# Patient Record
Sex: MALE | Race: WHITE | NOT HISPANIC OR LATINO | Employment: OTHER | URBAN - METROPOLITAN AREA
[De-identification: names, ages, dates, MRNs, and addresses within clinical notes are randomized per-mention and may not be internally consistent; named-entity substitution may affect disease eponyms.]

---

## 2017-02-01 ENCOUNTER — OFFICE VISIT (OUTPATIENT)
Dept: FAMILY MEDICINE | Facility: CLINIC | Age: 77
End: 2017-02-01
Payer: MEDICARE

## 2017-02-01 VITALS
SYSTOLIC BLOOD PRESSURE: 131 MMHG | DIASTOLIC BLOOD PRESSURE: 70 MMHG | TEMPERATURE: 98 F | BODY MASS INDEX: 26.32 KG/M2 | WEIGHT: 173.63 LBS | HEART RATE: 55 BPM | OXYGEN SATURATION: 98 % | HEIGHT: 68 IN

## 2017-02-01 DIAGNOSIS — R10.11 RUQ ABDOMINAL PAIN: Primary | ICD-10-CM

## 2017-02-01 PROCEDURE — 99999 PR PBB SHADOW E&M-EST. PATIENT-LVL III: CPT | Mod: PBBFAC,,, | Performed by: NURSE PRACTITIONER

## 2017-02-01 PROCEDURE — 1125F AMNT PAIN NOTED PAIN PRSNT: CPT | Mod: S$GLB,,, | Performed by: NURSE PRACTITIONER

## 2017-02-01 PROCEDURE — 1159F MED LIST DOCD IN RCRD: CPT | Mod: S$GLB,,, | Performed by: NURSE PRACTITIONER

## 2017-02-01 PROCEDURE — 99213 OFFICE O/P EST LOW 20 MIN: CPT | Mod: S$GLB,,, | Performed by: NURSE PRACTITIONER

## 2017-02-01 PROCEDURE — 1157F ADVNC CARE PLAN IN RCRD: CPT | Mod: S$GLB,,, | Performed by: NURSE PRACTITIONER

## 2017-02-01 PROCEDURE — 1160F RVW MEDS BY RX/DR IN RCRD: CPT | Mod: S$GLB,,, | Performed by: NURSE PRACTITIONER

## 2017-02-01 NOTE — PROGRESS NOTES
"History of Present Illness   Joo York is a 77 y.o. man with no significant past medical history who presents today with RUQ abdominal pain. He states pain has been intermittent over the past three days. The pain is a sharp pain which lasts for seconds-minutes and resolves spontaneously. He denies associated nausea, vomiting, or diarrhea. He has been having normal bowel movements. No black tarry stools or blood in stool. He has no had fevers. Since onset of pain he has switched to a bland diet which has helped somewhat. Pain is not reproducible, and he denies known injury or trauma. Pain is not present on exam today. He has no additional complaints and is otherwise healthy on today's visit.       History reviewed. No pertinent past medical history.    Past Surgical History   Procedure Laterality Date    Vasectomy      Tonsillectomy         Social History     Social History    Marital status:      Spouse name: N/A    Number of children: N/A    Years of education: N/A     Social History Main Topics    Smoking status: Never Smoker    Smokeless tobacco: None    Alcohol use None    Drug use: None    Sexual activity: Not Asked     Other Topics Concern    None     Social History Narrative       Family History   Problem Relation Age of Onset    Cancer Brother        Review of Systems  Review of Systems   Constitutional: Negative for chills and fever.   Respiratory: Negative for shortness of breath.    Cardiovascular: Negative for chest pain and palpitations.   Gastrointestinal: Positive for abdominal pain (RUQ). Negative for blood in stool, constipation, diarrhea, heartburn, nausea and vomiting.   Genitourinary: Negative for dysuria, flank pain, frequency and urgency.     A complete review of systems was otherwise negative.    Physical Exam  Visit Vitals    /70 (BP Location: Left arm, Patient Position: Sitting, BP Method: Manual)    Pulse (!) 55    Temp 98.2 °F (36.8 °C) (Oral)    Ht 5' 8" " (1.727 m)    Wt 78.8 kg (173 lb 9.8 oz)    SpO2 98%    BMI 26.4 kg/m2     General appearance: alert, appears stated age, cooperative and no distress  Back: symmetric, no curvature. ROM normal. No CVA tenderness.  Lungs: clear to auscultation bilaterally  Chest wall: no tenderness  Heart: regular rate and rhythm, S1, S2 normal, no murmur, click, rub or gallop  Abdomen: soft, non-tender; bowel sounds normal; no masses,  no organomegaly and no rebound tenderness, guarding, or rigidity.   Neurologic: Grossly normal    Assessment/Plan  RUQ abdominal pain  Patient is nontoxic and well appearing on exam today. Outpatient ultrasound ordered to evaluate for possible gallbladder pathology. He will schedule appointment with a PCP to establish care. Instructed patient to go to ER if he develops persistent pain, fever, nausea, vomiting, or other worrisome complaints. Continue bland diet and advance as tolerated. Contact me for any questions or concerns. He has verbalized understanding and is in agreement with plan of care.   -     US Abdomen Complete; Future; Expected date: 2/1/17      Return if symptoms worsen or fail to improve.

## 2017-02-01 NOTE — PATIENT INSTRUCTIONS
Uncertain Causes of Abdominal Pain (Male)  Based on your visit today, the exact cause of your abdominal pain is not clear. Your exam and tests do not suggest a dangerous cause at this time. However, the signs of a serious problem may take more time to appear. Although your evaluation was reassuring today, sometimes early in the course of many conditions, exam and lab tests can appear normal. Therefore, it is important for you to watch for any new symptoms or worsening of your condition.  It may not be obvious what caused your symptoms. Pay attention to things that do seem to make your symptoms worse or better and discuss this with your doctor when you follow up.  The evaluation of abdominal pain in the emergency department may only require an exam by the doctor or it may include blood, urine or imaging studies, depending on many factors. Sometimes exams and tests can identify a cause but in many cases, a clear cause is not found. Further testing at follow up visits may help to suggest a clear diagnosis.  Home care  · Rest as much as you can until your next exam.  · Try to avoid any medicines (unless otherwise directed by your doctor), foods, activities, or other factors that may have contributed to your symptoms.  · Try to eat foods that you know that you have tolerated well in the past. Certain diets may be recommended for some conditions that cause abdominal pain. However, since the cause of your symptoms may not be clear, discuss your diet more with your healthcare provider or specialist for further recommendations.   · If you have diarrhea, it may help to avoid dairy (lactose) for the time being. A low fat, low fiber diet can also help.  · Eating several small meals per day as opposed to 2 or 3 larger meals may help.  · Avoid dehydration. Make sure to drink plenty of water. Other options include broth, soup, gelatin, sports drinks, or other clear liquids.  · Watch closely for anything that may make your  symptoms worse or better. Pay close attention to symptoms below that may mean your condition is getting worse.  Follow-up care  Follow up with your healthcare provider if your symptoms are not improving, or as advised. In some cases, you may need more testing.  When to seek medical advice  Call your healthcare provider right away if any of these occur:  · Pain is becoming worse  · You are unable to take your medicines or can't keep water down due to excessive vomiting  · Swelling of the abdomen  · Fever of 100.4ºF (38ºC) or higher, or as directed by your healthcare provider  · Blood in vomit or bowel movements (dark red or black color)  · Jaundice (yellow color of eyes and skin)  · New onset of weakness, dizziness or fainting  · New onset of chest, arm, back, neck or jaw pain  © 9563-2698 [employer]. [address] [place] [address], [place] [address]. All rights reserved. This information is not intended as a substitute for professional medical care. Always follow your healthcare professional's instructions.

## 2017-02-01 NOTE — MR AVS SNAPSHOT
"    [facility]  [address] [place] [address]  Phone: [phone]  Fax: [phone]                  Joo York   2017 11:00 AM   Office Visit    Description:  Male : 1940   Provider:  Tenisha Perez NP   Department:  [facility]           Reason for Visit     PAIN ON RIGHT SIDE           Diagnoses this Visit        Comments    RUQ abdominal pain    -  Primary            To Do List           Future Appointments        Provider Department Dept Phone    2017 10:30 AM [facility] US1 - ROOM 3 [facility] [phone]      Goals (5 Years of Data)     None      Follow-Up and Disposition     Return if symptoms worsen or fail to improve.      [facility]     [facility] Care [facility] -  Assistance  Registered nurses in the [facility] provide clinical advisement, health education, appointment booking, and other advisory services.  Call for this free service at [phone].             Medications           Message regarding Medications     Verify the changes and/or additions to your medication regime listed below are the same as discussed with your clinician today.  If any of these changes or additions are incorrect, please notify your healthcare provider.             Verify that the below list of medications is an accurate representation of the medications you are currently taking.  If none reported, the list may be blank. If incorrect, please contact your healthcare provider. Carry this list with you in case of emergency.                Clinical Reference Information           Vital Signs - Last Recorded  Most recent update: 2017 10:57 AM by Lynda Carreon    BP Pulse Temp Ht Wt SpO2    131/70 (BP Location: Left arm, Patient Position: Sitting, BP Method: Manual) (!) 55 98.2 °F (36.8 °C) (Oral) 5' 8" (1.727 m) 78.8 kg (173 lb 9.8 oz) 98%    BMI                26.4 kg/m2          Blood Pressure          Most Recent " Value    BP  131/70      Allergies as of 2/1/2017     No Known Allergies      Immunizations Administered on Date of Encounter - 2/1/2017     None      Orders Placed During Today's Visit     Future Labs/Procedures Expected by Expires    US Abdomen Complete  2/1/2017 2/1/2018      Instructions      Uncertain Causes of Abdominal Pain (Male)  Based on your visit today, the exact cause of your abdominal pain is not clear. Your exam and tests do not suggest a dangerous cause at this time. However, the signs of a serious problem may take more time to appear. Although your evaluation was reassuring today, sometimes early in the course of many conditions, exam and lab tests can appear normal. Therefore, it is important for you to watch for any new symptoms or worsening of your condition.  It may not be obvious what caused your symptoms. Pay attention to things that do seem to make your symptoms worse or better and discuss this with your doctor when you follow up.  The evaluation of abdominal pain in the emergency department may only require an exam by the doctor or it may include blood, urine or imaging studies, depending on many factors. Sometimes exams and tests can identify a cause but in many cases, a clear cause is not found. Further testing at follow up visits may help to suggest a clear diagnosis.  Home care  · Rest as much as you can until your next exam.  · Try to avoid any medicines (unless otherwise directed by your doctor), foods, activities, or other factors that may have contributed to your symptoms.  · Try to eat foods that you know that you have tolerated well in the past. Certain diets may be recommended for some conditions that cause abdominal pain. However, since the cause of your symptoms may not be clear, discuss your diet more with your healthcare provider or specialist for further recommendations.   · If you have diarrhea, it may help to avoid dairy (lactose) for the time being. A low fat, low fiber  diet can also help.  · Eating several small meals per day as opposed to 2 or 3 larger meals may help.  · Avoid dehydration. Make sure to drink plenty of water. Other options include broth, soup, gelatin, sports drinks, or other clear liquids.  · Watch closely for anything that may make your symptoms worse or better. Pay close attention to symptoms below that may mean your condition is getting worse.  Follow-up care  Follow up with your healthcare provider if your symptoms are not improving, or as advised. In some cases, you may need more testing.  When to seek medical advice  Call your healthcare provider right away if any of these occur:  · Pain is becoming worse  · You are unable to take your medicines or can't keep water down due to excessive vomiting  · Swelling of the abdomen  · Fever of 100.4ºF (38ºC) or higher, or as directed by your healthcare provider  · Blood in vomit or bowel movements (dark red or black color)  · Jaundice (yellow color of eyes and skin)  · New onset of weakness, dizziness or fainting  · New onset of chest, arm, back, neck or jaw pain  © 5566-8046 [employer]. [address] [place] [address]. All rights reserved. This information is not intended as a substitute for professional medical care. Always follow your healthcare professional's instructions.

## 2017-02-03 ENCOUNTER — HOSPITAL ENCOUNTER (OUTPATIENT)
Dept: RADIOLOGY | Facility: HOSPITAL | Age: 77
Discharge: HOME | End: 2017-02-03
Attending: NURSE PRACTITIONER
Payer: MEDICARE

## 2017-02-03 ENCOUNTER — OFFICE VISIT (OUTPATIENT)
Dept: FAMILY MEDICINE | Facility: CLINIC | Age: 77
End: 2017-02-03
Payer: MEDICARE

## 2017-02-03 ENCOUNTER — PATIENT MESSAGE (OUTPATIENT)
Dept: FAMILY MEDICINE | Facility: CLINIC | Age: 77
End: 2017-02-03

## 2017-02-03 VITALS
HEIGHT: 68 IN | RESPIRATION RATE: 17 BRPM | OXYGEN SATURATION: 98 % | DIASTOLIC BLOOD PRESSURE: 90 MMHG | TEMPERATURE: 97 F | SYSTOLIC BLOOD PRESSURE: 144 MMHG | BODY MASS INDEX: 25.9 KG/M2 | WEIGHT: 170.88 LBS | HEART RATE: 52 BPM

## 2017-02-03 DIAGNOSIS — R10.11 RUQ ABDOMINAL PAIN: ICD-10-CM

## 2017-02-03 DIAGNOSIS — K75.81 NASH (NONALCOHOLIC STEATOHEPATITIS): ICD-10-CM

## 2017-02-03 DIAGNOSIS — R10.11 RIGHT UPPER QUADRANT ABDOMINAL PAIN: Primary | ICD-10-CM

## 2017-02-03 PROCEDURE — 1159F MED LIST DOCD IN RCRD: CPT | Mod: S$GLB,,, | Performed by: INTERNAL MEDICINE

## 2017-02-03 PROCEDURE — 99999 PR PBB SHADOW E&M-EST. PATIENT-LVL III: CPT | Mod: PBBFAC,,, | Performed by: INTERNAL MEDICINE

## 2017-02-03 PROCEDURE — 99214 OFFICE O/P EST MOD 30 MIN: CPT | Mod: S$GLB,,, | Performed by: INTERNAL MEDICINE

## 2017-02-03 PROCEDURE — 1157F ADVNC CARE PLAN IN RCRD: CPT | Mod: S$GLB,,, | Performed by: INTERNAL MEDICINE

## 2017-02-03 PROCEDURE — 76700 US EXAM ABDOM COMPLETE: CPT | Mod: 26,,, | Performed by: RADIOLOGY

## 2017-02-03 PROCEDURE — 1125F AMNT PAIN NOTED PAIN PRSNT: CPT | Mod: S$GLB,,, | Performed by: INTERNAL MEDICINE

## 2017-02-03 PROCEDURE — 1160F RVW MEDS BY RX/DR IN RCRD: CPT | Mod: S$GLB,,, | Performed by: INTERNAL MEDICINE

## 2017-02-03 RX ORDER — TIZANIDINE 4 MG/1
4 TABLET ORAL NIGHTLY PRN
Qty: 15 TABLET | Refills: 0 | Status: SHIPPED | OUTPATIENT
Start: 2017-02-03 | End: 2017-02-13

## 2017-02-03 NOTE — PROGRESS NOTES
"Subjective:       Patient ID: Joo York is a 77 y.o. male.    Chief Complaint: Establish Care and Abdominal Pain    HPI Comments: Abdominal pain x five days    HPI: 78 y/o reports five day history of right upper quadrent pain. Pain worse when lying on right side at night no overlying skin changes no change with PO intake no nausea/vomitting. No GERD type symptoms no change in pain with movement of right shoulder/arm. Did try to hang a television two days prior to onset of pain. No pain currently riding bike for exercise three to four tiimes per week. Has tried tyelonol and topical heat which help to relieve the pain    Review of Systems   Constitutional: Negative for activity change, fever and unexpected weight change.   HENT: Negative for congestion, rhinorrhea, sore throat and trouble swallowing.    Eyes: Negative for photophobia and redness.   Respiratory: Negative for cough, chest tightness, shortness of breath and wheezing.    Cardiovascular: Negative for chest pain, palpitations and leg swelling.   Gastrointestinal: Negative for abdominal pain, blood in stool, constipation, diarrhea, nausea and vomiting.   Endocrine: Negative for cold intolerance, heat intolerance and polyuria.   Genitourinary: Negative for decreased urine volume, difficulty urinating, dysuria and urgency.   Musculoskeletal: Negative for arthralgias and back pain.   Skin: Negative for rash.   Neurological: Negative for dizziness, syncope, weakness and headaches.   Psychiatric/Behavioral: Negative for dysphoric mood, sleep disturbance and suicidal ideas.       Objective:     Vitals:    02/03/17 1042   BP: (!) 144/90   BP Location: Right arm   Patient Position: Sitting   BP Method: Manual   Pulse: (!) 52   Resp: 17   Temp: 97.4 °F (36.3 °C)   TempSrc: Oral   SpO2: 98%   Weight: 77.5 kg (170 lb 13.7 oz)   Height: 5' 8" (1.727 m)          Physical Exam   Constitutional: He is oriented to person, place, and time. He appears well-developed and " well-nourished.   HENT:   Head: Normocephalic and atraumatic.   Eyes: Conjunctivae are normal. Pupils are equal, round, and reactive to light.   Neck: Normal range of motion.   Cardiovascular: Normal rate and regular rhythm.  Exam reveals no gallop and no friction rub.    No murmur heard.  Pulmonary/Chest: Effort normal and breath sounds normal. He has no wheezes. He has no rales.   Abdominal: Soft. Bowel sounds are normal. There is no tenderness. There is no rebound and no guarding.   Negative willoughby's sign   Musculoskeletal: Normal range of motion. He exhibits no edema or tenderness.   Neurological: He is alert and oriented to person, place, and time. No cranial nerve deficit.   Skin: Skin is warm and dry.   No rashes or skin changes over axilla   Psychiatric: He has a normal mood and affect.     RUQ ultrasound done today shows hypoechoic liver withou distinct masses no evidence of cholelithiasis small punctate stone in right renal pelvis  Assessment:       1. Right upper quadrant abdominal pain    2. BARAHONA (nonalcoholic steatohepatitis)        Plan:         1. Suspect MSK as cause given relief with topical heat and APAP, given evidence of fatting liver hold APAP topical heating cream daily and muscle relaxer nightly. Check urine for blood but doubt imaged renal stone causing symptoms    2. lft's and lipid panel today

## 2017-02-03 NOTE — MR AVS SNAPSHOT
[facility]  [address]  [place] [address]  Phone: [phone]                  Joo York   2/3/2017 11:00 AM   Office Visit    Description:  Male : 1940   Provider:  Keegan Watson MD   Department:  [facility]           Reason for Visit     Establish Care     Abdominal Pain           Diagnoses this Visit        Comments    Right upper quadrant abdominal pain    -  Primary     BARAHONA (nonalcoholic steatohepatitis)                To Do List           Goals (5 Years of Data)     None       These Medications        Disp Refills Start End    tizanidine (ZANAFLEX) 4 MG tablet 15 tablet 0 2/3/2017 2017    Take 1 tablet (4 mg total) by mouth nightly as needed. - Oral    Pharmacy: [facility] - [facility] [address] #: [phone]         [facility]     [facility] -  Assistance  Registered nurses in the [facility] provide clinical advisement, health education, appointment booking, and other advisory services.  Call for this free service at [phone].             Medications           Message regarding Medications     Verify the changes and/or additions to your medication regime listed below are the same as discussed with your clinician today.  If any of these changes or additions are incorrect, please notify your healthcare provider.        START taking these NEW medications        Refills    tizanidine (ZANAFLEX) 4 MG tablet 0    Sig: Take 1 tablet (4 mg total) by mouth nightly as needed.    Class: Normal    Route: Oral           Verify that the below list of medications is an accurate representation of the medications you are currently taking.  If none reported, the list may be blank. If incorrect, please contact your healthcare provider. Carry this list with you in case of emergency.           Current Medications     KRILL/OM-3/DHA/EPA/PHOSPHO/AST (MEGARED OMEGA-3 KRILL OIL ORAL) Take  "by mouth.    multivitamin-minerals-lutein Tab Take 1 tablet by mouth once daily.    SAW PALMETTO ORAL Take by mouth.    SAW/VIT E/SOD FRANSISCO/LYC/BETA/PYG (PROSTATE HEALTH ORAL) Take by mouth.    tizanidine (ZANAFLEX) 4 MG tablet Take 1 tablet (4 mg total) by mouth nightly as needed.           Clinical Reference Information           Your Vitals Were     BP Pulse Temp Resp Height Weight    144/90 (BP Location: Right arm, Patient Position: Sitting, BP Method: Manual) 52 97.4 °F (36.3 °C) (Oral) 17 5' 8" (1.727 m) 77.5 kg (170 lb 13.7 oz)    SpO2 BMI             98% 25.98 kg/m2         Blood Pressure          Most Recent Value    BP  (!)  144/90      Allergies as of 2/3/2017     No Known Allergies      Immunizations Administered on Date of Encounter - 2/3/2017     None      Orders Placed During Today's Visit     Future Labs/Procedures Expected by Expires    CBC auto differential  2/3/2017 2/3/2018    Comprehensive metabolic panel  2/3/2017 2/3/2018    Lipid panel  2/3/2017 2/3/2018    Urinalysis  2/3/2017 4/4/2018      Instructions    capscasin (over the counter heating cream) apply to skin once to twice per day       Language Assistance Services     ATTENTION: Language assistance services are available, free of charge. Please call [phone].      ATENCIÓN: Si maria elenala belkis, tiene a hensley disposición servicios gratuitos de asistencia lingüística. Llame al [phone].     CHÚ Ý: N?u b?n nói Ti?ng Vi?t, có các d?ch v? h? tr? ngôn ng? mi?n phí dành cho b?n. G?i s? [phone].         [facility] complies with applicable Federal civil rights laws and does not discriminate on the basis of race, color, national origin, age, disability, or sex.        "

## 2017-02-06 ENCOUNTER — OFFICE VISIT (OUTPATIENT)
Dept: FAMILY MEDICINE | Facility: CLINIC | Age: 77
End: 2017-02-06
Payer: MEDICARE

## 2017-02-06 VITALS
HEART RATE: 59 BPM | HEIGHT: 68 IN | SYSTOLIC BLOOD PRESSURE: 162 MMHG | RESPIRATION RATE: 17 BRPM | BODY MASS INDEX: 26.4 KG/M2 | TEMPERATURE: 98 F | OXYGEN SATURATION: 97 % | DIASTOLIC BLOOD PRESSURE: 88 MMHG | WEIGHT: 174.19 LBS

## 2017-02-06 DIAGNOSIS — B02.9 HERPES ZOSTER WITHOUT COMPLICATION: Primary | ICD-10-CM

## 2017-02-06 DIAGNOSIS — R03.0 ELEVATED BLOOD PRESSURE READING: ICD-10-CM

## 2017-02-06 PROCEDURE — 99213 OFFICE O/P EST LOW 20 MIN: CPT | Mod: S$GLB,,, | Performed by: INTERNAL MEDICINE

## 2017-02-06 PROCEDURE — 1157F ADVNC CARE PLAN IN RCRD: CPT | Mod: S$GLB,,, | Performed by: INTERNAL MEDICINE

## 2017-02-06 PROCEDURE — 1125F AMNT PAIN NOTED PAIN PRSNT: CPT | Mod: S$GLB,,, | Performed by: INTERNAL MEDICINE

## 2017-02-06 PROCEDURE — 1159F MED LIST DOCD IN RCRD: CPT | Mod: S$GLB,,, | Performed by: INTERNAL MEDICINE

## 2017-02-06 PROCEDURE — 99999 PR PBB SHADOW E&M-EST. PATIENT-LVL III: CPT | Mod: PBBFAC,,, | Performed by: INTERNAL MEDICINE

## 2017-02-06 PROCEDURE — 1160F RVW MEDS BY RX/DR IN RCRD: CPT | Mod: S$GLB,,, | Performed by: INTERNAL MEDICINE

## 2017-02-06 RX ORDER — METHYLPREDNISOLONE 4 MG/1
TABLET ORAL
Qty: 1 PACKAGE | Refills: 0 | Status: SHIPPED | OUTPATIENT
Start: 2017-02-06 | End: 2017-02-20

## 2017-02-06 RX ORDER — VALACYCLOVIR HYDROCHLORIDE 1 G/1
1000 TABLET, FILM COATED ORAL 3 TIMES DAILY
Qty: 21 TABLET | Refills: 0 | Status: SHIPPED | OUTPATIENT
Start: 2017-02-06 | End: 2017-10-06 | Stop reason: ALTCHOICE

## 2017-02-06 NOTE — PROGRESS NOTES
"Subjective:       Patient ID: Joo York is a 77 y.o. male.    Chief Complaint: Abdominal Pain    HPI Comments: F/u flank pain    HPI: 78 y/o seen three days ago for five day history of right abdominal pain. Since last visit trialed muscle relaxer which helped with night time pain but over last two days has developed "burning sensation of right abdomen raditaiting to back. This morning noted red "bumps" on abdomen no prior similar skin rash no involvement of face or eyes    Review of Systems   Constitutional: Negative for activity change, fever and unexpected weight change.   HENT: Negative for congestion, rhinorrhea, sore throat and trouble swallowing.    Eyes: Negative for photophobia and redness.   Respiratory: Negative for cough, chest tightness, shortness of breath and wheezing.    Cardiovascular: Negative for chest pain, palpitations and leg swelling.   Gastrointestinal: Negative for abdominal pain, blood in stool, constipation, diarrhea, nausea and vomiting.   Endocrine: Negative for cold intolerance, heat intolerance and polyuria.   Genitourinary: Negative for decreased urine volume, difficulty urinating, dysuria and urgency.   Musculoskeletal: Negative for arthralgias and back pain.   Skin: Positive for rash.   Neurological: Negative for dizziness, syncope, weakness and headaches.   Psychiatric/Behavioral: Negative for dysphoric mood, sleep disturbance and suicidal ideas.       Objective:     Vitals:    02/06/17 1027   BP: (!) 162/88   BP Location: Left arm   Patient Position: Sitting   BP Method: Manual   Pulse: (!) 59   Resp: 17   Temp: 97.6 °F (36.4 °C)   TempSrc: Oral   SpO2: 97%   Weight: 79 kg (174 lb 2.6 oz)   Height: 5' 8" (1.727 m)          Physical Exam   Constitutional: He is oriented to person, place, and time. He appears well-developed and well-nourished.   HENT:   Head: Normocephalic and atraumatic.   Eyes: Conjunctivae are normal. Pupils are equal, round, and reactive to light.   Neck: " Normal range of motion.   Cardiovascular: Normal rate and regular rhythm.  Exam reveals no gallop and no friction rub.    No murmur heard.  Pulmonary/Chest: Effort normal and breath sounds normal. He has no wheezes. He has no rales.   Abdominal: Soft. Bowel sounds are normal. There is no tenderness. There is no rebound and no guarding.   Musculoskeletal: Normal range of motion. He exhibits no edema or tenderness.   Neurological: He is alert and oriented to person, place, and time. No cranial nerve deficit.   Skin: Skin is warm and dry. Rash noted.   Right upper abdomen shows four patches of erythematous vesicles in dermatomal distribution    Psychiatric: He has a normal mood and affect.       Assessment:       1. Herpes zoster without complication    2. Elevated blood pressure reading        Plan:    1. Antiviral tid x seven days short steroid course for anti inflammatory effect can continue muscle relaxer    2. Follow up in two weeks to monitor suspect some relation to pain from #1

## 2017-02-20 ENCOUNTER — OFFICE VISIT (OUTPATIENT)
Dept: FAMILY MEDICINE | Facility: CLINIC | Age: 77
End: 2017-02-20
Payer: MEDICARE

## 2017-02-20 VITALS
HEART RATE: 52 BPM | WEIGHT: 173.63 LBS | SYSTOLIC BLOOD PRESSURE: 128 MMHG | HEIGHT: 68 IN | BODY MASS INDEX: 26.32 KG/M2 | TEMPERATURE: 98 F | RESPIRATION RATE: 17 BRPM | DIASTOLIC BLOOD PRESSURE: 70 MMHG | OXYGEN SATURATION: 97 %

## 2017-02-20 DIAGNOSIS — B02.9 HERPES ZOSTER WITHOUT COMPLICATION: Primary | ICD-10-CM

## 2017-02-20 PROCEDURE — 1157F ADVNC CARE PLAN IN RCRD: CPT | Mod: S$GLB,,, | Performed by: INTERNAL MEDICINE

## 2017-02-20 PROCEDURE — 1126F AMNT PAIN NOTED NONE PRSNT: CPT | Mod: S$GLB,,, | Performed by: INTERNAL MEDICINE

## 2017-02-20 PROCEDURE — 1159F MED LIST DOCD IN RCRD: CPT | Mod: S$GLB,,, | Performed by: INTERNAL MEDICINE

## 2017-02-20 PROCEDURE — 99213 OFFICE O/P EST LOW 20 MIN: CPT | Mod: S$GLB,,, | Performed by: INTERNAL MEDICINE

## 2017-02-20 PROCEDURE — 99999 PR PBB SHADOW E&M-EST. PATIENT-LVL III: CPT | Mod: PBBFAC,,, | Performed by: INTERNAL MEDICINE

## 2017-02-20 RX ORDER — IBUPROFEN 200 MG
200 TABLET ORAL
COMMUNITY
End: 2017-10-04

## 2017-02-20 NOTE — PROGRESS NOTES
"Subjective:       Patient ID: Joo York is a 77 y.o. male.    Chief Complaint: Herpes Zoster and Follow-up    HPI Comments: F/u shingles    HPI: 78 y/o see two weeks ago with flare of right abdominal pain with subsequent skin changes consistent with zoster. Completed valacylovir pain significantly improved still some itching about site but no logner as sensitive. No new skin lesions     Review of Systems   Constitutional: Negative for activity change, appetite change, fatigue, fever and unexpected weight change.   HENT: Negative for ear pain, rhinorrhea and sore throat.    Eyes: Negative for discharge and visual disturbance.   Respiratory: Negative for chest tightness, shortness of breath and wheezing.    Cardiovascular: Negative for chest pain, palpitations and leg swelling.   Gastrointestinal: Negative for abdominal pain, constipation and diarrhea.   Endocrine: Negative for cold intolerance and heat intolerance.   Genitourinary: Negative for dysuria and hematuria.   Musculoskeletal: Negative for joint swelling and neck stiffness.   Skin: Negative for rash.   Neurological: Negative for dizziness, syncope, weakness and headaches.   Psychiatric/Behavioral: Negative for suicidal ideas.       Objective:     Vitals:    02/20/17 1107   BP: 128/70   BP Location: Left arm   Patient Position: Sitting   BP Method: Manual   Pulse: (!) 52   Resp: 17   Temp: 98.3 °F (36.8 °C)   TempSrc: Oral   SpO2: 97%   Weight: 78.8 kg (173 lb 9.8 oz)   Height: 5' 8" (1.727 m)          Physical Exam   Constitutional: He is oriented to person, place, and time. He appears well-developed and well-nourished.   HENT:   Head: Normocephalic and atraumatic.   Eyes: Conjunctivae are normal. Pupils are equal, round, and reactive to light.   Neck: Normal range of motion.   Cardiovascular: Normal rate and regular rhythm.  Exam reveals no gallop and no friction rub.    No murmur heard.  Pulmonary/Chest: Effort normal and breath sounds normal. He has no " wheezes. He has no rales.   Abdominal: Soft. Bowel sounds are normal. There is no tenderness. There is no rebound and no guarding.   Musculoskeletal: Normal range of motion. He exhibits no edema or tenderness.   Neurological: He is alert and oriented to person, place, and time. No cranial nerve deficit.   Skin: Skin is warm and dry.   Right axila in dermatomal patterns shows three dried patches no crusting or ulceration   Psychiatric: He has a normal mood and affect.       Assessment:       1. Herpes zoster without complication        Plan:    resolving daily moisturizer to skin, recommend shingles vaccine in foru weeks

## 2017-03-27 ENCOUNTER — TELEPHONE (OUTPATIENT)
Dept: FAMILY MEDICINE | Facility: CLINIC | Age: 77
End: 2017-03-27

## 2017-03-27 DIAGNOSIS — Z23 NEED FOR SHINGLES VACCINE: Primary | ICD-10-CM

## 2017-03-27 NOTE — TELEPHONE ENCOUNTER
----- Message from Breann Ruiz sent at 3/27/2017  1:29 PM CDT -----  Contact: self  Pt is requesting Shingle vaccine. Please call @ [phone]        Thanks

## 2017-04-05 ENCOUNTER — PATIENT MESSAGE (OUTPATIENT)
Dept: FAMILY MEDICINE | Facility: CLINIC | Age: 77
End: 2017-04-05

## 2017-04-05 DIAGNOSIS — Z23 NEED FOR ZOSTER VACCINATION: Primary | ICD-10-CM

## 2017-04-07 ENCOUNTER — TELEPHONE (OUTPATIENT)
Dept: FAMILY MEDICINE | Facility: CLINIC | Age: 77
End: 2017-04-07

## 2017-04-07 NOTE — TELEPHONE ENCOUNTER
Spoke with patient and he will go to his pharmacy, the [facility] to receive his injection. His insurance will not pay for him to receive this at the clinic. He was explained this and gladly accepted this and will remove from the injection schedule.

## 2017-10-04 ENCOUNTER — OFFICE VISIT (OUTPATIENT)
Dept: FAMILY MEDICINE | Facility: CLINIC | Age: 77
End: 2017-10-04
Payer: MEDICARE

## 2017-10-04 ENCOUNTER — APPOINTMENT (OUTPATIENT)
Dept: RADIOLOGY | Facility: HOSPITAL | Age: 77
End: 2017-10-04
Attending: INTERNAL MEDICINE
Payer: MEDICARE

## 2017-10-04 VITALS
DIASTOLIC BLOOD PRESSURE: 84 MMHG | BODY MASS INDEX: 26.73 KG/M2 | SYSTOLIC BLOOD PRESSURE: 136 MMHG | WEIGHT: 176.38 LBS | HEART RATE: 60 BPM | HEIGHT: 68 IN | RESPIRATION RATE: 20 BRPM | TEMPERATURE: 98 F | OXYGEN SATURATION: 98 %

## 2017-10-04 DIAGNOSIS — R07.89 ATYPICAL CHEST PAIN: ICD-10-CM

## 2017-10-04 DIAGNOSIS — K21.9 GASTROESOPHAGEAL REFLUX DISEASE, ESOPHAGITIS PRESENCE NOT SPECIFIED: ICD-10-CM

## 2017-10-04 DIAGNOSIS — Z23 NEED FOR INFLUENZA VACCINATION: ICD-10-CM

## 2017-10-04 DIAGNOSIS — R07.89 ATYPICAL CHEST PAIN: Primary | ICD-10-CM

## 2017-10-04 PROCEDURE — 71020 XR CHEST PA AND LATERAL: CPT | Mod: 26,,, | Performed by: RADIOLOGY

## 2017-10-04 PROCEDURE — 90662 IIV NO PRSV INCREASED AG IM: CPT | Mod: S$GLB,,, | Performed by: INTERNAL MEDICINE

## 2017-10-04 PROCEDURE — 93005 ELECTROCARDIOGRAM TRACING: CPT | Mod: S$GLB,,, | Performed by: INTERNAL MEDICINE

## 2017-10-04 PROCEDURE — 99214 OFFICE O/P EST MOD 30 MIN: CPT | Mod: S$GLB,,, | Performed by: INTERNAL MEDICINE

## 2017-10-04 PROCEDURE — 93010 ELECTROCARDIOGRAM REPORT: CPT | Mod: S$GLB,,, | Performed by: INTERNAL MEDICINE

## 2017-10-04 PROCEDURE — 71020 XR CHEST PA AND LATERAL: CPT | Mod: TC,PN

## 2017-10-04 PROCEDURE — 99999 PR PBB SHADOW E&M-EST. PATIENT-LVL III: CPT | Mod: PBBFAC,,, | Performed by: INTERNAL MEDICINE

## 2017-10-04 PROCEDURE — 99499 UNLISTED E&M SERVICE: CPT | Mod: S$GLB,,, | Performed by: INTERNAL MEDICINE

## 2017-10-04 PROCEDURE — G0008 ADMIN INFLUENZA VIRUS VAC: HCPCS | Mod: S$GLB,,, | Performed by: INTERNAL MEDICINE

## 2017-10-04 NOTE — PROGRESS NOTES
High dose influenza vaccine Given to the pt as ordered by the MD. The patient tolerated well, I advised the patient to wait 15 minuets to observe for any vaccine reactions. The pt. Expressed an understanding.

## 2017-10-04 NOTE — PROGRESS NOTES
"Subjective:       Patient ID: Joo York is a 77 y.o. male.    Chief Complaint: Annual Exam and Chest Pain (off and on)    Chest Pain    This is a new problem. The current episode started 1 to 4 weeks ago (x 2 weeks). The onset quality is gradual. The problem occurs daily. The problem has been gradually improving. The pain is present in the lateral region (left upper pectoral). The pain is at a severity of 2/10. The pain is mild. The quality of the pain is described as burning. The pain does not radiate. Pertinent negatives include no back pain, cough, headaches, irregular heartbeat, lower extremity edema, nausea, palpitations, vomiting or weakness. Associated symptoms comments: Feels "fullness" in throat when leaning forward similar to refulx symptoms in past. Able to ride bike and walk > 1 mile without this pain. Associated with: leaning forward or eating. He has tried nothing for the symptoms. Risk factors include male gender.   Pertinent negatives for past medical history include no CAD and no diabetes.     Review of Systems   Constitutional: Negative for activity change and unexpected weight change.   HENT: Negative for hearing loss, rhinorrhea and trouble swallowing.    Eyes: Negative for discharge and visual disturbance.   Respiratory: Negative for cough, chest tightness and wheezing.    Cardiovascular: Positive for chest pain. Negative for palpitations.   Gastrointestinal: Negative for blood in stool, constipation, diarrhea, nausea and vomiting.   Endocrine: Negative for polydipsia and polyuria.   Genitourinary: Negative for hematuria and urgency.   Musculoskeletal: Negative for arthralgias, back pain, joint swelling and neck pain.   Neurological: Negative for weakness and headaches.   Psychiatric/Behavioral: Negative for confusion and dysphoric mood.       Objective:     Vitals:    10/04/17 1345 10/04/17 1410   BP: (!) 146/86 136/84   BP Location: Right arm Right arm   Patient Position: Sitting    BP " "Method: Medium (Manual)    Pulse: (!) 58 60   Resp: 20    Temp: 98.1 °F (36.7 °C)    TempSrc: Oral    SpO2: 98%    Weight: 80 kg (176 lb 5.9 oz)    Height: 5' 8" (1.727 m)           Physical Exam   Constitutional: He is oriented to person, place, and time. He appears well-developed and well-nourished.   HENT:   Head: Normocephalic and atraumatic.   Eyes: Conjunctivae are normal. Pupils are equal, round, and reactive to light.   Neck: Normal range of motion. No JVD present.   Cardiovascular: Normal rate and regular rhythm.  Exam reveals no gallop and no friction rub.    No murmur heard.  No LE edema   Pulmonary/Chest: Effort normal and breath sounds normal. He has no wheezes. He has no rales.   Abdominal: Soft. Bowel sounds are normal. There is no tenderness. There is no rebound and no guarding.   Musculoskeletal: Normal range of motion. He exhibits no edema or tenderness.   Neurological: He is alert and oriented to person, place, and time. No cranial nerve deficit.   Skin: Skin is warm and dry.   Psychiatric: He has a normal mood and affect.     EKG sinus camila without heart block no acute ischemic changes  Assessment:       1. Atypical chest pain    2. Gastroesophageal reflux disease, esophagitis presence not specified    3. Need for influenza vaccination        Plan:    1/2. Positional nature lack of exertional component. And similarity to past reflux symtpoms suspect GI component check cxr for mediastinal widening start H2 blocker nightly if no improvement would consider cards evaluation    3. Flu vaccine today       "

## 2017-10-05 ENCOUNTER — PATIENT MESSAGE (OUTPATIENT)
Dept: FAMILY MEDICINE | Facility: CLINIC | Age: 77
End: 2017-10-05

## 2017-10-06 ENCOUNTER — PATIENT MESSAGE (OUTPATIENT)
Dept: FAMILY MEDICINE | Facility: CLINIC | Age: 77
End: 2017-10-06

## 2017-10-17 ENCOUNTER — PATIENT MESSAGE (OUTPATIENT)
Dept: FAMILY MEDICINE | Facility: CLINIC | Age: 77
End: 2017-10-17

## 2018-03-22 ENCOUNTER — PES CALL (OUTPATIENT)
Dept: ADMINISTRATIVE | Facility: CLINIC | Age: 78
End: 2018-03-22

## 2018-07-11 ENCOUNTER — PES CALL (OUTPATIENT)
Dept: ADMINISTRATIVE | Facility: CLINIC | Age: 78
End: 2018-07-11

## 2018-08-02 ENCOUNTER — PATIENT MESSAGE (OUTPATIENT)
Dept: FAMILY MEDICINE | Facility: CLINIC | Age: 78
End: 2018-08-02

## 2018-08-02 ENCOUNTER — TELEPHONE (OUTPATIENT)
Dept: FAMILY MEDICINE | Facility: CLINIC | Age: 78
End: 2018-08-02

## 2018-08-02 NOTE — TELEPHONE ENCOUNTER
Informed pt that per  recommendations were for him to go tot he ED. Pt states he feels like he shouldn't have to go to the ED because this was a one time thing and he is 78 years old and he would rather come to the office to see a provider.

## 2018-08-02 NOTE — TELEPHONE ENCOUNTER
"Patient states that he passed a blood clot and urinated blood this week. Patient states the urine is now pink. Patient states he "feels" like he has to urinate but when he goes, nothing comes up. Patient denies c/o fever, pain or abdominal distention. Patient was instructed to go to the ER to have his bladder scanned and to be evaluated. Patient refused stating he would just rather come to the clinic.   "

## 2018-08-02 NOTE — TELEPHONE ENCOUNTER
Please let patient know that this is a medial emergency, not urinating in that time frame can be dangerous. He needs urgent evaluation and this is not something that can wait. Futhermore, he may need imaging a Pollack placed, which we cannot do here anyway

## 2018-08-02 NOTE — TELEPHONE ENCOUNTER
Spoke to patient myself by phone. He reports that he had an episode of bleeding two days ago. Since then he has been having frequency. No retention. Will keep appt.

## 2018-08-02 NOTE — TELEPHONE ENCOUNTER
Pt states he started having some frequent urination sometime ago but didn't bother about it until Tuesday he noticed a discharge of blood with some clots. Yesterday he noticed it was just pinkish in color but no clots. Today he states it's clear but he is going maybe every 10-15 minutes but hardly nothing is coming out. He states he is not having any pain  Just some discomfort a slight pressure when he is trying to urinate. He states no fever,abd pain,chills,nausea or body aches. He asked if he would need to give a urine sample upon appt he was told yes and also blood work. Pt was informed if symptoms change or get worse to go to the ED. Pt is scheduled to see  tomorrow morning

## 2018-08-03 ENCOUNTER — OFFICE VISIT (OUTPATIENT)
Dept: FAMILY MEDICINE | Facility: CLINIC | Age: 78
End: 2018-08-03
Payer: MEDICARE

## 2018-08-03 ENCOUNTER — OFFICE VISIT (OUTPATIENT)
Dept: UROLOGY | Facility: CLINIC | Age: 78
End: 2018-08-03
Payer: MEDICARE

## 2018-08-03 ENCOUNTER — LAB VISIT (OUTPATIENT)
Dept: LAB | Facility: HOSPITAL | Age: 78
End: 2018-08-03
Attending: FAMILY MEDICINE
Payer: MEDICARE

## 2018-08-03 VITALS
OXYGEN SATURATION: 96 % | SYSTOLIC BLOOD PRESSURE: 128 MMHG | DIASTOLIC BLOOD PRESSURE: 74 MMHG | RESPIRATION RATE: 17 BRPM | WEIGHT: 175.25 LBS | TEMPERATURE: 98 F | BODY MASS INDEX: 26.56 KG/M2 | HEIGHT: 68 IN | HEART RATE: 61 BPM

## 2018-08-03 VITALS
DIASTOLIC BLOOD PRESSURE: 78 MMHG | RESPIRATION RATE: 16 BRPM | SYSTOLIC BLOOD PRESSURE: 128 MMHG | BODY MASS INDEX: 26.07 KG/M2 | HEIGHT: 68 IN | WEIGHT: 172 LBS

## 2018-08-03 DIAGNOSIS — N39.0 URINARY TRACT INFECTION WITH HEMATURIA, SITE UNSPECIFIED: ICD-10-CM

## 2018-08-03 DIAGNOSIS — R31.0 GROSS HEMATURIA: ICD-10-CM

## 2018-08-03 DIAGNOSIS — R31.0 GROSS HEMATURIA: Primary | ICD-10-CM

## 2018-08-03 DIAGNOSIS — R35.0 URINARY FREQUENCY: ICD-10-CM

## 2018-08-03 DIAGNOSIS — R31.9 URINARY TRACT INFECTION WITH HEMATURIA, SITE UNSPECIFIED: ICD-10-CM

## 2018-08-03 DIAGNOSIS — R30.0 DYSURIA: ICD-10-CM

## 2018-08-03 DIAGNOSIS — N40.1 BENIGN PROSTATIC HYPERPLASIA WITH LOWER URINARY TRACT SYMPTOMS, SYMPTOM DETAILS UNSPECIFIED: ICD-10-CM

## 2018-08-03 LAB
BACTERIA #/AREA URNS AUTO: ABNORMAL /HPF
BILIRUB SERPL-MCNC: NORMAL MG/DL
BILIRUB UR QL STRIP: NEGATIVE
BLOOD URINE, POC: 250
CLARITY UR REFRACT.AUTO: ABNORMAL
COLOR UR AUTO: ABNORMAL
COLOR, POC UA: NORMAL
GLUCOSE UR QL STRIP: NEGATIVE
GLUCOSE UR QL STRIP: NORMAL
HGB UR QL STRIP: ABNORMAL
HYALINE CASTS UR QL AUTO: 0 /LPF
KETONES UR QL STRIP: NEGATIVE
KETONES UR QL STRIP: NORMAL
LEUKOCYTE ESTERASE UR QL STRIP: ABNORMAL
LEUKOCYTE ESTERASE URINE, POC: NORMAL
MICROSCOPIC COMMENT: ABNORMAL
NITRITE UR QL STRIP: NEGATIVE
NITRITE, POC UA: NORMAL
PH UR STRIP: 7 [PH] (ref 5–8)
PH, POC UA: 6
POC RESIDUAL URINE VOLUME: 91 ML (ref 0–100)
PROT UR QL STRIP: ABNORMAL
PROTEIN, POC: 500
RBC #/AREA URNS AUTO: >100 /HPF (ref 0–4)
SP GR UR STRIP: 1.01 (ref 1–1.03)
SPECIFIC GRAVITY, POC UA: 1005
URN SPEC COLLECT METH UR: ABNORMAL
UROBILINOGEN UR STRIP-ACNC: NEGATIVE EU/DL
UROBILINOGEN, POC UA: NORMAL
WBC #/AREA URNS AUTO: 23 /HPF (ref 0–5)

## 2018-08-03 PROCEDURE — 99999 PR PBB SHADOW E&M-EST. PATIENT-LVL IV: CPT | Mod: PBBFAC,,, | Performed by: FAMILY MEDICINE

## 2018-08-03 PROCEDURE — 99999 PR PBB SHADOW E&M-EST. PATIENT-LVL IV: CPT | Mod: PBBFAC,,, | Performed by: NURSE PRACTITIONER

## 2018-08-03 PROCEDURE — 51798 US URINE CAPACITY MEASURE: CPT | Mod: S$GLB,,, | Performed by: NURSE PRACTITIONER

## 2018-08-03 PROCEDURE — 87086 URINE CULTURE/COLONY COUNT: CPT

## 2018-08-03 PROCEDURE — 88112 CYTOPATH CELL ENHANCE TECH: CPT | Mod: 26,,, | Performed by: PATHOLOGY

## 2018-08-03 PROCEDURE — 99204 OFFICE O/P NEW MOD 45 MIN: CPT | Mod: 25,S$GLB,, | Performed by: NURSE PRACTITIONER

## 2018-08-03 PROCEDURE — 88112 CYTOPATH CELL ENHANCE TECH: CPT | Performed by: PATHOLOGY

## 2018-08-03 PROCEDURE — 87086 URINE CULTURE/COLONY COUNT: CPT | Mod: 59

## 2018-08-03 PROCEDURE — 81001 URINALYSIS AUTO W/SCOPE: CPT

## 2018-08-03 PROCEDURE — 99214 OFFICE O/P EST MOD 30 MIN: CPT | Mod: 25,S$GLB,, | Performed by: FAMILY MEDICINE

## 2018-08-03 PROCEDURE — 96372 THER/PROPH/DIAG INJ SC/IM: CPT | Mod: S$GLB,,, | Performed by: FAMILY MEDICINE

## 2018-08-03 PROCEDURE — 81001 URINALYSIS AUTO W/SCOPE: CPT | Mod: S$GLB,,, | Performed by: NURSE PRACTITIONER

## 2018-08-03 RX ORDER — TAMSULOSIN HYDROCHLORIDE 0.4 MG/1
0.4 CAPSULE ORAL DAILY
Qty: 30 CAPSULE | Refills: 0 | Status: SHIPPED | OUTPATIENT
Start: 2018-08-03 | End: 2018-08-31 | Stop reason: SDUPTHER

## 2018-08-03 RX ORDER — CEFTRIAXONE 250 MG/1
250 INJECTION, POWDER, FOR SOLUTION INTRAMUSCULAR; INTRAVENOUS
Status: COMPLETED | OUTPATIENT
Start: 2018-08-03 | End: 2018-08-03

## 2018-08-03 RX ORDER — PHENAZOPYRIDINE HYDROCHLORIDE 200 MG/1
200 TABLET, FILM COATED ORAL 3 TIMES DAILY PRN
Qty: 20 TABLET | Refills: 0 | Status: SHIPPED | OUTPATIENT
Start: 2018-08-03 | End: 2018-08-13

## 2018-08-03 RX ORDER — CIPROFLOXACIN 500 MG/1
500 TABLET ORAL 2 TIMES DAILY
Qty: 28 TABLET | Refills: 0 | Status: SHIPPED | OUTPATIENT
Start: 2018-08-03 | End: 2018-08-17

## 2018-08-03 RX ADMIN — CEFTRIAXONE 250 MG: 250 INJECTION, POWDER, FOR SOLUTION INTRAMUSCULAR; INTRAVENOUS at 10:08

## 2018-08-03 NOTE — LETTER
August 3, 2018      Randolph Morales Jr., MD  [address]  [place] [address]           [facility]  [address] [facility] [address]  [facility] [facility] [place] [address]  Phone: [phone]  Fax: [phone]          Patient: Joo York   MR Number: [id number]   YOB: 1940   Date of Visit: 8/3/2018       Dear Dr. Randolph Morales Jr.:    Thank you for referring Joo York to me for evaluation. Attached you will find relevant portions of my assessment and plan of care.    If you have questions, please do not hesitate to call me. I look forward to following oJo York along with you.    Sincerely,    Susana Adams, NP    Enclosure  CC:  No Recipients    If you would like to receive this communication electronically, please contact [email or web address] or [phone] to request more information on [facility] Link access.    For providers and/or their staff who would like to refer a patient to [facility], please contact us through our one-stop-shop provider referral line, [facility], at [phone].    If you feel you have received this communication in error or would no longer like to receive these types of communications, please e-mail [email or web address]

## 2018-08-03 NOTE — PROGRESS NOTES
"Subjective:       Patient ID: Joo York is a 78 y.o. male who is a new patient was referred by Randolph Morales Jr., MD  for evaluation of gross hematuria    Chief Complaint:   Chief Complaint   Patient presents with    Hematuria       Hematuria  Patient complains of gross hematuria. He reports blood clots the size of "pinpoint" during onset of symptoms. He has not had any more blood clots since this time.  Onset of hematuria was 3 days ago and was sudden in onset. He tells me that hematuria resolved x 2 days but returned this am. There is not a history of nephrolithiasis. There is not a history of urologic trauma. Other urologic symptoms include urgency, dysuria, frequency. Also reports nocturia x 4-5 (normally 1-2).  Patient admits to history of tobacco use. Former smoker x 15-20 years. He reports 1-2 packs/day. Patient reports he quit smoking 38 years ago.  Patient denies history of Agent Orange exposure, chronic Pollack catheter,  surgeries, occupational exposure, sexually transmitted diseases, trauma and urolithiasis. Prior workup has been none.  He denies known family history of  malignancy or prostate cancer.     He was seen by Dr. Morales this am prior to this office visit. He was prescribed empiric Cipro. He is not straining to void. Denies flank pain, suprapubic pressure/pain, or fever.    PVR (bladder scan) today - 91 ml    ACTIVE MEDICAL ISSUES:  Patient Active Problem List   Diagnosis    Dyslipidemia    Gross hematuria    Urinary frequency       PAST MEDICAL HISTORY  History reviewed. No pertinent past medical history.    PAST SURGICAL HISTORY:  Past Surgical History:   Procedure Laterality Date    TONSILLECTOMY      VASECTOMY         SOCIAL HISTORY:  Social History   Substance Use Topics    Smoking status: Never Smoker    Smokeless tobacco: Not on file    Alcohol use Not on file       FAMILY HISTORY:  Family History   Problem Relation Age of Onset    Cancer Brother        ALLERGIES AND " "MEDICATIONS: updated and reviewed.  Review of patient's allergies indicates:  No Known Allergies  Current Outpatient Prescriptions   Medication Sig    ciprofloxacin HCl (CIPRO) 500 MG tablet Take 1 tablet (500 mg total) by mouth 2 (two) times daily. for 14 days    DOCOSAHEXANOIC ACID/EPA (FISH OIL ORAL) Take 1,200 mg by mouth Daily.    MULTIVIT-MIN/FOLIC/VIT K/LYCOP (ONE-A-DAY MEN'S 50 PLUS ORAL) Take by mouth.    ranitidine (ZANTAC) 300 MG capsule Take 1 capsule (300 mg total) by mouth every evening. (Patient taking differently: Take 300 mg by mouth as needed. )    tamsulosin (FLOMAX) 0.4 mg Cap Take 1 capsule (0.4 mg total) by mouth once daily.    ZINC MTH/COPPER/SAW PALM/GNSG (PROSTATE HEALTH FORMULA ORAL) Take by mouth.    phenazopyridine (PYRIDIUM) 200 MG tablet Take 1 tablet (200 mg total) by mouth 3 (three) times daily as needed.     No current facility-administered medications for this visit.        Review of Systems   Constitutional: Negative for activity change, chills, fatigue, fever and unexpected weight change.   Eyes: Negative for discharge, redness and visual disturbance.   Respiratory: Negative for cough, shortness of breath and wheezing.    Cardiovascular: Negative for chest pain and leg swelling.   Gastrointestinal: Negative for abdominal distention, abdominal pain, constipation, diarrhea, nausea and vomiting.   Genitourinary: Positive for dysuria, frequency and hematuria. Negative for decreased urine volume, difficulty urinating, discharge, flank pain, penile pain, testicular pain and urgency.   Musculoskeletal: Negative for arthralgias, joint swelling and myalgias.   Skin: Negative for color change and rash.   Neurological: Negative for dizziness and light-headedness.   Psychiatric/Behavioral: Negative for behavioral problems and confusion. The patient is not nervous/anxious.        Objective:      Vitals:    08/03/18 1128   BP: 128/78   Resp: 16   Weight: 78 kg (172 lb)   Height: 5' 8" " (1.727 m)     Physical Exam   Constitutional: He is oriented to person, place, and time. He appears well-developed.   HENT:   Head: Normocephalic and atraumatic.   Nose: Nose normal.   Eyes: Conjunctivae are normal. Right eye exhibits no discharge. Left eye exhibits no discharge.   Neck: Normal range of motion. Neck supple. No tracheal deviation present. No thyromegaly present.   Cardiovascular: Normal rate and regular rhythm.    Pulmonary/Chest: Effort normal. No respiratory distress. He has no wheezes.   Abdominal: Soft. He exhibits no distension. There is no hepatosplenomegaly. There is no tenderness. There is no CVA tenderness. No hernia.   Genitourinary:   Genitourinary Comments: Patient declined exam   Musculoskeletal: Normal range of motion. He exhibits no edema.   Neurological: He is alert and oriented to person, place, and time.   Skin: Skin is warm and dry. No rash noted. No erythema.     Psychiatric: He has a normal mood and affect. His behavior is normal. Judgment normal.       Urine dipstick shows ++LE, +++protein, 250 RBCs.     Assessment:       1. Gross hematuria    2. Urinary frequency    3. Dysuria          Plan:       1. Gross hematuria   - Discussed etiology and workup of hematuria  - POCT urinalysis, dipstick or tablet reag  - Urine culture today  - Cytology, urine  - CT Urogram Abd Pelvis W WO; Future  - Cystoscopy; Future  - Creatinine, serum; Future    2. Urinary frequency  - POCT Bladder Scan    3. Dysuria  - Urine culture  - phenazopyridine (PYRIDIUM) 200 MG tablet; Take 1 tablet (200 mg total) by mouth 3 (three) times daily as needed.  Dispense: 20 tablet; Refill: 0            Follow-up for 2-3 weeks for cystoscopy.

## 2018-08-03 NOTE — PROGRESS NOTES
Routine Office Visit    Patient Name: Joo York    : 1940  MRN: [id number]    Subjective:  Joo is a 78 y.o. male who presents today for:   Chief Complaint   Patient presents with    Hematuria     pelvic area pressure no pain     78-year-old male comes in with complaint of blood in the urine 2 days ago.  He states that since then he has been urinating a lot more than normal.  He also reports that he has increased urge to urinate.  He also states that starting last night it burns somewhat to urinate.  He reports no history of urinary tract infections.  He reports no history of kidney stones.  He reports no trauma to the pelvic area.  He reports no catheterization.  He states that this morning he had another episode where he noticed a bit of blood.  He also reports that he used to be a smoker but quit over 30 years ago.  The patient reports that prior to this he used to have a weak urinary stream and would have dribbling.  He would also get up 2 times a night to urinate.  He reports no fevers or chills      Past Medical History  History reviewed. No pertinent past medical history.    Past Surgical History  Past Surgical History:   Procedure Laterality Date    TONSILLECTOMY      VASECTOMY          Family History  Family History   Problem Relation Age of Onset    Cancer Brother        Social History  Social History     Social History    Marital status:      Spouse name: N/A    Number of children: N/A    Years of education: N/A     Occupational History    Not on file.     Social History Main Topics    Smoking status: Never Smoker    Smokeless tobacco: Not on file    Alcohol use Not on file    Drug use: Unknown    Sexual activity: Not on file     Other Topics Concern    Not on file     Social History Narrative    No narrative on file       Current Medications  Current Outpatient Prescriptions on File Prior to Visit   Medication Sig Dispense Refill    DOCOSAHEXANOIC ACID/EPA (FISH OIL ORAL)  "Take 1,200 mg by mouth Daily.      MULTIVIT-MIN/FOLIC/VIT K/LYCOP (ONE-A-DAY MEN'S 50 PLUS ORAL) Take by mouth.      ranitidine (ZANTAC) 300 MG capsule Take 1 capsule (300 mg total) by mouth every evening. (Patient taking differently: Take 300 mg by mouth as needed. ) 30 capsule 1    ZINC MTH/COPPER/SAW PALM/GNSG (PROSTATE HEALTH FORMULA ORAL) Take by mouth.      [DISCONTINUED] multivitamin-minerals-lutein Tab Take 1 tablet by mouth once daily.      [DISCONTINUED] SAW PALMETTO ORAL Take 450 mg by mouth once daily.        No current facility-administered medications on file prior to visit.        Allergies   Review of patient's allergies indicates:  No Known Allergies    Review of Systems   Constitutional: Negative for chills and fever.   Genitourinary: Positive for dysuria, frequency, hematuria and urgency. Negative for discharge, flank pain, penile pain, penile swelling, scrotal swelling and testicular pain.         /74 (BP Location: Right arm, Patient Position: Sitting, BP Method: Medium (Manual))   Pulse 61   Temp 98.1 °F (36.7 °C) (Oral)   Resp 17   Ht 5' 8" (1.727 m)   Wt 79.5 kg (175 lb 4.3 oz)   SpO2 96%   BMI 26.65 kg/m²     Physical Exam   Constitutional: He appears well-developed.   Cardiovascular: Normal rate, regular rhythm, normal heart sounds and intact distal pulses.    Pulmonary/Chest: Effort normal and breath sounds normal. No respiratory distress. He has no wheezes.   Abdominal: Soft. Bowel sounds are normal. He exhibits no distension. There is no tenderness.   Genitourinary: Testes normal and penis normal. Right testis shows no mass. Left testis shows no mass. Circumcised.       Assessment/Plan:  Joo was seen today for hematuria.  He is an established patient, new to me with an acute problem    Diagnoses and all orders for this visit:    Gross hematuria  -     POCT URINE DIPSTICK WITH MICROSCOPE, AUTOMATED  -     PSA, total and free; Future  -     Comprehensive metabolic panel; " Future  -     Ambulatory referral to Urology  -     cefTRIAXone injection 250 mg; Inject 250 mg into the muscle one time.  -     ciprofloxacin HCl (CIPRO) 500 MG tablet; Take 1 tablet (500 mg total) by mouth 2 (two) times daily. for 14 days  -     Urine culture; Future  -     Urinalysis; Future  Discussed with patient workup.  He will need a Urology referral.  Urine culture is collected.  One time dose of ceftriaxone given in the office.  Discussed with patient that although we typically try to avoid fluoroquinolones, urinary tract infection in Men are consider complicated and are an indication for fluoroquinolones use.  Discussed with him side effect of fluoroquinolone use including tendon rupture, myopathies, and neuropathies.  Patient was instructed to call office if he has any of the side effects.  Will adjust medication if needed based on culture results.  Discussed with patient that he will likely need a CT scan and cystoscopy.    Urinary tract infection with hematuria, site unspecified  As above.    Benign prostatic hyperplasia with lower urinary tract symptoms, symptom details unspecified  -     tamsulosin (FLOMAX) 0.4 mg Cap; Take 1 capsule (0.4 mg total) by mouth once daily.  Discussed with patient that many of his symptoms are consistent with BPH, and this was explained to him.  He agrees to start on Flomax, will monitor.        This office note has been dictated.  This dictation has been generated using M-Modal Fluency Direct dictation; some phonetic errors may occur.

## 2018-08-03 NOTE — PROGRESS NOTES
(10:55 AM) - Rocephin was given IM in the right gluteus joe. Tolerated well. Pt went to urology appt after injection

## 2018-08-05 LAB
BACTERIA UR CULT: NORMAL
BACTERIA UR CULT: NORMAL

## 2018-08-06 ENCOUNTER — TELEPHONE (OUTPATIENT)
Dept: UROLOGY | Facility: CLINIC | Age: 78
End: 2018-08-06

## 2018-08-08 ENCOUNTER — HOSPITAL ENCOUNTER (OUTPATIENT)
Dept: RADIOLOGY | Facility: HOSPITAL | Age: 78
Discharge: HOME | End: 2018-08-08
Attending: NURSE PRACTITIONER
Payer: MEDICARE

## 2018-08-08 DIAGNOSIS — R31.0 GROSS HEMATURIA: ICD-10-CM

## 2018-08-08 PROCEDURE — 74178 CT ABD&PLV WO CNTR FLWD CNTR: CPT | Mod: 26,,, | Performed by: RADIOLOGY

## 2018-08-08 PROCEDURE — 25500020 PHARM REV CODE 255: Performed by: NURSE PRACTITIONER

## 2018-08-08 PROCEDURE — 74178 CT ABD&PLV WO CNTR FLWD CNTR: CPT | Mod: TC

## 2018-08-08 RX ADMIN — IOHEXOL 125 ML: 350 INJECTION, SOLUTION INTRAVENOUS at 12:08

## 2018-08-27 ENCOUNTER — PROCEDURE VISIT (OUTPATIENT)
Dept: UROLOGY | Facility: CLINIC | Age: 78
End: 2018-08-27
Payer: MEDICARE

## 2018-08-27 VITALS
TEMPERATURE: 99 F | HEIGHT: 68 IN | DIASTOLIC BLOOD PRESSURE: 78 MMHG | HEART RATE: 68 BPM | RESPIRATION RATE: 16 BRPM | WEIGHT: 177.94 LBS | BODY MASS INDEX: 26.97 KG/M2 | SYSTOLIC BLOOD PRESSURE: 138 MMHG

## 2018-08-27 DIAGNOSIS — R31.0 GROSS HEMATURIA: Primary | ICD-10-CM

## 2018-08-27 PROCEDURE — 88112 CYTOPATH CELL ENHANCE TECH: CPT | Mod: 26,,, | Performed by: PATHOLOGY

## 2018-08-27 PROCEDURE — 52000 CYSTOURETHROSCOPY: CPT | Mod: S$GLB,,, | Performed by: UROLOGY

## 2018-08-27 PROCEDURE — 88112 CYTOPATH CELL ENHANCE TECH: CPT | Performed by: PATHOLOGY

## 2018-08-27 RX ORDER — CIPROFLOXACIN 500 MG/1
500 TABLET ORAL ONCE
Qty: 1 TABLET | Refills: 0 | Status: SHIPPED | OUTPATIENT
Start: 2018-08-27 | End: 2018-08-27

## 2018-08-27 NOTE — H&P (VIEW-ONLY)
"Chief Complaint:       Chief Complaint   Patient presents with    Hematuria         Hematuria  Patient complains of gross hematuria. He reports blood clots the size of "pinpoint" during onset of symptoms. He has not had any more blood clots since this time.  Onset of hematuria was 3 days ago and was sudden in onset. He tells me that hematuria resolved x 2 days but returned this am. There is not a history of nephrolithiasis. There is not a history of urologic trauma. Other urologic symptoms include urgency, dysuria, frequency. Also reports nocturia x 4-5 (normally 1-2).  Patient admits to history of tobacco use. Former smoker x 15-20 years. He reports 1-2 packs/day. Patient reports he quit smoking 38 years ago.  Patient denies history of Agent Orange exposure, chronic Pollack catheter,  surgeries, occupational exposure, sexually transmitted diseases, trauma and urolithiasis. Prior workup has been none.  He denies known family history of  malignancy or prostate cancer.        PVR (bladder scan) first visit - 91 ml     He had an office cystoscopy on 8/27/2018.  This showed a large prostate but also several very erythematous patches in the posterior bladder wall.    ACTIVE MEDICAL ISSUES:      Patient Active Problem List   Diagnosis    Dyslipidemia    Gross hematuria    Urinary frequency         PAST MEDICAL HISTORY  History reviewed. No pertinent past medical history.     PAST SURGICAL HISTORY:        Past Surgical History:   Procedure Laterality Date    TONSILLECTOMY        VASECTOMY             SOCIAL HISTORY:       Social History   Substance Use Topics    Smoking status: Never Smoker    Smokeless tobacco: Not on file    Alcohol use Not on file         FAMILY HISTORY:        Family History   Problem Relation Age of Onset    Cancer Brother           ALLERGIES AND MEDICATIONS: updated and reviewed.  Review of patient's allergies indicates:  No Known Allergies       Current Outpatient Prescriptions " "  Medication Sig    ciprofloxacin HCl (CIPRO) 500 MG tablet Take 1 tablet (500 mg total) by mouth 2 (two) times daily. for 14 days    DOCOSAHEXANOIC ACID/EPA (FISH OIL ORAL) Take 1,200 mg by mouth Daily.    MULTIVIT-MIN/FOLIC/VIT K/LYCOP (ONE-A-DAY MEN'S 50 PLUS ORAL) Take by mouth.    ranitidine (ZANTAC) 300 MG capsule Take 1 capsule (300 mg total) by mouth every evening. (Patient taking differently: Take 300 mg by mouth as needed. )    tamsulosin (FLOMAX) 0.4 mg Cap Take 1 capsule (0.4 mg total) by mouth once daily.    ZINC MTH/COPPER/SAW PALM/GNSG (PROSTATE HEALTH FORMULA ORAL) Take by mouth.    phenazopyridine (PYRIDIUM) 200 MG tablet Take 1 tablet (200 mg total) by mouth 3 (three) times daily as needed.      No current facility-administered medications for this visit.          Review of Systems   Constitutional: Negative for activity change, chills, fatigue, fever and unexpected weight change.   Eyes: Negative for discharge, redness and visual disturbance.   Respiratory: Negative for cough, shortness of breath and wheezing.    Cardiovascular: Negative for chest pain and leg swelling.   Gastrointestinal: Negative for abdominal distention, abdominal pain, constipation, diarrhea, nausea and vomiting.   Genitourinary: Positive for dysuria, frequency and hematuria. Negative for decreased urine volume, difficulty urinating, discharge, flank pain, penile pain, testicular pain and urgency.   Musculoskeletal: Negative for arthralgias, joint swelling and myalgias.   Skin: Negative for color change and rash.   Neurological: Negative for dizziness and light-headedness.   Psychiatric/Behavioral: Negative for behavioral problems and confusion. The patient is not nervous/anxious.        Objective:   Vitals       Vitals:     08/03/18 1128   BP: 128/78   Resp: 16   Weight: 78 kg (172 lb)   Height: 5' 8" (1.727 m)         Physical Exam   Constitutional: He is oriented to person, place, and time. He appears well-developed. "   HENT:   Head: Normocephalic and atraumatic.   Nose: Nose normal.   Eyes: Conjunctivae are normal. Right eye exhibits no discharge. Left eye exhibits no discharge.   Neck: Normal range of motion. Neck supple. No tracheal deviation present. No thyromegaly present.   Cardiovascular: Normal rate and regular rhythm.    Pulmonary/Chest: Effort normal. No respiratory distress. He has no wheezes.   Abdominal: Soft. He exhibits no distension. There is no hepatosplenomegaly. There is no tenderness. There is no CVA tenderness. No hernia.   Genitourinary:   Genitourinary Comments: Patient declined exam   Musculoskeletal: Normal range of motion. He exhibits no edema.   Neurological: He is alert and oriented to person, place, and time.   Skin: Skin is warm and dry. No rash noted. No erythema.     Psychiatric: He has a normal mood and affect. His behavior is normal. Judgment normal.       Urine dipstick shows ++LE, +++protein, 250 RBCs.      Assessment:       1. Gross hematuria    2. Urinary frequency    3. Dysuria           Plan:       1. Gross hematuria   Plan for Cystoscopy with biopsy on Wednesday 9/5/2018    Urine cytology       2. Urinary frequency  - POCT Bladder Scan     3. Dysuria

## 2018-08-27 NOTE — PROCEDURES
"Cystoscopy  Date/Time: 8/27/2018 3:59 PM  Performed by: KADIE Johnson MD  Authorized by: Susana Adams NP     Consent Done?:  Yes (Written)  Time out: Immediately prior to procedure a "time out" was called to verify the correct patient, procedure, equipment, support staff and site/side marked as required.    Indications: hematuria    Position:  Supine  Anesthesia:  Lidocaine jelly  Patient sedated?: No    Preparation: Patient was prepped and draped in usual sterile fashion      Scope type:  Flexible cystoscope  Stent inserted: No    Stent removed: No    External exam normal: Yes    Digital exam performed: No    Urethra normal: Yes    Prostate normal: No          Hyperplasia (Trilobar)(Length (cm):  6)Bladder neck normal: Bladder neck normal   Bladder normal: No (trabecuated, several intensely erythematous patches posterior wall)      Patient tolerance:  Patient tolerated the procedure well with no immediate complications     He needs a biopsy.  Plan for Wednesday 9/5/2018      "

## 2018-08-29 ENCOUNTER — TELEPHONE (OUTPATIENT)
Dept: UROLOGY | Facility: CLINIC | Age: 78
End: 2018-08-29

## 2018-08-30 ENCOUNTER — HOSPITAL ENCOUNTER (OUTPATIENT)
Dept: PREADMISSION TESTING | Facility: HOSPITAL | Age: 78
Discharge: HOME | End: 2018-08-30
Attending: UROLOGY
Payer: MEDICARE

## 2018-08-30 VITALS
BODY MASS INDEX: 26.76 KG/M2 | WEIGHT: 176.56 LBS | RESPIRATION RATE: 16 BRPM | DIASTOLIC BLOOD PRESSURE: 4 MMHG | HEART RATE: 56 BPM | SYSTOLIC BLOOD PRESSURE: 161 MMHG | OXYGEN SATURATION: 98 % | TEMPERATURE: 98 F | HEIGHT: 68 IN

## 2018-08-30 DIAGNOSIS — Z01.818 PRE-OP TESTING: Primary | ICD-10-CM

## 2018-08-30 DIAGNOSIS — R31.0 GROSS HEMATURIA: ICD-10-CM

## 2018-08-30 LAB
ANION GAP SERPL CALC-SCNC: 9 MMOL/L
BASOPHILS # BLD AUTO: 0.04 K/UL
BASOPHILS NFR BLD: 0.7 %
BUN SERPL-MCNC: 11 MG/DL
CALCIUM SERPL-MCNC: 10.6 MG/DL
CHLORIDE SERPL-SCNC: 106 MMOL/L
CO2 SERPL-SCNC: 26 MMOL/L
CREAT SERPL-MCNC: 1 MG/DL
DIFFERENTIAL METHOD: ABNORMAL
EOSINOPHIL # BLD AUTO: 0.2 K/UL
EOSINOPHIL NFR BLD: 3.8 %
ERYTHROCYTE [DISTWIDTH] IN BLOOD BY AUTOMATED COUNT: 12.8 %
EST. GFR  (AFRICAN AMERICAN): >60 ML/MIN/1.73 M^2
EST. GFR  (NON AFRICAN AMERICAN): >60 ML/MIN/1.73 M^2
GLUCOSE SERPL-MCNC: 106 MG/DL
HCT VFR BLD AUTO: 43 %
HGB BLD-MCNC: 15.3 G/DL
LYMPHOCYTES # BLD AUTO: 1.7 K/UL
LYMPHOCYTES NFR BLD: 28.5 %
MCH RBC QN AUTO: 32.3 PG
MCHC RBC AUTO-ENTMCNC: 35.6 G/DL
MCV RBC AUTO: 91 FL
MONOCYTES # BLD AUTO: 0.7 K/UL
MONOCYTES NFR BLD: 12.3 %
NEUTROPHILS # BLD AUTO: 3.3 K/UL
NEUTROPHILS NFR BLD: 54.7 %
PLATELET # BLD AUTO: 183 K/UL
PMV BLD AUTO: 10.1 FL
POTASSIUM SERPL-SCNC: 5.1 MMOL/L
RBC # BLD AUTO: 4.73 M/UL
SODIUM SERPL-SCNC: 141 MMOL/L
WBC # BLD AUTO: 6.04 K/UL

## 2018-08-30 PROCEDURE — 85025 COMPLETE CBC W/AUTO DIFF WBC: CPT

## 2018-08-30 PROCEDURE — 36415 COLL VENOUS BLD VENIPUNCTURE: CPT

## 2018-08-30 PROCEDURE — 80048 BASIC METABOLIC PNL TOTAL CA: CPT

## 2018-08-30 PROCEDURE — 93005 ELECTROCARDIOGRAM TRACING: CPT

## 2018-08-30 PROCEDURE — 93010 ELECTROCARDIOGRAM REPORT: CPT | Mod: ,,, | Performed by: INTERNAL MEDICINE

## 2018-08-30 NOTE — DISCHARGE INSTRUCTIONS
"  Your surgery is scheduled for __Wednesday Sept 5, 2018__________________.    Call [phone] between 2 p.m. and 5 p.m. on   _Tuesday______________ to find out your arrival time for the day of your surgery.      Please report to SAME DAY SURGERY UNIT on the 2nd FLOOR at _______ a.m.  Use front door entrance. The doors open at 0530 am.      If you need WHEELCHAIR assistance please call  [phone] from your cell phone or "[phone]"  from the  hospital courtesy phone located to the right after you enter the hospital lobby.      INSTRUCTIONS IMPORTANT!!!  ¨ Do not eat or drink after 12 midnight-including water. OK to brush teeth, no   gum, candy or mints!      _x___  Prep instructions:   SHOWER     _x___  Please shower using Hibiclens soap the night before AND  the morning of  your surgery/procedure. Do not use Hibiclens on your face or genitals               If your surgery is around your belly button (Navel) be sure to wash inside your  belly button also. Rinse hibiclens off completely.  _x___  No shaving of procedural area at least 4-5 days before surgery due to increased risk of skin irritation and/or possible infection.  _x___  Do not wear makeup, including mascara. WEARING EYE MAKEUP MAY  LEAD TO SERIOUS EYE INJURY during surgery.  _x___  No powder, lotions or creams to your body.  _x___  You may wear only deodorant on the day of surgery.  ____  Please remove all jewelry, including piercings and leave at home.  _x___  No money or valuables needed. Please leave at home.  You may bring your cell phone.  ____  Please bring any documents given by your doctor.  _x___  If going home the same day, arrange for a ride home. You will not be able to drive if Anesthesia was used.  ____  Children under 18 years require a parent / guardian present the entire time   they are in surgery / recovery.  _x___  Wear loose fitting clothing. Allow for dressings, bandages.  _x___  Stop Aspirin, Ibuprofen, Motrin and Aleve at least 3-5 days before "   surgery, unless otherwise instructed by your doctor, or the nurse.              You MAY use Tylenol/acetaminophen until day of surgery.  ____  If you take diabetic medication, do not take am of surgery unless instructed by   Doctor.  _x___  Call MD for temperature above 101 degrees.        _x___ Stop taking any Fish Oil supplement or any Vitamins that contain Vitamin  E at least 5 days prior to surgery.          I have read or had read and explained to me, and understand the above information.  Additional comments or instructions:Please call   [phone] if you have any questions regarding the instructions above.

## 2018-08-31 ENCOUNTER — PATIENT MESSAGE (OUTPATIENT)
Dept: FAMILY MEDICINE | Facility: CLINIC | Age: 78
End: 2018-08-31

## 2018-08-31 DIAGNOSIS — N40.1 BENIGN PROSTATIC HYPERPLASIA WITH LOWER URINARY TRACT SYMPTOMS, SYMPTOM DETAILS UNSPECIFIED: ICD-10-CM

## 2018-08-31 RX ORDER — TAMSULOSIN HYDROCHLORIDE 0.4 MG/1
0.4 CAPSULE ORAL DAILY
Qty: 90 CAPSULE | Refills: 1 | Status: SHIPPED | OUTPATIENT
Start: 2018-08-31 | End: 2018-09-18 | Stop reason: SDUPTHER

## 2018-09-04 ENCOUNTER — PATIENT MESSAGE (OUTPATIENT)
Dept: FAMILY MEDICINE | Facility: CLINIC | Age: 78
End: 2018-09-04

## 2018-09-05 ENCOUNTER — ANESTHESIA (OUTPATIENT)
Dept: SURGERY | Facility: HOSPITAL | Age: 78
End: 2018-09-05
Payer: MEDICARE

## 2018-09-05 ENCOUNTER — ANESTHESIA EVENT (OUTPATIENT)
Dept: SURGERY | Facility: HOSPITAL | Age: 78
End: 2018-09-05
Payer: MEDICARE

## 2018-09-05 ENCOUNTER — HOSPITAL ENCOUNTER (OUTPATIENT)
Facility: HOSPITAL | Age: 78
Discharge: HOME | End: 2018-09-05
Attending: UROLOGY | Admitting: UROLOGY
Payer: MEDICARE

## 2018-09-05 VITALS
HEIGHT: 68 IN | HEART RATE: 66 BPM | BODY MASS INDEX: 26.76 KG/M2 | WEIGHT: 176.56 LBS | SYSTOLIC BLOOD PRESSURE: 174 MMHG | OXYGEN SATURATION: 100 % | RESPIRATION RATE: 20 BRPM | TEMPERATURE: 98 F | DIASTOLIC BLOOD PRESSURE: 74 MMHG

## 2018-09-05 DIAGNOSIS — R31.0 GROSS HEMATURIA: Primary | ICD-10-CM

## 2018-09-05 PROCEDURE — 63600175 PHARM REV CODE 636 W HCPCS: Performed by: UROLOGY

## 2018-09-05 PROCEDURE — 36000707: Performed by: UROLOGY

## 2018-09-05 PROCEDURE — 71000015 HC POSTOP RECOV 1ST HR: Performed by: UROLOGY

## 2018-09-05 PROCEDURE — 25000003 PHARM REV CODE 250: Performed by: NURSE ANESTHETIST, CERTIFIED REGISTERED

## 2018-09-05 PROCEDURE — 71000016 HC POSTOP RECOV ADDL HR: Performed by: UROLOGY

## 2018-09-05 PROCEDURE — 71000033 HC RECOVERY, INTIAL HOUR: Performed by: UROLOGY

## 2018-09-05 PROCEDURE — 63600175 PHARM REV CODE 636 W HCPCS: Performed by: NURSE ANESTHETIST, CERTIFIED REGISTERED

## 2018-09-05 PROCEDURE — 88305 TISSUE EXAM BY PATHOLOGIST: CPT | Performed by: PATHOLOGY

## 2018-09-05 PROCEDURE — 88305 TISSUE EXAM BY PATHOLOGIST: CPT | Mod: 26,,, | Performed by: PATHOLOGY

## 2018-09-05 PROCEDURE — 37000008 HC ANESTHESIA 1ST 15 MINUTES: Performed by: UROLOGY

## 2018-09-05 PROCEDURE — 36000706: Performed by: UROLOGY

## 2018-09-05 PROCEDURE — D9220A PRA ANESTHESIA: Mod: CRNA,,, | Performed by: NURSE ANESTHETIST, CERTIFIED REGISTERED

## 2018-09-05 PROCEDURE — 25000003 PHARM REV CODE 250: Performed by: UROLOGY

## 2018-09-05 PROCEDURE — 71000039 HC RECOVERY, EACH ADD'L HOUR: Performed by: UROLOGY

## 2018-09-05 PROCEDURE — D9220A PRA ANESTHESIA: Mod: ANES,,, | Performed by: ANESTHESIOLOGY

## 2018-09-05 PROCEDURE — 27200651 HC AIRWAY, LMA: Performed by: NURSE ANESTHETIST, CERTIFIED REGISTERED

## 2018-09-05 PROCEDURE — 52204 CYSTOSCOPY W/BIOPSY(S): CPT | Mod: ,,, | Performed by: UROLOGY

## 2018-09-05 PROCEDURE — 63600175 PHARM REV CODE 636 W HCPCS: Performed by: ANESTHESIOLOGY

## 2018-09-05 PROCEDURE — 37000009 HC ANESTHESIA EA ADD 15 MINS: Performed by: UROLOGY

## 2018-09-05 RX ORDER — HYDRALAZINE HYDROCHLORIDE 20 MG/ML
5 INJECTION INTRAMUSCULAR; INTRAVENOUS ONCE
Status: COMPLETED | OUTPATIENT
Start: 2018-09-05 | End: 2018-09-05

## 2018-09-05 RX ORDER — CIPROFLOXACIN 2 MG/ML
400 INJECTION, SOLUTION INTRAVENOUS
Status: COMPLETED | OUTPATIENT
Start: 2018-09-05 | End: 2018-09-05

## 2018-09-05 RX ORDER — PHENYLEPHRINE HYDROCHLORIDE 10 MG/ML
INJECTION INTRAVENOUS
Status: DISCONTINUED | OUTPATIENT
Start: 2018-09-05 | End: 2018-09-05

## 2018-09-05 RX ORDER — LIDOCAINE HCL/PF 100 MG/5ML
SYRINGE (ML) INTRAVENOUS
Status: DISCONTINUED | OUTPATIENT
Start: 2018-09-05 | End: 2018-09-05

## 2018-09-05 RX ORDER — SODIUM CHLORIDE, SODIUM LACTATE, POTASSIUM CHLORIDE, CALCIUM CHLORIDE 600; 310; 30; 20 MG/100ML; MG/100ML; MG/100ML; MG/100ML
INJECTION, SOLUTION INTRAVENOUS CONTINUOUS PRN
Status: DISCONTINUED | OUTPATIENT
Start: 2018-09-05 | End: 2018-09-05

## 2018-09-05 RX ORDER — ONDANSETRON 2 MG/ML
INJECTION INTRAMUSCULAR; INTRAVENOUS
Status: DISCONTINUED | OUTPATIENT
Start: 2018-09-05 | End: 2018-09-05

## 2018-09-05 RX ORDER — PROPOFOL 10 MG/ML
VIAL (ML) INTRAVENOUS
Status: DISCONTINUED | OUTPATIENT
Start: 2018-09-05 | End: 2018-09-05

## 2018-09-05 RX ORDER — ONDANSETRON 2 MG/ML
4 INJECTION INTRAMUSCULAR; INTRAVENOUS ONCE AS NEEDED
Status: DISCONTINUED | OUTPATIENT
Start: 2018-09-05 | End: 2018-09-05 | Stop reason: HOSPADM

## 2018-09-05 RX ORDER — SODIUM CHLORIDE 0.9 % (FLUSH) 0.9 %
3 SYRINGE (ML) INJECTION
Status: DISCONTINUED | OUTPATIENT
Start: 2018-09-05 | End: 2018-09-05 | Stop reason: HOSPADM

## 2018-09-05 RX ORDER — HYDROCODONE BITARTRATE AND ACETAMINOPHEN 5; 325 MG/1; MG/1
1 TABLET ORAL EVERY 4 HOURS PRN
Status: DISCONTINUED | OUTPATIENT
Start: 2018-09-05 | End: 2018-09-05 | Stop reason: HOSPADM

## 2018-09-05 RX ORDER — PHENAZOPYRIDINE HYDROCHLORIDE 100 MG/1
200 TABLET, FILM COATED ORAL ONCE
Status: COMPLETED | OUTPATIENT
Start: 2018-09-05 | End: 2018-09-05

## 2018-09-05 RX ORDER — OXYBUTYNIN CHLORIDE 5 MG/1
5 TABLET ORAL 3 TIMES DAILY
Status: DISCONTINUED | OUTPATIENT
Start: 2018-09-05 | End: 2018-09-05 | Stop reason: HOSPADM

## 2018-09-05 RX ORDER — OXYBUTYNIN CHLORIDE 5 MG/1
5 TABLET ORAL 3 TIMES DAILY PRN
Qty: 30 TABLET | Refills: 0 | Status: SHIPPED | OUTPATIENT
Start: 2018-09-05 | End: 2018-09-18

## 2018-09-05 RX ORDER — PHENAZOPYRIDINE HYDROCHLORIDE 200 MG/1
200 TABLET, FILM COATED ORAL 3 TIMES DAILY PRN
Qty: 21 TABLET | Refills: 0 | Status: SHIPPED | OUTPATIENT
Start: 2018-09-05 | End: 2018-09-12 | Stop reason: ALTCHOICE

## 2018-09-05 RX ORDER — SODIUM CHLORIDE, SODIUM LACTATE, POTASSIUM CHLORIDE, CALCIUM CHLORIDE 600; 310; 30; 20 MG/100ML; MG/100ML; MG/100ML; MG/100ML
INJECTION, SOLUTION INTRAVENOUS CONTINUOUS
Status: DISCONTINUED | OUTPATIENT
Start: 2018-09-05 | End: 2018-09-05 | Stop reason: HOSPADM

## 2018-09-05 RX ORDER — GLYCOPYRROLATE 0.2 MG/ML
INJECTION INTRAMUSCULAR; INTRAVENOUS
Status: DISCONTINUED | OUTPATIENT
Start: 2018-09-05 | End: 2018-09-05

## 2018-09-05 RX ORDER — HYDROCODONE BITARTRATE AND ACETAMINOPHEN 5; 325 MG/1; MG/1
1 TABLET ORAL EVERY 6 HOURS PRN
Qty: 30 TABLET | Refills: 0 | Status: SHIPPED | OUTPATIENT
Start: 2018-09-05 | End: 2018-09-18

## 2018-09-05 RX ORDER — FENTANYL CITRATE 50 UG/ML
INJECTION, SOLUTION INTRAMUSCULAR; INTRAVENOUS
Status: DISCONTINUED | OUTPATIENT
Start: 2018-09-05 | End: 2018-09-05

## 2018-09-05 RX ORDER — FENTANYL CITRATE 50 UG/ML
25 INJECTION, SOLUTION INTRAMUSCULAR; INTRAVENOUS EVERY 5 MIN PRN
Status: DISCONTINUED | OUTPATIENT
Start: 2018-09-05 | End: 2018-09-05 | Stop reason: HOSPADM

## 2018-09-05 RX ADMIN — FENTANYL CITRATE 25 MCG: 50 INJECTION INTRAMUSCULAR; INTRAVENOUS at 07:09

## 2018-09-05 RX ADMIN — PHENAZOPYRIDINE HYDROCHLORIDE 200 MG: 100 TABLET ORAL at 08:09

## 2018-09-05 RX ADMIN — SODIUM CHLORIDE, SODIUM LACTATE, POTASSIUM CHLORIDE, AND CALCIUM CHLORIDE: .6; .31; .03; .02 INJECTION, SOLUTION INTRAVENOUS at 07:09

## 2018-09-05 RX ADMIN — HYDRALAZINE HYDROCHLORIDE 5 MG: 20 INJECTION INTRAMUSCULAR; INTRAVENOUS at 09:09

## 2018-09-05 RX ADMIN — GLYCOPYRROLATE 0.2 MG: 0.2 INJECTION, SOLUTION INTRAMUSCULAR; INTRAVENOUS at 07:09

## 2018-09-05 RX ADMIN — LIDOCAINE HYDROCHLORIDE 100 MG: 20 INJECTION, SOLUTION INTRAVENOUS at 07:09

## 2018-09-05 RX ADMIN — CIPROFLOXACIN 400 MG: 2 INJECTION, SOLUTION INTRAVENOUS at 07:09

## 2018-09-05 RX ADMIN — FENTANYL CITRATE 50 MCG: 50 INJECTION INTRAMUSCULAR; INTRAVENOUS at 07:09

## 2018-09-05 RX ADMIN — PROPOFOL 40 MG: 10 INJECTION, EMULSION INTRAVENOUS at 07:09

## 2018-09-05 RX ADMIN — ONDANSETRON 4 MG: 2 INJECTION, SOLUTION INTRAMUSCULAR; INTRAVENOUS at 07:09

## 2018-09-05 RX ADMIN — PHENYLEPHRINE HYDROCHLORIDE 100 MCG: 10 INJECTION INTRAVENOUS at 07:09

## 2018-09-05 RX ADMIN — PROPOFOL 160 MG: 10 INJECTION, EMULSION INTRAVENOUS at 07:09

## 2018-09-05 NOTE — ANESTHESIA PREPROCEDURE EVALUATION
09/05/2018  Joo York is a 78 y.o., male   Pre-operative evaluation for Procedure(s) (LRB):  CYSTOSCOPY, WITH BLADDER BIOPSY (N/A)    Joo York is a 78 y.o. male     Patient Active Problem List   Diagnosis    Dyslipidemia    Gross hematuria    Urinary frequency       Review of patient's allergies indicates:  No Known Allergies    No current facility-administered medications on file prior to encounter.      Current Outpatient Medications on File Prior to Encounter   Medication Sig Dispense Refill    MULTIVIT-MIN/FOLIC/VIT K/LYCOP (ONE-A-DAY MEN'S 50 PLUS ORAL) Take by mouth.      ranitidine (ZANTAC) 300 MG capsule Take 1 capsule (300 mg total) by mouth every evening. (Patient taking differently: Take 300 mg by mouth as needed. ) 30 capsule 1    ZINC MTH/COPPER/SAW PALM/GNSG (PROSTATE HEALTH FORMULA ORAL) Take by mouth.         Past Surgical History:   Procedure Laterality Date    office cysto 2018      TONSILLECTOMY      VASECTOMY         Social History     Socioeconomic History    Marital status:      Spouse name: Not on file    Number of children: Not on file    Years of education: Not on file    Highest education level: Not on file   Social Needs    Financial resource strain: Not on file    Food insecurity - worry: Not on file    Food insecurity - inability: Not on file    Transportation needs - medical: Not on file    Transportation needs - non-medical: Not on file   Occupational History    Not on file   Tobacco Use    Smoking status: Never Smoker    Smokeless tobacco: Never Used   Substance and Sexual Activity    Alcohol use: No     Frequency: Never    Drug use: No    Sexual activity: Yes     Partners: Female   Other Topics Concern    Not on file   Social History Narrative    Not on file     Lab Results   Component Value Date    WBC 6.04 08/30/2018    HGB 15.3  08/30/2018    HCT 43.0 08/30/2018    MCV 91 08/30/2018     08/30/2018     CMP  Sodium   Date Value Ref Range Status   08/30/2018 141 136 - 145 mmol/L Final     Potassium   Date Value Ref Range Status   08/30/2018 5.1 3.5 - 5.1 mmol/L Final     Chloride   Date Value Ref Range Status   08/30/2018 106 95 - 110 mmol/L Final     CO2   Date Value Ref Range Status   08/30/2018 26 23 - 29 mmol/L Final     Glucose   Date Value Ref Range Status   08/30/2018 106 70 - 110 mg/dL Final     BUN, Bld   Date Value Ref Range Status   08/30/2018 11 8 - 23 mg/dL Final     Creatinine   Date Value Ref Range Status   08/30/2018 1.0 0.5 - 1.4 mg/dL Final     Calcium   Date Value Ref Range Status   08/30/2018 10.6 (H) 8.7 - 10.5 mg/dL Final     Total Protein   Date Value Ref Range Status   08/08/2018 7.7 6.0 - 8.4 g/dL Final     Albumin   Date Value Ref Range Status   08/08/2018 4.3 3.5 - 5.2 g/dL Final     Total Bilirubin   Date Value Ref Range Status   08/08/2018 0.6 0.1 - 1.0 mg/dL Final     Comment:     For infants and newborns, interpretation of results should be based  on gestational age, weight and in agreement with clinical  observations.  Premature Infant recommended reference ranges:  Up to 24 hours.............<8.0 mg/dL  Up to 48 hours............<12.0 mg/dL  3-5 days..................<15.0 mg/dL  6-29 days.................<15.0 mg/dL       Alkaline Phosphatase   Date Value Ref Range Status   08/08/2018 59 55 - 135 U/L Final     AST   Date Value Ref Range Status   08/08/2018 27 10 - 40 U/L Final     ALT   Date Value Ref Range Status   08/08/2018 31 10 - 44 U/L Final     Anion Gap   Date Value Ref Range Status   08/30/2018 9 8 - 16 mmol/L Final     eGFR if    Date Value Ref Range Status   08/30/2018 >60 >60 mL/min/1.73 m^2 Final     eGFR if non    Date Value Ref Range Status   08/30/2018 >60 >60 mL/min/1.73 m^2 Final     Comment:     Calculation used to obtain the estimated glomerular  filtration  rate (eGFR) is the CKD-EPI equation.            Anesthesia Evaluation    I have reviewed the Patient Summary Reports.     I have reviewed the Medications.     Review of Systems  Anesthesia Hx:  No problems with previous Anesthesia Denies Hx of Anesthetic complications  History of prior surgery of interest to airway management or planning: Denies Family Hx of Anesthesia complications.   Denies Personal Hx of Anesthesia complications.   Social:  No Alcohol Use, Non-Smoker    Hematology/Oncology:  Hematology Normal   Oncology Normal     EENT/Dental:EENT/Dental Normal   Cardiovascular:   Exercise tolerance: good hyperlipidemia    Pulmonary:  Pulmonary Normal    Renal/:  Renal/ Normal  Gross hematuria  Urinary frequency   Hepatic/GI:  Hepatic/GI Normal    Neurological:  Neurology Normal    Endocrine:  Endocrine Normal    Psych:  Psychiatric Normal       Body mass index is 26.85 kg/m².      Physical Exam  General:  Well nourished    Airway/Jaw/Neck:  Airway Findings: Mouth Opening: Normal Tongue: Normal  General Airway Assessment: Adult, Average  Mallampati: II  TM Distance: Normal, at least 6 cm  Jaw/Neck Findings:  Neck ROM: Normal ROM      Dental:  Dental Findings: In tact   Chest/Lungs:  Chest/Lungs Findings: Normal Respiratory Rate, Clear to auscultation     Heart/Vascular:  Heart Findings: Rate: Normal  Rhythm: Regular Rhythm        Mental Status:  Mental Status Findings:  Alert and Oriented, Cooperative         Anesthesia Plan  Type of Anesthesia, risks & benefits discussed:  Anesthesia Type:  general  Patient's Preference:   Intra-op Monitoring Plan: standard ASA monitors  Intra-op Monitoring Plan Comments:   Post Op Pain Control Plan: multimodal analgesia and IV/PO Opioids PRN  Post Op Pain Control Plan Comments:   Induction:   IV  Beta Blocker:  Patient is not currently on a Beta-Blocker (No further documentation required).       Informed Consent: Patient understands risks and agrees with  Anesthesia plan.  Questions answered. Anesthesia consent signed with patient.  ASA Score: 2     Day of Surgery Review of History & Physical:    H&P update referred to the surgeon.         Ready For Surgery From Anesthesia Perspective.

## 2018-09-05 NOTE — OR NURSING
Patient AAOx4 and denies pain. Incision CDI, no signs or symptoms of bleeding. BP elevated 181/84, notified physician, gave 5mg hydralazine IV x2 doses per order. Report given to LOUISA Riddle RN. Patient transported to [facility].

## 2018-09-05 NOTE — OP NOTE
DATE OF PROCEDURE:  09/05/2018.    PREOPERATIVE DIAGNOSIS:  Gross hematuria.    POSTOPERATIVE DIAGNOSES:  Gross hematuria, bladder lesion.    PROCEDURES PERFORMED:  Cystoscopy, bladder biopsy, fulguration.    PRIMARY SURGEON:  Danyel Johnson M.D.    ANESTHESIA:  General.    ESTIMATED BLOOD LOSS:  Minimal.    DRAINS:  None.    COMPLICATIONS:  None.    INDICATIONS:  Joo York is an 78-year-old male with a history of gross   hematuria.  He underwent an office cystoscopy few days ago shows several   erythematous lesions, which were concerning and it was recommended that he   undergo cystoscopy with bladder biopsy in the Operating Room.    Joo York was taken to the Operating Room where he was positively identified   by armband.  He was placed supine on the operating room table.  Following   induction of adequate general anesthesia, he was placed in the dorsal lithotomy   position and his external genitalia were prepped and draped in the usual sterile   fashion.    A preoperative timeout was performed as well as confirmation of preoperative   antibiotics.  A 22-Latvian rigid cystoscope was then passed per urethra into the   bladder under direct vision.  The bladder was inspected with 30 and 70 degree   lenses.  He had trilobar hypertrophy of the prostate noted.  In the posterior   wall, he had several markedly erythematous patches, which were concerning for   CIS.  He had moderate trabeculation of the bladder wall noted.  Both ureteral   orifices were seen to be in their orthotopic position.    I then switched to the biopsy bridge and used the biopsy bridge to sample   several of the erythematous patches.  These were sent off for pathologic   analysis listed as bladder lesion.  I then switched to sterile water.    I then switched back to the regular bridge and passed a Bugbee electrode through   the scope and used a Bugbee electrode to cauterize the biopsy sites.    Hemostasis was deemed adequate.  The scope  was used to drain the bladder.  The   scope was then withdrawn.    His anesthesia was reversed.  He was taken to the Recovery Room in stable   condition.          /cally [id number] blank(s)        MEHRAN/AUDIE  dd: 09/05/2018 08:01:45 (CDT)  td: 09/05/2018 08:17:43 (CDT)  Doc ID   #[id number]  Job ID #[id number]    CC:

## 2018-09-05 NOTE — ANESTHESIA POSTPROCEDURE EVALUATION
"Anesthesia Post Evaluation    Patient: Joo York    Procedure(s) Performed: Procedure(s) (LRB):  CYSTOSCOPY, WITH BLADDER BIOPSY (N/A)    Final Anesthesia Type: general  Patient location during evaluation: PACU  Patient participation: Yes- Able to Participate  Level of consciousness: awake and alert and oriented  Post-procedure vital signs: reviewed and stable  Pain management: adequate  Airway patency: patent  PONV status at discharge: No PONV  Anesthetic complications: no      Cardiovascular status: blood pressure returned to baseline, hemodynamically stable and hypertensive  Respiratory status: unassisted, spontaneous ventilation and room air  Hydration status: euvolemic  Follow-up not needed.        Visit Vitals  BP (!) 175/76 (BP Location: Left arm, Patient Position: Lying)   Pulse 65   Temp 36.5 °C (97.7 °F) (Oral)   Resp 18   Ht 5' 8" (1.727 m)   Wt 80.1 kg (176 lb 9 oz)   SpO2 100%   BMI 26.85 kg/m²   /90 in recovery prior to release. I counseled the patient on reporting the hypertension we observed to his primary care physician. We treated him twice to get him down to his pre-procedure baseline. Patient voiced understanding and reported he has had high readings in the past, but hadn't spoken to his PCP about it.     Pain/Jagdish Score: Pain Assessment Performed: Yes (9/5/2018 10:10 AM)  Presence of Pain: denies (9/5/2018 10:10 AM)  Pain Rating Prior to Med Admin: 0 (9/5/2018  7:54 AM)  Jagdish Score: 10 (9/5/2018 10:10 AM)  Modified Jagdish Score: 20 (9/5/2018 10:10 AM)        "

## 2018-09-05 NOTE — BRIEF OP NOTE
[facility]  Brief Operative Note     SUMMARY     Surgery Date: 9/5/2018     Surgeon(s) and Role:     * KADIE Johnson MD - Primary    Assisting Surgeon: None    Pre-op Diagnosis:  Gross hematuria [R31.0]    Post-op Diagnosis:  Post-Op Diagnosis Codes:     * Gross hematuria [R31.0]    Procedure(s) (LRB):  CYSTOSCOPY, WITH BLADDER BIOPSY (N/A)    Anesthesia: General/MAC    Description of the findings of the procedure: multiple erythematous lesions posterior wall    Findings/Key Components: as above    Estimated Blood Loss: * No values recorded between 9/5/2018  7:36 AM and 9/5/2018  7:53 AM *         Specimens:   Specimen (12h ago, onward)    Start     Ordered    09/05/18 0743  Specimen to Pathology - Surgery  Once     Comments:  Bladder Lesion     Start Status   09/05/18 0743 Collected (09/05/18 0743)       09/05/18 0742          Discharge Note    SUMMARY     Admit Date: 9/5/2018    Discharge Date and Time:  09/05/2018 7:57 AM    Hospital Course (synopsis of major diagnoses, care, treatment, and services provided during the course of the hospital stay): Patient was admitted for an outpatient procedure and tolerated the procedure well with no complications.      Final Diagnosis: Post-Op Diagnosis Codes:     * Gross hematuria [R31.0]    Disposition: Home or Self Care    Follow Up/Patient Instructions:     Medications:  Reconciled Home Medications:      Medication List      START taking these medications    HYDROcodone-acetaminophen 5-325 mg per tablet  Commonly known as:  NORCO  Take 1 tablet by mouth every 6 (six) hours as needed for Pain.     oxybutynin 5 MG Tab  Commonly known as:  DITROPAN  Take 1 tablet (5 mg total) by mouth 3 (three) times daily as needed.     phenazopyridine 200 MG tablet  Commonly known as:  PYRIDIUM  Take 1 tablet (200 mg total) by mouth 3 (three) times daily as needed for Pain (Burning).        CHANGE how you take these medications    ranitidine 300 MG capsule  Commonly  known as:  ZANTAC  Take 1 capsule (300 mg total) by mouth every evening.  What changed:    · when to take this  · reasons to take this        CONTINUE taking these medications    ONE-A-DAY MEN'S 50 PLUS ORAL  Take by mouth.     PROSTATE HEALTH FORMULA ORAL  Take by mouth.     tamsulosin 0.4 mg Cap  Commonly known as:  FLOMAX  Take 1 capsule (0.4 mg total) by mouth once daily.          Discharge Procedure Orders   Diet general     Call MD for:   Order Comments: Significant Hematuria

## 2018-09-05 NOTE — DISCHARGE SUMMARY
[facility]  Discharge Note  Short Stay    Admit Date: 9/5/2018    Discharge Date and Time: 09/05/2018 7:58 AM     Attending Physician: KADIE Johnson MD     Discharge Provider: JASON Johnson    Diagnoses:  Active Hospital Problems    Diagnosis  POA    *Gross hematuria [R31.0]  Yes      Resolved Hospital Problems   No resolved problems to display.       Discharged Condition: stable    Hospital Course: Patient was admitted for an outpatient procedure and tolerated the procedure well with no complications.    Final Diagnoses: Same as principal problem.    Disposition: Home or Self Care    Follow up/Patient Instructions:    Medications:  Reconciled Home Medications:      Medication List      START taking these medications    HYDROcodone-acetaminophen 5-325 mg per tablet  Commonly known as:  NORCO  Take 1 tablet by mouth every 6 (six) hours as needed for Pain.     oxybutynin 5 MG Tab  Commonly known as:  DITROPAN  Take 1 tablet (5 mg total) by mouth 3 (three) times daily as needed.     phenazopyridine 200 MG tablet  Commonly known as:  PYRIDIUM  Take 1 tablet (200 mg total) by mouth 3 (three) times daily as needed for Pain (Burning).        CHANGE how you take these medications    ranitidine 300 MG capsule  Commonly known as:  ZANTAC  Take 1 capsule (300 mg total) by mouth every evening.  What changed:    · when to take this  · reasons to take this        CONTINUE taking these medications    ONE-A-DAY MEN'S 50 PLUS ORAL  Take by mouth.     PROSTATE HEALTH FORMULA ORAL  Take by mouth.     tamsulosin 0.4 mg Cap  Commonly known as:  FLOMAX  Take 1 capsule (0.4 mg total) by mouth once daily.          Discharge Procedure Orders   Diet general     Call MD for:   Order Comments: Significant Hematuria         Discharge Procedure Orders (must include Diet, Follow-up, Activity):   Discharge Procedure Orders (must include Diet, Follow-up, Activity)   Diet general     Call MD for:   Order Comments: Significant  Hematuria

## 2018-09-05 NOTE — TRANSFER OF CARE
"Anesthesia Transfer of Care Note    Patient: Joo York    Procedure(s) Performed: Procedure(s) (LRB):  CYSTOSCOPY, WITH BLADDER BIOPSY (N/A)    Patient location: PACU    Anesthesia Type: general    Transport from OR: Transported from OR on room air with adequate spontaneous ventilation    Post pain: adequate analgesia    Post assessment: no apparent anesthetic complications    Post vital signs: stable    Level of consciousness: responds to stimulation    Nausea/Vomiting: no nausea/vomiting    Complications: none    Transfer of care protocol was followed      Last vitals:   Visit Vitals  BP (!) 163/80 (BP Location: Left arm, Patient Position: Lying)   Pulse 67   Temp 36.4 °C (97.5 °F) (Axillary)   Resp 10   Ht 5' 8" (1.727 m)   Wt 80.1 kg (176 lb 9 oz)   SpO2 100%   BMI 26.85 kg/m²     "

## 2018-09-05 NOTE — INTERVAL H&P NOTE
The patient has been examined and the H&P has been reviewed:    I concur with the findings and no changes have occurred since H&P was written.    Anesthesia/Surgery risks, benefits and alternative options discussed and understood by patient/family.          Active Hospital Problems    Diagnosis  POA    Gross hematuria [R31.0]  Yes      Resolved Hospital Problems   No resolved problems to display.

## 2018-09-10 ENCOUNTER — NURSE TRIAGE (OUTPATIENT)
Dept: ADMINISTRATIVE | Facility: CLINIC | Age: 78
End: 2018-09-10

## 2018-09-10 ENCOUNTER — TELEPHONE (OUTPATIENT)
Dept: FAMILY MEDICINE | Facility: CLINIC | Age: 78
End: 2018-09-10

## 2018-09-10 NOTE — TELEPHONE ENCOUNTER
----- Message from Cindy Ky sent at 9/10/2018  2:59 PM CDT -----  Contact: spouse- Maria Luz - [phone]  Pt's spouse states pt has not been able to eat for 2 days because he cannot swallow due to the surgery he had. Not sure if reaction to medication.     Call was also transferred to [facility].

## 2018-09-10 NOTE — TELEPHONE ENCOUNTER
"    Reason for Disposition   Patient sounds very sick or weak to the triager    Answer Assessment - Initial Assessment Questions  1. SYMPTOM: "Are you having difficulty swallowing liquids, solids, or both?"      both  2. ONSET: "When did the swallowing problems begin?"       Yesterday   3. CAUSE: "What do you think is causing the problem?"       Not sure -pt had cystoscopy 9/5/18 and seemed to do ok for several days. Yesterday began having trouble swallowing - stated it is like indigestion but he can't swallow when this occurs and has trouble breathing. Occurs intermittently  4. CHRONIC/RECURRENT: "Is this a new problem for you?"  If no, ask: "How long have you had this problem?" (e.g., days, weeks, months)       New problem   5. OTHER SYMPTOMS: "Do you have any other symptoms?" (e.g., difficulty breathing, sore throat, swollen tongue, chest pain)      None reported  6. PREGNANCY: "Is there any chance you are pregnant?" "When was your last menstrual period?"      n/a    Protocols used: ST SWALLOWING DIFFICULTY-A-      "

## 2018-09-10 NOTE — TELEPHONE ENCOUNTER
Spoke with patient and he informed me that he had a Cytoscope and bladder biopsy done on 9/5/18 and was placed on Norco, Ditropan, and Pyridium. On yesterday he had problems swallowing solid foods and liquids. He said later on that night the episode had resolved but when he ate his noon meal today the same thing happened. Patient is not sure that the medications are causing this. Please advise.

## 2018-09-11 NOTE — TELEPHONE ENCOUNTER
Spoke with patient and advise per provider to be see by any provider today due to unavailability on providers schedule. Patient said that he is still out of town and want be back until tomorrow. Patient said that he has seen Dr Morales in [place] and would like to be scheduled with him.

## 2018-09-12 ENCOUNTER — TELEPHONE (OUTPATIENT)
Dept: FAMILY MEDICINE | Facility: CLINIC | Age: 78
End: 2018-09-12

## 2018-09-12 ENCOUNTER — HOSPITAL ENCOUNTER (OUTPATIENT)
Dept: RADIOLOGY | Facility: HOSPITAL | Age: 78
Discharge: HOME | End: 2018-09-12
Attending: FAMILY MEDICINE
Payer: MEDICARE

## 2018-09-12 ENCOUNTER — OFFICE VISIT (OUTPATIENT)
Dept: FAMILY MEDICINE | Facility: CLINIC | Age: 78
End: 2018-09-12
Payer: MEDICARE

## 2018-09-12 VITALS
SYSTOLIC BLOOD PRESSURE: 154 MMHG | BODY MASS INDEX: 27.13 KG/M2 | WEIGHT: 179 LBS | TEMPERATURE: 98 F | RESPIRATION RATE: 16 BRPM | HEART RATE: 61 BPM | OXYGEN SATURATION: 94 % | DIASTOLIC BLOOD PRESSURE: 70 MMHG | HEIGHT: 68 IN

## 2018-09-12 DIAGNOSIS — R13.10 DYSPHAGIA, UNSPECIFIED TYPE: Primary | ICD-10-CM

## 2018-09-12 DIAGNOSIS — R13.10 DYSPHAGIA: ICD-10-CM

## 2018-09-12 DIAGNOSIS — R13.10 DYSPHAGIA, UNSPECIFIED TYPE: ICD-10-CM

## 2018-09-12 PROCEDURE — 1101F PT FALLS ASSESS-DOCD LE1/YR: CPT | Mod: CPTII,,, | Performed by: FAMILY MEDICINE

## 2018-09-12 PROCEDURE — 99214 OFFICE O/P EST MOD 30 MIN: CPT | Mod: S$PBB,,, | Performed by: FAMILY MEDICINE

## 2018-09-12 PROCEDURE — 74220 X-RAY XM ESOPHAGUS 1CNTRST: CPT | Mod: TC

## 2018-09-12 PROCEDURE — 74230 X-RAY XM SWLNG FUNCJ C+: CPT | Mod: 26,,, | Performed by: RADIOLOGY

## 2018-09-12 PROCEDURE — 99213 OFFICE O/P EST LOW 20 MIN: CPT | Mod: PBBFAC,25,PN | Performed by: FAMILY MEDICINE

## 2018-09-12 PROCEDURE — 99999 PR PBB SHADOW E&M-EST. PATIENT-LVL III: CPT | Mod: PBBFAC,,, | Performed by: FAMILY MEDICINE

## 2018-09-12 PROCEDURE — 70371 SPEECH EVALUATION COMPLEX: CPT | Mod: 26,,, | Performed by: FAMILY MEDICINE

## 2018-09-12 PROCEDURE — 74230 X-RAY XM SWLNG FUNCJ C+: CPT | Mod: TC

## 2018-09-12 NOTE — TELEPHONE ENCOUNTER
Paola rubalcava/ [facility] called to see if Dr Morales would approve an  Esophagram on pt ?.......  Dr. Morales said yes.

## 2018-09-12 NOTE — TELEPHONE ENCOUNTER
Patient informed of Barium Swallow test today at 1:30pm. Patient was instructed to go through ER to registration for test. Patient verbalized understanding.

## 2018-09-13 ENCOUNTER — PES CALL (OUTPATIENT)
Dept: ADMINISTRATIVE | Facility: CLINIC | Age: 78
End: 2018-09-13

## 2018-09-16 ENCOUNTER — TELEPHONE (OUTPATIENT)
Dept: FAMILY MEDICINE | Facility: CLINIC | Age: 78
End: 2018-09-16

## 2018-09-16 NOTE — PROGRESS NOTES
Routine Office Visit    Patient Name: Joo York    : 1940  MRN: [id number]    Subjective:  Joo is a 78 y.o. male who presents today for:   Chief Complaint   Patient presents with    Sore Throat     problem swolling       78-year-old male comes in a to discuss sore throat and problem with swallowing.  He states that this has happened within the last week.  He states that it all began after he was discharged from the hospital for prostate biopsy.  He reports that he has had 2 episodes where he was eating and felt like his throat was closing off and he could not swallow anything liquid or solid.  The he states that he had a feeling that something was stuck in the back of his throat and is mid chest.  He states that they resolved on their own after a few minutes.  The during these times.  He had some shortness of breath.  The he has a history of GERD but has not been taking any medication recently.     Past Medical History  Past Medical History:   Diagnosis Date    Hematuria        Past Surgical History  Past Surgical History:   Procedure Laterality Date    CYSTOSCOPY WITH BIOPSY OF BLADDER N/A 2018    Procedure: CYSTOSCOPY, WITH BLADDER BIOPSY;  Surgeon: KADIE Johnson MD;  Location: [facility] OR;  Service: Urology;  Laterality: N/A;  RN PRE OP 18------NEED CONSENT    CYSTOSCOPY, WITH BLADDER BIOPSY N/A 2018    Performed by KADIE Johnson MD at [facility] OR    office cysto 2018      TONSILLECTOMY      VASECTOMY          Family History  Family History   Problem Relation Age of Onset    Cancer Brother        Social History  Social History     Socioeconomic History    Marital status:      Spouse name: Not on file    Number of children: Not on file    Years of education: Not on file    Highest education level: Not on file   Social Needs    Financial resource strain: Not on file    Food insecurity - worry: Not on file    Food insecurity - inability: Not on file    Transportation needs  - medical: Not on file    Transportation needs - non-medical: Not on file   Occupational History    Not on file   Tobacco Use    Smoking status: Never Smoker    Smokeless tobacco: Never Used   Substance and Sexual Activity    Alcohol use: No     Frequency: Never    Drug use: No    Sexual activity: Yes     Partners: Female   Other Topics Concern    Not on file   Social History Narrative    Not on file       Current Medications  Current Outpatient Medications on File Prior to Visit   Medication Sig Dispense Refill    HYDROcodone-acetaminophen (NORCO) 5-325 mg per tablet Take 1 tablet by mouth every 6 (six) hours as needed for Pain. 30 tablet 0    MULTIVIT-MIN/FOLIC/VIT K/LYCOP (ONE-A-DAY MEN'S 50 PLUS ORAL) Take by mouth.      oxybutynin (DITROPAN) 5 MG Tab Take 1 tablet (5 mg total) by mouth 3 (three) times daily as needed. 30 tablet 0    tamsulosin (FLOMAX) 0.4 mg Cap Take 1 capsule (0.4 mg total) by mouth once daily. 90 capsule 1    ZINC MTH/COPPER/SAW PALM/GNSG (PROSTATE HEALTH FORMULA ORAL) Take by mouth.       No current facility-administered medications on file prior to visit.        Allergies   Review of patient's allergies indicates:  No Known Allergies    Review of Systems   Constitutional: Negative for activity change and unexpected weight change.   HENT: Negative for hearing loss, rhinorrhea and trouble swallowing.    Eyes: Negative for discharge.   Respiratory: Positive for wheezing. Negative for chest tightness.    Cardiovascular: Negative for chest pain and palpitations.   Gastrointestinal: Positive for constipation. Negative for blood in stool, diarrhea and vomiting.   Endocrine: Negative for polydipsia and polyuria.   Genitourinary: Negative for difficulty urinating, hematuria and urgency.   Musculoskeletal: Negative for arthralgias, joint swelling and neck pain.   Neurological: Negative for weakness and headaches.   Psychiatric/Behavioral: Positive for confusion. Negative for dysphoric  "mood.       BP (!) 154/70 (BP Location: Right arm, Patient Position: Sitting, BP Method: Medium (Manual))   Pulse 61   Temp 98.3 °F (36.8 °C) (Oral)   Resp 16   Ht 5' 8" (1.727 m)   Wt 81.2 kg (179 lb 0.2 oz)   SpO2 (!) 94%   BMI 27.22 kg/m²     Physical Exam   Constitutional: He appears well-developed and well-nourished.   HENT:   Head: Normocephalic.   Right Ear: External ear normal.   Left Ear: External ear normal.   Nose: Nose normal.   Mouth/Throat: No oropharyngeal exudate.   Neck: Normal range of motion. Neck supple. No tracheal deviation present.   Cardiovascular: Normal rate, regular rhythm, normal heart sounds and intact distal pulses.   No murmur heard.  Pulmonary/Chest: Effort normal and breath sounds normal. He has no wheezes. He has no rales.   Abdominal: Soft. Bowel sounds are normal. He exhibits no mass. There is no tenderness.   Musculoskeletal: He exhibits no edema.   Lymphadenopathy:     He has no cervical adenopathy.   Skin: He is not diaphoretic.   Vitals reviewed.        Assessment/Plan:  Joo was seen today for sore throat.    Diagnoses and all orders for this visit:    Dysphagia, unspecified type  -     Fl Modified Barium Swallow Speech; Future  -     SLP video swallow; Future  -     ranitidine (ZANTAC) 150 MG tablet; Take 1 tablet (150 mg total) by mouth 2 (two) times daily.     Discussed with the patient that I would like to get a modified barium swallow study for further evaluation.  I discussed with him that I would like to place him on a proton pump inhibitor but he states that he would prefer Zantac.  I discussed placing him on 150 mg of Zantac twice a day however he states that he has a 300 mg pills at home and he would rather take these for now.  I discussed with him that if his symptoms do not improve with this, will need to adjust his dosage. I will call him after his modified barium.  We also discussed that in the future if he has similar symptoms, the best option will go to " the emergency room as we need to be sure that his throat is not closing off.     This office note has been dictated.  This dictation has been generated using M-Modal Fluency Direct dictation; some phonetic errors may occur.

## 2018-09-16 NOTE — TELEPHONE ENCOUNTER
Please let patient know that I would like to see him to discuss his the imaging results from his visit as the swallowing study did show some muscle problems that we should discuss along with recommendations for this.

## 2018-09-18 ENCOUNTER — TELEPHONE (OUTPATIENT)
Dept: FAMILY MEDICINE | Facility: CLINIC | Age: 78
End: 2018-09-18

## 2018-09-18 ENCOUNTER — OFFICE VISIT (OUTPATIENT)
Dept: UROLOGY | Facility: CLINIC | Age: 78
End: 2018-09-18
Payer: MEDICARE

## 2018-09-18 VITALS
BODY MASS INDEX: 26.1 KG/M2 | HEART RATE: 62 BPM | DIASTOLIC BLOOD PRESSURE: 72 MMHG | HEIGHT: 68 IN | WEIGHT: 172.19 LBS | SYSTOLIC BLOOD PRESSURE: 122 MMHG

## 2018-09-18 DIAGNOSIS — R35.0 URINARY FREQUENCY: ICD-10-CM

## 2018-09-18 DIAGNOSIS — N40.1 BENIGN PROSTATIC HYPERPLASIA WITH LOWER URINARY TRACT SYMPTOMS, SYMPTOM DETAILS UNSPECIFIED: Primary | ICD-10-CM

## 2018-09-18 DIAGNOSIS — R31.0 GROSS HEMATURIA: ICD-10-CM

## 2018-09-18 LAB
BILIRUB SERPL-MCNC: NORMAL MG/DL
BLOOD URINE, POC: NORMAL
COLOR, POC UA: YELLOW
GLUCOSE UR QL STRIP: POSITIVE
KETONES UR QL STRIP: NORMAL
LEUKOCYTE ESTERASE URINE, POC: NORMAL
NITRITE, POC UA: NORMAL
PH, POC UA: 5
PROTEIN, POC: NORMAL
SPECIFIC GRAVITY, POC UA: 1030
UROBILINOGEN, POC UA: NORMAL

## 2018-09-18 PROCEDURE — 99999 PR PBB SHADOW E&M-EST. PATIENT-LVL III: CPT | Mod: PBBFAC,,, | Performed by: UROLOGY

## 2018-09-18 PROCEDURE — 99213 OFFICE O/P EST LOW 20 MIN: CPT | Mod: PBBFAC | Performed by: UROLOGY

## 2018-09-18 PROCEDURE — 99214 OFFICE O/P EST MOD 30 MIN: CPT | Mod: S$PBB,,, | Performed by: UROLOGY

## 2018-09-18 PROCEDURE — 81001 URINALYSIS AUTO W/SCOPE: CPT | Mod: PBBFAC | Performed by: UROLOGY

## 2018-09-18 PROCEDURE — 1101F PT FALLS ASSESS-DOCD LE1/YR: CPT | Mod: CPTII,,, | Performed by: UROLOGY

## 2018-09-18 RX ORDER — TAMSULOSIN HYDROCHLORIDE 0.4 MG/1
0.8 CAPSULE ORAL DAILY
Qty: 180 CAPSULE | Refills: 1 | Status: SHIPPED | OUTPATIENT
Start: 2018-09-18 | End: 2018-12-21 | Stop reason: SDUPTHER

## 2018-09-18 NOTE — PROGRESS NOTES
Subjective:       Patient ID: Joo York is a 78 y.o. male who was last seen in this office 8/27/2018    Chief Complaint:   Chief Complaint   Patient presents with    Hematuria     f/u from cysto/bladder bx       Benign Prostatic Hyperplasia  He patient reports frequency and nocturia two times a night. He denies weak stream. The patient states symptoms are of mild severity. Onset of symptoms was several years ago and was gradual in onset.  He has no personal history and no family history of prostate cancer. He reports a history of gross hematuria. He denies flank pain, kidney stones and recurrent UTI.  He is currently taking Flomax.  He recently had a hematuria work up.  Office cystoscopy showed several erythematous patches worrisome for CIS.  He had a biopsy on 9/5/2018 and is here for follow up.      He would like to increase his Flomax.        ACTIVE MEDICAL ISSUES:  Patient Active Problem List   Diagnosis    Dyslipidemia    Gross hematuria    Urinary frequency    Dysphagia       ALLERGIES AND MEDICATIONS: updated and reviewed.  Review of patient's allergies indicates:  No Known Allergies  Current Outpatient Medications   Medication Sig    MULTIVIT-MIN/FOLIC/VIT K/LYCOP (ONE-A-DAY MEN'S 50 PLUS ORAL) Take by mouth.    ranitidine (ZANTAC) 150 MG tablet Take 1 tablet (150 mg total) by mouth 2 (two) times daily.    tamsulosin (FLOMAX) 0.4 mg Cap Take 2 capsules (0.8 mg total) by mouth once daily.    ZINC MTH/COPPER/SAW PALM/GNSG (PROSTATE HEALTH FORMULA ORAL) Take by mouth.     No current facility-administered medications for this visit.        Review of Systems   Constitutional: Negative for activity change, fatigue, fever and unexpected weight change.   HENT: Negative for congestion.    Eyes: Negative for redness.   Respiratory: Negative for chest tightness and shortness of breath.    Cardiovascular: Negative for chest pain and leg swelling.   Gastrointestinal: Negative for abdominal pain, constipation,  "diarrhea, nausea and vomiting.   Genitourinary: Negative for dysuria, flank pain, frequency, hematuria, penile pain, penile swelling, scrotal swelling, testicular pain and urgency.   Musculoskeletal: Negative for arthralgias and back pain.   Neurological: Negative for dizziness and light-headedness.   Psychiatric/Behavioral: Negative for behavioral problems and confusion. The patient is not nervous/anxious.    All other systems reviewed and are negative.      Objective:      Vitals:    09/18/18 0928   BP: 122/72   Pulse: 62   Weight: 78.1 kg (172 lb 2.9 oz)   Height: 5' 8" (1.727 m)     Physical Exam   Nursing note and vitals reviewed.  Constitutional: He is oriented to person, place, and time. He appears well-developed and well-nourished.   HENT:   Head: Normocephalic.   Eyes: Conjunctivae are normal.   Neck: Normal range of motion. Neck supple. No tracheal deviation present. No thyromegaly present.   Cardiovascular: Normal rate and normal heart sounds.    Pulmonary/Chest: Effort normal and breath sounds normal. No respiratory distress. He has no wheezes.   Abdominal: Soft. Bowel sounds are normal. There is no hepatosplenomegaly. There is no tenderness. There is no rebound and no CVA tenderness. No hernia.   Musculoskeletal: Normal range of motion. He exhibits no edema or tenderness.   Lymphadenopathy:     He has no cervical adenopathy.   Neurological: He is alert and oriented to person, place, and time.   Skin: Skin is warm and dry. No rash noted. No erythema.     Psychiatric: He has a normal mood and affect. His behavior is normal. Judgment and thought content normal.       Urine dipstick shows negative for all components.  Micro exam: negative for WBC's or RBC's.    CT: no tumors or stones    Biopsy: no malignancy  As        Assessment:       1. Benign prostatic hyperplasia with lower urinary tract symptoms, symptom details unspecified    2. Gross hematuria    3. Urinary frequency          Plan:       1. Benign " prostatic hyperplasia with lower urinary tract symptoms, symptom details unspecified  Increase Flomax to 0.8 mg    - tamsulosin (FLOMAX) 0.4 mg Cap; Take 2 capsules (0.8 mg total) by mouth once daily.  Dispense: 180 capsule; Refill: 1  - POCT urinalysis, dipstick or tablet reag  - US Retroperitoneal Complete (Kidney and; Future    2. Gross hematuria  Resolved  He may need Proscar    3. Urinary frequency  improved            Follow-up in about 6 months (around 3/18/2019) for Follow up, Review X-ray.

## 2018-09-20 ENCOUNTER — OFFICE VISIT (OUTPATIENT)
Dept: FAMILY MEDICINE | Facility: CLINIC | Age: 78
End: 2018-09-20
Payer: MEDICARE

## 2018-09-20 VITALS
RESPIRATION RATE: 20 BRPM | DIASTOLIC BLOOD PRESSURE: 70 MMHG | WEIGHT: 171.31 LBS | HEART RATE: 63 BPM | OXYGEN SATURATION: 99 % | TEMPERATURE: 98 F | BODY MASS INDEX: 25.96 KG/M2 | HEIGHT: 68 IN | SYSTOLIC BLOOD PRESSURE: 136 MMHG

## 2018-09-20 DIAGNOSIS — J39.2 CRICOPHARYNGEAL HYPERTROPHY: ICD-10-CM

## 2018-09-20 DIAGNOSIS — K21.9 GASTROESOPHAGEAL REFLUX DISEASE, ESOPHAGITIS PRESENCE NOT SPECIFIED: Primary | ICD-10-CM

## 2018-09-20 DIAGNOSIS — K22.4 ESOPHAGEAL DYSMOTILITY: ICD-10-CM

## 2018-09-20 DIAGNOSIS — R49.0 HOARSENESS: ICD-10-CM

## 2018-09-20 PROCEDURE — 99214 OFFICE O/P EST MOD 30 MIN: CPT | Mod: PBBFAC,PN | Performed by: FAMILY MEDICINE

## 2018-09-20 PROCEDURE — 99999 PR PBB SHADOW E&M-EST. PATIENT-LVL IV: CPT | Mod: PBBFAC,,, | Performed by: FAMILY MEDICINE

## 2018-09-20 PROCEDURE — 1101F PT FALLS ASSESS-DOCD LE1/YR: CPT | Mod: CPTII,,, | Performed by: FAMILY MEDICINE

## 2018-09-20 PROCEDURE — 99214 OFFICE O/P EST MOD 30 MIN: CPT | Mod: S$PBB,,, | Performed by: FAMILY MEDICINE

## 2018-10-01 NOTE — PROGRESS NOTES
Routine Office Visit    Patient Name: Joo York    : 1940  MRN: [id number]    Subjective:  Joo is a 78 y.o. male who presents today for:   Chief Complaint   Patient presents with    review swallow study       78-year-old male comes in after being recalled to review abnormal barium swallow study and esophagram.  The studies were done because of choking sensation after eating.  He was empirically placed on Zantac prior to the results coming back.  He reports that since then he has not had any difficulty.  He also reports that since then he has made dietary changes he had been recommended to decrease GERD symptoms.    Past Medical History  Past Medical History:   Diagnosis Date    Hematuria     Hernia of abdominal cavity        Past Surgical History  Past Surgical History:   Procedure Laterality Date    CYSTOSCOPY WITH BIOPSY OF BLADDER N/A 2018    Procedure: CYSTOSCOPY, WITH BLADDER BIOPSY;  Surgeon: KADIE Johnson MD;  Location: [facility] OR;  Service: Urology;  Laterality: N/A;  RN PRE OP 18------NEED CONSENT    CYSTOSCOPY, WITH BLADDER BIOPSY N/A 2018    Performed by KADIE Johnson MD at [facility] OR    office cysto 2018      TONSILLECTOMY      VASECTOMY          Family History  Family History   Problem Relation Age of Onset    Cancer Brother        Social History  Social History     Socioeconomic History    Marital status:      Spouse name: Not on file    Number of children: Not on file    Years of education: Not on file    Highest education level: Not on file   Social Needs    Financial resource strain: Not on file    Food insecurity - worry: Not on file    Food insecurity - inability: Not on file    Transportation needs - medical: Not on file    Transportation needs - non-medical: Not on file   Occupational History    Not on file   Tobacco Use    Smoking status: Never Smoker    Smokeless tobacco: Never Used   Substance and Sexual Activity    Alcohol use: No      "Frequency: Never    Drug use: No    Sexual activity: Yes     Partners: Female   Other Topics Concern    Not on file   Social History Narrative    Not on file       Current Medications  Current Outpatient Medications on File Prior to Visit   Medication Sig Dispense Refill    MULTIVIT-MIN/FOLIC/VIT K/LYCOP (ONE-A-DAY MEN'S 50 PLUS ORAL) Take by mouth.      ranitidine (ZANTAC) 150 MG tablet Take 1 tablet (150 mg total) by mouth 2 (two) times daily. 60 tablet 0    tamsulosin (FLOMAX) 0.4 mg Cap Take 2 capsules (0.8 mg total) by mouth once daily. 180 capsule 1    ZINC MTH/COPPER/SAW PALM/GNSG (PROSTATE HEALTH FORMULA ORAL) Take by mouth.       No current facility-administered medications on file prior to visit.        Allergies   Review of patient's allergies indicates:  No Known Allergies    Review of Systems   Constitutional: Negative for activity change and unexpected weight change.   HENT: Positive for voice change (wife reports hoarseness over the last year). Negative for hearing loss, rhinorrhea and trouble swallowing.    Eyes: Negative for discharge and visual disturbance.   Respiratory: Negative for chest tightness and wheezing.    Cardiovascular: Negative for chest pain and palpitations.   Gastrointestinal: Negative for blood in stool, constipation, diarrhea and vomiting.   Endocrine: Negative for polydipsia and polyuria.   Genitourinary: Positive for urgency. Negative for difficulty urinating and hematuria.   Musculoskeletal: Negative for arthralgias, joint swelling and neck pain.   Neurological: Negative for weakness and headaches.   Psychiatric/Behavioral: Negative for confusion and dysphoric mood.       /70 (BP Location: Right arm, Patient Position: Sitting, BP Method: Medium (Manual))   Pulse 63   Temp 97.9 °F (36.6 °C) (Oral)   Resp 20   Ht 5' 8" (1.727 m)   Wt 77.7 kg (171 lb 4.8 oz)   SpO2 99%   BMI 26.05 kg/m²     Physical Exam   Constitutional: He appears well-developed and " well-nourished.   HENT:   Head: Normocephalic.   Right Ear: External ear normal.   Left Ear: External ear normal.   Nose: Nose normal.   Mouth/Throat: No oropharyngeal exudate.   Neck: Normal range of motion. Neck supple. No tracheal deviation present.   Cardiovascular: Normal rate, regular rhythm, normal heart sounds and intact distal pulses.   No murmur heard.  Pulmonary/Chest: Effort normal and breath sounds normal. He has no wheezes. He has no rales.   Abdominal: Soft. Bowel sounds are normal. He exhibits no mass. There is no tenderness.   Lymphadenopathy:     He has no cervical adenopathy.   Skin: He is not diaphoretic.   Vitals reviewed.        Assessment/Plan:  Joo was seen today for review swallow study.    Diagnoses and all orders for this visit:    Gastroesophageal reflux disease, esophagitis presence not specified  -     Ambulatory referral to Gastroenterology    Esophageal dysmotility  -     Ambulatory referral to Gastroenterology    Cricopharyngeal hypertrophy  -     Ambulatory referral to Gastroenterology    Hoarseness  -     Ambulatory consult to ENT    - Modified barium swallow study and esophagram results were reviewed with the patient.  These had shown cricopharyngeal hypertrophy and esophageal dysmotility.  - I will refer the patient to GI for further evaluation.  He is to continue Zantac for now.  The we had discussed switching him over to a proton pump inhibitor, however he prefers to stay on the Zantac given the side effects which may be associated with proton pump inhibitors.  Since he has been symptom-free last visit, I see no problems with this, until he sees GI.   - this may be related to acid reflux, however I will refer the patient to ENT for evaluation.      This office note has been dictated.  This dictation has been generated using M-Modal Fluency Direct dictation; some phonetic errors may occur.

## 2018-10-17 DIAGNOSIS — R13.10 DYSPHAGIA, UNSPECIFIED TYPE: ICD-10-CM

## 2018-11-26 ENCOUNTER — OFFICE VISIT (OUTPATIENT)
Dept: OTOLARYNGOLOGY | Facility: CLINIC | Age: 78
End: 2018-11-26
Payer: MEDICARE

## 2018-11-26 VITALS
SYSTOLIC BLOOD PRESSURE: 140 MMHG | BODY MASS INDEX: 27 KG/M2 | DIASTOLIC BLOOD PRESSURE: 80 MMHG | HEIGHT: 67 IN | WEIGHT: 172 LBS

## 2018-11-26 DIAGNOSIS — K21.9 GASTROESOPHAGEAL REFLUX DISEASE, ESOPHAGITIS PRESENCE NOT SPECIFIED: ICD-10-CM

## 2018-11-26 DIAGNOSIS — R49.0 HOARSENESS: Primary | ICD-10-CM

## 2018-11-26 PROCEDURE — 99203 OFFICE O/P NEW LOW 30 MIN: CPT | Mod: 25,S$GLB,, | Performed by: OTOLARYNGOLOGY

## 2018-11-26 PROCEDURE — 1101F PT FALLS ASSESS-DOCD LE1/YR: CPT | Mod: CPTII,S$GLB,, | Performed by: OTOLARYNGOLOGY

## 2018-11-26 PROCEDURE — 31575 DIAGNOSTIC LARYNGOSCOPY: CPT | Mod: S$GLB,,, | Performed by: OTOLARYNGOLOGY

## 2018-11-26 NOTE — LETTER
November 26, 2018      Randolph Morales Jr., MD  [address] [facility]  [place] [address]           [facility]  [address] [facility] [address]  [place] [address]  Phone: [phone]          Patient: Joo York   MR Number: [id number]   YOB: 1940   Date of Visit: 11/26/2018       Dear Dr. Randolph Morales Jr.:    Thank you for referring Joo York to me for evaluation. Attached you will find relevant portions of my assessment and plan of care.    If you have questions, please do not hesitate to call me. I look forward to following Joo York along with you.    Sincerely,    Matt Valdez MD    Enclosure  CC:  No Recipients    If you would like to receive this communication electronically, please contact [email or web address] or [phone] to request more information on [facility] access.    For providers and/or their staff who would like to refer a patient to [facility], please contact us through our one-stop-shop provider referral line, [facility], at [phone].    If you feel you have received this communication in error or would no longer like to receive these types of communications, please e-mail [email or web address]

## 2018-11-26 NOTE — PROGRESS NOTES
History of Present Illness:  Joo York presents to the clinic today for Other (hoarseness,swallowing problems)  .  He is a retired  and nonsmoker who quit smoking 35-40 years ago.  He is here for evaluation of his voice which has a raspy quality and he says that it improves and worsens periodically.  He feels that it is due to his gastroesophageal reflux disease. He has had no symptoms of sinusitis or pharyngiti or chronic cough.    Past Medical History:   Diagnosis Date    Hematuria     Hernia of abdominal cavity      Past Surgical History:   Procedure Laterality Date    CYSTOSCOPY WITH BIOPSY OF BLADDER N/A 9/5/2018    Procedure: CYSTOSCOPY, WITH BLADDER BIOPSY;  Surgeon: KADIE Johnson MD;  Location: [facility] OR;  Service: Urology;  Laterality: N/A;  RN PRE OP 8-30-18------NEED CONSENT    CYSTOSCOPY, WITH BLADDER BIOPSY N/A 9/5/2018    Performed by KADIE Johnson MD at [facility] OR    office cysto 2018      TONSILLECTOMY      VASECTOMY       Family History   Problem Relation Age of Onset    Cancer Brother        Social History     Tobacco Use    Smoking status: Never Smoker    Smokeless tobacco: Never Used   Substance Use Topics    Alcohol use: No     Frequency: Never    Drug use: No         Review of Systems   Ears: Negative for hearing loss.    Nose:  Negative for nasal obstruction and postnasal drip.    Mouth/Throat: Positive for hoarse voice.   Gastrointestinal:  Positive for acid reflux and indigestion.       Physical Exam:    Constitutional:   Vital signs are normal. He appears well-developed and well-nourished. He is active. Hoarse voice.      Head:  Normocephalic. Salivary glands normal.  Facial strength is normal.      Ears:  Hearing normal to normal and whispered voice; external ear normal without scars, lesions, or masses; ear canal, tympanic membrane, and middle ear normal..     Nose:  Nose normal including turbinates, nasal mucosa, sinuses and nasal septum.     Mouth/Throat  Lips,  teeth, and gums normal and oropharynx normal.     Neck:  Neck normal without thyromegaly masses, asymmetry, normal tracheal structure, crepitus, and tenderness, thyroid normal, trachea normal and phonation normal.       FIBEROPTIC LARYNGOSCOPY -  After appropriate consent, I introduced a fiberoptic laryngoscope through the left nostril.  The nasopharynx was clear.  The base of the tongue and vallecula were clear.  The piriform sinuses were clear.  The posterior pharyngeal wall was clear.  The laryngeal and lingual surfaces of the epiglottis were normal. The true vocal cords were clear and free of polyps or nodules. The upper trachea could be seen in was clear.  The ventricle was clear.  There was some erythema of the arytenoid area and interarytenoid area and posterior portion of the false vocal cords without any definite lesion.  Impression:  This is compatible with gastroesophageal reflux disease.  There were no laryngeal or true vocal cord lesions noted.     Assessment:   Joo was seen today for other.    Diagnoses and all orders for this visit:    Hoarseness    Gastroesophageal reflux disease, esophagitis presence not specified          Plan:   continue Zantac 150 mg b.i.d..  Consider using Tums at night if you have had an acid prone meal.  Head elevation while sleeping.  Contact the office and revisit if symptoms do not improve.    Follow-up if symptoms worsen or fail to improve.

## 2018-12-21 DIAGNOSIS — N40.1 BENIGN PROSTATIC HYPERPLASIA WITH LOWER URINARY TRACT SYMPTOMS, SYMPTOM DETAILS UNSPECIFIED: ICD-10-CM

## 2018-12-21 RX ORDER — TAMSULOSIN HYDROCHLORIDE 0.4 MG/1
0.8 CAPSULE ORAL DAILY
Qty: 180 CAPSULE | Refills: 1 | Status: SHIPPED | OUTPATIENT
Start: 2018-12-21 | End: 2019-03-25 | Stop reason: SDUPTHER

## 2019-01-01 ENCOUNTER — OFFICE VISIT (OUTPATIENT)
Dept: FAMILY MEDICINE | Facility: CLINIC | Age: 79
End: 2019-01-01
Payer: MEDICARE

## 2019-01-01 ENCOUNTER — PATIENT MESSAGE (OUTPATIENT)
Dept: FAMILY MEDICINE | Facility: CLINIC | Age: 79
End: 2019-01-01

## 2019-01-01 ENCOUNTER — HOSPITAL ENCOUNTER (OUTPATIENT)
Dept: CARDIOLOGY | Facility: HOSPITAL | Age: 79
Discharge: HOME | End: 2019-12-10
Attending: INTERNAL MEDICINE
Payer: MEDICARE

## 2019-01-01 ENCOUNTER — HOSPITAL ENCOUNTER (OUTPATIENT)
Dept: CARDIOLOGY | Facility: HOSPITAL | Age: 79
Discharge: HOME | End: 2019-11-11
Attending: INTERNAL MEDICINE
Payer: MEDICARE

## 2019-01-01 ENCOUNTER — OFFICE VISIT (OUTPATIENT)
Dept: CARDIOLOGY | Facility: CLINIC | Age: 79
End: 2019-01-01
Payer: MEDICARE

## 2019-01-01 ENCOUNTER — TELEPHONE (OUTPATIENT)
Dept: FAMILY MEDICINE | Facility: CLINIC | Age: 79
End: 2019-01-01

## 2019-01-01 VITALS
DIASTOLIC BLOOD PRESSURE: 72 MMHG | HEART RATE: 58 BPM | SYSTOLIC BLOOD PRESSURE: 130 MMHG | WEIGHT: 173.31 LBS | BODY MASS INDEX: 27.2 KG/M2 | TEMPERATURE: 99 F | OXYGEN SATURATION: 96 % | HEIGHT: 67 IN

## 2019-01-01 VITALS
OXYGEN SATURATION: 98 % | RESPIRATION RATE: 15 BRPM | SYSTOLIC BLOOD PRESSURE: 130 MMHG | WEIGHT: 175.06 LBS | DIASTOLIC BLOOD PRESSURE: 80 MMHG | BODY MASS INDEX: 27.48 KG/M2 | HEIGHT: 67 IN | HEART RATE: 62 BPM

## 2019-01-01 VITALS
DIASTOLIC BLOOD PRESSURE: 80 MMHG | OXYGEN SATURATION: 98 % | WEIGHT: 175.5 LBS | SYSTOLIC BLOOD PRESSURE: 160 MMHG | BODY MASS INDEX: 27.55 KG/M2 | HEART RATE: 61 BPM | HEIGHT: 67 IN

## 2019-01-01 DIAGNOSIS — I73.9 CLAUDICATION: Primary | ICD-10-CM

## 2019-01-01 DIAGNOSIS — I73.9 VASCULAR CLAUDICATION: ICD-10-CM

## 2019-01-01 DIAGNOSIS — K76.0 HEPATIC STEATOSIS: ICD-10-CM

## 2019-01-01 DIAGNOSIS — I73.9 CLAUDICATION: ICD-10-CM

## 2019-01-01 DIAGNOSIS — K21.9 GASTROESOPHAGEAL REFLUX DISEASE, ESOPHAGITIS PRESENCE NOT SPECIFIED: Primary | ICD-10-CM

## 2019-01-01 DIAGNOSIS — Z23 NEED FOR PNEUMOCOCCAL VACCINE: ICD-10-CM

## 2019-01-01 DIAGNOSIS — R03.0 ELEVATED BLOOD PRESSURE READING IN OFFICE WITHOUT DIAGNOSIS OF HYPERTENSION: ICD-10-CM

## 2019-01-01 DIAGNOSIS — Z23 NEED FOR PNEUMOCOCCAL VACCINE: Primary | ICD-10-CM

## 2019-01-01 LAB
IMMEDIATE ARM BP: 167 MMHG
IMMEDIATE LEFT ABI: 0.55
IMMEDIATE LEFT TIBIAL: 92 MMHG
IMMEDIATE RIGHT ABI: 0.3
IMMEDIATE RIGHT TIBIAL: 50 MMHG
LEFT ABI: 0.79
LEFT ABI: 0.95
LEFT ARM BP: 163 MMHG
LEFT ARM BP: 169 MMHG
LEFT CALF BP: 155 MMHG
LEFT DORSALIS PEDIS: 134 MMHG
LEFT DORSALIS PEDIS: 167 MMHG
LEFT LOWER LEG BP: 191 MMHG
LEFT POSTERIOR TIBIAL: 124 MMHG
LEFT POSTERIOR TIBIAL: 156 MMHG
LEFT TBI: 0.4
LEFT TBI: 0.45
LEFT TOE PRESSURE: 70 MMHG
LEFT TOE PRESSURE: 77 MMHG
LEFT UPPER LEG BP: 255 MMHG
RIGHT ABI: 0.79
RIGHT ABI: 0.92
RIGHT ARM BP: 170 MMHG
RIGHT ARM BP: 175 MMHG
RIGHT CALF BP: 178 MMHG
RIGHT DORSALIS PEDIS: 134 MMHG
RIGHT DORSALIS PEDIS: 161 MMHG
RIGHT LOWER LEG BP: 179 MMHG
RIGHT POSTERIOR TIBIAL: 124 MMHG
RIGHT POSTERIOR TIBIAL: 153 MMHG
RIGHT TBI: 0.29
RIGHT TBI: 0.35
RIGHT TOE PRESSURE: 50 MMHG
RIGHT TOE PRESSURE: 61 MMHG
RIGHT UPPER LEG BP: 255 MMHG
TOE RAISES: 100

## 2019-01-01 PROCEDURE — 93924 LWR XTR VASC STDY BILAT: CPT | Mod: 50,HCNC

## 2019-01-01 PROCEDURE — 99499 UNLISTED E&M SERVICE: CPT | Mod: HCNC,S$GLB,, | Performed by: INTERNAL MEDICINE

## 2019-01-01 PROCEDURE — 99999 PR PBB SHADOW E&M-EST. PATIENT-LVL III: CPT | Mod: PBBFAC,HCNC,, | Performed by: INTERNAL MEDICINE

## 2019-01-01 PROCEDURE — 99999 PR PBB SHADOW E&M-EST. PATIENT-LVL III: ICD-10-PCS | Mod: PBBFAC,HCNC,, | Performed by: INTERNAL MEDICINE

## 2019-01-01 PROCEDURE — 93922 UPR/L XTREMITY ART 2 LEVELS: CPT | Mod: 26,HCNC,, | Performed by: SURGERY

## 2019-01-01 PROCEDURE — 1101F PT FALLS ASSESS-DOCD LE1/YR: CPT | Mod: HCNC,CPTII,S$GLB, | Performed by: INTERNAL MEDICINE

## 2019-01-01 PROCEDURE — 99213 OFFICE O/P EST LOW 20 MIN: CPT | Mod: HCNC,S$GLB,, | Performed by: INTERNAL MEDICINE

## 2019-01-01 PROCEDURE — 93924 LWR XTR VASC STDY BILAT: CPT | Mod: 26,HCNC,, | Performed by: INTERNAL MEDICINE

## 2019-01-01 PROCEDURE — 93000 ELECTROCARDIOGRAM COMPLETE: CPT | Mod: HCNC,S$GLB,, | Performed by: INTERNAL MEDICINE

## 2019-01-01 PROCEDURE — 93922 UPR/L XTREMITY ART 2 LEVELS: CPT | Mod: 50,HCNC

## 2019-01-01 PROCEDURE — 1159F PR MEDICATION LIST DOCUMENTED IN MEDICAL RECORD: ICD-10-PCS | Mod: HCNC,S$GLB,, | Performed by: INTERNAL MEDICINE

## 2019-01-01 PROCEDURE — 93922 CV US ANKLE BRACHIAL INDICES WO STRESS W TOE TRACINGS (CUPID ONLY): ICD-10-PCS | Mod: 26,HCNC,, | Performed by: SURGERY

## 2019-01-01 PROCEDURE — 99214 OFFICE O/P EST MOD 30 MIN: CPT | Mod: HCNC,S$GLB,, | Performed by: INTERNAL MEDICINE

## 2019-01-01 PROCEDURE — 1126F PR PAIN SEVERITY QUANTIFIED, NO PAIN PRESENT: ICD-10-PCS | Mod: HCNC,S$GLB,, | Performed by: INTERNAL MEDICINE

## 2019-01-01 PROCEDURE — 1101F PR PT FALLS ASSESS DOC 0-1 FALLS W/OUT INJ PAST YR: ICD-10-PCS | Mod: HCNC,CPTII,S$GLB, | Performed by: INTERNAL MEDICINE

## 2019-01-01 PROCEDURE — 99203 OFFICE O/P NEW LOW 30 MIN: CPT | Mod: HCNC,S$GLB,, | Performed by: INTERNAL MEDICINE

## 2019-01-01 PROCEDURE — 99203 PR OFFICE/OUTPT VISIT, NEW, LEVL III, 30-44 MIN: ICD-10-PCS | Mod: HCNC,S$GLB,, | Performed by: INTERNAL MEDICINE

## 2019-01-01 PROCEDURE — 99213 PR OFFICE/OUTPT VISIT, EST, LEVL III, 20-29 MIN: ICD-10-PCS | Mod: HCNC,S$GLB,, | Performed by: INTERNAL MEDICINE

## 2019-01-01 PROCEDURE — 93924 CV US ANKLE / BRACHIAL INDICES EXERCIS W/ RESTING SEGMENTAL PRESSURES (CUPID ONLY): ICD-10-PCS | Mod: 26,HCNC,, | Performed by: INTERNAL MEDICINE

## 2019-01-01 PROCEDURE — 1159F MED LIST DOCD IN RCRD: CPT | Mod: HCNC,S$GLB,, | Performed by: INTERNAL MEDICINE

## 2019-01-01 PROCEDURE — 99214 PR OFFICE/OUTPT VISIT, EST, LEVL IV, 30-39 MIN: ICD-10-PCS | Mod: HCNC,S$GLB,, | Performed by: INTERNAL MEDICINE

## 2019-01-01 PROCEDURE — 1126F AMNT PAIN NOTED NONE PRSNT: CPT | Mod: HCNC,S$GLB,, | Performed by: INTERNAL MEDICINE

## 2019-01-01 PROCEDURE — 99499 RISK ADDL DX/OHS AUDIT: ICD-10-PCS | Mod: HCNC,S$GLB,, | Performed by: INTERNAL MEDICINE

## 2019-01-01 PROCEDURE — 93000 EKG 12-LEAD: ICD-10-PCS | Mod: HCNC,S$GLB,, | Performed by: INTERNAL MEDICINE

## 2019-01-01 RX ORDER — FAMOTIDINE 20 MG/1
20 TABLET, FILM COATED ORAL 2 TIMES DAILY
Qty: 60 TABLET | Refills: 11 | Status: ON HOLD | OUTPATIENT
Start: 2019-01-01 | End: 2020-01-01 | Stop reason: HOSPADM

## 2019-02-07 ENCOUNTER — TELEPHONE (OUTPATIENT)
Dept: UROLOGY | Facility: CLINIC | Age: 79
End: 2019-02-07

## 2019-02-07 NOTE — TELEPHONE ENCOUNTER
Lt message appt that is schedule for 03/12/19 will need to be emile  will be out of the office. Pt will need to keep ultrasound appt an follow appt should be made after ultrasound appt.-jannette

## 2019-03-06 ENCOUNTER — HOSPITAL ENCOUNTER (OUTPATIENT)
Dept: RADIOLOGY | Facility: HOSPITAL | Age: 79
Discharge: HOME | End: 2019-03-06
Attending: UROLOGY
Payer: MEDICARE

## 2019-03-06 DIAGNOSIS — N40.1 BENIGN PROSTATIC HYPERPLASIA WITH LOWER URINARY TRACT SYMPTOMS, SYMPTOM DETAILS UNSPECIFIED: ICD-10-CM

## 2019-03-06 PROCEDURE — 76770 US RETROPERITONEAL COMPLETE: ICD-10-PCS | Mod: 26,HCNC,, | Performed by: RADIOLOGY

## 2019-03-06 PROCEDURE — 76770 US EXAM ABDO BACK WALL COMP: CPT | Mod: 26,HCNC,, | Performed by: RADIOLOGY

## 2019-03-06 PROCEDURE — 76770 US EXAM ABDO BACK WALL COMP: CPT | Mod: TC,HCNC

## 2019-03-25 ENCOUNTER — OFFICE VISIT (OUTPATIENT)
Dept: UROLOGY | Facility: CLINIC | Age: 79
End: 2019-03-25
Payer: MEDICARE

## 2019-03-25 VITALS
SYSTOLIC BLOOD PRESSURE: 110 MMHG | HEIGHT: 67 IN | HEART RATE: 82 BPM | BODY MASS INDEX: 27.64 KG/M2 | DIASTOLIC BLOOD PRESSURE: 72 MMHG | WEIGHT: 176.13 LBS

## 2019-03-25 DIAGNOSIS — N40.1 BENIGN PROSTATIC HYPERPLASIA WITH LOWER URINARY TRACT SYMPTOMS, SYMPTOM DETAILS UNSPECIFIED: ICD-10-CM

## 2019-03-25 DIAGNOSIS — N40.1 BPH WITH OBSTRUCTION/LOWER URINARY TRACT SYMPTOMS: Primary | ICD-10-CM

## 2019-03-25 DIAGNOSIS — N13.8 BPH WITH OBSTRUCTION/LOWER URINARY TRACT SYMPTOMS: Primary | ICD-10-CM

## 2019-03-25 DIAGNOSIS — R35.1 NOCTURIA MORE THAN TWICE PER NIGHT: ICD-10-CM

## 2019-03-25 DIAGNOSIS — N20.0 KIDNEY STONES: ICD-10-CM

## 2019-03-25 DIAGNOSIS — R33.9 INCOMPLETE EMPTYING OF BLADDER: ICD-10-CM

## 2019-03-25 PROBLEM — R31.0 GROSS HEMATURIA: Status: RESOLVED | Noted: 2018-08-03 | Resolved: 2019-03-25

## 2019-03-25 LAB
BILIRUB SERPL-MCNC: NORMAL MG/DL
BLOOD URINE, POC: NORMAL
COLOR, POC UA: YELLOW
GLUCOSE UR QL STRIP: NORMAL
KETONES UR QL STRIP: NORMAL
LEUKOCYTE ESTERASE URINE, POC: NORMAL
NITRITE, POC UA: NORMAL
PH, POC UA: 5
PROTEIN, POC: NORMAL
SPECIFIC GRAVITY, POC UA: 1020
UROBILINOGEN, POC UA: NORMAL

## 2019-03-25 PROCEDURE — 99999 PR PBB SHADOW E&M-EST. PATIENT-LVL III: CPT | Mod: PBBFAC,HCNC,, | Performed by: UROLOGY

## 2019-03-25 PROCEDURE — 99214 OFFICE O/P EST MOD 30 MIN: CPT | Mod: 25,S$GLB,, | Performed by: UROLOGY

## 2019-03-25 PROCEDURE — 81001 URINALYSIS AUTO W/SCOPE: CPT | Mod: S$GLB,,, | Performed by: UROLOGY

## 2019-03-25 PROCEDURE — 1101F PR PT FALLS ASSESS DOC 0-1 FALLS W/OUT INJ PAST YR: ICD-10-PCS | Mod: CPTII,S$GLB,, | Performed by: UROLOGY

## 2019-03-25 PROCEDURE — 99214 PR OFFICE/OUTPT VISIT, EST, LEVL IV, 30-39 MIN: ICD-10-PCS | Mod: 25,S$GLB,, | Performed by: UROLOGY

## 2019-03-25 PROCEDURE — 99999 PR PBB SHADOW E&M-EST. PATIENT-LVL III: ICD-10-PCS | Mod: PBBFAC,HCNC,, | Performed by: UROLOGY

## 2019-03-25 PROCEDURE — 1101F PT FALLS ASSESS-DOCD LE1/YR: CPT | Mod: CPTII,S$GLB,, | Performed by: UROLOGY

## 2019-03-25 PROCEDURE — 81001 POCT URINALYSIS, DIPSTICK OR TABLET REAGENT, AUTOMATED, WITH MICROSCOP: ICD-10-PCS | Mod: S$GLB,,, | Performed by: UROLOGY

## 2019-03-25 RX ORDER — TAMSULOSIN HYDROCHLORIDE 0.4 MG/1
0.8 CAPSULE ORAL DAILY
Qty: 180 CAPSULE | Refills: 3 | Status: SHIPPED | OUTPATIENT
Start: 2019-03-25 | End: 2019-06-21 | Stop reason: SDUPTHER

## 2019-03-25 RX ORDER — GUAIFENESIN/PHENYLPROPANOLAMIN
450 EXPECTORANT ORAL DAILY
Status: ON HOLD | COMMUNITY
End: 2020-01-01 | Stop reason: HOSPADM

## 2019-03-25 NOTE — PROGRESS NOTES
Subjective:       Patient ID: Joo York is a 79 y.o. male who was last seen in this office Visit date not found    Chief Complaint:   Chief Complaint   Patient presents with    Benign Prostatic Hypertrophy     6 month f/u with ultrasound results        Benign Prostatic Hyperplasia  He patient reports frequency and nocturia two times a night. He denies weak stream. The patient states symptoms are of mild severity. Onset of symptoms was several years ago and was gradual in onset.  He has no personal history and no family history of prostate cancer. He reports a history of gross hematuria. He denies flank pain, kidney stones and recurrent UTI.  He is currently taking Flomax 0.8 mg.  He recently had a hematuria work up.  Office cystoscopy showed several erythematous patches worrisome for CIS.  He had a biopsy on 9/5/2018 that showed no malginancy.  He is pleased with his stream at present.       ACTIVE MEDICAL ISSUES:  Patient Active Problem List   Diagnosis    Dyslipidemia    Urinary frequency    Dysphagia       ALLERGIES AND MEDICATIONS: updated and reviewed.  Review of patient's allergies indicates:  No Known Allergies  Current Outpatient Medications   Medication Sig    MULTIVIT-MIN/FOLIC/VIT K/LYCOP (ONE-A-DAY MEN'S 50 PLUS ORAL) Take by mouth.    ranitidine (ZANTAC) 150 MG tablet Take 1 tablet (150 mg total) by mouth 2 (two) times daily.    saw palmetto 500 MG capsule Take 450 mg by mouth once daily.    tamsulosin (FLOMAX) 0.4 mg Cap Take 2 capsules (0.8 mg total) by mouth once daily.    ZINC MTH/COPPER/SAW PALM/GNSG (PROSTATE HEALTH FORMULA ORAL) Take by mouth.     No current facility-administered medications for this visit.        Review of Systems   Constitutional: Negative for activity change, fatigue, fever and unexpected weight change.   HENT: Negative for congestion.    Eyes: Negative for redness.   Respiratory: Negative for chest tightness and shortness of breath.    Cardiovascular: Negative for  "chest pain and leg swelling.   Gastrointestinal: Negative for abdominal pain, constipation, diarrhea, nausea and vomiting.   Genitourinary: Negative for dysuria, flank pain, frequency, hematuria, penile pain, penile swelling, scrotal swelling, testicular pain and urgency.   Musculoskeletal: Negative for arthralgias and back pain.   Neurological: Negative for dizziness and light-headedness.   Psychiatric/Behavioral: Negative for behavioral problems and confusion. The patient is not nervous/anxious.    All other systems reviewed and are negative.      Objective:      Vitals:    03/25/19 1501   BP: 110/72   Pulse: 82   Weight: 79.9 kg (176 lb 2.4 oz)   Height: 5' 7" (1.702 m)     Physical Exam   Nursing note and vitals reviewed.  Constitutional: He is oriented to person, place, and time. He appears well-developed and well-nourished.   HENT:   Head: Normocephalic.   Eyes: Conjunctivae are normal.   Neck: Normal range of motion. No tracheal deviation present. No thyromegaly present.   Cardiovascular: Normal rate and normal heart sounds.    Pulmonary/Chest: Effort normal and breath sounds normal. No respiratory distress. He has no wheezes.   Abdominal: Soft. He exhibits no distension and no mass. There is no hepatosplenomegaly. There is no tenderness. There is no rebound, no guarding and no CVA tenderness. No hernia. Hernia confirmed negative in the right inguinal area and confirmed negative in the left inguinal area.   Genitourinary: Rectum normal, testes normal and penis normal. Rectal exam shows no external hemorrhoid, no mass and no tenderness. Prostate is enlarged. Prostate is not tender. Right testis shows no mass and no tenderness. Left testis shows no mass and no tenderness.       Musculoskeletal: He exhibits no edema or tenderness.   Lymphadenopathy: No inguinal adenopathy noted on the right or left side.   Neurological: He is alert and oriented to person, place, and time.   Skin: Skin is warm and dry. No rash " noted. No erythema.     Psychiatric: He has a normal mood and affect. His behavior is normal. Judgment and thought content normal.       Urine dipstick shows negative for all components.  Micro exam: negative for WBC's or RBC's.    US Retroperitoneal Complete (Kidney and   Order: [id number]   Status:  Final result   Visible to patient:  Yes (Patient Portal)   Next appt:  04/10/2019 at 09:00 AM in Family Medicine (ANNUAL WELLNESS VISIT-NURSE PRACTITIONER, [address])   Dx:  Benign prostatic hyperplasia with low...   Details     Reading Physician Reading Date Result Priority   Joshua Pardo MD 3/6/2019       Narrative     EXAMINATION:  US RETROPERITONEAL COMPLETE    CLINICAL HISTORY:  Benign prostatic hyperplasia with lower urinary tract symptoms    TECHNIQUE:  Ultrasound of the kidneys and urinary bladder was performed including color flow and Doppler evaluation of the kidneys.    COMPARISON:  CT urogram dated 08/08/2018 and complete abdominal ultrasound dated 02/03/2017.    FINDINGS:  Right kidney: The right kidney measures 11.0 x 5.1 x 5.2 cm. No cortical thinning. No loss of corticomedullary distinction.  No mass. There is a 5 mm nonshadowing echogenic focus within the cortex of the mid right kidney which may represent a nonobstructing calculus.  No hydronephrosis.    Left kidney: The left kidney measures 11.8 x 6.2 x 5.3 cm. No cortical thinning. No loss of corticomedullary distinction. There is a subcentimeter cyst within the upper pole of the left kidney measuring 7 mm in size.  No renal stone. No hydronephrosis.    The urinary bladder is smooth walled with a prominent prostatic impression upon the base of the urinary bladder.  Prevoid measurements suggest a prevoid bladder volume of 39 mL.  Following voiding, repeat bladder ultrasound suggest a postvoid residual of 31 mL.    The prostate gland is enlarged measuring 6.8 x 5.6 x 5.3 cm in size corresponding to an estimated prostate volume of 106 cm3.       Impression       Enlarged prostate gland with prominent impression upon the floor of the urinary bladder.    Mild to moderate postvoid residual within the urinary bladder estimated to be 31 mL.    Subcentimeter left renal cyst.    Possible nonobstructing calculus within the mid right kidney.      Electronically signed by: Joshua Pardo MD  Date: 03/06/2019  Time: 11:54            Last Resulted: 03/06/19 11:54           Lab Results   Component Value Date    PSA 2.5 09/07/2010     PSA Total 0.00 - 4.00 ng/mL 4.5High      Comment: PSA Expected levels:   Hormonal Therapy: <0.05 ng/ml   Prostatectomy: <0.01 ng/ml   Radiation Therapy: <1.00 ng/ml     PSA, Free 0.01 - 1.50 ng/mL 1.74High      PSA, Free Pct Not established % 38.67          Assessment:       1. BPH with obstruction/lower urinary tract symptoms    2. Benign prostatic hyperplasia with lower urinary tract symptoms, symptom details unspecified    3. Nocturia more than twice per night    4. Incomplete emptying of bladder    5. Kidney stones          Plan:       1. Benign prostatic hyperplasia with lower urinary tract symptoms, symptom details unspecified    - tamsulosin (FLOMAX) 0.4 mg Cap; Take 2 capsules (0.8 mg total) by mouth once daily.  Dispense: 180 capsule; Refill: 3    2. BPH with obstruction/lower urinary tract symptoms  We discussed his PSA.  For 79, this is acceptable but we will monitor.    - POCT urinalysis, dipstick or tablet reag  - Prostate Specific Antigen, Diagnostic; Future    3. Nocturia more than twice per night  Limit evening fluids    4. Incomplete emptying of bladder  stable    5. Kidney stones  monitor  - X-Ray KUB; Future            No follow-ups on file.

## 2019-04-10 ENCOUNTER — OFFICE VISIT (OUTPATIENT)
Dept: FAMILY MEDICINE | Facility: CLINIC | Age: 79
End: 2019-04-10
Payer: MEDICARE

## 2019-04-10 VITALS — BODY MASS INDEX: 27.16 KG/M2 | WEIGHT: 173.38 LBS

## 2019-04-10 DIAGNOSIS — R35.0 URINARY FREQUENCY: ICD-10-CM

## 2019-04-10 DIAGNOSIS — I70.0 ABDOMINAL AORTIC ATHEROSCLEROSIS: ICD-10-CM

## 2019-04-10 DIAGNOSIS — Z23 NEED FOR VACCINATION: ICD-10-CM

## 2019-04-10 DIAGNOSIS — Z00.00 ENCOUNTER FOR PREVENTIVE HEALTH EXAMINATION: Primary | ICD-10-CM

## 2019-04-10 PROCEDURE — G0439 PPPS, SUBSEQ VISIT: HCPCS | Mod: HCNC,S$GLB,, | Performed by: NURSE PRACTITIONER

## 2019-04-10 PROCEDURE — 99999 PR PBB SHADOW E&M-EST. PATIENT-LVL III: ICD-10-PCS | Mod: PBBFAC,HCNC,, | Performed by: NURSE PRACTITIONER

## 2019-04-10 PROCEDURE — 99499 RISK ADDL DX/OHS AUDIT: ICD-10-PCS | Mod: S$GLB,,, | Performed by: NURSE PRACTITIONER

## 2019-04-10 PROCEDURE — 99499 UNLISTED E&M SERVICE: CPT | Mod: S$GLB,,, | Performed by: NURSE PRACTITIONER

## 2019-04-10 PROCEDURE — 99999 PR PBB SHADOW E&M-EST. PATIENT-LVL III: CPT | Mod: PBBFAC,HCNC,, | Performed by: NURSE PRACTITIONER

## 2019-04-10 PROCEDURE — G0439 PR MEDICARE ANNUAL WELLNESS SUBSEQUENT VISIT: ICD-10-PCS | Mod: HCNC,S$GLB,, | Performed by: NURSE PRACTITIONER

## 2019-04-10 NOTE — PATIENT INSTRUCTIONS
Counseling and Referral of Other Preventative  (Italic type indicates deductible and co-insurance are waived)    Patient Name: Joo York  Today's Date: 4/10/2019    Health Maintenance       Date Due Completion Date    TETANUS VACCINE 01/30/1958 ---    Pneumococcal Vaccine (65+ Low/Medium Risk) (2 of 2 - PCV13) 09/11/2015 9/11/2014    Lipid Panel 02/03/2022 2/3/2017        No orders of the defined types were placed in this encounter.    The following information is provided to all patients.  This information is to help you find resources for any of the problems found today that may be affecting your health:                Living healthy guide: www.[facility].[place].gov      Understanding Diabetes: www.diabetes.org      Eating healthy: www.cdc.gov/healthyweight      CDC home safety checklist: www.cdc.gov/steadi/patient.html      Agency on Aging: www.goea.[place].[facility]      Alcoholics anonymous (AA): www.aa.org      Physical Activity: www.[place].nih.gov/ue1mihp      Tobacco use: www.quitwithusla.org

## 2019-04-11 NOTE — PROGRESS NOTES
Joo York presented for a  Medicare AWV and comprehensive Health Risk Assessment today. The following components were reviewed and updated:    · Medical history  · Family History  · Social history  · Allergies and Current Medications  · Health Risk Assessment  · Health Maintenance  · Care Team     ** See Completed Assessments for Annual Wellness Visit within the encounter summary.**       The following assessments were completed:  · Living Situation  · CAGE  · Depression Screening  · Timed Get Up and Go  · Whisper Test  · Cognitive Function Screening  ·   ·   · Nutrition Screening  · ADL Screening  · PAQ Screening    Vitals:    04/10/19 0927   Weight: 78.7 kg (173 lb 6.3 oz)     Body mass index is 27.16 kg/m².  Physical Exam   Constitutional: He is oriented to person, place, and time.   Cardiovascular: Normal rate.   Pulmonary/Chest: Effort normal.   Musculoskeletal: Normal range of motion.   Neurological: He is alert and oriented to person, place, and time.   Skin: Skin is warm. Capillary refill takes less than 2 seconds.   Psychiatric: He has a normal mood and affect. His behavior is normal. Thought content normal.   Vitals reviewed.        Diagnoses and health risks identified today and associated recommendations/orders:    1. Encounter for preventive health examination  Education provided about preventive health examinations and procedures; addressed and discussed patient's health concerns. Additionally, reviewed medical record for risk factors and documented the results during this encounter.    2. Abdominal aortic atherosclerosis  Stable, asymptomatic; monitor.     3. Urinary frequency  Stable, patient evaluated/monitored by [facility] urology dept; continue as advised.     4. Need for vaccination  Patient aware of recommendation for pneumococcal and tetanus vaccines.        Provided Joo with a 5-10 year written screening schedule and personal prevention plan. Recommendations were developed using the USPSTF  age appropriate recommendations. Education, counseling, and referrals were provided as needed. After Visit Summary printed and given to patient which includes a list of additional screenings\tests needed.    Follow up in about 1 year (around 4/10/2020) for assessment .    Rylan Newell Jr, NP

## 2019-04-16 DIAGNOSIS — R13.10 DYSPHAGIA, UNSPECIFIED TYPE: ICD-10-CM

## 2019-06-13 NOTE — TELEPHONE ENCOUNTER
Spoke with patient. Injection appointment for Zostavax scheduled 4-6-17 at [facility]   O-Z Plasty Text: The defect edges were debeveled with a #15 scalpel blade.  Given the location of the defect, shape of the defect and the proximity to free margins an O-Z plasty (double transposition flap) was deemed most appropriate.  Using a sterile surgical marker, the appropriate transposition flaps were drawn incorporating the defect and placing the expected incisions within the relaxed skin tension lines where possible.    The area thus outlined was incised deep to adipose tissue with a #15 scalpel blade.  The skin margins were undermined to an appropriate distance in all directions utilizing iris scissors.  Hemostasis was achieved with electrocautery.  The flaps were then transposed into place, one clockwise and the other counterclockwise, and anchored with interrupted buried subcutaneous sutures.

## 2019-06-21 DIAGNOSIS — N40.1 BENIGN PROSTATIC HYPERPLASIA WITH LOWER URINARY TRACT SYMPTOMS, SYMPTOM DETAILS UNSPECIFIED: ICD-10-CM

## 2019-06-21 RX ORDER — TAMSULOSIN HYDROCHLORIDE 0.4 MG/1
0.8 CAPSULE ORAL DAILY
Qty: 180 CAPSULE | Refills: 3 | Status: SHIPPED | OUTPATIENT
Start: 2019-06-21 | End: 2020-01-01 | Stop reason: SDUPTHER

## 2019-07-11 DIAGNOSIS — R13.10 DYSPHAGIA, UNSPECIFIED TYPE: ICD-10-CM

## 2019-11-14 NOTE — TELEPHONE ENCOUNTER
Unable to leave voicemail. VIRGINIA show mild disease bilaterally referral to cardiologist for consideration of angiogram versus cliostazol therapy

## 2019-12-02 NOTE — PROGRESS NOTES
CARDIOLOGY CONSULTATION    REASON FOR CONSULT:   Joo York is a 79 y.o. male who presents for cardiovascular evaluation.      HISTORY OF PRESENT ILLNESS:     Joo York presents for cardiovascular evaluation. He was seen by Dr. Watson with complaints of bilateral calf pain when he attempts to ambulate. Some numbness/tingling plantar surface.VIRGINIA showed mild bilateral arterial disease. VIR[place]IA of 0.79 bilaterally. He is not a diabetic. No htn or hlp.    CARDIOVASCULAR HISTORY:     None    PAST MEDICAL HISTORY:     Past Medical History:   Diagnosis Date    Hematuria     Hernia of abdominal cavity        PAST SURGICAL HISTORY:     Past Surgical History:   Procedure Laterality Date    CYSTOSCOPY WITH BIOPSY OF BLADDER N/A 9/5/2018    Procedure: CYSTOSCOPY, WITH BLADDER BIOPSY;  Surgeon: KADIE Johnson MD;  Location: [facility];  Service: Urology;  Laterality: N/A;  RN PRE OP 8-30-18------NEED CONSENT    office cysto 2018      TONSILLECTOMY      VASECTOMY         ALLERGIES AND MEDICATION:   Review of patient's allergies indicates:  No Known Allergies     Medication List           Accurate as of December 2, 2019 10:57 AM. If you have any questions, ask your nurse or doctor.               CONTINUE taking these medications    famotidine 20 MG tablet  Commonly known as:  Pepcid  Take 1 tablet (20 mg total) by mouth 2 (two) times daily.     ONE-A-DAY MEN'S 50 PLUS ORAL     PROSTATE HEALTH FORMULA ORAL     saw palmetto 500 MG capsule     tamsulosin 0.4 mg Cap  Commonly known as:  FLOMAX  Take 2 capsules (0.8 mg total) by mouth once daily.            SOCIAL HISTORY:     Social History     Socioeconomic History    Marital status:      Spouse name: Not on file    Number of children: Not on file    Years of education: Not on file    Highest education level: Not on file   Occupational History    Not on file   Social Needs    Financial resource strain: Not on file    Food insecurity:     Worry: Not on file      "Inability: Not on file    Transportation needs:     Medical: Not on file     Non-medical: Not on file   Tobacco Use    Smoking status: Never Smoker    Smokeless tobacco: Never Used   Substance and Sexual Activity    Alcohol use: Yes     Frequency: Never    Drug use: No    Sexual activity: Yes     Partners: Female   Lifestyle    Physical activity:     Days per week: Not on file     Minutes per session: Not on file    Stress: Not on file   Relationships    Social connections:     Talks on phone: Not on file     Gets together: Not on file     Attends Zoroastrianism service: Not on file     Active member of club or organization: Not on file     Attends meetings of clubs or organizations: Not on file     Relationship status: Not on file   Other Topics Concern    Not on file   Social History Narrative    Not on file       FAMILY HISTORY:     Family History   Problem Relation Age of Onset    Osteoarthritis Sister     Cancer Brother        REVIEW OF SYSTEMS:   Review of Systems   Respiratory: Negative for shortness of breath and wheezing.    Cardiovascular: Positive for claudication. Negative for chest pain, palpitations, orthopnea, leg swelling and PND.   Musculoskeletal: Negative for falls, joint pain and myalgias.   Neurological: Positive for tingling. Negative for dizziness, tremors, focal weakness, loss of consciousness and weakness.   All other systems reviewed and are negative.      PHYSICAL EXAM:     Vitals:    12/02/19 1038   BP: (!) 160/80   Pulse: 61    Body mass index is 27.49 kg/m².  Weight: 79.6 kg (175 lb 7.8 oz)   Height: 5' 7" (170.2 cm)     Physical Exam   Constitutional: He is oriented to person, place, and time. No distress.   Neck: No JVD present. Carotid bruit is not present.   Cardiovascular:   Murmur heard.   Systolic murmur is present with a grade of 2/6.  Pulses:       Femoral pulses are 2+ on the right side, and 2+ on the left side.       Popliteal pulses are 2+ on the right side, and 2+ " on the left side.        Dorsalis pedis pulses are 2+ on the right side, and 2+ on the left side.        Posterior tibial pulses are 1+ on the right side, and 1+ on the left side.   Pulmonary/Chest: Effort normal and breath sounds normal.   Abdominal: Soft. Bowel sounds are normal. There is no tenderness.   Musculoskeletal:        Right lower leg: He exhibits no deformity.        Left lower leg: He exhibits no edema.   Neurological: He is alert and oriented to person, place, and time.   Skin: He is not diaphoretic.   Psychiatric: He has a normal mood and affect. His speech is normal and behavior is normal. Thought content normal.   Vitals reviewed.      DATA:   EKG: (personally reviewed tracing)  Normal sinus rhythm    Laboratory:  CBC:  Recent Labs   Lab 17  1130 18  1645   WBC 6.24 6.04   Hemoglobin 15.6 15.3   Hematocrit 45.5 43.0   Platelets 218 183       CHEMISTRIES:  Recent Labs   Lab 17  1130 18  1202 18  1645   Glucose 96 128 H 106   Sodium 142 139 141   Potassium 4.9 4.5 5.1   BUN, Bld 12 12 11   Creatinine 1.0 1.1 1.0   eGFR if African American >60 >60 >60   eGFR if non African American >60 >60 >60   Calcium 10.3 11.0 H 10.6 H       CARDIAC BIOMARKERS:        COAGS:        LIPIDS/LFTS:  Recent Labs   Lab 17  1130 18  1202   Cholesterol 226 H  --    Triglycerides 176 H  --    HDL 49  --    LDL Cholesterol 141.8  --    Non-HDL Cholesterol 177  --    AST 27 27   ALT 28 31       Cardiovascular Testin. Right lower extremity pressures and waveforms indicate mild arterial occlusive disease.  2. Left lower extremity pressures and waveforms indicate mild arterial occlusive disease.  Toe pressure is normal with dampened waveform.    ASSESSMENT:     1. Claudication  2. Elevated blood pressure without diagnosis of HTN        PLAN:     1. CMP, FLP  2. LE pressures c stress.  3. BP log  4. RTC one month.        Triston Hightower MD, MPH, FACC, [facility]

## 2019-12-02 NOTE — LETTER
December 2, 2019      Keegan Watson MD  [address]  [place] [address]           [facility]  [address]  [facility] [place] [address]  Phone: [phone]          Patient: Joo York   MR Number: [id number]   YOB: 1940   Date of Visit: 12/2/2019       Dear Dr. Keegan Watson:    Thank you for referring Joo York to me for evaluation. Attached you will find relevant portions of my assessment and plan of care.    If you have questions, please do not hesitate to call me. I look forward to following Joo York along with you.    Sincerely,    Triston Hightower MD    Enclosure  CC:  No Recipients    If you would like to receive this communication electronically, please contact [email or web address] or [phone] to request more information on [employer] Link access.    For providers and/or their staff who would like to refer a patient to [facility], please contact us through our one-stop-shop provider referral line, [facility], at [phone].    If you feel you have received this communication in error or would no longer like to receive these types of communications, please e-mail [email or web address]

## 2019-12-17 NOTE — PROGRESS NOTES
CARDIOLOGY CLINIC VISIT        HISTORY OF PRESENT ILLNESS:     oJo York presents for continued care. Initially seen secondary to complaints of bilateral calf pain with ambulation. VIRGINIA showed mild disease. We had him do segmental pressures lower extremity with exercise.     · With toe raises the right VIRGINIA went from 0.92 to 0.30  · With toe raises the left VIRGINIA went from 0.95 to 0.55.    We discussed the test results. Discussed next step of CTA vs. Invasive angiography. We also discussed potential medications such as cilostazol. He would like to proceed with the CTA of the lower extremities.     CARDIOVASCULAR HISTORY:     None    PAST MEDICAL HISTORY:     Past Medical History:   Diagnosis Date    Hematuria     Hernia of abdominal cavity        PAST SURGICAL HISTORY:     Past Surgical History:   Procedure Laterality Date    CYSTOSCOPY WITH BIOPSY OF BLADDER N/A 9/5/2018    Procedure: CYSTOSCOPY, WITH BLADDER BIOPSY;  Surgeon: KADIE Johnson MD;  Location: [facility];  Service: Urology;  Laterality: N/A;  RN PRE OP [id number]------NEED CONSENT    office cysto 2018      TONSILLECTOMY      VASECTOMY         ALLERGIES AND MEDICATION:   Review of patient's allergies indicates:  No Known Allergies     Medication List           Accurate as of December 17, 2019  1:12 PM. If you have any questions, ask your nurse or doctor.               CONTINUE taking these medications    famotidine 20 MG tablet  Commonly known as:  Pepcid  Take 1 tablet (20 mg total) by mouth 2 (two) times daily.     ONE-A-DAY MEN'S 50 PLUS ORAL     PROSTATE HEALTH FORMULA ORAL     saw palmetto 500 MG capsule     tamsulosin 0.4 mg Cap  Commonly known as:  FLOMAX  Take 2 capsules (0.8 mg total) by mouth once daily.            SOCIAL HISTORY:     Social History     Socioeconomic History    Marital status:      Spouse name: Not on file    Number of children: Not on file    Years of education: Not on file    Highest education level: Not on file  "  Occupational History    Not on file   Social Needs    Financial resource strain: Not on file    Food insecurity:     Worry: Not on file     Inability: Not on file    Transportation needs:     Medical: Not on file     Non-medical: Not on file   Tobacco Use    Smoking status: Never Smoker    Smokeless tobacco: Never Used   Substance and Sexual Activity    Alcohol use: Yes     Frequency: Never    Drug use: No    Sexual activity: Yes     Partners: Female   Lifestyle    Physical activity:     Days per week: Not on file     Minutes per session: Not on file    Stress: Not on file   Relationships    Social connections:     Talks on phone: Not on file     Gets together: Not on file     Attends Orthodoxy service: Not on file     Active member of club or organization: Not on file     Attends meetings of clubs or organizations: Not on file     Relationship status: Not on file   Other Topics Concern    Not on file   Social History Narrative    Not on file       FAMILY HISTORY:     Family History   Problem Relation Age of Onset    Osteoarthritis Sister     Cancer Brother        REVIEW OF SYSTEMS:   Review of Systems   Respiratory: Negative for shortness of breath and wheezing.    Cardiovascular: Positive for claudication. Negative for chest pain, palpitations, orthopnea and leg swelling.   Neurological: Negative for dizziness, speech change, focal weakness, seizures and headaches.   All other systems reviewed and are negative.      PHYSICAL EXAM:     Vitals:    12/17/19 1310   BP: 130/80   Pulse: 62   Resp: 15    Body mass index is 27.42 kg/m².  Weight: 79.4 kg (175 lb 0.7 oz)   Height: 5' 7" (170.2 cm)     Physical Exam   Constitutional: He is oriented to person, place, and time. No distress.   Cardiovascular: Normal rate, regular rhythm and normal pulses.   Pulmonary/Chest: Effort normal and breath sounds normal.   Abdominal: Soft. Bowel sounds are normal.   Musculoskeletal:        Right lower leg: He exhibits " no edema.        Left lower leg: He exhibits no edema.   Neurological: He is alert and oriented to person, place, and time.   Skin: He is not diaphoretic.   Psychiatric: He has a normal mood and affect. His speech is normal and behavior is normal.   Vitals reviewed.      DATA:     Laboratory:  CBC:  Recent Labs   Lab 02/03/17  1130 08/30/18  1645   WBC 6.24 6.04   Hemoglobin 15.6 15.3   Hematocrit 45.5 43.0   Platelets 218 183       CHEMISTRIES:  Recent Labs   Lab 08/08/18  1202 08/30/18  1645 12/10/19  0730   Glucose 128 H 106 142 H   Sodium 139 141 141   Potassium 4.5 5.1 4.9   BUN, Bld 12 11 14   Creatinine 1.1 1.0 1.2   eGFR if  >60 >60 >60   eGFR if non  >60 >60 56 A   Calcium 11.0 H 10.6 H 10.4       CARDIAC BIOMARKERS:        COAGS:        LIPIDS/LFTS:  Recent Labs   Lab 02/03/17  1130 08/08/18  1202 12/10/19  0730   Cholesterol 226 H  --  212 H   Triglycerides 176 H  --  170 H   HDL 49  --  45   LDL Cholesterol 141.8  --  133.0   Non-HDL Cholesterol 177  --  167   AST 27 27 22   ALT 28 31 25       Cardiovascular Testing:    · With toe raises the right VIRGINIA went from 0.92 to 0.30  · With toe raises the left VIRGINIA went from 0.95 to 0.55.    ASSESSMENT:     1. Claudication  2. Abnormal VIRGINIA/peripheral study    PLAN:     1. CTA lower extremities.  2. RTC one month          Triston Hightower MD, MPH, FACC, [facility]

## 2020-01-01 ENCOUNTER — TELEPHONE (OUTPATIENT)
Dept: FAMILY MEDICINE | Facility: CLINIC | Age: 80
End: 2020-01-01

## 2020-01-01 ENCOUNTER — PATIENT MESSAGE (OUTPATIENT)
Dept: ADMINISTRATIVE | Facility: HOSPITAL | Age: 80
End: 2020-01-01

## 2020-01-01 ENCOUNTER — HOSPITAL ENCOUNTER (OUTPATIENT)
Dept: CARDIOLOGY | Facility: HOSPITAL | Age: 80
Discharge: HOME | End: 2020-06-11
Attending: INTERNAL MEDICINE
Payer: MEDICARE

## 2020-01-01 ENCOUNTER — PATIENT MESSAGE (OUTPATIENT)
Dept: CARDIOLOGY | Facility: CLINIC | Age: 80
End: 2020-01-01

## 2020-01-01 ENCOUNTER — PATIENT OUTREACH (OUTPATIENT)
Dept: OTHER | Facility: OTHER | Age: 80
End: 2020-01-01

## 2020-01-01 ENCOUNTER — PATIENT MESSAGE (OUTPATIENT)
Dept: OTHER | Facility: OTHER | Age: 80
End: 2020-01-01

## 2020-01-01 ENCOUNTER — NURSE TRIAGE (OUTPATIENT)
Dept: ADMINISTRATIVE | Facility: CLINIC | Age: 80
End: 2020-01-01

## 2020-01-01 ENCOUNTER — HOSPITAL ENCOUNTER (OUTPATIENT)
Facility: HOSPITAL | Age: 80
Discharge: HOME | End: 2020-01-24
Attending: INTERNAL MEDICINE | Admitting: INTERNAL MEDICINE
Payer: MEDICARE

## 2020-01-01 ENCOUNTER — OFFICE VISIT (OUTPATIENT)
Dept: CARDIOLOGY | Facility: CLINIC | Age: 80
End: 2020-01-01
Payer: MEDICARE

## 2020-01-01 ENCOUNTER — HOSPITAL ENCOUNTER (OUTPATIENT)
Dept: RADIOLOGY | Facility: HOSPITAL | Age: 80
Discharge: HOME | End: 2020-01-07
Attending: INTERNAL MEDICINE
Payer: MEDICARE

## 2020-01-01 ENCOUNTER — HOSPITAL ENCOUNTER (INPATIENT)
Facility: HOSPITAL | Age: 80
LOS: 20 days | DRG: 177 | End: 2020-08-07
Attending: EMERGENCY MEDICINE | Admitting: HOSPITALIST
Payer: MEDICARE

## 2020-01-01 ENCOUNTER — HOSPITAL ENCOUNTER (OUTPATIENT)
Dept: RADIOLOGY | Facility: HOSPITAL | Age: 80
Discharge: HOME | End: 2020-06-11
Attending: INTERNAL MEDICINE
Payer: MEDICARE

## 2020-01-01 ENCOUNTER — PATIENT MESSAGE (OUTPATIENT)
Dept: CARDIOLOGY | Facility: HOSPITAL | Age: 80
End: 2020-01-01

## 2020-01-01 ENCOUNTER — PATIENT MESSAGE (OUTPATIENT)
Dept: ADMINISTRATIVE | Facility: OTHER | Age: 80
End: 2020-01-01

## 2020-01-01 ENCOUNTER — PATIENT OUTREACH (OUTPATIENT)
Dept: ADMINISTRATIVE | Facility: OTHER | Age: 80
End: 2020-01-01

## 2020-01-01 ENCOUNTER — PES CALL (OUTPATIENT)
Dept: ADMINISTRATIVE | Facility: CLINIC | Age: 80
End: 2020-01-01

## 2020-01-01 ENCOUNTER — LAB VISIT (OUTPATIENT)
Dept: INTERNAL MEDICINE | Facility: CLINIC | Age: 80
DRG: 177 | End: 2020-01-01
Payer: MEDICARE

## 2020-01-01 ENCOUNTER — HOSPITAL ENCOUNTER (OUTPATIENT)
Dept: PREADMISSION TESTING | Facility: HOSPITAL | Age: 80
Discharge: HOME | End: 2020-01-20
Attending: INTERNAL MEDICINE
Payer: MEDICARE

## 2020-01-01 VITALS
OXYGEN SATURATION: 99 % | BODY MASS INDEX: 27.32 KG/M2 | WEIGHT: 174.25 LBS | HEIGHT: 67 IN | RESPIRATION RATE: 15 BRPM | SYSTOLIC BLOOD PRESSURE: 140 MMHG | HEART RATE: 66 BPM | DIASTOLIC BLOOD PRESSURE: 78 MMHG | OXYGEN SATURATION: 97 % | DIASTOLIC BLOOD PRESSURE: 80 MMHG | HEART RATE: 54 BPM | BODY MASS INDEX: 27.35 KG/M2 | WEIGHT: 174.06 LBS | SYSTOLIC BLOOD PRESSURE: 150 MMHG | RESPIRATION RATE: 15 BRPM | HEIGHT: 67 IN

## 2020-01-01 VITALS
DIASTOLIC BLOOD PRESSURE: 72 MMHG | RESPIRATION RATE: 24 BRPM | WEIGHT: 173.75 LBS | HEIGHT: 67 IN | SYSTOLIC BLOOD PRESSURE: 158 MMHG | TEMPERATURE: 99 F | HEART RATE: 58 BPM | BODY MASS INDEX: 27.27 KG/M2 | OXYGEN SATURATION: 98 %

## 2020-01-01 VITALS
TEMPERATURE: 97 F | HEIGHT: 67 IN | WEIGHT: 173.75 LBS | RESPIRATION RATE: 16 BRPM | OXYGEN SATURATION: 100 % | SYSTOLIC BLOOD PRESSURE: 183 MMHG | HEART RATE: 54 BPM | BODY MASS INDEX: 27.27 KG/M2 | DIASTOLIC BLOOD PRESSURE: 69 MMHG

## 2020-01-01 VITALS
DIASTOLIC BLOOD PRESSURE: 82 MMHG | WEIGHT: 172 LBS | OXYGEN SATURATION: 98 % | HEIGHT: 67 IN | HEART RATE: 58 BPM | SYSTOLIC BLOOD PRESSURE: 148 MMHG | BODY MASS INDEX: 27 KG/M2

## 2020-01-01 VITALS
SYSTOLIC BLOOD PRESSURE: 121 MMHG | HEIGHT: 67 IN | BODY MASS INDEX: 26.33 KG/M2 | OXYGEN SATURATION: 91 % | TEMPERATURE: 99 F | RESPIRATION RATE: 24 BRPM | WEIGHT: 167.75 LBS | DIASTOLIC BLOOD PRESSURE: 59 MMHG | HEART RATE: 94 BPM

## 2020-01-01 VITALS — HEIGHT: 67 IN | BODY MASS INDEX: 27 KG/M2 | WEIGHT: 172 LBS

## 2020-01-01 DIAGNOSIS — R06.02 SOBOE (SHORTNESS OF BREATH ON EXERTION): ICD-10-CM

## 2020-01-01 DIAGNOSIS — I73.9 PAD (PERIPHERAL ARTERY DISEASE): ICD-10-CM

## 2020-01-01 DIAGNOSIS — I70.0 ABDOMINAL AORTIC ATHEROSCLEROSIS: ICD-10-CM

## 2020-01-01 DIAGNOSIS — R03.0 ELEVATED BLOOD PRESSURE READING IN OFFICE WITHOUT DIAGNOSIS OF HYPERTENSION: Primary | ICD-10-CM

## 2020-01-01 DIAGNOSIS — U07.1 COVID-19 VIRUS DETECTED: Primary | ICD-10-CM

## 2020-01-01 DIAGNOSIS — R06.02 SOBOE (SHORTNESS OF BREATH ON EXERTION): Primary | ICD-10-CM

## 2020-01-01 DIAGNOSIS — I10 ESSENTIAL HYPERTENSION: ICD-10-CM

## 2020-01-01 DIAGNOSIS — R79.89 ELEVATED TROPONIN: ICD-10-CM

## 2020-01-01 DIAGNOSIS — I73.9 CLAUDICATION: ICD-10-CM

## 2020-01-01 DIAGNOSIS — I10 ESSENTIAL HYPERTENSION: Primary | ICD-10-CM

## 2020-01-01 DIAGNOSIS — R50.9 FEVER, UNSPECIFIED FEVER CAUSE: ICD-10-CM

## 2020-01-01 DIAGNOSIS — R06.02 SOB (SHORTNESS OF BREATH): ICD-10-CM

## 2020-01-01 DIAGNOSIS — R09.02 HYPOXIA: ICD-10-CM

## 2020-01-01 DIAGNOSIS — I73.9 CLAUDICATION: Primary | ICD-10-CM

## 2020-01-01 DIAGNOSIS — Z51.5 PALLIATIVE CARE ENCOUNTER: ICD-10-CM

## 2020-01-01 DIAGNOSIS — J96.01 ACUTE HYPOXEMIC RESPIRATORY FAILURE: ICD-10-CM

## 2020-01-01 DIAGNOSIS — J18.9 MULTIFOCAL PNEUMONIA: ICD-10-CM

## 2020-01-01 DIAGNOSIS — I73.9 VASCULAR CLAUDICATION: ICD-10-CM

## 2020-01-01 DIAGNOSIS — U07.1 COVID-19: Primary | ICD-10-CM

## 2020-01-01 DIAGNOSIS — R50.9 FEVER, UNSPECIFIED FEVER CAUSE: Primary | ICD-10-CM

## 2020-01-01 DIAGNOSIS — Z20.822 SUSPECTED COVID-19 VIRUS INFECTION: ICD-10-CM

## 2020-01-01 DIAGNOSIS — R79.89 ELEVATED BRAIN NATRIURETIC PEPTIDE (BNP) LEVEL: ICD-10-CM

## 2020-01-01 LAB
25(OH)D3+25(OH)D2 SERPL-MCNC: 30 NG/ML (ref 30–96)
ABO + RH BLD: NORMAL
ALBUMIN SERPL BCP-MCNC: 2.2 G/DL (ref 3.5–5.2)
ALBUMIN SERPL BCP-MCNC: 2.3 G/DL (ref 3.5–5.2)
ALBUMIN SERPL BCP-MCNC: 2.5 G/DL (ref 3.5–5.2)
ALBUMIN SERPL BCP-MCNC: 2.6 G/DL (ref 3.5–5.2)
ALBUMIN SERPL BCP-MCNC: 2.6 G/DL (ref 3.5–5.2)
ALBUMIN SERPL BCP-MCNC: 2.8 G/DL (ref 3.5–5.2)
ALBUMIN SERPL BCP-MCNC: 3 G/DL (ref 3.5–5.2)
ALBUMIN SERPL BCP-MCNC: 3.3 G/DL (ref 3.5–5.2)
ALLENS TEST: ABNORMAL
ALP SERPL-CCNC: 126 U/L (ref 55–135)
ALP SERPL-CCNC: 133 U/L (ref 55–135)
ALP SERPL-CCNC: 150 U/L (ref 55–135)
ALP SERPL-CCNC: 157 U/L (ref 55–135)
ALP SERPL-CCNC: 180 U/L (ref 55–135)
ALP SERPL-CCNC: 65 U/L (ref 55–135)
ALP SERPL-CCNC: 67 U/L (ref 55–135)
ALP SERPL-CCNC: 78 U/L (ref 55–135)
ALT SERPL W/O P-5'-P-CCNC: 22 U/L (ref 10–44)
ALT SERPL W/O P-5'-P-CCNC: 24 U/L (ref 10–44)
ALT SERPL W/O P-5'-P-CCNC: 46 U/L (ref 10–44)
ALT SERPL W/O P-5'-P-CCNC: 53 U/L (ref 10–44)
ALT SERPL W/O P-5'-P-CCNC: 59 U/L (ref 10–44)
ALT SERPL W/O P-5'-P-CCNC: 63 U/L (ref 10–44)
ALT SERPL W/O P-5'-P-CCNC: 74 U/L (ref 10–44)
ALT SERPL W/O P-5'-P-CCNC: 74 U/L (ref 10–44)
ANION GAP SERPL CALC-SCNC: 10 MMOL/L (ref 8–16)
ANION GAP SERPL CALC-SCNC: 13 MMOL/L (ref 8–16)
ANION GAP SERPL CALC-SCNC: 15 MMOL/L (ref 8–16)
ANION GAP SERPL CALC-SCNC: 3 MMOL/L (ref 8–16)
ANION GAP SERPL CALC-SCNC: 4 MMOL/L (ref 8–16)
ANION GAP SERPL CALC-SCNC: 5 MMOL/L (ref 8–16)
ANION GAP SERPL CALC-SCNC: 6 MMOL/L (ref 8–16)
ANION GAP SERPL CALC-SCNC: 8 MMOL/L (ref 8–16)
ANION GAP SERPL CALC-SCNC: 9 MMOL/L (ref 8–16)
ANISOCYTOSIS BLD QL SMEAR: ABNORMAL
ANISOCYTOSIS BLD QL SMEAR: SLIGHT
AORTIC ROOT ANNULUS: 3.09 CM
AORTIC VALVE CUSP SEPERATION: 2.24 CM
APTT BLDCRRT: 26.5 SEC (ref 21–32)
ASCENDING AORTA: 3.31 CM
AST SERPL-CCNC: 29 U/L (ref 10–40)
AST SERPL-CCNC: 38 U/L (ref 10–40)
AST SERPL-CCNC: 47 U/L (ref 10–40)
AST SERPL-CCNC: 48 U/L (ref 10–40)
AST SERPL-CCNC: 61 U/L (ref 10–40)
AST SERPL-CCNC: 65 U/L (ref 10–40)
AST SERPL-CCNC: 67 U/L (ref 10–40)
AST SERPL-CCNC: 72 U/L (ref 10–40)
AV INDEX (PROSTH): 0.88
AV MEAN GRADIENT: 4 MMHG
AV PEAK GRADIENT: 7 MMHG
AV VALVE AREA: 3.17 CM2
AV VELOCITY RATIO: 1.03
BACTERIA #/AREA URNS HPF: NORMAL /HPF
BACTERIA BLD CULT: NORMAL
BACTERIA SPEC AEROBE CULT: ABNORMAL
BASO STIPL BLD QL SMEAR: ABNORMAL
BASO STIPL BLD QL SMEAR: ABNORMAL
BASOPHILS # BLD AUTO: 0.01 K/UL (ref 0–0.2)
BASOPHILS # BLD AUTO: 0.02 K/UL (ref 0–0.2)
BASOPHILS # BLD AUTO: 0.03 K/UL (ref 0–0.2)
BASOPHILS # BLD AUTO: 0.04 K/UL (ref 0–0.2)
BASOPHILS # BLD AUTO: 0.04 K/UL (ref 0–0.2)
BASOPHILS # BLD AUTO: 0.06 K/UL (ref 0–0.2)
BASOPHILS # BLD AUTO: ABNORMAL K/UL (ref 0–0.2)
BASOPHILS NFR BLD: 0 % (ref 0–1.9)
BASOPHILS NFR BLD: 0.1 % (ref 0–1.9)
BASOPHILS NFR BLD: 0.2 % (ref 0–1.9)
BASOPHILS NFR BLD: 0.3 % (ref 0–1.9)
BASOPHILS NFR BLD: 0.3 % (ref 0–1.9)
BASOPHILS NFR BLD: 0.5 % (ref 0–1.9)
BILIRUB SERPL-MCNC: 0.4 MG/DL (ref 0.1–1)
BILIRUB SERPL-MCNC: 0.4 MG/DL (ref 0.1–1)
BILIRUB SERPL-MCNC: 0.5 MG/DL (ref 0.1–1)
BILIRUB SERPL-MCNC: 0.6 MG/DL (ref 0.1–1)
BILIRUB SERPL-MCNC: 0.7 MG/DL (ref 0.1–1)
BILIRUB SERPL-MCNC: 0.7 MG/DL (ref 0.1–1)
BILIRUB SERPL-MCNC: 0.8 MG/DL (ref 0.1–1)
BILIRUB SERPL-MCNC: 0.8 MG/DL (ref 0.1–1)
BILIRUB UR QL STRIP: NEGATIVE
BLD GP AB SCN CELLS X3 SERPL QL: NORMAL
BLD PROD TYP BPU: NORMAL
BLD PROD TYP BPU: NORMAL
BLOOD UNIT EXPIRATION DATE: NORMAL
BLOOD UNIT EXPIRATION DATE: NORMAL
BLOOD UNIT TYPE CODE: 600
BLOOD UNIT TYPE CODE: 600
BLOOD UNIT TYPE: NORMAL
BLOOD UNIT TYPE: NORMAL
BNP SERPL-MCNC: 136 PG/ML (ref 0–99)
BNP SERPL-MCNC: 217 PG/ML (ref 0–99)
BSA FOR ECHO PROCEDURE: 1.92 M2
BUN SERPL-MCNC: 16 MG/DL (ref 8–23)
BUN SERPL-MCNC: 20 MG/DL (ref 8–23)
BUN SERPL-MCNC: 21 MG/DL (ref 8–23)
BUN SERPL-MCNC: 21 MG/DL (ref 8–23)
BUN SERPL-MCNC: 24 MG/DL (ref 8–23)
BUN SERPL-MCNC: 24 MG/DL (ref 8–23)
BUN SERPL-MCNC: 26 MG/DL (ref 8–23)
BUN SERPL-MCNC: 26 MG/DL (ref 8–23)
BUN SERPL-MCNC: 27 MG/DL (ref 8–23)
BUN SERPL-MCNC: 28 MG/DL (ref 8–23)
BUN SERPL-MCNC: 29 MG/DL (ref 8–23)
BUN SERPL-MCNC: 30 MG/DL (ref 8–23)
BUN SERPL-MCNC: 30 MG/DL (ref 8–23)
BUN SERPL-MCNC: 31 MG/DL (ref 8–23)
BUN SERPL-MCNC: 36 MG/DL (ref 8–23)
BUN SERPL-MCNC: 39 MG/DL (ref 8–23)
BUN SERPL-MCNC: 42 MG/DL (ref 8–23)
BUN SERPL-MCNC: 45 MG/DL (ref 8–23)
BUN SERPL-MCNC: 50 MG/DL (ref 8–23)
CALCIUM SERPL-MCNC: 7.5 MG/DL (ref 8.7–10.5)
CALCIUM SERPL-MCNC: 7.6 MG/DL (ref 8.7–10.5)
CALCIUM SERPL-MCNC: 7.8 MG/DL (ref 8.7–10.5)
CALCIUM SERPL-MCNC: 7.9 MG/DL (ref 8.7–10.5)
CALCIUM SERPL-MCNC: 8.1 MG/DL (ref 8.7–10.5)
CALCIUM SERPL-MCNC: 8.2 MG/DL (ref 8.7–10.5)
CALCIUM SERPL-MCNC: 8.3 MG/DL (ref 8.7–10.5)
CALCIUM SERPL-MCNC: 8.4 MG/DL (ref 8.7–10.5)
CALCIUM SERPL-MCNC: 8.4 MG/DL (ref 8.7–10.5)
CALCIUM SERPL-MCNC: 8.5 MG/DL (ref 8.7–10.5)
CALCIUM SERPL-MCNC: 8.5 MG/DL (ref 8.7–10.5)
CALCIUM SERPL-MCNC: 8.6 MG/DL (ref 8.7–10.5)
CALCIUM SERPL-MCNC: 8.7 MG/DL (ref 8.7–10.5)
CALCIUM SERPL-MCNC: 8.7 MG/DL (ref 8.7–10.5)
CALCIUM SERPL-MCNC: 8.8 MG/DL (ref 8.7–10.5)
CALCIUM SERPL-MCNC: 9.1 MG/DL (ref 8.7–10.5)
CALCIUM SERPL-MCNC: 9.4 MG/DL (ref 8.7–10.5)
CHLORIDE SERPL-SCNC: 102 MMOL/L (ref 95–110)
CHLORIDE SERPL-SCNC: 103 MMOL/L (ref 95–110)
CHLORIDE SERPL-SCNC: 104 MMOL/L (ref 95–110)
CHLORIDE SERPL-SCNC: 105 MMOL/L (ref 95–110)
CHLORIDE SERPL-SCNC: 106 MMOL/L (ref 95–110)
CHLORIDE SERPL-SCNC: 106 MMOL/L (ref 95–110)
CHLORIDE SERPL-SCNC: 107 MMOL/L (ref 95–110)
CHLORIDE SERPL-SCNC: 108 MMOL/L (ref 95–110)
CHLORIDE SERPL-SCNC: 109 MMOL/L (ref 95–110)
CK SERPL-CCNC: 113 U/L (ref 20–200)
CLARITY UR: CLEAR
CO2 SERPL-SCNC: 18 MMOL/L (ref 23–29)
CO2 SERPL-SCNC: 21 MMOL/L (ref 23–29)
CO2 SERPL-SCNC: 22 MMOL/L (ref 23–29)
CO2 SERPL-SCNC: 23 MMOL/L (ref 23–29)
CO2 SERPL-SCNC: 23 MMOL/L (ref 23–29)
CO2 SERPL-SCNC: 25 MMOL/L (ref 23–29)
CO2 SERPL-SCNC: 25 MMOL/L (ref 23–29)
CO2 SERPL-SCNC: 26 MMOL/L (ref 23–29)
CO2 SERPL-SCNC: 26 MMOL/L (ref 23–29)
CO2 SERPL-SCNC: 27 MMOL/L (ref 23–29)
CO2 SERPL-SCNC: 27 MMOL/L (ref 23–29)
CO2 SERPL-SCNC: 28 MMOL/L (ref 23–29)
CO2 SERPL-SCNC: 34 MMOL/L (ref 23–29)
CO2 SERPL-SCNC: 38 MMOL/L (ref 23–29)
CO2 SERPL-SCNC: 39 MMOL/L (ref 23–29)
CODING SYSTEM: NORMAL
CODING SYSTEM: NORMAL
COLOR UR: YELLOW
CREAT SERPL-MCNC: 0.7 MG/DL (ref 0.5–1.4)
CREAT SERPL-MCNC: 0.7 MG/DL (ref 0.5–1.4)
CREAT SERPL-MCNC: 0.8 MG/DL (ref 0.5–1.4)
CREAT SERPL-MCNC: 0.9 MG/DL (ref 0.5–1.4)
CREAT SERPL-MCNC: 1 MG/DL (ref 0.5–1.4)
CREAT SERPL-MCNC: 1 MG/DL (ref 0.5–1.4)
CREAT SERPL-MCNC: 1.2 MG/DL (ref 0.5–1.4)
CRP SERPL-MCNC: 147.1 MG/L (ref 0–8.2)
CRP SERPL-MCNC: 158.2 MG/L (ref 0–8.2)
CRP SERPL-MCNC: 202.4 MG/L (ref 0–8.2)
CRP SERPL-MCNC: 223.2 MG/L (ref 0–8.2)
CRP SERPL-MCNC: 226.4 MG/L (ref 0–8.2)
CRP SERPL-MCNC: 233.4 MG/L (ref 0–8.2)
CRP SERPL-MCNC: 29.5 MG/L (ref 0–8.2)
CRP SERPL-MCNC: 315.7 MG/L (ref 0–8.2)
CRP SERPL-MCNC: 329.6 MG/L (ref 0–8.2)
CRP SERPL-MCNC: 54.8 MG/L (ref 0–8.2)
CV ECHO LV RWT: 0.33 CM
CV STRESS BASE HR: 60 BPM
D DIMER PPP IA.FEU-MCNC: 1.61 MG/L FEU
D DIMER PPP IA.FEU-MCNC: 10.71 MG/L FEU
D DIMER PPP IA.FEU-MCNC: 11.2 MG/L FEU
D DIMER PPP IA.FEU-MCNC: 12.78 MG/L FEU
D DIMER PPP IA.FEU-MCNC: 13.28 MG/L FEU
D DIMER PPP IA.FEU-MCNC: 24.71 MG/L FEU
D DIMER PPP IA.FEU-MCNC: 32.41 MG/L FEU
D DIMER PPP IA.FEU-MCNC: 6.64 MG/L FEU
D DIMER PPP IA.FEU-MCNC: >33 MG/L FEU
DELSYS: ABNORMAL
DELSYS: ABNORMAL
DIASTOLIC BLOOD PRESSURE: 71 MMHG
DIFFERENTIAL METHOD: ABNORMAL
DIFFERENTIAL METHOD: NORMAL
DISPENSE STATUS: NORMAL
DISPENSE STATUS: NORMAL
DOP CALC AO PEAK VEL: 1.31 M/S
DOP CALC AO VTI: 35.09 CM
DOP CALC LVOT AREA: 3.6 CM2
DOP CALC LVOT DIAMETER: 2.14 CM
DOP CALC LVOT PEAK VEL: 1.35 M/S
DOP CALC LVOT STROKE VOLUME: 111.12 CM3
DOP CALCLVOT PEAK VEL VTI: 30.91 CM
E WAVE DECELERATION TIME: 328.47 MSEC
E/A RATIO: 0.9
E/E' RATIO: 10.3 M/S
ECHO LV POSTERIOR WALL: 0.81 CM (ref 0.6–1.1)
EOSINOPHIL # BLD AUTO: 0 K/UL (ref 0–0.5)
EOSINOPHIL # BLD AUTO: 0.1 K/UL (ref 0–0.5)
EOSINOPHIL # BLD AUTO: 0.2 K/UL (ref 0–0.5)
EOSINOPHIL # BLD AUTO: 0.3 K/UL (ref 0–0.5)
EOSINOPHIL # BLD AUTO: 0.3 K/UL (ref 0–0.5)
EOSINOPHIL # BLD AUTO: ABNORMAL K/UL (ref 0–0.5)
EOSINOPHIL NFR BLD: 0 % (ref 0–8)
EOSINOPHIL NFR BLD: 0.1 % (ref 0–8)
EOSINOPHIL NFR BLD: 0.2 % (ref 0–8)
EOSINOPHIL NFR BLD: 0.2 % (ref 0–8)
EOSINOPHIL NFR BLD: 0.3 % (ref 0–8)
EOSINOPHIL NFR BLD: 0.3 % (ref 0–8)
EOSINOPHIL NFR BLD: 0.4 % (ref 0–8)
EOSINOPHIL NFR BLD: 0.4 % (ref 0–8)
EOSINOPHIL NFR BLD: 0.8 % (ref 0–8)
EOSINOPHIL NFR BLD: 0.8 % (ref 0–8)
EOSINOPHIL NFR BLD: 1 % (ref 0–8)
EOSINOPHIL NFR BLD: 1 % (ref 0–8)
EOSINOPHIL NFR BLD: 1.2 % (ref 0–8)
EOSINOPHIL NFR BLD: 1.9 % (ref 0–8)
EP: 8
ERYTHROCYTE [DISTWIDTH] IN BLOOD BY AUTOMATED COUNT: 12.1 % (ref 11.5–14.5)
ERYTHROCYTE [DISTWIDTH] IN BLOOD BY AUTOMATED COUNT: 12.4 % (ref 11.5–14.5)
ERYTHROCYTE [DISTWIDTH] IN BLOOD BY AUTOMATED COUNT: 12.9 % (ref 11.5–14.5)
ERYTHROCYTE [DISTWIDTH] IN BLOOD BY AUTOMATED COUNT: 12.9 % (ref 11.5–14.5)
ERYTHROCYTE [DISTWIDTH] IN BLOOD BY AUTOMATED COUNT: 13 % (ref 11.5–14.5)
ERYTHROCYTE [DISTWIDTH] IN BLOOD BY AUTOMATED COUNT: 13.1 % (ref 11.5–14.5)
ERYTHROCYTE [DISTWIDTH] IN BLOOD BY AUTOMATED COUNT: 13.2 % (ref 11.5–14.5)
ERYTHROCYTE [DISTWIDTH] IN BLOOD BY AUTOMATED COUNT: 13.5 % (ref 11.5–14.5)
ERYTHROCYTE [DISTWIDTH] IN BLOOD BY AUTOMATED COUNT: 13.5 % (ref 11.5–14.5)
ERYTHROCYTE [DISTWIDTH] IN BLOOD BY AUTOMATED COUNT: 13.7 % (ref 11.5–14.5)
ERYTHROCYTE [DISTWIDTH] IN BLOOD BY AUTOMATED COUNT: 13.9 % (ref 11.5–14.5)
ERYTHROCYTE [DISTWIDTH] IN BLOOD BY AUTOMATED COUNT: 14 % (ref 11.5–14.5)
ERYTHROCYTE [DISTWIDTH] IN BLOOD BY AUTOMATED COUNT: 14.1 % (ref 11.5–14.5)
ERYTHROCYTE [DISTWIDTH] IN BLOOD BY AUTOMATED COUNT: 14.4 % (ref 11.5–14.5)
ERYTHROCYTE [DISTWIDTH] IN BLOOD BY AUTOMATED COUNT: 14.6 % (ref 11.5–14.5)
ERYTHROCYTE [DISTWIDTH] IN BLOOD BY AUTOMATED COUNT: 14.9 % (ref 11.5–14.5)
ERYTHROCYTE [DISTWIDTH] IN BLOOD BY AUTOMATED COUNT: 15 % (ref 11.5–14.5)
ERYTHROCYTE [DISTWIDTH] IN BLOOD BY AUTOMATED COUNT: 15 % (ref 11.5–14.5)
ERYTHROCYTE [DISTWIDTH] IN BLOOD BY AUTOMATED COUNT: 15.1 % (ref 11.5–14.5)
ERYTHROCYTE [SEDIMENTATION RATE] IN BLOOD BY WESTERGREN METHOD: 14 MM/H
ERYTHROCYTE [SEDIMENTATION RATE] IN BLOOD BY WESTERGREN METHOD: 45 MM/H
EST. GFR  (AFRICAN AMERICAN): >60 ML/MIN/1.73 M^2
EST. GFR  (NON AFRICAN AMERICAN): 56 ML/MIN/1.73 M^2
EST. GFR  (NON AFRICAN AMERICAN): >60 ML/MIN/1.73 M^2
FERRITIN SERPL-MCNC: 2080 NG/ML (ref 20–300)
FIO2: 100
FIO2: 60
FLOW: 40
FRACTIONAL SHORTENING: 38 % (ref 28–44)
GLUCOSE SERPL-MCNC: 100 MG/DL (ref 70–110)
GLUCOSE SERPL-MCNC: 126 MG/DL (ref 70–110)
GLUCOSE SERPL-MCNC: 126 MG/DL (ref 70–110)
GLUCOSE SERPL-MCNC: 136 MG/DL (ref 70–110)
GLUCOSE SERPL-MCNC: 159 MG/DL (ref 70–110)
GLUCOSE SERPL-MCNC: 170 MG/DL (ref 70–110)
GLUCOSE SERPL-MCNC: 201 MG/DL (ref 70–110)
GLUCOSE SERPL-MCNC: 207 MG/DL (ref 70–110)
GLUCOSE SERPL-MCNC: 221 MG/DL (ref 70–110)
GLUCOSE SERPL-MCNC: 221 MG/DL (ref 70–110)
GLUCOSE SERPL-MCNC: 232 MG/DL (ref 70–110)
GLUCOSE SERPL-MCNC: 239 MG/DL (ref 70–110)
GLUCOSE SERPL-MCNC: 252 MG/DL (ref 70–110)
GLUCOSE SERPL-MCNC: 270 MG/DL (ref 70–110)
GLUCOSE SERPL-MCNC: 291 MG/DL (ref 70–110)
GLUCOSE SERPL-MCNC: 307 MG/DL (ref 70–110)
GLUCOSE SERPL-MCNC: 311 MG/DL (ref 70–110)
GLUCOSE SERPL-MCNC: 315 MG/DL (ref 70–110)
GLUCOSE SERPL-MCNC: 453 MG/DL (ref 70–110)
GLUCOSE UR QL STRIP: ABNORMAL
GRAM STN SPEC: ABNORMAL
HCO3 UR-SCNC: 18.5 MMOL/L (ref 24–28)
HCO3 UR-SCNC: 20.9 MMOL/L (ref 24–28)
HCT VFR BLD AUTO: 22.1 % (ref 40–54)
HCT VFR BLD AUTO: 22.2 % (ref 40–54)
HCT VFR BLD AUTO: 24.5 % (ref 40–54)
HCT VFR BLD AUTO: 24.8 % (ref 40–54)
HCT VFR BLD AUTO: 25.4 % (ref 40–54)
HCT VFR BLD AUTO: 25.6 % (ref 40–54)
HCT VFR BLD AUTO: 25.9 % (ref 40–54)
HCT VFR BLD AUTO: 26.1 % (ref 40–54)
HCT VFR BLD AUTO: 27 % (ref 40–54)
HCT VFR BLD AUTO: 27.2 % (ref 40–54)
HCT VFR BLD AUTO: 27.2 % (ref 40–54)
HCT VFR BLD AUTO: 27.3 % (ref 40–54)
HCT VFR BLD AUTO: 27.3 % (ref 40–54)
HCT VFR BLD AUTO: 27.6 % (ref 40–54)
HCT VFR BLD AUTO: 28 % (ref 40–54)
HCT VFR BLD AUTO: 28.5 % (ref 40–54)
HCT VFR BLD AUTO: 29.4 % (ref 40–54)
HCT VFR BLD AUTO: 29.8 % (ref 40–54)
HCT VFR BLD AUTO: 30.5 % (ref 40–54)
HCT VFR BLD AUTO: 30.6 % (ref 40–54)
HCT VFR BLD AUTO: 31.2 % (ref 40–54)
HCT VFR BLD AUTO: 35.3 % (ref 40–54)
HCT VFR BLD AUTO: 36 % (ref 40–54)
HCT VFR BLD AUTO: 38.1 % (ref 40–54)
HCT VFR BLD AUTO: 38.3 % (ref 40–54)
HCT VFR BLD AUTO: 39.3 % (ref 40–54)
HCT VFR BLD AUTO: 41.7 % (ref 40–54)
HCT VFR BLD AUTO: 42.2 % (ref 40–54)
HCT VFR BLD AUTO: 44.6 % (ref 40–54)
HCT VFR BLD AUTO: 44.7 % (ref 40–54)
HCT VFR BLD AUTO: 46.2 % (ref 40–54)
HCT VFR BLD AUTO: 47.6 % (ref 40–54)
HGB BLD-MCNC: 10.1 G/DL (ref 14–18)
HGB BLD-MCNC: 10.2 G/DL (ref 14–18)
HGB BLD-MCNC: 10.2 G/DL (ref 14–18)
HGB BLD-MCNC: 10.4 G/DL (ref 14–18)
HGB BLD-MCNC: 10.6 G/DL (ref 14–18)
HGB BLD-MCNC: 11.7 G/DL (ref 14–18)
HGB BLD-MCNC: 12.2 G/DL (ref 14–18)
HGB BLD-MCNC: 12.5 G/DL (ref 14–18)
HGB BLD-MCNC: 12.7 G/DL (ref 14–18)
HGB BLD-MCNC: 13.1 G/DL (ref 14–18)
HGB BLD-MCNC: 13.9 G/DL (ref 14–18)
HGB BLD-MCNC: 14.1 G/DL (ref 14–18)
HGB BLD-MCNC: 15.1 G/DL (ref 14–18)
HGB BLD-MCNC: 15.2 G/DL (ref 14–18)
HGB BLD-MCNC: 15.5 G/DL (ref 14–18)
HGB BLD-MCNC: 16.2 G/DL (ref 14–18)
HGB BLD-MCNC: 7.5 G/DL (ref 14–18)
HGB BLD-MCNC: 7.6 G/DL (ref 14–18)
HGB BLD-MCNC: 8 G/DL (ref 14–18)
HGB BLD-MCNC: 8.2 G/DL (ref 14–18)
HGB BLD-MCNC: 8.3 G/DL (ref 14–18)
HGB BLD-MCNC: 8.4 G/DL (ref 14–18)
HGB BLD-MCNC: 8.5 G/DL (ref 14–18)
HGB BLD-MCNC: 8.5 G/DL (ref 14–18)
HGB BLD-MCNC: 8.7 G/DL (ref 14–18)
HGB BLD-MCNC: 9 G/DL (ref 14–18)
HGB BLD-MCNC: 9.2 G/DL (ref 14–18)
HGB BLD-MCNC: 9.2 G/DL (ref 14–18)
HGB BLD-MCNC: 9.3 G/DL (ref 14–18)
HGB BLD-MCNC: 9.4 G/DL (ref 14–18)
HGB UR QL STRIP: NEGATIVE
HYPOCHROMIA BLD QL SMEAR: ABNORMAL
HYPOCHROMIA BLD QL SMEAR: ABNORMAL
IMM GRANULOCYTES # BLD AUTO: 0.02 K/UL (ref 0–0.04)
IMM GRANULOCYTES # BLD AUTO: 0.03 K/UL (ref 0–0.04)
IMM GRANULOCYTES # BLD AUTO: 0.04 K/UL (ref 0–0.04)
IMM GRANULOCYTES # BLD AUTO: 0.06 K/UL (ref 0–0.04)
IMM GRANULOCYTES # BLD AUTO: 0.2 K/UL (ref 0–0.04)
IMM GRANULOCYTES # BLD AUTO: 0.24 K/UL (ref 0–0.04)
IMM GRANULOCYTES # BLD AUTO: 0.24 K/UL (ref 0–0.04)
IMM GRANULOCYTES # BLD AUTO: 0.32 K/UL (ref 0–0.04)
IMM GRANULOCYTES # BLD AUTO: 0.35 K/UL (ref 0–0.04)
IMM GRANULOCYTES # BLD AUTO: 0.36 K/UL (ref 0–0.04)
IMM GRANULOCYTES # BLD AUTO: 0.37 K/UL (ref 0–0.04)
IMM GRANULOCYTES # BLD AUTO: 0.38 K/UL (ref 0–0.04)
IMM GRANULOCYTES # BLD AUTO: 0.48 K/UL (ref 0–0.04)
IMM GRANULOCYTES # BLD AUTO: 0.5 K/UL (ref 0–0.04)
IMM GRANULOCYTES # BLD AUTO: 0.7 K/UL (ref 0–0.04)
IMM GRANULOCYTES # BLD AUTO: 0.71 K/UL (ref 0–0.04)
IMM GRANULOCYTES # BLD AUTO: 0.87 K/UL (ref 0–0.04)
IMM GRANULOCYTES # BLD AUTO: ABNORMAL K/UL (ref 0–0.04)
IMM GRANULOCYTES NFR BLD AUTO: 0.2 % (ref 0–0.5)
IMM GRANULOCYTES NFR BLD AUTO: 0.4 % (ref 0–0.5)
IMM GRANULOCYTES NFR BLD AUTO: 0.5 % (ref 0–0.5)
IMM GRANULOCYTES NFR BLD AUTO: 0.6 % (ref 0–0.5)
IMM GRANULOCYTES NFR BLD AUTO: 1.4 % (ref 0–0.5)
IMM GRANULOCYTES NFR BLD AUTO: 1.6 % (ref 0–0.5)
IMM GRANULOCYTES NFR BLD AUTO: 1.7 % (ref 0–0.5)
IMM GRANULOCYTES NFR BLD AUTO: 1.7 % (ref 0–0.5)
IMM GRANULOCYTES NFR BLD AUTO: 1.8 % (ref 0–0.5)
IMM GRANULOCYTES NFR BLD AUTO: 1.9 % (ref 0–0.5)
IMM GRANULOCYTES NFR BLD AUTO: 1.9 % (ref 0–0.5)
IMM GRANULOCYTES NFR BLD AUTO: 2.3 % (ref 0–0.5)
IMM GRANULOCYTES NFR BLD AUTO: 2.9 % (ref 0–0.5)
IMM GRANULOCYTES NFR BLD AUTO: 3.2 % (ref 0–0.5)
IMM GRANULOCYTES NFR BLD AUTO: 4.2 % (ref 0–0.5)
IMM GRANULOCYTES NFR BLD AUTO: ABNORMAL % (ref 0–0.5)
INR PPP: 1 (ref 0.8–1.2)
INR PPP: 1.1 (ref 0.8–1.2)
INR PPP: 1.1 (ref 0.8–1.2)
INTERVENTRICULAR SEPTUM: 0.79 CM (ref 0.6–1.1)
IP: 15
IVRT: 133.21 MSEC
KETONES UR QL STRIP: NEGATIVE
LA MAJOR: 5.31 CM
LA MINOR: 5.16 CM
LA WIDTH: 4.55 CM
LACTATE SERPL-SCNC: 2.2 MMOL/L (ref 0.5–2.2)
LDH SERPL L TO P-CCNC: 560 U/L (ref 110–260)
LEFT ANT TIBIAL SYS PSV: 46 CM/S
LEFT ATRIUM SIZE: 3.45 CM
LEFT ATRIUM VOLUME INDEX: 36.8 ML/M2
LEFT ATRIUM VOLUME: 69.84 CM3
LEFT CFA PSV: 121 CM/S
LEFT EXTERNAL ILIAC PSV: 96 CM/S
LEFT INTERNAL DIMENSION IN SYSTOLE: 3.05 CM (ref 2.1–4)
LEFT PERONEAL SYS PSV: 67 CM/S
LEFT POPLITEAL PSV: 49 CM/S
LEFT POST TIBIAL SYS PSV: 57 CM/S
LEFT PROFUNDA SYS PSV: 107 CM/S
LEFT SUPER FEMORAL DIST SYS PSV: 161 CM/S
LEFT SUPER FEMORAL MID SYS PSV: 366 CM/S
LEFT SUPER FEMORAL OSTIAL SYS PSV: 137 CM/S
LEFT SUPER FEMORAL PROX SYS PSV: 60 CM/S
LEFT TIB/PER TRUNK SYS PSV: 59 CM/S
LEFT VENTRICLE DIASTOLIC VOLUME INDEX: 60.35 ML/M2
LEFT VENTRICLE DIASTOLIC VOLUME: 114.45 ML
LEFT VENTRICLE MASS INDEX: 70 G/M2
LEFT VENTRICLE SYSTOLIC VOLUME INDEX: 19.2 ML/M2
LEFT VENTRICLE SYSTOLIC VOLUME: 36.5 ML
LEFT VENTRICULAR INTERNAL DIMENSION IN DIASTOLE: 4.93 CM (ref 3.5–6)
LEFT VENTRICULAR MASS: 132.57 G
LEUKOCYTE ESTERASE UR QL STRIP: NEGATIVE
LV LATERAL E/E' RATIO: 9.36 M/S
LV SEPTAL E/E' RATIO: 11.44 M/S
LYMPHOCYTES # BLD AUTO: 0.5 K/UL (ref 1–4.8)
LYMPHOCYTES # BLD AUTO: 0.6 K/UL (ref 1–4.8)
LYMPHOCYTES # BLD AUTO: 0.6 K/UL (ref 1–4.8)
LYMPHOCYTES # BLD AUTO: 0.7 K/UL (ref 1–4.8)
LYMPHOCYTES # BLD AUTO: 0.8 K/UL (ref 1–4.8)
LYMPHOCYTES # BLD AUTO: 0.9 K/UL (ref 1–4.8)
LYMPHOCYTES # BLD AUTO: 1 K/UL (ref 1–4.8)
LYMPHOCYTES # BLD AUTO: 1 K/UL (ref 1–4.8)
LYMPHOCYTES # BLD AUTO: 1.3 K/UL (ref 1–4.8)
LYMPHOCYTES # BLD AUTO: 1.3 K/UL (ref 1–4.8)
LYMPHOCYTES # BLD AUTO: 1.5 K/UL (ref 1–4.8)
LYMPHOCYTES # BLD AUTO: 1.5 K/UL (ref 1–4.8)
LYMPHOCYTES # BLD AUTO: ABNORMAL K/UL (ref 1–4.8)
LYMPHOCYTES NFR BLD: 1 % (ref 18–48)
LYMPHOCYTES NFR BLD: 1 % (ref 18–48)
LYMPHOCYTES NFR BLD: 10 % (ref 18–48)
LYMPHOCYTES NFR BLD: 12 % (ref 18–48)
LYMPHOCYTES NFR BLD: 15.6 % (ref 18–48)
LYMPHOCYTES NFR BLD: 2.6 % (ref 18–48)
LYMPHOCYTES NFR BLD: 23.6 % (ref 18–48)
LYMPHOCYTES NFR BLD: 3 % (ref 18–48)
LYMPHOCYTES NFR BLD: 3 % (ref 18–48)
LYMPHOCYTES NFR BLD: 3.4 % (ref 18–48)
LYMPHOCYTES NFR BLD: 3.9 % (ref 18–48)
LYMPHOCYTES NFR BLD: 4 % (ref 18–48)
LYMPHOCYTES NFR BLD: 4 % (ref 18–48)
LYMPHOCYTES NFR BLD: 4.1 % (ref 18–48)
LYMPHOCYTES NFR BLD: 5.3 % (ref 18–48)
LYMPHOCYTES NFR BLD: 5.3 % (ref 18–48)
LYMPHOCYTES NFR BLD: 5.4 % (ref 18–48)
LYMPHOCYTES NFR BLD: 5.7 % (ref 18–48)
LYMPHOCYTES NFR BLD: 5.8 % (ref 18–48)
LYMPHOCYTES NFR BLD: 6 % (ref 18–48)
LYMPHOCYTES NFR BLD: 6.5 % (ref 18–48)
LYMPHOCYTES NFR BLD: 7 % (ref 18–48)
LYMPHOCYTES NFR BLD: 7 % (ref 18–48)
LYMPHOCYTES NFR BLD: 7.1 % (ref 18–48)
LYMPHOCYTES NFR BLD: 7.9 % (ref 18–48)
MAGNESIUM SERPL-MCNC: 2.1 MG/DL (ref 1.6–2.6)
MAGNESIUM SERPL-MCNC: 2.3 MG/DL (ref 1.6–2.6)
MAGNESIUM SERPL-MCNC: 2.3 MG/DL (ref 1.6–2.6)
MAGNESIUM SERPL-MCNC: 2.5 MG/DL (ref 1.6–2.6)
MAGNESIUM SERPL-MCNC: 2.7 MG/DL (ref 1.6–2.6)
MAGNESIUM SERPL-MCNC: 2.7 MG/DL (ref 1.6–2.6)
MAGNESIUM SERPL-MCNC: 2.8 MG/DL (ref 1.6–2.6)
MAGNESIUM SERPL-MCNC: 2.8 MG/DL (ref 1.6–2.6)
MAGNESIUM SERPL-MCNC: 2.9 MG/DL (ref 1.6–2.6)
MAGNESIUM SERPL-MCNC: 3.1 MG/DL (ref 1.6–2.6)
MCH RBC QN AUTO: 30 PG (ref 27–31)
MCH RBC QN AUTO: 30.1 PG (ref 27–31)
MCH RBC QN AUTO: 30.3 PG (ref 27–31)
MCH RBC QN AUTO: 30.4 PG (ref 27–31)
MCH RBC QN AUTO: 30.5 PG (ref 27–31)
MCH RBC QN AUTO: 30.6 PG (ref 27–31)
MCH RBC QN AUTO: 30.6 PG (ref 27–31)
MCH RBC QN AUTO: 30.7 PG (ref 27–31)
MCH RBC QN AUTO: 30.7 PG (ref 27–31)
MCH RBC QN AUTO: 30.9 PG (ref 27–31)
MCH RBC QN AUTO: 31 PG (ref 27–31)
MCH RBC QN AUTO: 31.1 PG (ref 27–31)
MCH RBC QN AUTO: 31.4 PG (ref 27–31)
MCH RBC QN AUTO: 31.4 PG (ref 27–31)
MCH RBC QN AUTO: 31.5 PG (ref 27–31)
MCH RBC QN AUTO: 31.5 PG (ref 27–31)
MCHC RBC AUTO-ENTMCNC: 28.8 G/DL (ref 32–36)
MCHC RBC AUTO-ENTMCNC: 30.7 G/DL (ref 32–36)
MCHC RBC AUTO-ENTMCNC: 31.8 G/DL (ref 32–36)
MCHC RBC AUTO-ENTMCNC: 32.3 G/DL (ref 32–36)
MCHC RBC AUTO-ENTMCNC: 32.7 G/DL (ref 32–36)
MCHC RBC AUTO-ENTMCNC: 32.8 G/DL (ref 32–36)
MCHC RBC AUTO-ENTMCNC: 32.8 G/DL (ref 32–36)
MCHC RBC AUTO-ENTMCNC: 33.1 G/DL (ref 32–36)
MCHC RBC AUTO-ENTMCNC: 33.1 G/DL (ref 32–36)
MCHC RBC AUTO-ENTMCNC: 33.2 G/DL (ref 32–36)
MCHC RBC AUTO-ENTMCNC: 33.2 G/DL (ref 32–36)
MCHC RBC AUTO-ENTMCNC: 33.3 G/DL (ref 32–36)
MCHC RBC AUTO-ENTMCNC: 33.4 G/DL (ref 32–36)
MCHC RBC AUTO-ENTMCNC: 33.4 G/DL (ref 32–36)
MCHC RBC AUTO-ENTMCNC: 33.5 G/DL (ref 32–36)
MCHC RBC AUTO-ENTMCNC: 33.7 G/DL (ref 32–36)
MCHC RBC AUTO-ENTMCNC: 33.7 G/DL (ref 32–36)
MCHC RBC AUTO-ENTMCNC: 33.8 G/DL (ref 32–36)
MCHC RBC AUTO-ENTMCNC: 33.9 G/DL (ref 32–36)
MCHC RBC AUTO-ENTMCNC: 33.9 G/DL (ref 32–36)
MCHC RBC AUTO-ENTMCNC: 34 G/DL (ref 32–36)
MCHC RBC AUTO-ENTMCNC: 34 G/DL (ref 32–36)
MCV RBC AUTO: 100 FL (ref 82–98)
MCV RBC AUTO: 105 FL (ref 82–98)
MCV RBC AUTO: 90 FL (ref 82–98)
MCV RBC AUTO: 91 FL (ref 82–98)
MCV RBC AUTO: 92 FL (ref 82–98)
MCV RBC AUTO: 93 FL (ref 82–98)
MCV RBC AUTO: 94 FL (ref 82–98)
MCV RBC AUTO: 94 FL (ref 82–98)
MCV RBC AUTO: 96 FL (ref 82–98)
MCV RBC AUTO: 98 FL (ref 82–98)
MCV RBC AUTO: 99 FL (ref 82–98)
METAMYELOCYTES NFR BLD MANUAL: 1 %
METAMYELOCYTES NFR BLD MANUAL: 2 %
METAMYELOCYTES NFR BLD MANUAL: 2 %
MICROSCOPIC COMMENT: NORMAL
MODE: ABNORMAL
MODE: ABNORMAL
MONOCYTES # BLD AUTO: 0.4 K/UL (ref 0.3–1)
MONOCYTES # BLD AUTO: 0.5 K/UL (ref 0.3–1)
MONOCYTES # BLD AUTO: 0.5 K/UL (ref 0.3–1)
MONOCYTES # BLD AUTO: 0.6 K/UL (ref 0.3–1)
MONOCYTES # BLD AUTO: 0.6 K/UL (ref 0.3–1)
MONOCYTES # BLD AUTO: 0.7 K/UL (ref 0.3–1)
MONOCYTES # BLD AUTO: 0.8 K/UL (ref 0.3–1)
MONOCYTES # BLD AUTO: 0.9 K/UL (ref 0.3–1)
MONOCYTES # BLD AUTO: 1 K/UL (ref 0.3–1)
MONOCYTES # BLD AUTO: 1 K/UL (ref 0.3–1)
MONOCYTES # BLD AUTO: 1.1 K/UL (ref 0.3–1)
MONOCYTES # BLD AUTO: 1.1 K/UL (ref 0.3–1)
MONOCYTES # BLD AUTO: ABNORMAL K/UL (ref 0.3–1)
MONOCYTES NFR BLD: 1 % (ref 4–15)
MONOCYTES NFR BLD: 1 % (ref 4–15)
MONOCYTES NFR BLD: 10.9 % (ref 4–15)
MONOCYTES NFR BLD: 13.9 % (ref 4–15)
MONOCYTES NFR BLD: 2.3 % (ref 4–15)
MONOCYTES NFR BLD: 2.7 % (ref 4–15)
MONOCYTES NFR BLD: 2.9 % (ref 4–15)
MONOCYTES NFR BLD: 3 % (ref 4–15)
MONOCYTES NFR BLD: 3.2 % (ref 4–15)
MONOCYTES NFR BLD: 3.3 % (ref 4–15)
MONOCYTES NFR BLD: 3.5 % (ref 4–15)
MONOCYTES NFR BLD: 3.7 % (ref 4–15)
MONOCYTES NFR BLD: 3.9 % (ref 4–15)
MONOCYTES NFR BLD: 4 % (ref 4–15)
MONOCYTES NFR BLD: 4.1 % (ref 4–15)
MONOCYTES NFR BLD: 4.2 % (ref 4–15)
MONOCYTES NFR BLD: 4.5 % (ref 4–15)
MONOCYTES NFR BLD: 4.6 % (ref 4–15)
MONOCYTES NFR BLD: 4.6 % (ref 4–15)
MONOCYTES NFR BLD: 5.3 % (ref 4–15)
MV PEAK A VEL: 1.14 M/S
MV PEAK E VEL: 1.03 M/S
MYELOCYTES NFR BLD MANUAL: 1 %
MYELOCYTES NFR BLD MANUAL: 2 %
NEUTROPHILS # BLD AUTO: 11.2 K/UL (ref 1.8–7.7)
NEUTROPHILS # BLD AUTO: 11.3 K/UL (ref 1.8–7.7)
NEUTROPHILS # BLD AUTO: 12.8 K/UL (ref 1.8–7.7)
NEUTROPHILS # BLD AUTO: 15.2 K/UL (ref 1.8–7.7)
NEUTROPHILS # BLD AUTO: 17.9 K/UL (ref 1.8–7.7)
NEUTROPHILS # BLD AUTO: 18.7 K/UL (ref 1.8–7.7)
NEUTROPHILS # BLD AUTO: 18.9 K/UL (ref 1.8–7.7)
NEUTROPHILS # BLD AUTO: 19 K/UL (ref 1.8–7.7)
NEUTROPHILS # BLD AUTO: 19.4 K/UL (ref 1.8–7.7)
NEUTROPHILS # BLD AUTO: 19.7 K/UL (ref 1.8–7.7)
NEUTROPHILS # BLD AUTO: 21.8 K/UL (ref 1.8–7.7)
NEUTROPHILS # BLD AUTO: 22.1 K/UL (ref 1.8–7.7)
NEUTROPHILS # BLD AUTO: 22.2 K/UL (ref 1.8–7.7)
NEUTROPHILS # BLD AUTO: 3.8 K/UL (ref 1.8–7.7)
NEUTROPHILS # BLD AUTO: 7.1 K/UL (ref 1.8–7.7)
NEUTROPHILS # BLD AUTO: 8.6 K/UL (ref 1.8–7.7)
NEUTROPHILS # BLD AUTO: 8.7 K/UL (ref 1.8–7.7)
NEUTROPHILS # BLD AUTO: ABNORMAL K/UL (ref 1.8–7.7)
NEUTROPHILS NFR BLD: 59.6 % (ref 38–73)
NEUTROPHILS NFR BLD: 72.2 % (ref 38–73)
NEUTROPHILS NFR BLD: 77 % (ref 38–73)
NEUTROPHILS NFR BLD: 83 % (ref 38–73)
NEUTROPHILS NFR BLD: 86.8 % (ref 38–73)
NEUTROPHILS NFR BLD: 87 % (ref 38–73)
NEUTROPHILS NFR BLD: 87 % (ref 38–73)
NEUTROPHILS NFR BLD: 87.2 % (ref 38–73)
NEUTROPHILS NFR BLD: 87.3 % (ref 38–73)
NEUTROPHILS NFR BLD: 87.5 % (ref 38–73)
NEUTROPHILS NFR BLD: 87.7 % (ref 38–73)
NEUTROPHILS NFR BLD: 87.7 % (ref 38–73)
NEUTROPHILS NFR BLD: 87.9 % (ref 38–73)
NEUTROPHILS NFR BLD: 89 % (ref 38–73)
NEUTROPHILS NFR BLD: 89 % (ref 38–73)
NEUTROPHILS NFR BLD: 89.2 % (ref 38–73)
NEUTROPHILS NFR BLD: 89.4 % (ref 38–73)
NEUTROPHILS NFR BLD: 89.6 % (ref 38–73)
NEUTROPHILS NFR BLD: 89.6 % (ref 38–73)
NEUTROPHILS NFR BLD: 90.3 % (ref 38–73)
NEUTROPHILS NFR BLD: 90.9 % (ref 38–73)
NEUTROPHILS NFR BLD: 91.4 % (ref 38–73)
NEUTROPHILS NFR BLD: 91.4 % (ref 38–73)
NEUTROPHILS NFR BLD: 93 % (ref 38–73)
NEUTROPHILS NFR BLD: 95 % (ref 38–73)
NEUTS BAND NFR BLD MANUAL: 1 %
NEUTS BAND NFR BLD MANUAL: 3 %
NEUTS BAND NFR BLD MANUAL: 5 %
NEUTS BAND NFR BLD MANUAL: 5 %
NEUTS BAND NFR BLD MANUAL: 6 %
NITRITE UR QL STRIP: NEGATIVE
NRBC BLD-RTO: 0 /100 WBC
NRBC BLD-RTO: 1 /100 WBC
NRBC BLD-RTO: 2 /100 WBC
NUC REST DIASTOLIC VOLUME INDEX: 50
NUC REST EJECTION FRACTION: 77
NUC REST SYSTOLIC VOLUME INDEX: 12
OB PNL STL: POSITIVE
OHS CV CPX 1 MINUTE RECOVERY HEART RATE: 127 BPM
OHS CV CPX 85 PERCENT MAX PREDICTED HEART RATE MALE: 117
OHS CV CPX ESTIMATED METS: 10.1
OHS CV CPX MAX PREDICTED HEART RATE: 138
OHS CV CPX PATIENT IS FEMALE: 0
OHS CV CPX PATIENT IS MALE: 1
OHS CV CPX PEAK DIASTOLIC BLOOD PRESSURE: 78 MMHG
OHS CV CPX PEAK HEAR RATE: 144 BPM
OHS CV CPX PEAK RATE PRESSURE PRODUCT: NORMAL
OHS CV CPX PEAK SYSTOLIC BLOOD PRESSURE: 229 MMHG
OHS CV CPX PERCENT MAX PREDICTED HEART RATE ACHIEVED: 104
OHS CV CPX RATE PRESSURE PRODUCT PRESENTING: 9840
OHS CV LEFT LOWER EXTREMITY ABI (NO CALC): 0.93
OHS CV RIGHT ABI LOWER EXTREMITY (NO CALC): 1
OVALOCYTES BLD QL SMEAR: ABNORMAL
PCO2 BLDA: 26.7 MMHG (ref 35–45)
PCO2 BLDA: 32.2 MMHG (ref 35–45)
PH SMN: 7.42 [PH] (ref 7.35–7.45)
PH SMN: 7.45 [PH] (ref 7.35–7.45)
PH UR STRIP: 6 [PH] (ref 5–8)
PHOSPHATE SERPL-MCNC: 1.5 MG/DL (ref 2.7–4.5)
PHOSPHATE SERPL-MCNC: 1.6 MG/DL (ref 2.7–4.5)
PHOSPHATE SERPL-MCNC: 2 MG/DL (ref 2.7–4.5)
PHOSPHATE SERPL-MCNC: 2.3 MG/DL (ref 2.7–4.5)
PHOSPHATE SERPL-MCNC: 2.4 MG/DL (ref 2.7–4.5)
PHOSPHATE SERPL-MCNC: 2.5 MG/DL (ref 2.7–4.5)
PHOSPHATE SERPL-MCNC: 2.6 MG/DL (ref 2.7–4.5)
PHOSPHATE SERPL-MCNC: 2.9 MG/DL (ref 2.7–4.5)
PHOSPHATE SERPL-MCNC: 3 MG/DL (ref 2.7–4.5)
PHOSPHATE SERPL-MCNC: 3.3 MG/DL (ref 2.7–4.5)
PHOSPHATE SERPL-MCNC: 3.5 MG/DL (ref 2.7–4.5)
PHOSPHATE SERPL-MCNC: 3.8 MG/DL (ref 2.7–4.5)
PISA TR MAX VEL: 2.63 M/S
PLATELET # BLD AUTO: 115 K/UL (ref 150–350)
PLATELET # BLD AUTO: 125 K/UL (ref 150–350)
PLATELET # BLD AUTO: 132 K/UL (ref 150–350)
PLATELET # BLD AUTO: 133 K/UL (ref 150–350)
PLATELET # BLD AUTO: 137 K/UL (ref 150–350)
PLATELET # BLD AUTO: 144 K/UL (ref 150–350)
PLATELET # BLD AUTO: 144 K/UL (ref 150–350)
PLATELET # BLD AUTO: 150 K/UL (ref 150–350)
PLATELET # BLD AUTO: 154 K/UL (ref 150–350)
PLATELET # BLD AUTO: 154 K/UL (ref 150–350)
PLATELET # BLD AUTO: 180 K/UL (ref 150–350)
PLATELET # BLD AUTO: 187 K/UL (ref 150–350)
PLATELET # BLD AUTO: 194 K/UL (ref 150–350)
PLATELET # BLD AUTO: 220 K/UL (ref 150–350)
PLATELET # BLD AUTO: 235 K/UL (ref 150–350)
PLATELET # BLD AUTO: 240 K/UL (ref 150–350)
PLATELET # BLD AUTO: 260 K/UL (ref 150–350)
PLATELET # BLD AUTO: 270 K/UL (ref 150–350)
PLATELET # BLD AUTO: 283 K/UL (ref 150–350)
PLATELET # BLD AUTO: 286 K/UL (ref 150–350)
PLATELET # BLD AUTO: 291 K/UL (ref 150–350)
PLATELET # BLD AUTO: 320 K/UL (ref 150–350)
PLATELET # BLD AUTO: 334 K/UL (ref 150–350)
PLATELET # BLD AUTO: 359 K/UL (ref 150–350)
PLATELET # BLD AUTO: 382 K/UL (ref 150–350)
PLATELET BLD QL SMEAR: ABNORMAL
PMV BLD AUTO: 10 FL (ref 9.2–12.9)
PMV BLD AUTO: 10 FL (ref 9.2–12.9)
PMV BLD AUTO: 10.2 FL (ref 9.2–12.9)
PMV BLD AUTO: 10.3 FL (ref 9.2–12.9)
PMV BLD AUTO: 10.5 FL (ref 9.2–12.9)
PMV BLD AUTO: 10.6 FL (ref 9.2–12.9)
PMV BLD AUTO: 10.7 FL (ref 9.2–12.9)
PMV BLD AUTO: 10.8 FL (ref 9.2–12.9)
PMV BLD AUTO: 11.2 FL (ref 9.2–12.9)
PMV BLD AUTO: 9.7 FL (ref 9.2–12.9)
PMV BLD AUTO: 9.9 FL (ref 9.2–12.9)
PO2 BLDA: 52 MMHG (ref 80–100)
PO2 BLDA: 60 MMHG (ref 80–100)
POC ACTIVATED CLOTTING TIME K: 147 SEC (ref 74–137)
POC ACTIVATED CLOTTING TIME K: 164 SEC (ref 74–137)
POC ACTIVATED CLOTTING TIME K: 202 SEC (ref 74–137)
POC ACTIVATED CLOTTING TIME K: 208 SEC (ref 74–137)
POC BE: -3 MMOL/L
POC BE: -4 MMOL/L
POC SATURATED O2: 89 % (ref 95–100)
POC SATURATED O2: 91 % (ref 95–100)
POC TCO2: 19 MMOL/L (ref 23–27)
POC TCO2: 22 MMOL/L (ref 23–27)
POCT GLUCOSE: 118 MG/DL (ref 70–110)
POCT GLUCOSE: 129 MG/DL (ref 70–110)
POCT GLUCOSE: 132 MG/DL (ref 70–110)
POCT GLUCOSE: 142 MG/DL (ref 70–110)
POCT GLUCOSE: 146 MG/DL (ref 70–110)
POCT GLUCOSE: 154 MG/DL (ref 70–110)
POCT GLUCOSE: 161 MG/DL (ref 70–110)
POCT GLUCOSE: 169 MG/DL (ref 70–110)
POCT GLUCOSE: 177 MG/DL (ref 70–110)
POCT GLUCOSE: 177 MG/DL (ref 70–110)
POCT GLUCOSE: 178 MG/DL (ref 70–110)
POCT GLUCOSE: 196 MG/DL (ref 70–110)
POCT GLUCOSE: 205 MG/DL (ref 70–110)
POCT GLUCOSE: 217 MG/DL (ref 70–110)
POCT GLUCOSE: 218 MG/DL (ref 70–110)
POCT GLUCOSE: 221 MG/DL (ref 70–110)
POCT GLUCOSE: 223 MG/DL (ref 70–110)
POCT GLUCOSE: 237 MG/DL (ref 70–110)
POCT GLUCOSE: 247 MG/DL (ref 70–110)
POCT GLUCOSE: 251 MG/DL (ref 70–110)
POCT GLUCOSE: 252 MG/DL (ref 70–110)
POCT GLUCOSE: 266 MG/DL (ref 70–110)
POCT GLUCOSE: 277 MG/DL (ref 70–110)
POCT GLUCOSE: 281 MG/DL (ref 70–110)
POCT GLUCOSE: 284 MG/DL (ref 70–110)
POCT GLUCOSE: 292 MG/DL (ref 70–110)
POCT GLUCOSE: 294 MG/DL (ref 70–110)
POCT GLUCOSE: 294 MG/DL (ref 70–110)
POCT GLUCOSE: 296 MG/DL (ref 70–110)
POCT GLUCOSE: 327 MG/DL (ref 70–110)
POCT GLUCOSE: 332 MG/DL (ref 70–110)
POCT GLUCOSE: 333 MG/DL (ref 70–110)
POCT GLUCOSE: 390 MG/DL (ref 70–110)
POCT GLUCOSE: 391 MG/DL (ref 70–110)
POCT GLUCOSE: 420 MG/DL (ref 70–110)
POCT GLUCOSE: 79 MG/DL (ref 70–110)
POCT GLUCOSE: 97 MG/DL (ref 70–110)
POIKILOCYTOSIS BLD QL SMEAR: SLIGHT
POIKILOCYTOSIS BLD QL SMEAR: SLIGHT
POLYCHROMASIA BLD QL SMEAR: ABNORMAL
POTASSIUM SERPL-SCNC: 3.5 MMOL/L (ref 3.5–5.1)
POTASSIUM SERPL-SCNC: 3.7 MMOL/L (ref 3.5–5.1)
POTASSIUM SERPL-SCNC: 3.7 MMOL/L (ref 3.5–5.1)
POTASSIUM SERPL-SCNC: 3.9 MMOL/L (ref 3.5–5.1)
POTASSIUM SERPL-SCNC: 4 MMOL/L (ref 3.5–5.1)
POTASSIUM SERPL-SCNC: 4.1 MMOL/L (ref 3.5–5.1)
POTASSIUM SERPL-SCNC: 4.1 MMOL/L (ref 3.5–5.1)
POTASSIUM SERPL-SCNC: 4.2 MMOL/L (ref 3.5–5.1)
POTASSIUM SERPL-SCNC: 4.2 MMOL/L (ref 3.5–5.1)
POTASSIUM SERPL-SCNC: 4.3 MMOL/L (ref 3.5–5.1)
POTASSIUM SERPL-SCNC: 4.5 MMOL/L (ref 3.5–5.1)
POTASSIUM SERPL-SCNC: 4.6 MMOL/L (ref 3.5–5.1)
POTASSIUM SERPL-SCNC: 4.7 MMOL/L (ref 3.5–5.1)
POTASSIUM SERPL-SCNC: 4.8 MMOL/L (ref 3.5–5.1)
POTASSIUM SERPL-SCNC: 4.9 MMOL/L (ref 3.5–5.1)
POTASSIUM SERPL-SCNC: 5 MMOL/L (ref 3.5–5.1)
POTASSIUM SERPL-SCNC: 5.7 MMOL/L (ref 3.5–5.1)
PREALB SERPL-MCNC: 7 MG/DL (ref 20–43)
PROCALCITONIN SERPL IA-MCNC: 10 NG/ML
PROT SERPL-MCNC: 5.6 G/DL (ref 6–8.4)
PROT SERPL-MCNC: 6.2 G/DL (ref 6–8.4)
PROT SERPL-MCNC: 6.2 G/DL (ref 6–8.4)
PROT SERPL-MCNC: 6.5 G/DL (ref 6–8.4)
PROT SERPL-MCNC: 6.6 G/DL (ref 6–8.4)
PROT SERPL-MCNC: 6.7 G/DL (ref 6–8.4)
PROT SERPL-MCNC: 7.1 G/DL (ref 6–8.4)
PROT SERPL-MCNC: 7.2 G/DL (ref 6–8.4)
PROT UR QL STRIP: NEGATIVE
PROTHROMBIN TIME: 10.1 SEC (ref 9–12.5)
PROTHROMBIN TIME: 11.6 SEC (ref 9–12.5)
PROTHROMBIN TIME: 11.6 SEC (ref 9–12.5)
PULM VEIN S/D RATIO: 1.24
PV PEAK D VEL: 0.5 M/S
PV PEAK S VEL: 0.62 M/S
PV PEAK VELOCITY: 1.1 CM/S
RA MAJOR: 4.86 CM
RA PRESSURE: 3 MMHG
RA WIDTH: 2.75 CM
RBC # BLD AUTO: 2.45 M/UL (ref 4.6–6.2)
RBC # BLD AUTO: 2.54 M/UL (ref 4.6–6.2)
RBC # BLD AUTO: 2.62 M/UL (ref 4.6–6.2)
RBC # BLD AUTO: 2.64 M/UL (ref 4.6–6.2)
RBC # BLD AUTO: 2.67 M/UL (ref 4.6–6.2)
RBC # BLD AUTO: 2.71 M/UL (ref 4.6–6.2)
RBC # BLD AUTO: 2.72 M/UL (ref 4.6–6.2)
RBC # BLD AUTO: 2.74 M/UL (ref 4.6–6.2)
RBC # BLD AUTO: 2.82 M/UL (ref 4.6–6.2)
RBC # BLD AUTO: 2.91 M/UL (ref 4.6–6.2)
RBC # BLD AUTO: 2.98 M/UL (ref 4.6–6.2)
RBC # BLD AUTO: 2.99 M/UL (ref 4.6–6.2)
RBC # BLD AUTO: 3.03 M/UL (ref 4.6–6.2)
RBC # BLD AUTO: 3.32 M/UL (ref 4.6–6.2)
RBC # BLD AUTO: 3.86 M/UL (ref 4.6–6.2)
RBC # BLD AUTO: 3.95 M/UL (ref 4.6–6.2)
RBC # BLD AUTO: 4.04 M/UL (ref 4.6–6.2)
RBC # BLD AUTO: 4.15 M/UL (ref 4.6–6.2)
RBC # BLD AUTO: 4.35 M/UL (ref 4.6–6.2)
RBC # BLD AUTO: 4.58 M/UL (ref 4.6–6.2)
RBC # BLD AUTO: 4.64 M/UL (ref 4.6–6.2)
RBC # BLD AUTO: 4.83 M/UL (ref 4.6–6.2)
RBC # BLD AUTO: 4.89 M/UL (ref 4.6–6.2)
RBC # BLD AUTO: 5.11 M/UL (ref 4.6–6.2)
RBC # BLD AUTO: 5.21 M/UL (ref 4.6–6.2)
RIGHT ANT TIBIAL SYS PSV: 49 CM/S
RIGHT CFA PSV: 106 CM/S
RIGHT EXTERNAL ILLIAC PSV: 91 CM/S
RIGHT PERONEAL SYS PSV: 121 CM/S
RIGHT POPLITEAL PSV: 56 CM/S
RIGHT POST TIBIAL SYS PSV: 0 CM/S
RIGHT PROFUNDA SYS PSV: 103 CM/S
RIGHT SUPER FEMORAL DIST SYS PSV: 163 CM/S
RIGHT SUPER FEMORAL MID SYS PSV: 144 CM/S
RIGHT SUPER FEMORAL OSTIAL SYS PSV: 115 CM/S
RIGHT SUPER FEMORAL PROX SYS PSV: 91 CM/S
RIGHT TIB/PER TRUNK SYS PSV: 52 CM/S
RIGHT VENTRICULAR END-DIASTOLIC DIMENSION: 3.33 CM
SAMPLE: ABNORMAL
SARS-COV-2 RNA RESP QL NAA+PROBE: DETECTED
SITE: ABNORMAL
SODIUM SERPL-SCNC: 132 MMOL/L (ref 136–145)
SODIUM SERPL-SCNC: 133 MMOL/L (ref 136–145)
SODIUM SERPL-SCNC: 135 MMOL/L (ref 136–145)
SODIUM SERPL-SCNC: 136 MMOL/L (ref 136–145)
SODIUM SERPL-SCNC: 137 MMOL/L (ref 136–145)
SODIUM SERPL-SCNC: 140 MMOL/L (ref 136–145)
SODIUM SERPL-SCNC: 141 MMOL/L (ref 136–145)
SODIUM SERPL-SCNC: 142 MMOL/L (ref 136–145)
SODIUM SERPL-SCNC: 146 MMOL/L (ref 136–145)
SODIUM SERPL-SCNC: 149 MMOL/L (ref 136–145)
SP GR UR STRIP: 1.02 (ref 1–1.03)
SP02: 90
SPHEROCYTES BLD QL SMEAR: ABNORMAL
STJ: 2.71 CM
STRESS ECHO POST EXERCISE DUR MIN: 10 MINUTES
STRESS ECHO POST EXERCISE DUR SEC: 8 SECONDS
STRESS ECHO TARGET HR: 117 BPM
SYSTOLIC BLOOD PRESSURE: 164 MMHG
TDI LATERAL: 0.11 M/S
TDI SEPTAL: 0.09 M/S
TDI: 0.1 M/S
TR MAX PG: 28 MMHG
TRANS ERYTHROCYTES VOL PATIENT: NORMAL ML
TRANS ERYTHROCYTES VOL PATIENT: NORMAL ML
TRICUSPID ANNULAR PLANE SYSTOLIC EXCURSION: 2.57 CM
TROPONIN I SERPL DL<=0.01 NG/ML-MCNC: 0.29 NG/ML (ref 0–0.03)
TROPONIN I SERPL DL<=0.01 NG/ML-MCNC: 0.57 NG/ML (ref 0–0.03)
TROPONIN I SERPL DL<=0.01 NG/ML-MCNC: 0.86 NG/ML (ref 0–0.03)
TROPONIN I SERPL DL<=0.01 NG/ML-MCNC: 0.96 NG/ML (ref 0–0.03)
TV REST PULMONARY ARTERY PRESSURE: 31 MMHG
URN SPEC COLLECT METH UR: ABNORMAL
UROBILINOGEN UR STRIP-ACNC: NEGATIVE EU/DL
VANCOMYCIN TROUGH SERPL-MCNC: 5 UG/ML (ref 10–22)
WBC # BLD AUTO: 10.01 K/UL (ref 3.9–12.7)
WBC # BLD AUTO: 12.51 K/UL (ref 3.9–12.7)
WBC # BLD AUTO: 12.87 K/UL (ref 3.9–12.7)
WBC # BLD AUTO: 14.25 K/UL (ref 3.9–12.7)
WBC # BLD AUTO: 17.44 K/UL (ref 3.9–12.7)
WBC # BLD AUTO: 19.57 K/UL (ref 3.9–12.7)
WBC # BLD AUTO: 20.76 K/UL (ref 3.9–12.7)
WBC # BLD AUTO: 20.85 K/UL (ref 3.9–12.7)
WBC # BLD AUTO: 21.55 K/UL (ref 3.9–12.7)
WBC # BLD AUTO: 21.86 K/UL (ref 3.9–12.7)
WBC # BLD AUTO: 22.15 K/UL (ref 3.9–12.7)
WBC # BLD AUTO: 23.63 K/UL (ref 3.9–12.7)
WBC # BLD AUTO: 24.16 K/UL (ref 3.9–12.7)
WBC # BLD AUTO: 24.48 K/UL (ref 3.9–12.7)
WBC # BLD AUTO: 24.82 K/UL (ref 3.9–12.7)
WBC # BLD AUTO: 24.84 K/UL (ref 3.9–12.7)
WBC # BLD AUTO: 25.72 K/UL (ref 3.9–12.7)
WBC # BLD AUTO: 25.89 K/UL (ref 3.9–12.7)
WBC # BLD AUTO: 30.48 K/UL (ref 3.9–12.7)
WBC # BLD AUTO: 32.59 K/UL (ref 3.9–12.7)
WBC # BLD AUTO: 39.11 K/UL (ref 3.9–12.7)
WBC # BLD AUTO: 44.35 K/UL (ref 3.9–12.7)
WBC # BLD AUTO: 6.4 K/UL (ref 3.9–12.7)
WBC # BLD AUTO: 9.82 K/UL (ref 3.9–12.7)
WBC # BLD AUTO: 9.86 K/UL (ref 3.9–12.7)
YEAST URNS QL MICRO: NORMAL

## 2020-01-01 PROCEDURE — 93010 ELECTROCARDIOGRAM REPORT: CPT | Mod: HCNC,,, | Performed by: INTERNAL MEDICINE

## 2020-01-01 PROCEDURE — 25000003 PHARM REV CODE 250: Mod: HCNC | Performed by: HOSPITALIST

## 2020-01-01 PROCEDURE — 83735 ASSAY OF MAGNESIUM: CPT | Mod: HCNC

## 2020-01-01 PROCEDURE — 86920 COMPATIBILITY TEST SPIN: CPT | Mod: HCNC

## 2020-01-01 PROCEDURE — 86140 C-REACTIVE PROTEIN: CPT | Mod: HCNC

## 2020-01-01 PROCEDURE — 25000003 PHARM REV CODE 250: Mod: HCNC | Performed by: INTERNAL MEDICINE

## 2020-01-01 PROCEDURE — 3079F PR MOST RECENT DIASTOLIC BLOOD PRESSURE 80-89 MM HG: ICD-10-PCS | Mod: HCNC,CPTII,S$GLB, | Performed by: INTERNAL MEDICINE

## 2020-01-01 PROCEDURE — 94660 CPAP INITIATION&MGMT: CPT | Mod: HCNC

## 2020-01-01 PROCEDURE — 63600175 PHARM REV CODE 636 W HCPCS: Mod: HCNC | Performed by: NURSE PRACTITIONER

## 2020-01-01 PROCEDURE — 36415 COLL VENOUS BLD VENIPUNCTURE: CPT | Mod: HCNC

## 2020-01-01 PROCEDURE — 80048 BASIC METABOLIC PNL TOTAL CA: CPT | Mod: HCNC

## 2020-01-01 PROCEDURE — 85025 COMPLETE CBC W/AUTO DIFF WBC: CPT | Mod: HCNC

## 2020-01-01 PROCEDURE — 25000003 PHARM REV CODE 250: Mod: HCNC | Performed by: NURSE PRACTITIONER

## 2020-01-01 PROCEDURE — 82550 ASSAY OF CK (CPK): CPT | Mod: HCNC

## 2020-01-01 PROCEDURE — 99233 PR SUBSEQUENT HOSPITAL CARE,LEVL III: ICD-10-PCS | Mod: HCNC,,, | Performed by: PHYSICIAN ASSISTANT

## 2020-01-01 PROCEDURE — 94761 N-INVAS EAR/PLS OXIMETRY MLT: CPT | Mod: HCNC

## 2020-01-01 PROCEDURE — 20000000 HC ICU ROOM: Mod: HCNC

## 2020-01-01 PROCEDURE — 93925 LOWER EXTREMITY STUDY: CPT | Mod: 50,HCNC

## 2020-01-01 PROCEDURE — 99223 1ST HOSP IP/OBS HIGH 75: CPT | Mod: HCNC,,, | Performed by: NURSE PRACTITIONER

## 2020-01-01 PROCEDURE — 99291 CRITICAL CARE FIRST HOUR: CPT | Mod: HCNC,,, | Performed by: NURSE PRACTITIONER

## 2020-01-01 PROCEDURE — 99497 ADVNCD CARE PLAN 30 MIN: CPT | Mod: HCNC,,, | Performed by: NURSE PRACTITIONER

## 2020-01-01 PROCEDURE — 63600175 PHARM REV CODE 636 W HCPCS: Mod: HCNC | Performed by: HOSPITALIST

## 2020-01-01 PROCEDURE — 27100171 HC OXYGEN HIGH FLOW UP TO 24 HOURS: Mod: HCNC

## 2020-01-01 PROCEDURE — 85379 FIBRIN DEGRADATION QUANT: CPT | Mod: HCNC

## 2020-01-01 PROCEDURE — 63600175 PHARM REV CODE 636 W HCPCS: Mod: HCNC | Performed by: INTERNAL MEDICINE

## 2020-01-01 PROCEDURE — 99233 SBSQ HOSP IP/OBS HIGH 50: CPT | Mod: HCNC,,, | Performed by: NURSE PRACTITIONER

## 2020-01-01 PROCEDURE — 25000003 PHARM REV CODE 250: Mod: HCNC | Performed by: EMERGENCY MEDICINE

## 2020-01-01 PROCEDURE — 27000221 HC OXYGEN, UP TO 24 HOURS: Mod: HCNC

## 2020-01-01 PROCEDURE — 93018 STRESS TEST WITH MYOCARDIAL PERFUSION (CUPID ONLY): ICD-10-PCS | Mod: HCNC,,, | Performed by: INTERNAL MEDICINE

## 2020-01-01 PROCEDURE — 25000242 PHARM REV CODE 250 ALT 637 W/ HCPCS: Mod: HCNC | Performed by: EMERGENCY MEDICINE

## 2020-01-01 PROCEDURE — 99291 PR CRITICAL CARE, E/M 30-74 MINUTES: ICD-10-PCS | Mod: HCNC,,, | Performed by: NURSE PRACTITIONER

## 2020-01-01 PROCEDURE — 83615 LACTATE (LD) (LDH) ENZYME: CPT | Mod: HCNC

## 2020-01-01 PROCEDURE — 84134 ASSAY OF PREALBUMIN: CPT | Mod: HCNC

## 2020-01-01 PROCEDURE — C9113 INJ PANTOPRAZOLE SODIUM, VIA: HCPCS | Mod: HCNC | Performed by: INTERNAL MEDICINE

## 2020-01-01 PROCEDURE — 85018 HEMOGLOBIN: CPT | Mod: HCNC

## 2020-01-01 PROCEDURE — 85610 PROTHROMBIN TIME: CPT | Mod: HCNC

## 2020-01-01 PROCEDURE — 85014 HEMATOCRIT: CPT | Mod: HCNC

## 2020-01-01 PROCEDURE — 99900035 HC TECH TIME PER 15 MIN (STAT): Mod: HCNC

## 2020-01-01 PROCEDURE — 63600175 PHARM REV CODE 636 W HCPCS: Mod: HCNC | Performed by: EMERGENCY MEDICINE

## 2020-01-01 PROCEDURE — 82803 BLOOD GASES ANY COMBINATION: CPT | Mod: HCNC,91

## 2020-01-01 PROCEDURE — 99291 PR CRITICAL CARE, E/M 30-74 MINUTES: ICD-10-PCS | Mod: HCNC,,, | Performed by: INTERNAL MEDICINE

## 2020-01-01 PROCEDURE — 82272 OCCULT BLD FECES 1-3 TESTS: CPT | Mod: HCNC

## 2020-01-01 PROCEDURE — 93306 ECHO (CUPID ONLY): ICD-10-PCS | Mod: 26,HCNC,, | Performed by: INTERNAL MEDICINE

## 2020-01-01 PROCEDURE — 93306 TTE W/DOPPLER COMPLETE: CPT | Mod: 26,HCNC,, | Performed by: INTERNAL MEDICINE

## 2020-01-01 PROCEDURE — 80053 COMPREHEN METABOLIC PANEL: CPT | Mod: HCNC

## 2020-01-01 PROCEDURE — 99214 PR OFFICE/OUTPT VISIT, EST, LEVL IV, 30-39 MIN: ICD-10-PCS | Mod: HCNC,S$GLB,, | Performed by: INTERNAL MEDICINE

## 2020-01-01 PROCEDURE — S0030 INJECTION, METRONIDAZOLE: HCPCS | Mod: HCNC | Performed by: HOSPITALIST

## 2020-01-01 PROCEDURE — 99291 CRITICAL CARE FIRST HOUR: CPT | Mod: 25,HCNC

## 2020-01-01 PROCEDURE — 99497 ADVNCD CARE PLAN 30 MIN: CPT | Mod: HCNC,25,, | Performed by: NURSE PRACTITIONER

## 2020-01-01 PROCEDURE — 25500020 PHARM REV CODE 255: Mod: HCNC | Performed by: INTERNAL MEDICINE

## 2020-01-01 PROCEDURE — 37225 PR FEM/POPL REVAS W/ATHERECTOMY: ICD-10-PCS | Mod: HCNC,RT,, | Performed by: INTERNAL MEDICINE

## 2020-01-01 PROCEDURE — 97803 MED NUTRITION INDIV SUBSEQ: CPT | Mod: HCNC

## 2020-01-01 PROCEDURE — 93018 CV STRESS TEST I&R ONLY: CPT | Mod: HCNC,,, | Performed by: INTERNAL MEDICINE

## 2020-01-01 PROCEDURE — 1159F PR MEDICATION LIST DOCUMENTED IN MEDICAL RECORD: ICD-10-PCS | Mod: HCNC,S$GLB,, | Performed by: INTERNAL MEDICINE

## 2020-01-01 PROCEDURE — C1769 GUIDE WIRE: HCPCS | Mod: HCNC | Performed by: INTERNAL MEDICINE

## 2020-01-01 PROCEDURE — 99223 PR INITIAL HOSPITAL CARE,LEVL III: ICD-10-PCS | Mod: HCNC,,, | Performed by: NURSE PRACTITIONER

## 2020-01-01 PROCEDURE — 93017 CV STRESS TEST TRACING ONLY: CPT | Mod: HCNC

## 2020-01-01 PROCEDURE — 99233 PR SUBSEQUENT HOSPITAL CARE,LEVL III: ICD-10-PCS | Mod: HCNC,,, | Performed by: NURSE PRACTITIONER

## 2020-01-01 PROCEDURE — 99291 PR CRITICAL CARE, E/M 30-74 MINUTES: ICD-10-PCS | Mod: HCNC,,, | Performed by: EMERGENCY MEDICINE

## 2020-01-01 PROCEDURE — 85025 COMPLETE CBC W/AUTO DIFF WBC: CPT | Mod: 91,HCNC

## 2020-01-01 PROCEDURE — S0166 INJ OLANZAPINE 2.5MG: HCPCS | Mod: HCNC | Performed by: INTERNAL MEDICINE

## 2020-01-01 PROCEDURE — 99291 CRITICAL CARE FIRST HOUR: CPT | Mod: HCNC,,, | Performed by: EMERGENCY MEDICINE

## 2020-01-01 PROCEDURE — 1101F PR PT FALLS ASSESS DOC 0-1 FALLS W/OUT INJ PAST YR: ICD-10-PCS | Mod: HCNC,CPTII,S$GLB, | Performed by: INTERNAL MEDICINE

## 2020-01-01 PROCEDURE — 84100 ASSAY OF PHOSPHORUS: CPT | Mod: HCNC

## 2020-01-01 PROCEDURE — 82803 BLOOD GASES ANY COMBINATION: CPT | Mod: HCNC

## 2020-01-01 PROCEDURE — 1126F PR PAIN SEVERITY QUANTIFIED, NO PAIN PRESENT: ICD-10-PCS | Mod: HCNC,S$GLB,, | Performed by: INTERNAL MEDICINE

## 2020-01-01 PROCEDURE — 75630 PR  ANGIO AORTOBIFEMORAL W CATH: ICD-10-PCS | Mod: 26,59,HCNC, | Performed by: INTERNAL MEDICINE

## 2020-01-01 PROCEDURE — 99999 PR PBB SHADOW E&M-EST. PATIENT-LVL III: CPT | Mod: PBBFAC,HCNC,, | Performed by: INTERNAL MEDICINE

## 2020-01-01 PROCEDURE — 85018 HEMOGLOBIN: CPT | Mod: 91,HCNC

## 2020-01-01 PROCEDURE — C2623 CATH, TRANSLUMIN, DRUG-COAT: HCPCS | Mod: HCNC | Performed by: INTERNAL MEDICINE

## 2020-01-01 PROCEDURE — 27000190 HC CPAP FULL FACE MASK W/VALVE: Mod: HCNC

## 2020-01-01 PROCEDURE — U0003 INFECTIOUS AGENT DETECTION BY NUCLEIC ACID (DNA OR RNA); SEVERE ACUTE RESPIRATORY SYNDROME CORONAVIRUS 2 (SARS-COV-2) (CORONAVIRUS DISEASE [COVID-19]), AMPLIFIED PROBE TECHNIQUE, MAKING USE OF HIGH THROUGHPUT TECHNOLOGIES AS DESCRIBED BY CMS-2020-01-R: HCPCS | Mod: HCNC

## 2020-01-01 PROCEDURE — C1751 CATH, INF, PER/CENT/MIDLINE: HCPCS | Mod: HCNC

## 2020-01-01 PROCEDURE — 36410 VNPNXR 3YR/> PHY/QHP DX/THER: CPT | Mod: HCNC

## 2020-01-01 PROCEDURE — 93010 EKG 12-LEAD: ICD-10-PCS | Mod: 76,HCNC,, | Performed by: INTERNAL MEDICINE

## 2020-01-01 PROCEDURE — C9399 UNCLASSIFIED DRUGS OR BIOLOG: HCPCS | Mod: HCNC | Performed by: INTERNAL MEDICINE

## 2020-01-01 PROCEDURE — 1101F PT FALLS ASSESS-DOCD LE1/YR: CPT | Mod: HCNC,CPTII,S$GLB, | Performed by: INTERNAL MEDICINE

## 2020-01-01 PROCEDURE — C1725 CATH, TRANSLUMIN NON-LASER: HCPCS | Mod: HCNC | Performed by: INTERNAL MEDICINE

## 2020-01-01 PROCEDURE — 85027 COMPLETE CBC AUTOMATED: CPT | Mod: HCNC

## 2020-01-01 PROCEDURE — 36600 WITHDRAWAL OF ARTERIAL BLOOD: CPT | Mod: HCNC

## 2020-01-01 PROCEDURE — A4216 STERILE WATER/SALINE, 10 ML: HCPCS | Mod: HCNC | Performed by: NURSE PRACTITIONER

## 2020-01-01 PROCEDURE — 37799 UNLISTED PX VASCULAR SURGERY: CPT | Mod: HCNC

## 2020-01-01 PROCEDURE — 99212 OFFICE O/P EST SF 10 MIN: CPT | Mod: HCNC,S$GLB,, | Performed by: INTERNAL MEDICINE

## 2020-01-01 PROCEDURE — 93000 ELECTROCARDIOGRAM COMPLETE: CPT | Mod: HCNC,S$GLB,, | Performed by: INTERNAL MEDICINE

## 2020-01-01 PROCEDURE — 99291 CRITICAL CARE FIRST HOUR: CPT | Mod: HCNC,,, | Performed by: INTERNAL MEDICINE

## 2020-01-01 PROCEDURE — C9113 INJ PANTOPRAZOLE SODIUM, VIA: HCPCS | Mod: HCNC | Performed by: HOSPITALIST

## 2020-01-01 PROCEDURE — 85007 BL SMEAR W/DIFF WBC COUNT: CPT | Mod: HCNC

## 2020-01-01 PROCEDURE — 87040 BLOOD CULTURE FOR BACTERIA: CPT | Mod: HCNC

## 2020-01-01 PROCEDURE — 93306 TTE W/DOPPLER COMPLETE: CPT | Mod: HCNC

## 2020-01-01 PROCEDURE — 85730 THROMBOPLASTIN TIME PARTIAL: CPT | Mod: HCNC

## 2020-01-01 PROCEDURE — 93016 STRESS TEST WITH MYOCARDIAL PERFUSION (CUPID ONLY): ICD-10-PCS | Mod: HCNC,,, | Performed by: INTERNAL MEDICINE

## 2020-01-01 PROCEDURE — 3078F PR MOST RECENT DIASTOLIC BLOOD PRESSURE < 80 MM HG: ICD-10-PCS | Mod: HCNC,CPTII,S$GLB, | Performed by: INTERNAL MEDICINE

## 2020-01-01 PROCEDURE — C1894 INTRO/SHEATH, NON-LASER: HCPCS | Mod: HCNC | Performed by: INTERNAL MEDICINE

## 2020-01-01 PROCEDURE — 1126F AMNT PAIN NOTED NONE PRSNT: CPT | Mod: HCNC,S$GLB,, | Performed by: INTERNAL MEDICINE

## 2020-01-01 PROCEDURE — 3077F PR MOST RECENT SYSTOLIC BLOOD PRESSURE >= 140 MM HG: ICD-10-PCS | Mod: HCNC,CPTII,S$GLB, | Performed by: INTERNAL MEDICINE

## 2020-01-01 PROCEDURE — 27201423 OPTIME MED/SURG SUP & DEVICES STERILE SUPPLY: Mod: HCNC | Performed by: INTERNAL MEDICINE

## 2020-01-01 PROCEDURE — 99233 PR SUBSEQUENT HOSPITAL CARE,LEVL III: ICD-10-PCS | Mod: HCNC,,, | Performed by: INTERNAL MEDICINE

## 2020-01-01 PROCEDURE — 93000 EKG 12-LEAD: ICD-10-PCS | Mod: HCNC,S$GLB,, | Performed by: INTERNAL MEDICINE

## 2020-01-01 PROCEDURE — 93925 LOWER EXTREMITY STUDY: CPT | Mod: 26,HCNC,, | Performed by: INTERNAL MEDICINE

## 2020-01-01 PROCEDURE — 99999 PR PBB SHADOW E&M-EST. PATIENT-LVL III: ICD-10-PCS | Mod: PBBFAC,HCNC,, | Performed by: INTERNAL MEDICINE

## 2020-01-01 PROCEDURE — 99457 RPM TX MGMT 1ST 20 MIN: CPT | Mod: S$GLB,,, | Performed by: INTERNAL MEDICINE

## 2020-01-01 PROCEDURE — 36569 INSJ PICC 5 YR+ W/O IMAGING: CPT | Mod: HCNC

## 2020-01-01 PROCEDURE — 75635 CTA RUNOFF ABD PEL BILAT LOWER EXT: ICD-10-PCS | Mod: 26,HCNC,, | Performed by: RADIOLOGY

## 2020-01-01 PROCEDURE — 99213 PR OFFICE/OUTPT VISIT, EST, LEVL III, 20-29 MIN: ICD-10-PCS | Mod: HCNC,S$GLB,, | Performed by: INTERNAL MEDICINE

## 2020-01-01 PROCEDURE — 3077F SYST BP >= 140 MM HG: CPT | Mod: HCNC,CPTII,S$GLB, | Performed by: INTERNAL MEDICINE

## 2020-01-01 PROCEDURE — C1887 CATHETER, GUIDING: HCPCS | Mod: HCNC | Performed by: INTERNAL MEDICINE

## 2020-01-01 PROCEDURE — 36430 TRANSFUSION BLD/BLD COMPNT: CPT

## 2020-01-01 PROCEDURE — 82728 ASSAY OF FERRITIN: CPT | Mod: HCNC

## 2020-01-01 PROCEDURE — A9502 TC99M TETROFOSMIN: HCPCS | Mod: HCNC

## 2020-01-01 PROCEDURE — 99214 OFFICE O/P EST MOD 30 MIN: CPT | Mod: HCNC,S$GLB,, | Performed by: INTERNAL MEDICINE

## 2020-01-01 PROCEDURE — 75635 CT ANGIO ABDOMINAL ARTERIES: CPT | Mod: 26,HCNC,, | Performed by: RADIOLOGY

## 2020-01-01 PROCEDURE — 99152 MOD SED SAME PHYS/QHP 5/>YRS: CPT | Mod: HCNC,,, | Performed by: INTERNAL MEDICINE

## 2020-01-01 PROCEDURE — 93016 CV STRESS TEST SUPVJ ONLY: CPT | Mod: HCNC,,, | Performed by: INTERNAL MEDICINE

## 2020-01-01 PROCEDURE — 99213 OFFICE O/P EST LOW 20 MIN: CPT | Mod: HCNC,S$GLB,, | Performed by: INTERNAL MEDICINE

## 2020-01-01 PROCEDURE — 63600175 PHARM REV CODE 636 W HCPCS: Mod: HCNC

## 2020-01-01 PROCEDURE — 87106 FUNGI IDENTIFICATION YEAST: CPT | Mod: HCNC

## 2020-01-01 PROCEDURE — 75630 X-RAY AORTA LEG ARTERIES: CPT | Mod: 59,HCNC | Performed by: INTERNAL MEDICINE

## 2020-01-01 PROCEDURE — 93005 ELECTROCARDIOGRAM TRACING: CPT | Mod: HCNC

## 2020-01-01 PROCEDURE — 99497 PR ADVNCD CARE PLAN 30 MIN: ICD-10-PCS | Mod: HCNC,,, | Performed by: NURSE PRACTITIONER

## 2020-01-01 PROCEDURE — 85014 HEMATOCRIT: CPT | Mod: 91,HCNC

## 2020-01-01 PROCEDURE — 1159F MED LIST DOCD IN RCRD: CPT | Mod: HCNC,S$GLB,, | Performed by: INTERNAL MEDICINE

## 2020-01-01 PROCEDURE — 83880 ASSAY OF NATRIURETIC PEPTIDE: CPT | Mod: HCNC

## 2020-01-01 PROCEDURE — 99233 SBSQ HOSP IP/OBS HIGH 50: CPT | Mod: HCNC,,, | Performed by: PHYSICIAN ASSISTANT

## 2020-01-01 PROCEDURE — 99499 RISK ADDL DX/OHS AUDIT: ICD-10-PCS | Mod: HCNC,S$GLB,, | Performed by: INTERNAL MEDICINE

## 2020-01-01 PROCEDURE — 99497 PR ADVNCD CARE PLAN 30 MIN: ICD-10-PCS | Mod: HCNC,25,, | Performed by: NURSE PRACTITIONER

## 2020-01-01 PROCEDURE — 85347 COAGULATION TIME ACTIVATED: CPT | Mod: HCNC

## 2020-01-01 PROCEDURE — 83605 ASSAY OF LACTIC ACID: CPT | Mod: HCNC

## 2020-01-01 PROCEDURE — 37225 PR FEM/POPL REVAS W/ATHERECTOMY: CPT | Mod: HCNC,RT,, | Performed by: INTERNAL MEDICINE

## 2020-01-01 PROCEDURE — 78452 HT MUSCLE IMAGE SPECT MULT: CPT | Mod: 26,HCNC,, | Performed by: INTERNAL MEDICINE

## 2020-01-01 PROCEDURE — 86901 BLOOD TYPING SEROLOGIC RH(D): CPT | Mod: HCNC

## 2020-01-01 PROCEDURE — 85027 COMPLETE CBC AUTOMATED: CPT | Mod: 91,HCNC

## 2020-01-01 PROCEDURE — 99152 PR MOD CONSCIOUS SEDATION, SAME PHYS, 5+ YRS, FIRST 15 MIN: ICD-10-PCS | Mod: HCNC,,, | Performed by: INTERNAL MEDICINE

## 2020-01-01 PROCEDURE — 99233 SBSQ HOSP IP/OBS HIGH 50: CPT | Mod: HCNC,,, | Performed by: INTERNAL MEDICINE

## 2020-01-01 PROCEDURE — 80202 ASSAY OF VANCOMYCIN: CPT | Mod: HCNC

## 2020-01-01 PROCEDURE — 84484 ASSAY OF TROPONIN QUANT: CPT | Mod: HCNC

## 2020-01-01 PROCEDURE — 87040 BLOOD CULTURE FOR BACTERIA: CPT | Mod: 59,HCNC

## 2020-01-01 PROCEDURE — 87205 SMEAR GRAM STAIN: CPT | Mod: HCNC

## 2020-01-01 PROCEDURE — 85007 BL SMEAR W/DIFF WBC COUNT: CPT | Mod: 91,HCNC

## 2020-01-01 PROCEDURE — 82306 VITAMIN D 25 HYDROXY: CPT | Mod: HCNC

## 2020-01-01 PROCEDURE — 78452 STRESS TEST WITH MYOCARDIAL PERFUSION (CUPID ONLY): ICD-10-PCS | Mod: 26,HCNC,, | Performed by: INTERNAL MEDICINE

## 2020-01-01 PROCEDURE — 3078F DIAST BP <80 MM HG: CPT | Mod: HCNC,CPTII,S$GLB, | Performed by: INTERNAL MEDICINE

## 2020-01-01 PROCEDURE — 3079F DIAST BP 80-89 MM HG: CPT | Mod: HCNC,CPTII,S$GLB, | Performed by: INTERNAL MEDICINE

## 2020-01-01 PROCEDURE — 99212 PR OFFICE/OUTPT VISIT, EST, LEVL II, 10-19 MIN: ICD-10-PCS | Mod: HCNC,S$GLB,, | Performed by: INTERNAL MEDICINE

## 2020-01-01 PROCEDURE — 99457 PR MONITORING, PHYSIOL PARAM, REMOTE, 1ST 20 MINS, PER MONTH: ICD-10-PCS | Mod: S$GLB,,, | Performed by: INTERNAL MEDICINE

## 2020-01-01 PROCEDURE — 81000 URINALYSIS NONAUTO W/SCOPE: CPT | Mod: HCNC

## 2020-01-01 PROCEDURE — 37225 HC FEM/POPL REVAS W/ATHER: CPT | Mod: HCNC,RT | Performed by: INTERNAL MEDICINE

## 2020-01-01 PROCEDURE — 96365 THER/PROPH/DIAG IV INF INIT: CPT | Mod: HCNC

## 2020-01-01 PROCEDURE — 84145 PROCALCITONIN (PCT): CPT | Mod: HCNC

## 2020-01-01 PROCEDURE — 93925 CV US DOPPLER ARTERIAL LEGS BILATERAL (CUPID ONLY): ICD-10-PCS | Mod: 26,HCNC,, | Performed by: INTERNAL MEDICINE

## 2020-01-01 PROCEDURE — 75630 X-RAY AORTA LEG ARTERIES: CPT | Mod: 26,59,HCNC, | Performed by: INTERNAL MEDICINE

## 2020-01-01 PROCEDURE — P9021 RED BLOOD CELLS UNIT: HCPCS | Mod: HCNC

## 2020-01-01 PROCEDURE — 75635 CT ANGIO ABDOMINAL ARTERIES: CPT | Mod: TC,HCNC

## 2020-01-01 PROCEDURE — 99499 UNLISTED E&M SERVICE: CPT | Mod: HCNC,S$GLB,, | Performed by: INTERNAL MEDICINE

## 2020-01-01 PROCEDURE — 87070 CULTURE OTHR SPECIMN AEROBIC: CPT | Mod: HCNC

## 2020-01-01 RX ORDER — IODIXANOL 320 MG/ML
INJECTION, SOLUTION INTRAVASCULAR
Status: DISCONTINUED | OUTPATIENT
Start: 2020-01-01 | End: 2020-01-01 | Stop reason: HOSPADM

## 2020-01-01 RX ORDER — HYDRALAZINE HYDROCHLORIDE 20 MG/ML
10 INJECTION INTRAMUSCULAR; INTRAVENOUS ONCE
Status: DISCONTINUED | OUTPATIENT
Start: 2020-01-01 | End: 2020-01-01 | Stop reason: HOSPADM

## 2020-01-01 RX ORDER — CLOPIDOGREL BISULFATE 75 MG/1
75 TABLET ORAL DAILY
Qty: 30 TABLET | Refills: 11 | Status: ON HOLD | OUTPATIENT
Start: 2020-01-01 | End: 2020-01-01 | Stop reason: HOSPADM

## 2020-01-01 RX ORDER — AMLODIPINE BESYLATE 5 MG/1
5 TABLET ORAL DAILY
Qty: 30 TABLET | Refills: 11 | Status: ON HOLD | OUTPATIENT
Start: 2020-01-01 | End: 2020-01-01 | Stop reason: HOSPADM

## 2020-01-01 RX ORDER — ACETAMINOPHEN 325 MG/1
650 TABLET ORAL EVERY 6 HOURS PRN
Status: DISCONTINUED | OUTPATIENT
Start: 2020-01-01 | End: 2020-01-01

## 2020-01-01 RX ORDER — ACETAMINOPHEN 10 MG/ML
1000 INJECTION, SOLUTION INTRAVENOUS ONCE
Status: COMPLETED | OUTPATIENT
Start: 2020-01-01 | End: 2020-01-01

## 2020-01-01 RX ORDER — FUROSEMIDE 10 MG/ML
40 INJECTION INTRAMUSCULAR; INTRAVENOUS ONCE
Status: COMPLETED | OUTPATIENT
Start: 2020-01-01 | End: 2020-01-01

## 2020-01-01 RX ORDER — NAPROXEN SODIUM 220 MG/1
81 TABLET, FILM COATED ORAL DAILY
Qty: 60 TABLET | Refills: 5 | Status: ON HOLD | OUTPATIENT
Start: 2020-01-01 | End: 2020-01-01 | Stop reason: HOSPADM

## 2020-01-01 RX ORDER — DEXMEDETOMIDINE HYDROCHLORIDE 4 UG/ML
0.2 INJECTION, SOLUTION INTRAVENOUS CONTINUOUS
Status: DISCONTINUED | OUTPATIENT
Start: 2020-01-01 | End: 2020-01-01

## 2020-01-01 RX ORDER — OLANZAPINE 10 MG/2ML
5 INJECTION, POWDER, FOR SOLUTION INTRAMUSCULAR 2 TIMES DAILY
Status: DISCONTINUED | OUTPATIENT
Start: 2020-01-01 | End: 2020-01-01

## 2020-01-01 RX ORDER — LORAZEPAM 2 MG/ML
1 INJECTION INTRAMUSCULAR
Status: DISCONTINUED | OUTPATIENT
Start: 2020-01-01 | End: 2020-01-01 | Stop reason: HOSPADM

## 2020-01-01 RX ORDER — MORPHINE SULFATE 10 MG/ML
1 INJECTION INTRAMUSCULAR; INTRAVENOUS; SUBCUTANEOUS
Status: DISCONTINUED | OUTPATIENT
Start: 2020-01-01 | End: 2020-01-01

## 2020-01-01 RX ORDER — IBUPROFEN 200 MG
16 TABLET ORAL
Status: DISCONTINUED | OUTPATIENT
Start: 2020-01-01 | End: 2020-01-01

## 2020-01-01 RX ORDER — HYDROCODONE BITARTRATE AND ACETAMINOPHEN 500; 5 MG/1; MG/1
TABLET ORAL
Status: DISCONTINUED | OUTPATIENT
Start: 2020-01-01 | End: 2020-01-01

## 2020-01-01 RX ORDER — DEXTROSE MONOHYDRATE 50 MG/ML
INJECTION, SOLUTION INTRAVENOUS ONCE
Status: COMPLETED | OUTPATIENT
Start: 2020-01-01 | End: 2020-01-01

## 2020-01-01 RX ORDER — NAPROXEN SODIUM 220 MG/1
81 TABLET, FILM COATED ORAL DAILY
Status: DISCONTINUED | OUTPATIENT
Start: 2020-01-01 | End: 2020-01-01

## 2020-01-01 RX ORDER — ENOXAPARIN SODIUM 100 MG/ML
1 INJECTION SUBCUTANEOUS
Status: DISCONTINUED | OUTPATIENT
Start: 2020-01-01 | End: 2020-01-01

## 2020-01-01 RX ORDER — SODIUM CHLORIDE 0.9 % (FLUSH) 0.9 %
10 SYRINGE (ML) INJECTION
Status: DISCONTINUED | OUTPATIENT
Start: 2020-01-01 | End: 2020-01-01 | Stop reason: HOSPADM

## 2020-01-01 RX ORDER — GLYCOPYRROLATE 0.2 MG/ML
0.2 INJECTION INTRAMUSCULAR; INTRAVENOUS 3 TIMES DAILY PRN
Status: CANCELLED | OUTPATIENT
Start: 2020-01-01

## 2020-01-01 RX ORDER — INSULIN ASPART 100 [IU]/ML
0-5 INJECTION, SOLUTION INTRAVENOUS; SUBCUTANEOUS EVERY 6 HOURS PRN
Status: DISCONTINUED | OUTPATIENT
Start: 2020-01-01 | End: 2020-01-01

## 2020-01-01 RX ORDER — FENTANYL CITRATE 50 UG/ML
INJECTION, SOLUTION INTRAMUSCULAR; INTRAVENOUS
Status: DISCONTINUED | OUTPATIENT
Start: 2020-01-01 | End: 2020-01-01 | Stop reason: HOSPADM

## 2020-01-01 RX ORDER — NITROGLYCERIN 20 MG/100ML
INJECTION INTRAVENOUS
Status: DISCONTINUED | OUTPATIENT
Start: 2020-01-01 | End: 2020-01-01 | Stop reason: HOSPADM

## 2020-01-01 RX ORDER — OLANZAPINE 2.5 MG/1
5 TABLET ORAL NIGHTLY
Status: DISCONTINUED | OUTPATIENT
Start: 2020-01-01 | End: 2020-01-01

## 2020-01-01 RX ORDER — ENOXAPARIN SODIUM 100 MG/ML
40 INJECTION SUBCUTANEOUS EVERY 24 HOURS
Status: DISCONTINUED | OUTPATIENT
Start: 2020-01-01 | End: 2020-01-01

## 2020-01-01 RX ORDER — SODIUM CHLORIDE 9 MG/ML
INJECTION, SOLUTION INTRAVENOUS CONTINUOUS
Status: ACTIVE | OUTPATIENT
Start: 2020-01-01 | End: 2020-01-01

## 2020-01-01 RX ORDER — HEPARIN SODIUM 1000 [USP'U]/ML
INJECTION, SOLUTION INTRAVENOUS; SUBCUTANEOUS
Status: DISCONTINUED | OUTPATIENT
Start: 2020-01-01 | End: 2020-01-01 | Stop reason: HOSPADM

## 2020-01-01 RX ORDER — SODIUM CHLORIDE 9 MG/ML
INJECTION, SOLUTION INTRAVENOUS ONCE
Status: CANCELLED | OUTPATIENT
Start: 2020-01-01

## 2020-01-01 RX ORDER — AMLODIPINE BESYLATE 5 MG/1
5 TABLET ORAL DAILY
Status: DISCONTINUED | OUTPATIENT
Start: 2020-01-01 | End: 2020-01-01

## 2020-01-01 RX ORDER — CLOPIDOGREL BISULFATE 75 MG/1
75 TABLET ORAL DAILY
Status: DISCONTINUED | OUTPATIENT
Start: 2020-01-01 | End: 2020-01-01 | Stop reason: HOSPADM

## 2020-01-01 RX ORDER — MORPHINE SULFATE 10 MG/ML
2 INJECTION INTRAMUSCULAR; INTRAVENOUS; SUBCUTANEOUS
Status: DISCONTINUED | OUTPATIENT
Start: 2020-01-01 | End: 2020-01-01 | Stop reason: HOSPADM

## 2020-01-01 RX ORDER — LORAZEPAM 2 MG/ML
INJECTION INTRAMUSCULAR
Status: COMPLETED
Start: 2020-01-01 | End: 2020-01-01

## 2020-01-01 RX ORDER — GUAIFENESIN 600 MG/1
600 TABLET, EXTENDED RELEASE ORAL 2 TIMES DAILY
Status: DISCONTINUED | OUTPATIENT
Start: 2020-01-01 | End: 2020-01-01

## 2020-01-01 RX ORDER — CLOPIDOGREL BISULFATE 75 MG/1
75 TABLET ORAL DAILY
Status: DISCONTINUED | OUTPATIENT
Start: 2020-01-01 | End: 2020-01-01

## 2020-01-01 RX ORDER — PANTOPRAZOLE SODIUM 40 MG/10ML
40 INJECTION, POWDER, LYOPHILIZED, FOR SOLUTION INTRAVENOUS 2 TIMES DAILY
Status: DISCONTINUED | OUTPATIENT
Start: 2020-01-01 | End: 2020-01-01

## 2020-01-01 RX ORDER — GUAIFENESIN 600 MG/1
600 TABLET, EXTENDED RELEASE ORAL 2 TIMES DAILY PRN
Status: DISCONTINUED | OUTPATIENT
Start: 2020-01-01 | End: 2020-01-01

## 2020-01-01 RX ORDER — ASPIRIN 325 MG
325 TABLET ORAL
Status: COMPLETED | OUTPATIENT
Start: 2020-01-01 | End: 2020-01-01

## 2020-01-01 RX ORDER — HYDROCODONE BITARTRATE AND ACETAMINOPHEN 5; 325 MG/1; MG/1
1 TABLET ORAL EVERY 4 HOURS PRN
Status: DISCONTINUED | OUTPATIENT
Start: 2020-01-01 | End: 2020-01-01 | Stop reason: HOSPADM

## 2020-01-01 RX ORDER — MIDAZOLAM HYDROCHLORIDE 1 MG/ML
INJECTION INTRAMUSCULAR; INTRAVENOUS
Status: DISCONTINUED | OUTPATIENT
Start: 2020-01-01 | End: 2020-01-01 | Stop reason: HOSPADM

## 2020-01-01 RX ORDER — ASCORBIC ACID 500 MG
500 TABLET ORAL 2 TIMES DAILY
Status: DISCONTINUED | OUTPATIENT
Start: 2020-01-01 | End: 2020-01-01

## 2020-01-01 RX ORDER — IPRATROPIUM BROMIDE AND ALBUTEROL SULFATE 2.5; .5 MG/3ML; MG/3ML
3 SOLUTION RESPIRATORY (INHALATION) EVERY 6 HOURS PRN
Status: DISCONTINUED | OUTPATIENT
Start: 2020-01-01 | End: 2020-01-01

## 2020-01-01 RX ORDER — LINEZOLID 2 MG/ML
600 INJECTION, SOLUTION INTRAVENOUS
Status: DISCONTINUED | OUTPATIENT
Start: 2020-01-01 | End: 2020-01-01

## 2020-01-01 RX ORDER — DEXAMETHASONE SODIUM PHOSPHATE 4 MG/ML
6 INJECTION, SOLUTION INTRA-ARTICULAR; INTRALESIONAL; INTRAMUSCULAR; INTRAVENOUS; SOFT TISSUE DAILY
Status: COMPLETED | OUTPATIENT
Start: 2020-01-01 | End: 2020-01-01

## 2020-01-01 RX ORDER — MORPHINE SULFATE 10 MG/ML
2 INJECTION INTRAMUSCULAR; INTRAVENOUS; SUBCUTANEOUS ONCE
Status: CANCELLED | OUTPATIENT
Start: 2020-01-01 | End: 2020-01-01

## 2020-01-01 RX ORDER — GLUCAGON 1 MG
1 KIT INJECTION
Status: DISCONTINUED | OUTPATIENT
Start: 2020-01-01 | End: 2020-01-01

## 2020-01-01 RX ORDER — ATROPINE SULFATE 0.1 MG/ML
0.5 INJECTION INTRAVENOUS EVERY 4 HOURS PRN
Status: DISCONTINUED | OUTPATIENT
Start: 2020-01-01 | End: 2020-01-01

## 2020-01-01 RX ORDER — LIDOCAINE HYDROCHLORIDE 10 MG/ML
INJECTION, SOLUTION INTRAVENOUS
Status: DISCONTINUED | OUTPATIENT
Start: 2020-01-01 | End: 2020-01-01 | Stop reason: HOSPADM

## 2020-01-01 RX ORDER — SODIUM CHLORIDE 0.9 % (FLUSH) 0.9 %
10 SYRINGE (ML) INJECTION EVERY 6 HOURS
Status: DISCONTINUED | OUTPATIENT
Start: 2020-01-01 | End: 2020-01-01 | Stop reason: HOSPADM

## 2020-01-01 RX ORDER — ONDANSETRON 2 MG/ML
4 INJECTION INTRAMUSCULAR; INTRAVENOUS EVERY 12 HOURS PRN
Status: DISCONTINUED | OUTPATIENT
Start: 2020-01-01 | End: 2020-01-01 | Stop reason: HOSPADM

## 2020-01-01 RX ORDER — CLOPIDOGREL 300 MG/1
TABLET, FILM COATED ORAL
Status: DISCONTINUED | OUTPATIENT
Start: 2020-01-01 | End: 2020-01-01 | Stop reason: HOSPADM

## 2020-01-01 RX ORDER — SODIUM CHLORIDE 0.9 % (FLUSH) 0.9 %
3 SYRINGE (ML) INJECTION EVERY 8 HOURS PRN
Status: DISCONTINUED | OUTPATIENT
Start: 2020-01-01 | End: 2020-01-01 | Stop reason: HOSPADM

## 2020-01-01 RX ORDER — LORAZEPAM 2 MG/ML
1 INJECTION INTRAMUSCULAR ONCE
Status: CANCELLED | OUTPATIENT
Start: 2020-01-01 | End: 2020-01-01

## 2020-01-01 RX ORDER — HYDRALAZINE HYDROCHLORIDE 20 MG/ML
10 INJECTION INTRAMUSCULAR; INTRAVENOUS EVERY 6 HOURS PRN
Status: DISCONTINUED | OUTPATIENT
Start: 2020-01-01 | End: 2020-01-01 | Stop reason: HOSPADM

## 2020-01-01 RX ORDER — DEXAMETHASONE SODIUM PHOSPHATE 4 MG/ML
6 INJECTION, SOLUTION INTRA-ARTICULAR; INTRALESIONAL; INTRAMUSCULAR; INTRAVENOUS; SOFT TISSUE DAILY
Status: DISCONTINUED | OUTPATIENT
Start: 2020-01-01 | End: 2020-01-01

## 2020-01-01 RX ORDER — NAPROXEN SODIUM 220 MG/1
TABLET, FILM COATED ORAL
Status: DISCONTINUED | OUTPATIENT
Start: 2020-01-01 | End: 2020-01-01 | Stop reason: HOSPADM

## 2020-01-01 RX ORDER — ASPIRIN 81 MG/1
81 TABLET ORAL DAILY
Status: DISCONTINUED | OUTPATIENT
Start: 2020-01-01 | End: 2020-01-01 | Stop reason: HOSPADM

## 2020-01-01 RX ORDER — DEXMEDETOMIDINE HYDROCHLORIDE 4 UG/ML
0.2 INJECTION, SOLUTION INTRAVENOUS CONTINUOUS
Status: ACTIVE | OUTPATIENT
Start: 2020-01-01 | End: 2020-01-01

## 2020-01-01 RX ORDER — ACETAMINOPHEN 325 MG/1
650 TABLET ORAL EVERY 4 HOURS PRN
Status: DISCONTINUED | OUTPATIENT
Start: 2020-01-01 | End: 2020-01-01 | Stop reason: HOSPADM

## 2020-01-01 RX ORDER — IBUPROFEN 200 MG
24 TABLET ORAL
Status: DISCONTINUED | OUTPATIENT
Start: 2020-01-01 | End: 2020-01-01

## 2020-01-01 RX ORDER — MORPHINE SULFATE 10 MG/ML
0.5 INJECTION INTRAMUSCULAR; INTRAVENOUS; SUBCUTANEOUS
Status: DISCONTINUED | OUTPATIENT
Start: 2020-01-01 | End: 2020-01-01

## 2020-01-01 RX ORDER — INSULIN ASPART 100 [IU]/ML
1-10 INJECTION, SOLUTION INTRAVENOUS; SUBCUTANEOUS
Status: DISCONTINUED | OUTPATIENT
Start: 2020-01-01 | End: 2020-01-01

## 2020-01-01 RX ORDER — NOREPINEPHRINE BITARTRATE/D5W 8 MG/250ML
0.02 PLASTIC BAG, INJECTION (ML) INTRAVENOUS CONTINUOUS
Status: DISCONTINUED | OUTPATIENT
Start: 2020-01-01 | End: 2020-01-01

## 2020-01-01 RX ORDER — AMLODIPINE BESYLATE 5 MG/1
5 TABLET ORAL DAILY
Status: DISCONTINUED | OUTPATIENT
Start: 2020-01-01 | End: 2020-01-01 | Stop reason: HOSPADM

## 2020-01-01 RX ORDER — SODIUM CHLORIDE 9 MG/ML
INJECTION, SOLUTION INTRAVENOUS ONCE
Status: COMPLETED | OUTPATIENT
Start: 2020-01-01 | End: 2020-01-01

## 2020-01-01 RX ORDER — PANTOPRAZOLE SODIUM 40 MG/10ML
80 INJECTION, POWDER, LYOPHILIZED, FOR SOLUTION INTRAVENOUS ONCE
Status: COMPLETED | OUTPATIENT
Start: 2020-01-01 | End: 2020-01-01

## 2020-01-01 RX ORDER — TAMSULOSIN HYDROCHLORIDE 0.4 MG/1
0.8 CAPSULE ORAL DAILY
Status: DISCONTINUED | OUTPATIENT
Start: 2020-01-01 | End: 2020-01-01

## 2020-01-01 RX ORDER — HALOPERIDOL 5 MG/ML
5 INJECTION INTRAMUSCULAR ONCE
Status: COMPLETED | OUTPATIENT
Start: 2020-01-01 | End: 2020-01-01

## 2020-01-01 RX ORDER — METRONIDAZOLE 500 MG/100ML
500 INJECTION, SOLUTION INTRAVENOUS
Status: DISCONTINUED | OUTPATIENT
Start: 2020-01-01 | End: 2020-01-01

## 2020-01-01 RX ADMIN — TAMSULOSIN HYDROCHLORIDE 0.4 MG: 0.4 CAPSULE ORAL at 07:07

## 2020-01-01 RX ADMIN — THERA TABS 1 TABLET: TAB at 07:07

## 2020-01-01 RX ADMIN — ASPIRIN 81 MG 81 MG: 81 TABLET ORAL at 09:07

## 2020-01-01 RX ADMIN — DEXTROSE 8 MG/HR: 50 INJECTION, SOLUTION INTRAVENOUS at 12:08

## 2020-01-01 RX ADMIN — DEXAMETHASONE 6 MG: 2 TABLET ORAL at 08:07

## 2020-01-01 RX ADMIN — FUROSEMIDE 40 MG: 10 INJECTION, SOLUTION INTRAMUSCULAR; INTRAVENOUS at 05:07

## 2020-01-01 RX ADMIN — FUROSEMIDE 40 MG: 10 INJECTION, SOLUTION INTRAMUSCULAR; INTRAVENOUS at 09:07

## 2020-01-01 RX ADMIN — THERA TABS 1 TABLET: TAB at 08:07

## 2020-01-01 RX ADMIN — INSULIN ASPART 6 UNITS: 100 INJECTION, SOLUTION INTRAVENOUS; SUBCUTANEOUS at 04:07

## 2020-01-01 RX ADMIN — Medication 10 ML: at 11:08

## 2020-01-01 RX ADMIN — OLANZAPINE 5 MG: 2.5 TABLET, FILM COATED ORAL at 10:07

## 2020-01-01 RX ADMIN — PIPERACILLIN SODIUM, TAZOBACTAM SODIUM 4.5 G: 4; .5 INJECTION, POWDER, LYOPHILIZED, FOR SOLUTION INTRAVENOUS at 03:07

## 2020-01-01 RX ADMIN — GUAIFENESIN 600 MG: 600 TABLET, EXTENDED RELEASE ORAL at 09:07

## 2020-01-01 RX ADMIN — Medication 10 ML: at 10:08

## 2020-01-01 RX ADMIN — VANCOMYCIN HYDROCHLORIDE 1500 MG: 1.5 INJECTION, POWDER, LYOPHILIZED, FOR SOLUTION INTRAVENOUS at 01:07

## 2020-01-01 RX ADMIN — OLANZAPINE 5 MG: 10 INJECTION, POWDER, FOR SOLUTION INTRAMUSCULAR at 08:07

## 2020-01-01 RX ADMIN — SODIUM CHLORIDE: 0.9 INJECTION, SOLUTION INTRAVENOUS at 07:01

## 2020-01-01 RX ADMIN — INSULIN ASPART 2 UNITS: 100 INJECTION, SOLUTION INTRAVENOUS; SUBCUTANEOUS at 05:08

## 2020-01-01 RX ADMIN — TAMSULOSIN HYDROCHLORIDE 0.8 MG: 0.4 CAPSULE ORAL at 08:07

## 2020-01-01 RX ADMIN — OXYCODONE HYDROCHLORIDE AND ACETAMINOPHEN 500 MG: 500 TABLET ORAL at 09:07

## 2020-01-01 RX ADMIN — DEXTROSE MONOHYDRATE 2 MG/HR: 50 INJECTION, SOLUTION INTRAVENOUS at 12:08

## 2020-01-01 RX ADMIN — INSULIN ASPART 6 UNITS: 100 INJECTION, SOLUTION INTRAVENOUS; SUBCUTANEOUS at 06:08

## 2020-01-01 RX ADMIN — LINEZOLID 600 MG: 600 INJECTION, SOLUTION INTRAVENOUS at 03:08

## 2020-01-01 RX ADMIN — OXYCODONE HYDROCHLORIDE AND ACETAMINOPHEN 500 MG: 500 TABLET ORAL at 08:07

## 2020-01-01 RX ADMIN — DEXMEDETOMIDINE HYDROCHLORIDE 0.2 MCG/KG/HR: 4 INJECTION, SOLUTION INTRAVENOUS at 04:07

## 2020-01-01 RX ADMIN — DEXTROSE 8 MG/HR: 50 INJECTION, SOLUTION INTRAVENOUS at 11:08

## 2020-01-01 RX ADMIN — DEXAMETHASONE SODIUM PHOSPHATE 6 MG: 4 INJECTION, SOLUTION INTRA-ARTICULAR; INTRALESIONAL; INTRAMUSCULAR; INTRAVENOUS; SOFT TISSUE at 09:08

## 2020-01-01 RX ADMIN — PIPERACILLIN SODIUM, TAZOBACTAM SODIUM 4.5 G: 4; .5 INJECTION, POWDER, LYOPHILIZED, FOR SOLUTION INTRAVENOUS at 02:07

## 2020-01-01 RX ADMIN — DEXTROSE 8 MG/HR: 50 INJECTION, SOLUTION INTRAVENOUS at 08:08

## 2020-01-01 RX ADMIN — PIPERACILLIN SODIUM, TAZOBACTAM SODIUM 4.5 G: 4; .5 INJECTION, POWDER, LYOPHILIZED, FOR SOLUTION INTRAVENOUS at 06:07

## 2020-01-01 RX ADMIN — CLOPIDOGREL 75 MG: 75 TABLET, FILM COATED ORAL at 08:07

## 2020-01-01 RX ADMIN — SODIUM CHLORIDE 100 MG: 9 INJECTION, SOLUTION INTRAVENOUS at 05:07

## 2020-01-01 RX ADMIN — INSULIN ASPART 6 UNITS: 100 INJECTION, SOLUTION INTRAVENOUS; SUBCUTANEOUS at 07:07

## 2020-01-01 RX ADMIN — Medication 10 ML: at 12:08

## 2020-01-01 RX ADMIN — ASPIRIN 81 MG: 81 TABLET, COATED ORAL at 09:01

## 2020-01-01 RX ADMIN — NITROGLYCERIN 0.5 INCH: 20 OINTMENT TOPICAL at 11:07

## 2020-01-01 RX ADMIN — HYDRALAZINE HYDROCHLORIDE 10 MG: 20 INJECTION INTRAMUSCULAR; INTRAVENOUS at 01:01

## 2020-01-01 RX ADMIN — OXYCODONE HYDROCHLORIDE AND ACETAMINOPHEN 500 MG: 500 TABLET ORAL at 10:08

## 2020-01-01 RX ADMIN — PANTOPRAZOLE SODIUM 40 MG: 40 INJECTION, POWDER, FOR SOLUTION INTRAVENOUS at 07:07

## 2020-01-01 RX ADMIN — IOHEXOL 125 ML: 350 INJECTION, SOLUTION INTRAVENOUS at 01:01

## 2020-01-01 RX ADMIN — NITROGLYCERIN 0.5 INCH: 20 OINTMENT TOPICAL at 05:07

## 2020-01-01 RX ADMIN — NITROGLYCERIN 0.5 INCH: 20 OINTMENT TOPICAL at 05:08

## 2020-01-01 RX ADMIN — ENOXAPARIN SODIUM 80 MG: 100 INJECTION SUBCUTANEOUS at 07:07

## 2020-01-01 RX ADMIN — THERA TABS 1 TABLET: TAB at 09:07

## 2020-01-01 RX ADMIN — DEXTROSE 8 MG/HR: 50 INJECTION, SOLUTION INTRAVENOUS at 09:08

## 2020-01-01 RX ADMIN — AZITHROMYCIN 500 MG: 500 INJECTION, POWDER, LYOPHILIZED, FOR SOLUTION INTRAVENOUS at 08:07

## 2020-01-01 RX ADMIN — INSULIN DETEMIR 10 UNITS: 100 INJECTION, SOLUTION SUBCUTANEOUS at 09:07

## 2020-01-01 RX ADMIN — GUAIFENESIN 600 MG: 600 TABLET, EXTENDED RELEASE ORAL at 08:07

## 2020-01-01 RX ADMIN — CEFTRIAXONE 1 G: 1 INJECTION, SOLUTION INTRAVENOUS at 05:07

## 2020-01-01 RX ADMIN — DEXAMETHASONE SODIUM PHOSPHATE 6 MG: 4 INJECTION, SOLUTION INTRA-ARTICULAR; INTRALESIONAL; INTRAMUSCULAR; INTRAVENOUS; SOFT TISSUE at 12:08

## 2020-01-01 RX ADMIN — MORPHINE SULFATE 2 MG: 10 INJECTION INTRAVENOUS at 12:08

## 2020-01-01 RX ADMIN — AMLODIPINE BESYLATE 5 MG: 5 TABLET ORAL at 08:07

## 2020-01-01 RX ADMIN — Medication 10 ML: at 06:08

## 2020-01-01 RX ADMIN — Medication 10 ML: at 05:07

## 2020-01-01 RX ADMIN — FUROSEMIDE 40 MG: 10 INJECTION, SOLUTION INTRAVENOUS at 11:07

## 2020-01-01 RX ADMIN — INSULIN ASPART 2 UNITS: 100 INJECTION, SOLUTION INTRAVENOUS; SUBCUTANEOUS at 12:08

## 2020-01-01 RX ADMIN — LINEZOLID 600 MG: 600 INJECTION, SOLUTION INTRAVENOUS at 02:07

## 2020-01-01 RX ADMIN — FUROSEMIDE 40 MG: 10 INJECTION, SOLUTION INTRAMUSCULAR; INTRAVENOUS at 12:08

## 2020-01-01 RX ADMIN — PIPERACILLIN SODIUM, TAZOBACTAM SODIUM 4.5 G: 4; .5 INJECTION, POWDER, LYOPHILIZED, FOR SOLUTION INTRAVENOUS at 07:07

## 2020-01-01 RX ADMIN — LORAZEPAM 1 MG: 2 INJECTION INTRAMUSCULAR; INTRAVENOUS at 02:08

## 2020-01-01 RX ADMIN — HYDRALAZINE HYDROCHLORIDE 10 MG: 20 INJECTION INTRAMUSCULAR; INTRAVENOUS at 10:01

## 2020-01-01 RX ADMIN — METRONIDAZOLE 500 MG: 500 INJECTION, SOLUTION INTRAVENOUS at 02:07

## 2020-01-01 RX ADMIN — CLOPIDOGREL BISULFATE 75 MG: 75 TABLET ORAL at 09:01

## 2020-01-01 RX ADMIN — NITROGLYCERIN 0.5 INCH: 20 OINTMENT TOPICAL at 12:08

## 2020-01-01 RX ADMIN — METRONIDAZOLE 500 MG: 500 INJECTION, SOLUTION INTRAVENOUS at 07:07

## 2020-01-01 RX ADMIN — METRONIDAZOLE 500 MG: 500 INJECTION, SOLUTION INTRAVENOUS at 11:07

## 2020-01-01 RX ADMIN — VANCOMYCIN HYDROCHLORIDE 1750 MG: 10 INJECTION, POWDER, LYOPHILIZED, FOR SOLUTION INTRAVENOUS at 11:07

## 2020-01-01 RX ADMIN — ENOXAPARIN SODIUM 40 MG: 40 INJECTION SUBCUTANEOUS at 08:08

## 2020-01-01 RX ADMIN — ENOXAPARIN SODIUM 80 MG: 100 INJECTION SUBCUTANEOUS at 08:07

## 2020-01-01 RX ADMIN — PIPERACILLIN SODIUM, TAZOBACTAM SODIUM 4.5 G: 4; .5 INJECTION, POWDER, LYOPHILIZED, FOR SOLUTION INTRAVENOUS at 06:08

## 2020-01-01 RX ADMIN — DEXTROSE 8 MG/HR: 50 INJECTION, SOLUTION INTRAVENOUS at 04:08

## 2020-01-01 RX ADMIN — DEXTROSE 8 MG/HR: 50 INJECTION, SOLUTION INTRAVENOUS at 09:07

## 2020-01-01 RX ADMIN — OXYCODONE HYDROCHLORIDE AND ACETAMINOPHEN 500 MG: 500 TABLET ORAL at 10:07

## 2020-01-01 RX ADMIN — INSULIN ASPART 8 UNITS: 100 INJECTION, SOLUTION INTRAVENOUS; SUBCUTANEOUS at 04:07

## 2020-01-01 RX ADMIN — DEXAMETHASONE SODIUM PHOSPHATE 6 MG: 4 INJECTION, SOLUTION INTRA-ARTICULAR; INTRALESIONAL; INTRAMUSCULAR; INTRAVENOUS; SOFT TISSUE at 08:07

## 2020-01-01 RX ADMIN — PANTOPRAZOLE SODIUM 40 MG: 40 INJECTION, POWDER, FOR SOLUTION INTRAVENOUS at 08:07

## 2020-01-01 RX ADMIN — TAMSULOSIN HYDROCHLORIDE 0.8 MG: 0.4 CAPSULE ORAL at 09:07

## 2020-01-01 RX ADMIN — MORPHINE SULFATE 2 MG: 10 INJECTION INTRAVENOUS at 06:08

## 2020-01-01 RX ADMIN — INSULIN ASPART 6 UNITS: 100 INJECTION, SOLUTION INTRAVENOUS; SUBCUTANEOUS at 05:08

## 2020-01-01 RX ADMIN — INSULIN ASPART 2 UNITS: 100 INJECTION, SOLUTION INTRAVENOUS; SUBCUTANEOUS at 09:07

## 2020-01-01 RX ADMIN — DEXAMETHASONE 6 MG: 2 TABLET ORAL at 09:07

## 2020-01-01 RX ADMIN — CEFTRIAXONE 1 G: 1 INJECTION, SOLUTION INTRAVENOUS at 06:07

## 2020-01-01 RX ADMIN — INSULIN ASPART 6 UNITS: 100 INJECTION, SOLUTION INTRAVENOUS; SUBCUTANEOUS at 11:08

## 2020-01-01 RX ADMIN — Medication 10 ML: at 05:08

## 2020-01-01 RX ADMIN — DEXTROSE 8 MG/HR: 50 INJECTION, SOLUTION INTRAVENOUS at 02:08

## 2020-01-01 RX ADMIN — PIPERACILLIN SODIUM, TAZOBACTAM SODIUM 4.5 G: 4; .5 INJECTION, POWDER, LYOPHILIZED, FOR SOLUTION INTRAVENOUS at 03:08

## 2020-01-01 RX ADMIN — OXYCODONE HYDROCHLORIDE AND ACETAMINOPHEN 500 MG: 500 TABLET ORAL at 09:08

## 2020-01-01 RX ADMIN — ASPIRIN 81 MG 81 MG: 81 TABLET ORAL at 08:07

## 2020-01-01 RX ADMIN — MORPHINE SULFATE 1 MG: 10 INJECTION INTRAVENOUS at 02:07

## 2020-01-01 RX ADMIN — PANTOPRAZOLE SODIUM 40 MG: 40 INJECTION, POWDER, FOR SOLUTION INTRAVENOUS at 10:07

## 2020-01-01 RX ADMIN — INSULIN ASPART 4 UNITS: 100 INJECTION, SOLUTION INTRAVENOUS; SUBCUTANEOUS at 04:07

## 2020-01-01 RX ADMIN — OXYCODONE HYDROCHLORIDE AND ACETAMINOPHEN 500 MG: 500 TABLET ORAL at 07:07

## 2020-01-01 RX ADMIN — ACETAMINOPHEN 1000 MG: 10 INJECTION, SOLUTION INTRAVENOUS at 04:07

## 2020-01-01 RX ADMIN — MORPHINE SULFATE 1 MG: 10 INJECTION INTRAVENOUS at 11:08

## 2020-01-01 RX ADMIN — DEXAMETHASONE SODIUM PHOSPHATE 6 MG: 4 INJECTION, SOLUTION INTRA-ARTICULAR; INTRALESIONAL; INTRAMUSCULAR; INTRAVENOUS; SOFT TISSUE at 09:07

## 2020-01-01 RX ADMIN — DEXMEDETOMIDINE HYDROCHLORIDE 0.2 MCG/KG/HR: 4 INJECTION, SOLUTION INTRAVENOUS at 09:08

## 2020-01-01 RX ADMIN — DEXTROSE 8 MG/HR: 50 INJECTION, SOLUTION INTRAVENOUS at 01:08

## 2020-01-01 RX ADMIN — DEXTROSE 8 MG/HR: 50 INJECTION, SOLUTION INTRAVENOUS at 02:07

## 2020-01-01 RX ADMIN — FUROSEMIDE 40 MG: 10 INJECTION, SOLUTION INTRAVENOUS at 12:07

## 2020-01-01 RX ADMIN — GUAIFENESIN 600 MG: 600 TABLET, EXTENDED RELEASE ORAL at 10:07

## 2020-01-01 RX ADMIN — NITROGLYCERIN 0.5 INCH: 20 OINTMENT TOPICAL at 11:08

## 2020-01-01 RX ADMIN — FUROSEMIDE 40 MG: 10 INJECTION, SOLUTION INTRAMUSCULAR; INTRAVENOUS at 02:07

## 2020-01-01 RX ADMIN — FUROSEMIDE 40 MG: 10 INJECTION, SOLUTION INTRAVENOUS at 10:07

## 2020-01-01 RX ADMIN — PIPERACILLIN SODIUM, TAZOBACTAM SODIUM 4.5 G: 4; .5 INJECTION, POWDER, LYOPHILIZED, FOR SOLUTION INTRAVENOUS at 10:08

## 2020-01-01 RX ADMIN — PIPERACILLIN SODIUM, TAZOBACTAM SODIUM 4.5 G: 4; .5 INJECTION, POWDER, LYOPHILIZED, FOR SOLUTION INTRAVENOUS at 11:07

## 2020-01-01 RX ADMIN — ENOXAPARIN SODIUM 40 MG: 40 INJECTION SUBCUTANEOUS at 09:07

## 2020-01-01 RX ADMIN — SODIUM CHLORIDE 200 MG: 9 INJECTION, SOLUTION INTRAVENOUS at 09:07

## 2020-01-01 RX ADMIN — ACETAMINOPHEN 650 MG: 325 TABLET ORAL at 03:07

## 2020-01-01 RX ADMIN — LORAZEPAM 1 MG: 2 INJECTION INTRAMUSCULAR; INTRAVENOUS at 11:08

## 2020-01-01 RX ADMIN — ENOXAPARIN SODIUM 80 MG: 100 INJECTION SUBCUTANEOUS at 09:07

## 2020-01-01 RX ADMIN — Medication 10 ML: at 06:07

## 2020-01-01 RX ADMIN — DEXTROSE 8 MG/HR: 50 INJECTION, SOLUTION INTRAVENOUS at 07:07

## 2020-01-01 RX ADMIN — INSULIN ASPART 4 UNITS: 100 INJECTION, SOLUTION INTRAVENOUS; SUBCUTANEOUS at 05:08

## 2020-01-01 RX ADMIN — OLANZAPINE 5 MG: 10 INJECTION, POWDER, FOR SOLUTION INTRAMUSCULAR at 07:07

## 2020-01-01 RX ADMIN — DEXMEDETOMIDINE HYDROCHLORIDE 0.6 MCG/KG/HR: 4 INJECTION, SOLUTION INTRAVENOUS at 12:08

## 2020-01-01 RX ADMIN — Medication 10 ML: at 12:07

## 2020-01-01 RX ADMIN — DEXTROSE MONOHYDRATE 2 MG/HR: 50 INJECTION, SOLUTION INTRAVENOUS at 08:08

## 2020-01-01 RX ADMIN — DEXTROSE 8 MG/HR: 50 INJECTION, SOLUTION INTRAVENOUS at 11:07

## 2020-01-01 RX ADMIN — MORPHINE SULFATE 1 MG: 10 INJECTION INTRAVENOUS at 07:08

## 2020-01-01 RX ADMIN — DEXAMETHASONE 6 MG: 4 TABLET ORAL at 02:07

## 2020-01-01 RX ADMIN — DEXAMETHASONE SODIUM PHOSPHATE 6 MG: 4 INJECTION, SOLUTION INTRA-ARTICULAR; INTRALESIONAL; INTRAMUSCULAR; INTRAVENOUS; SOFT TISSUE at 08:08

## 2020-01-01 RX ADMIN — MORPHINE SULFATE 0.5 MG: 10 INJECTION INTRAVENOUS at 11:07

## 2020-01-01 RX ADMIN — METRONIDAZOLE 500 MG: 500 INJECTION, SOLUTION INTRAVENOUS at 12:07

## 2020-01-01 RX ADMIN — NITROGLYCERIN 0.5 INCH: 20 OINTMENT TOPICAL at 07:08

## 2020-01-01 RX ADMIN — GUAIFENESIN 600 MG: 600 TABLET, EXTENDED RELEASE ORAL at 02:07

## 2020-01-01 RX ADMIN — DEXTROSE: 5 SOLUTION INTRAVENOUS at 05:08

## 2020-01-01 RX ADMIN — MORPHINE SULFATE 1 MG: 10 INJECTION INTRAVENOUS at 10:08

## 2020-01-01 RX ADMIN — NITROGLYCERIN 0.5 INCH: 20 OINTMENT TOPICAL at 06:08

## 2020-01-01 RX ADMIN — ACETAMINOPHEN 1000 MG: 10 INJECTION, SOLUTION INTRAVENOUS at 02:07

## 2020-01-01 RX ADMIN — DEXTROSE 8 MG/HR: 50 INJECTION, SOLUTION INTRAVENOUS at 06:07

## 2020-01-01 RX ADMIN — MORPHINE SULFATE 1 MG: 10 INJECTION INTRAVENOUS at 01:07

## 2020-01-01 RX ADMIN — OXYCODONE HYDROCHLORIDE AND ACETAMINOPHEN 500 MG: 500 TABLET ORAL at 08:08

## 2020-01-01 RX ADMIN — INSULIN ASPART 2 UNITS: 100 INJECTION, SOLUTION INTRAVENOUS; SUBCUTANEOUS at 09:08

## 2020-01-01 RX ADMIN — PIPERACILLIN SODIUM, TAZOBACTAM SODIUM 4.5 G: 4; .5 INJECTION, POWDER, LYOPHILIZED, FOR SOLUTION INTRAVENOUS at 09:07

## 2020-01-01 RX ADMIN — AMLODIPINE BESYLATE 5 MG: 5 TABLET ORAL at 09:07

## 2020-01-01 RX ADMIN — AMLODIPINE BESYLATE 5 MG: 5 TABLET ORAL at 09:01

## 2020-01-01 RX ADMIN — OLANZAPINE 5 MG: 2.5 TABLET, FILM COATED ORAL at 09:07

## 2020-01-01 RX ADMIN — TAMSULOSIN HYDROCHLORIDE 0.8 MG: 0.4 CAPSULE ORAL at 10:08

## 2020-01-01 RX ADMIN — INSULIN DETEMIR 10 UNITS: 100 INJECTION, SOLUTION SUBCUTANEOUS at 08:07

## 2020-01-01 RX ADMIN — GUAIFENESIN 600 MG: 600 TABLET, EXTENDED RELEASE ORAL at 07:07

## 2020-01-01 RX ADMIN — MORPHINE SULFATE 1 MG: 10 INJECTION INTRAVENOUS at 08:08

## 2020-01-01 RX ADMIN — SODIUM CHLORIDE: 0.9 INJECTION, SOLUTION INTRAVENOUS at 01:01

## 2020-01-01 RX ADMIN — DEXMEDETOMIDINE HYDROCHLORIDE 0.2 MCG/KG/HR: 4 INJECTION, SOLUTION INTRAVENOUS at 01:07

## 2020-01-01 RX ADMIN — DEXTROSE 8 MG/HR: 50 INJECTION, SOLUTION INTRAVENOUS at 10:08

## 2020-01-01 RX ADMIN — MORPHINE SULFATE 2 MG: 10 INJECTION INTRAVENOUS at 04:08

## 2020-01-01 RX ADMIN — DEXAMETHASONE SODIUM PHOSPHATE 6 MG: 4 INJECTION, SOLUTION INTRA-ARTICULAR; INTRALESIONAL; INTRAMUSCULAR; INTRAVENOUS; SOFT TISSUE at 07:07

## 2020-01-01 RX ADMIN — AZITHROMYCIN 500 MG: 500 INJECTION, POWDER, LYOPHILIZED, FOR SOLUTION INTRAVENOUS at 07:07

## 2020-01-01 RX ADMIN — LORAZEPAM 1 MG: 2 INJECTION INTRAMUSCULAR; INTRAVENOUS at 03:08

## 2020-01-01 RX ADMIN — PIPERACILLIN SODIUM, TAZOBACTAM SODIUM 4.5 G: 4; .5 INJECTION, POWDER, LYOPHILIZED, FOR SOLUTION INTRAVENOUS at 11:08

## 2020-01-01 RX ADMIN — PANTOPRAZOLE SODIUM 40 MG: 40 INJECTION, POWDER, FOR SOLUTION INTRAVENOUS at 08:08

## 2020-01-01 RX ADMIN — MORPHINE SULFATE 1 MG: 10 INJECTION INTRAVENOUS at 02:08

## 2020-01-01 RX ADMIN — INSULIN ASPART 4 UNITS: 100 INJECTION, SOLUTION INTRAVENOUS; SUBCUTANEOUS at 08:08

## 2020-01-01 RX ADMIN — CLOPIDOGREL 75 MG: 75 TABLET, FILM COATED ORAL at 09:07

## 2020-01-01 RX ADMIN — METRONIDAZOLE 500 MG: 500 INJECTION, SOLUTION INTRAVENOUS at 04:07

## 2020-01-01 RX ADMIN — DEXTROSE 8 MG/HR: 50 INJECTION, SOLUTION INTRAVENOUS at 07:08

## 2020-01-01 RX ADMIN — Medication 10 ML: at 07:08

## 2020-01-01 RX ADMIN — ENOXAPARIN SODIUM 80 MG: 100 INJECTION SUBCUTANEOUS at 10:07

## 2020-01-01 RX ADMIN — INSULIN ASPART 3 UNITS: 100 INJECTION, SOLUTION INTRAVENOUS; SUBCUTANEOUS at 05:08

## 2020-01-01 RX ADMIN — DEXMEDETOMIDINE HYDROCHLORIDE 0.2 MCG/KG/HR: 4 INJECTION, SOLUTION INTRAVENOUS at 02:08

## 2020-01-01 RX ADMIN — MORPHINE SULFATE 2 MG: 10 INJECTION INTRAVENOUS at 08:08

## 2020-01-01 RX ADMIN — DEXMEDETOMIDINE HYDROCHLORIDE 0.3 MCG/KG/HR: 4 INJECTION, SOLUTION INTRAVENOUS at 07:07

## 2020-01-01 RX ADMIN — INSULIN ASPART 10 UNITS: 100 INJECTION, SOLUTION INTRAVENOUS; SUBCUTANEOUS at 11:07

## 2020-01-01 RX ADMIN — DEXMEDETOMIDINE HYDROCHLORIDE 0.2 MCG/KG/HR: 4 INJECTION, SOLUTION INTRAVENOUS at 09:07

## 2020-01-01 RX ADMIN — NITROGLYCERIN 0.5 INCH: 20 OINTMENT TOPICAL at 03:08

## 2020-01-01 RX ADMIN — PIPERACILLIN SODIUM, TAZOBACTAM SODIUM 4.5 G: 4; .5 INJECTION, POWDER, LYOPHILIZED, FOR SOLUTION INTRAVENOUS at 02:08

## 2020-01-01 RX ADMIN — MORPHINE SULFATE 2 MG: 10 INJECTION INTRAVENOUS at 03:08

## 2020-01-01 RX ADMIN — INSULIN ASPART 1 UNITS: 100 INJECTION, SOLUTION INTRAVENOUS; SUBCUTANEOUS at 09:07

## 2020-01-01 RX ADMIN — PIPERACILLIN SODIUM, TAZOBACTAM SODIUM 4.5 G: 4; .5 INJECTION, POWDER, LYOPHILIZED, FOR SOLUTION INTRAVENOUS at 04:07

## 2020-01-01 RX ADMIN — HALOPERIDOL LACTATE 5 MG: 5 INJECTION, SOLUTION INTRAMUSCULAR at 04:07

## 2020-01-01 RX ADMIN — INSULIN ASPART 5 UNITS: 100 INJECTION, SOLUTION INTRAVENOUS; SUBCUTANEOUS at 06:08

## 2020-01-01 RX ADMIN — DEXTROSE 8 MG/HR: 50 INJECTION, SOLUTION INTRAVENOUS at 05:07

## 2020-01-01 RX ADMIN — DEXTROSE 8 MG/HR: 50 INJECTION, SOLUTION INTRAVENOUS at 05:08

## 2020-01-01 RX ADMIN — INSULIN ASPART 5 UNITS: 100 INJECTION, SOLUTION INTRAVENOUS; SUBCUTANEOUS at 10:07

## 2020-01-01 RX ADMIN — DEXTROSE 8 MG/HR: 50 INJECTION, SOLUTION INTRAVENOUS at 03:07

## 2020-01-01 RX ADMIN — INSULIN ASPART 1 UNITS: 100 INJECTION, SOLUTION INTRAVENOUS; SUBCUTANEOUS at 12:08

## 2020-01-01 RX ADMIN — INSULIN ASPART 8 UNITS: 100 INJECTION, SOLUTION INTRAVENOUS; SUBCUTANEOUS at 06:07

## 2020-01-01 RX ADMIN — LORAZEPAM 1 MG: 2 INJECTION INTRAMUSCULAR; INTRAVENOUS at 08:08

## 2020-01-01 RX ADMIN — PANTOPRAZOLE SODIUM 80 MG: 40 INJECTION, POWDER, FOR SOLUTION INTRAVENOUS at 12:07

## 2020-01-01 RX ADMIN — NITROGLYCERIN 0.5 INCH: 20 OINTMENT TOPICAL at 01:08

## 2020-01-01 RX ADMIN — LINEZOLID 600 MG: 600 INJECTION, SOLUTION INTRAVENOUS at 02:08

## 2020-01-01 RX ADMIN — INSULIN ASPART 2 UNITS: 100 INJECTION, SOLUTION INTRAVENOUS; SUBCUTANEOUS at 11:07

## 2020-01-01 RX ADMIN — DEXMEDETOMIDINE HYDROCHLORIDE 0.2 MCG/KG/HR: 4 INJECTION, SOLUTION INTRAVENOUS at 12:07

## 2020-01-01 RX ADMIN — INSULIN ASPART 2 UNITS: 100 INJECTION, SOLUTION INTRAVENOUS; SUBCUTANEOUS at 08:07

## 2020-01-01 RX ADMIN — PANTOPRAZOLE SODIUM 40 MG: 40 INJECTION, POWDER, FOR SOLUTION INTRAVENOUS at 09:07

## 2020-01-01 RX ADMIN — AZITHROMYCIN 500 MG: 500 INJECTION, POWDER, LYOPHILIZED, FOR SOLUTION INTRAVENOUS at 09:07

## 2020-01-01 RX ADMIN — ATROPINE SULFATE 0.5 MG: 0.1 INJECTION, SOLUTION ENDOTRACHEAL; INTRAMUSCULAR; INTRAVENOUS; SUBCUTANEOUS at 04:07

## 2020-01-01 RX ADMIN — SODIUM CHLORIDE 100 MG: 9 INJECTION, SOLUTION INTRAVENOUS at 04:07

## 2020-01-01 RX ADMIN — MORPHINE SULFATE 2 MG: 10 INJECTION INTRAVENOUS at 05:08

## 2020-01-01 RX ADMIN — OLANZAPINE 5 MG: 2.5 TABLET, FILM COATED ORAL at 08:07

## 2020-01-01 RX ADMIN — Medication 0.02 MCG/KG/MIN: at 04:07

## 2020-01-01 RX ADMIN — ASPIRIN 325 MG ORAL TABLET 325 MG: 325 PILL ORAL at 05:07

## 2020-01-01 RX ADMIN — METRONIDAZOLE 500 MG: 500 INJECTION, SOLUTION INTRAVENOUS at 08:07

## 2020-01-01 RX ADMIN — PIPERACILLIN SODIUM, TAZOBACTAM SODIUM 4.5 G: 4; .5 INJECTION, POWDER, LYOPHILIZED, FOR SOLUTION INTRAVENOUS at 01:07

## 2020-01-01 RX ADMIN — INSULIN ASPART 3 UNITS: 100 INJECTION, SOLUTION INTRAVENOUS; SUBCUTANEOUS at 12:08

## 2020-01-01 RX ADMIN — DEXMEDETOMIDINE HYDROCHLORIDE 0.5 MCG/KG/HR: 4 INJECTION, SOLUTION INTRAVENOUS at 01:08

## 2020-01-01 RX ADMIN — DEXTROSE 8 MG/HR: 50 INJECTION, SOLUTION INTRAVENOUS at 10:07

## 2020-01-01 RX ADMIN — ENOXAPARIN SODIUM 40 MG: 40 INJECTION SUBCUTANEOUS at 12:08

## 2020-01-01 RX ADMIN — DEXTROSE 8 MG/HR: 50 INJECTION, SOLUTION INTRAVENOUS at 01:07

## 2020-01-01 RX ADMIN — Medication 10 ML: at 11:07

## 2020-01-01 RX ADMIN — THERA TABS 1 TABLET: TAB at 10:08

## 2020-01-01 RX ADMIN — DEXTROSE 8 MG/HR: 50 INJECTION, SOLUTION INTRAVENOUS at 04:07

## 2020-01-01 RX ADMIN — VANCOMYCIN HYDROCHLORIDE 1500 MG: 1.5 INJECTION, POWDER, LYOPHILIZED, FOR SOLUTION INTRAVENOUS at 11:07

## 2020-01-01 RX ADMIN — INSULIN DETEMIR 10 UNITS: 100 INJECTION, SOLUTION SUBCUTANEOUS at 10:07

## 2020-01-14 NOTE — PROGRESS NOTES
CARDIOLOGY CLINIC VISIT        HISTORY OF PRESENT ILLNESS:     Joo York presents for continued care. CTA of abd/LE extremity. Showed severe stenosis distal R SFA. Single vessel runoff with reconstituted PTA. Moderate atherosclerosis of abdominal aorta. Initially seen secondary to complaints of bilateral calf pain. VIRGINIA had showed mild disease. We had him do segmental pressures with exercise that showed VIRGINIA of 0.3 on the right, 0.55 on the left.     CARDIOVASCULAR HISTORY:       PAST MEDICAL HISTORY:     Past Medical History:   Diagnosis Date    Hematuria     Hernia of abdominal cavity        PAST SURGICAL HISTORY:     Past Surgical History:   Procedure Laterality Date    CYSTOSCOPY WITH BIOPSY OF BLADDER N/A 9/5/2018    Procedure: CYSTOSCOPY, WITH BLADDER BIOPSY;  Surgeon: KADIE Johnson MD;  Location: [facility];  Service: Urology;  Laterality: N/A;  RN PRE OP 8-30-18------NEED CONSENT    office cysto 2018      TONSILLECTOMY      VASECTOMY         ALLERGIES AND MEDICATION:   Review of patient's allergies indicates:  No Known Allergies     Medication List           Accurate as of January 14, 2020  3:02 PM. If you have any questions, ask your nurse or doctor.               CONTINUE taking these medications    famotidine 20 MG tablet  Commonly known as:  Pepcid  Take 1 tablet (20 mg total) by mouth 2 (two) times daily.     ONE-A-DAY MEN'S 50 PLUS ORAL     PROSTATE HEALTH FORMULA ORAL     saw palmetto 500 MG capsule     tamsulosin 0.4 mg Cap  Commonly known as:  FLOMAX  Take 2 capsules (0.8 mg total) by mouth once daily.            SOCIAL HISTORY:     Social History     Socioeconomic History    Marital status:      Spouse name: Not on file    Number of children: Not on file    Years of education: Not on file    Highest education level: Not on file   Occupational History    Not on file   Social Needs    Financial resource strain: Not on file    Food insecurity:     Worry: Not on file     Inability:  "Not on file    Transportation needs:     Medical: Not on file     Non-medical: Not on file   Tobacco Use    Smoking status: Never Smoker    Smokeless tobacco: Never Used   Substance and Sexual Activity    Alcohol use: Yes     Frequency: Never    Drug use: No    Sexual activity: Yes     Partners: Female   Lifestyle    Physical activity:     Days per week: Not on file     Minutes per session: Not on file    Stress: Not on file   Relationships    Social connections:     Talks on phone: Not on file     Gets together: Not on file     Attends Islam service: Not on file     Active member of club or organization: Not on file     Attends meetings of clubs or organizations: Not on file     Relationship status: Not on file   Other Topics Concern    Not on file   Social History Narrative    Not on file       FAMILY HISTORY:     Family History   Problem Relation Age of Onset    Osteoarthritis Sister     Cancer Brother        REVIEW OF SYSTEMS:   Review of Systems   Respiratory: Negative for sputum production, shortness of breath and wheezing.    Cardiovascular: Positive for claudication. Negative for chest pain, palpitations, orthopnea, leg swelling and PND.   All other systems reviewed and are negative.      PHYSICAL EXAM:     Vitals:    01/14/20 1342   BP: (!) 140/80   Pulse: (!) 54   Resp: 15    Body mass index is 27.26 kg/m².  Weight: 78.9 kg (174 lb 0.9 oz)   Height: 5' 7" (170.2 cm)     Physical Exam   Constitutional: He is oriented to person, place, and time. He appears well-developed and well-nourished. No distress.   Neck: No JVD present. Carotid bruit is not present.   Cardiovascular: Normal rate, regular rhythm and normal heart sounds. Exam reveals decreased pulses.   Pulses:       Femoral pulses are 2+ on the right side, and 2+ on the left side.       Popliteal pulses are 1+ on the right side, and 2+ on the left side.   Pulmonary/Chest: Effort normal and breath sounds normal.   Abdominal: Soft. Bowel " sounds are normal. There is no tenderness.   Musculoskeletal:        Right lower leg: He exhibits no edema.        Left lower leg: He exhibits no edema.   Neurological: He is alert and oriented to person, place, and time.   Skin: He is not diaphoretic.   Psychiatric: He has a normal mood and affect. His speech is normal and behavior is normal.   Vitals reviewed.      DATA:     Laboratory:  CBC:  Recent Labs   Lab 02/03/17  1130 08/30/18  1645   WBC 6.24 6.04   Hemoglobin 15.6 15.3   Hematocrit 45.5 43.0   Platelets 218 183       CHEMISTRIES:  Recent Labs   Lab 08/08/18  1202 08/30/18  1645 12/10/19  0730   Glucose 128 H 106 142 H   Sodium 139 141 141   Potassium 4.5 5.1 4.9   BUN, Bld 12 11 14   Creatinine 1.1 1.0 1.2   eGFR if  >60 >60 >60   eGFR if non  >60 >60 56 A   Calcium 11.0 H 10.6 H 10.4       CARDIAC BIOMARKERS:        COAGS:        LIPIDS/LFTS:  Recent Labs   Lab 02/03/17  1130 08/08/18  1202 12/10/19  0730   Cholesterol 226 H  --  212 H   Triglycerides 176 H  --  170 H   HDL 49  --  45   LDL Cholesterol 141.8  --  133.0   Non-HDL Cholesterol 177  --  167   AST 27 27 22   ALT 28 31 25       Cardiovascular Testing:    · With toe raises the right VIRGINIA went from 0.92 to 0.30  · With toe raises the left VIRGINIA went from 0.95 to 0.55.    ASSESSMENT:     1. Claudication  2. PAD  3. Abnormal CTA of the lower extremities    PLAN:     1. Patient amenable to lower extremity angiography.   I have explained the procedure, including indications, risks, and alternatives.  Joo York gave informed consent and is medically ready for procedure. Patient to call to schedule.  2. RTC post procedure.            Triston Hightower MD, MPH, FACC, [facility]

## 2020-01-14 NOTE — H&P (VIEW-ONLY)
CARDIOLOGY CLINIC VISIT        HISTORY OF PRESENT ILLNESS:     Joo York presents for continued care. CTA of abd/LE extremity. Showed severe stenosis distal R SFA. Single vessel runoff with reconstituted PTA. Moderate atherosclerosis of abdominal aorta. Initially seen secondary to complaints of bilateral calf pain. VIRGINIA had showed mild disease. We had him do segmental pressures with exercise that showed VIRGINIA of 0.3 on the right, 0.55 on the left.     CARDIOVASCULAR HISTORY:       PAST MEDICAL HISTORY:     Past Medical History:   Diagnosis Date    Hematuria     Hernia of abdominal cavity        PAST SURGICAL HISTORY:     Past Surgical History:   Procedure Laterality Date    CYSTOSCOPY WITH BIOPSY OF BLADDER N/A 9/5/2018    Procedure: CYSTOSCOPY, WITH BLADDER BIOPSY;  Surgeon: KADIE Johnson MD;  Location: [facility] OR;  Service: Urology;  Laterality: N/A;  RN PRE OP 8-30-18------NEED CONSENT    office cysto 2018      TONSILLECTOMY      VASECTOMY         ALLERGIES AND MEDICATION:   Review of patient's allergies indicates:  No Known Allergies     Medication List           Accurate as of January 14, 2020  3:02 PM. If you have any questions, ask your nurse or doctor.               CONTINUE taking these medications    famotidine 20 MG tablet  Commonly known as:  Pepcid  Take 1 tablet (20 mg total) by mouth 2 (two) times daily.     ONE-A-DAY MEN'S 50 PLUS ORAL     PROSTATE HEALTH FORMULA ORAL     saw palmetto 500 MG capsule     tamsulosin 0.4 mg Cap  Commonly known as:  FLOMAX  Take 2 capsules (0.8 mg total) by mouth once daily.            SOCIAL HISTORY:     Social History     Socioeconomic History    Marital status:      Spouse name: Not on file    Number of children: Not on file    Years of education: Not on file    Highest education level: Not on file   Occupational History    Not on file   Social Needs    Financial resource strain: Not on file    Food insecurity:     Worry: Not on file     Inability:  "Not on file    Transportation needs:     Medical: Not on file     Non-medical: Not on file   Tobacco Use    Smoking status: Never Smoker    Smokeless tobacco: Never Used   Substance and Sexual Activity    Alcohol use: Yes     Frequency: Never    Drug use: No    Sexual activity: Yes     Partners: Female   Lifestyle    Physical activity:     Days per week: Not on file     Minutes per session: Not on file    Stress: Not on file   Relationships    Social connections:     Talks on phone: Not on file     Gets together: Not on file     Attends Rastafari service: Not on file     Active member of club or organization: Not on file     Attends meetings of clubs or organizations: Not on file     Relationship status: Not on file   Other Topics Concern    Not on file   Social History Narrative    Not on file       FAMILY HISTORY:     Family History   Problem Relation Age of Onset    Osteoarthritis Sister     Cancer Brother        REVIEW OF SYSTEMS:   Review of Systems   Respiratory: Negative for sputum production, shortness of breath and wheezing.    Cardiovascular: Positive for claudication. Negative for chest pain, palpitations, orthopnea, leg swelling and PND.   All other systems reviewed and are negative.      PHYSICAL EXAM:     Vitals:    01/14/20 1342   BP: (!) 140/80   Pulse: (!) 54   Resp: 15    Body mass index is 27.26 kg/m².  Weight: 78.9 kg (174 lb 0.9 oz)   Height: 5' 7" (170.2 cm)     Physical Exam   Constitutional: He is oriented to person, place, and time. He appears well-developed and well-nourished. No distress.   Neck: No JVD present. Carotid bruit is not present.   Cardiovascular: Normal rate, regular rhythm and normal heart sounds. Exam reveals decreased pulses.   Pulses:       Femoral pulses are 2+ on the right side, and 2+ on the left side.       Popliteal pulses are 1+ on the right side, and 2+ on the left side.   Pulmonary/Chest: Effort normal and breath sounds normal.   Abdominal: Soft. Bowel " sounds are normal. There is no tenderness.   Musculoskeletal:        Right lower leg: He exhibits no edema.        Left lower leg: He exhibits no edema.   Neurological: He is alert and oriented to person, place, and time.   Skin: He is not diaphoretic.   Psychiatric: He has a normal mood and affect. His speech is normal and behavior is normal.   Vitals reviewed.      DATA:     Laboratory:  CBC:  Recent Labs   Lab 02/03/17  1130 08/30/18  1645   WBC 6.24 6.04   Hemoglobin 15.6 15.3   Hematocrit 45.5 43.0   Platelets 218 183       CHEMISTRIES:  Recent Labs   Lab 08/08/18  1202 08/30/18  1645 12/10/19  0730   Glucose 128 H 106 142 H   Sodium 139 141 141   Potassium 4.5 5.1 4.9   BUN, Bld 12 11 14   Creatinine 1.1 1.0 1.2   eGFR if  >60 >60 >60   eGFR if non  >60 >60 56 A   Calcium 11.0 H 10.6 H 10.4       CARDIAC BIOMARKERS:        COAGS:        LIPIDS/LFTS:  Recent Labs   Lab 02/03/17  1130 08/08/18  1202 12/10/19  0730   Cholesterol 226 H  --  212 H   Triglycerides 176 H  --  170 H   HDL 49  --  45   LDL Cholesterol 141.8  --  133.0   Non-HDL Cholesterol 177  --  167   AST 27 27 22   ALT 28 31 25       Cardiovascular Testing:    · With toe raises the right VIRGINIA went from 0.92 to 0.30  · With toe raises the left VIRGINIA went from 0.95 to 0.55.    ASSESSMENT:     1. Claudication  2. PAD  3. Abnormal CTA of the lower extremities    PLAN:     1. Patient amenable to lower extremity angiography.   I have explained the procedure, including indications, risks, and alternatives.  Joo York gave informed consent and is medically ready for procedure. Patient to call to schedule.  2. RTC post procedure.            Triston Hightower MD, MPH, FACC, [facility]   contact guard

## 2020-01-20 NOTE — DISCHARGE INSTRUCTIONS
Your surgery is scheduled for _ THURSDAY JAN. 23, 2020__________________.      Please report to SAME DAY SURGERY UNIT on the 2nd FLOOR at _6:30_a.m.  Use front door entrance. The doors open at 0530 am.          INSTRUCTIONS IMPORTANT!!!  ¨ Do not eat or drink after 12 midnight-including water. OK to brush teeth, no   gum, candy or mints!      _X___  Prep instructions:   SHOWER    _X___  Please shower using Hibiclens soap the night before AND  the morning of your surgery/procedure. Do not use Hibiclens on your face or genitals        _X___  No powder, lotions or creams to your body.  _X___  You may wear only deodorant on the day of surgery.  _X___  Please remove all jewelry, including piercings and leave at home.  _X___  No money or valuables needed. Please leave at home.  You may bring your cell phone.  _X___  Please bring any documents given by your doctor.  _X___  If going home the same day, arrange for a ride home. You will not be able to   drive if Anesthesia was used.    __X__  Wear loose fitting clothing. Allow for dressings, bandages.  ____  Stop Aspirin, Ibuprofen, Motrin and Aleve at least 3-5 days before  surgery,       X     You MAY use Tylenol/acetaminophen until day of surgery.    _X___  Call MD for temperature above 101 degrees.        ____ Stop taking any Fish Oil supplement or any Vitamins that contain Vitamin                  E at least 5 days prior to surgery.          I have read or had read and explained to me, and understand the above information.  Additional comments or instructions:Please call   [phone] if you have any questions regarding the instructions above.

## 2020-01-23 PROBLEM — I73.9 CLAUDICATION: Status: ACTIVE | Noted: 2020-01-01

## 2020-01-23 PROBLEM — I73.9 PAD (PERIPHERAL ARTERY DISEASE): Status: ACTIVE | Noted: 2020-01-01

## 2020-01-23 NOTE — Clinical Note
Angiography performed post intervention of the distal right superficial femoral artery. via power injection 10 mL contrast at 4 mL/s.

## 2020-01-23 NOTE — Clinical Note
Angiography performed post intervention of the ostial right superficial femoral artery. via power injection 10 mL contrast at 4 mL/s.

## 2020-01-23 NOTE — Clinical Note
The left DP pulse is detected w/ doppler. The right DP pulse is 1+.  The radial pulses are +2 bilaterally.

## 2020-01-23 NOTE — Clinical Note
Angiography of the infrarenal and abdominal aorta performed with the catheter   power injection 70 mL contrast at 7 mL/s.

## 2020-01-23 NOTE — INTERVAL H&P NOTE
The patient has been examined and the H&P has been reviewed:    I concur with the findings and no changes have occurred since H&P was written.    Anesthesia/Surgery risks, benefits and alternative options discussed and understood by patient/family.          Active Hospital Problems    Diagnosis  POA    Claudication [I73.9]  Yes      Resolved Hospital Problems   No resolved problems to display.

## 2020-01-23 NOTE — PLAN OF CARE
Pt blood pressure on arrival was high.  Md brady notified and order for hydralazine 10mg IV obtained.  Prior to giving med blood pressure rechecked and 138/68 with hr 55.  Md brady notified and ok to hold hydralazine now.     Will monitor

## 2020-01-23 NOTE — PROCEDURES
"Joo York is a 79 y.o. male patient.    Temp: 96.5 °F (35.8 °C) (01/23/20 1245)  Pulse: (!) 56 (01/23/20 1245)  Resp: (!) 28 (01/23/20 1245)  BP: (!) 184/86 (01/23/20 1245)  SpO2: 100 % (01/23/20 1245)  Weight: 78.8 kg (173 lb 11.6 oz) (01/22/20 0944)  Height: 5' 7" (170.2 cm) (01/22/20 0944)  Mallampati Scale: Class II  ASA Classification: Class 2    Procedures     AO c runoff  Orbital atherectomy, angiosculpting, drug coated balloon angioplasty of mid to distal right superficial femoral artery.    Triston Hightower  1/23/2020  "

## 2020-01-24 PROBLEM — I10 ESSENTIAL HYPERTENSION: Status: ACTIVE | Noted: 2020-01-01

## 2020-01-24 NOTE — DISCHARGE SUMMARY
[facility]  Cardiology  Discharge Summary      Patient Name: Joo York  MRN: [id number]  Admission Date: 1/23/2020  Hospital Length of Stay: 0 days  Discharge Date and Time:  01/24/2020 8:34 AM  Attending Physician: Triston Hightower MD  Discharging Provider: Triston Hightower MD  Primary Care Physician: Keegan Watson MD    HPI: Joo York presents initially presented with complaints of bilateral leg pain. VIRGINIA had showed mild disease. We had him do segmental pressures with exercise that showed VIRGINIA of 0.3 on the right, 0.55 on the left. Because of symptoms and exercise VIRGINIA a CTA was subsequently done. CTA of abd/LE extremity. Showed severe stenosis distal R SFA. Single vessel runoff with reconstituted PTA. Moderate atherosclerosis of abdominal aorta. Initially seen secondary to complaints of bilateral calf pain. Patient presented for lower extremity angiography on 1/23/20202.    Procedure(s) (LRB):  Angiogram, Lower Arterial, Bilateral (Bilateral)  Atherectomy  PTA, Superficial Femoral Artery     Indwelling Lines/Drains at time of discharge:  Lines/Drains/Airways     None                 Hospital Course (synopsis of major diagnoses, care, treatment, and services provided during the course of the hospital stay):   S/p orbital atherectomy, angiosculpting, drug coated balloon angioplasty of m/d right SFA stenosis. Patient also noted to have elevated blood pressure during hospitalization.      Significant Diagnostic Studies: Angiography: Lower extremity    Pending Diagnostic Studies:     None          Final Active Diagnoses:    Diagnosis Date Noted POA    PAD (peripheral artery disease) [I73.9] 01/23/2020 Yes    Claudication [I73.9] 01/23/2020 Yes    Essential hypertension [I10] 01/24/2020 Unknown      Problems Resolved During this Admission:       Discharged Condition: good    Follow Up:    Patient Instructions:      Diet general     Lifting restrictions     Call MD for:  temperature >100.4      Call MD for:  severe uncontrolled pain     Call MD for:  redness, tenderness, or signs of infection (pain, swelling, redness, odor or green/yellow discharge around incision site)     Remove dressing in 24 hours     Activity as tolerated     Medications:  Reconciled Home Medications:      Medication List      START taking these medications    amLODIPine 5 MG tablet  Commonly known as:  NORVASC  Take 1 tablet (5 mg total) by mouth once daily.     aspirin 81 MG Chew  Take 1 tablet (81 mg total) by mouth once daily.     clopidogrel 75 mg tablet  Commonly known as:  PLAVIX  Take 1 tablet (75 mg total) by mouth once daily.        CONTINUE taking these medications    famotidine 20 MG tablet  Commonly known as:  Pepcid  Take 1 tablet (20 mg total) by mouth 2 (two) times daily.     ONE-A-DAY MEN'S 50 PLUS ORAL  Take by mouth.     PROSTATE HEALTH FORMULA ORAL  Take by mouth.     saw palmetto 500 MG capsule  Take 450 mg by mouth once daily.     tamsulosin 0.4 mg Cap  Commonly known as:  FLOMAX  Take 2 capsules (0.8 mg total) by mouth once daily.            Time spent on the discharge of patient: 30 minutes    Triston Hightower MD  Cardiology  [facility]

## 2020-01-24 NOTE — PLAN OF CARE
Patient had angiogram of lower extremities for claudication. Plasty was done of right lower extremities superficial femoral artery. Patient tolerated procedure well and was brought to ICU due to elevated act test. Sheath was pulled around 1530 and pressure dressing applied Pulses found by doppler on both lower extremities.  No falls or injuries this shift. No skin breakdown noted this shift. Vital signs stable and afebrile. Denies any pain. Wife at beside andplan of care reviewed with both patient and wife.

## 2020-01-24 NOTE — PROGRESS NOTES
1110: discharge teaching and follow up appointments reviewed with pt and spouse. All questions answered, verbalized acceptance.     1130: pt taken to ER entrance via wheelchair. Pt able to ambulate to car. No complaints.

## 2020-01-24 NOTE — PROGRESS NOTES
Discharge Instructions for Coronary Angioplasty and Stenting  During your angioplasty, a doctor inserts a thin tube called a catheter into a blood vessel in your groin or wrist. The catheter is pushed through your blood vessel to a blocked area in one of your hearts arteries. The doctor inflates a tiny balloon at the tip of the catheter and stretches the blocked vessel so blood can flow freely. The balloon is then deflated and removed with the catheter. The doctor may also insert a metal mesh tube called a stent in the blocked vessel. The stent helps the vessel stay open. You may get several stents if you have blockages in more than one of your arteries.  Home care  · Ask someone to drive you to your appointments for the next few days.  · Rest for 2 to 3 days after the procedure. Most people are able to go back to normal activity within a few days.  · Take your temperature and check your incision for signs of infection every day for a week. Signs of infection include redness, swelling, drainage, or warmth. It is normal to have a small bruise or bump where the catheter was inserted.  · Take your medicines exactly as directed. Dont skip doses. It is important to take aspirin or other similar medicines for as long as your doctor advises. If you were also prescribed clopidogrel, prasugrel, or ticagrelor, it is very important to take these medicines, as well. These medicines prevent clots that could cause a heart attack. If you have a problem with any of your medicines, call healthcare provider right away. Call your provider right away if you have extra bleeding, but go to the emergency room if the bleeding can't be controlled.  · Unless told otherwise, drink plenty of fluids to help flush your body of the dye that was used during your angioplasty. Let your healthcare provider know if the color of your urine changes and doesn't return to normal color.  · Eat a healthy diet that is low in fat, salt, and cholesterol. Ask  your healthcare team for menus and other diet information.  · Exercise according to your healthcare team's recommendation. Depending on your case, your team may recommend you start a cardiac rehabilitation program. Cardiac rehab is an exercise program in which trained healthcare staff monitor your progress and stress on your heart while you exercise. Ask how to enroll if your team recommends this program.  · Don't swim or take a bath for 5 to 7 days. You may shower the day after the procedure. This keeps the incision site from getting wet and infected until the skin and artery can heal.  Follow-up care  · Make a follow-up appointment as directed by our staff. Follow-up appointments are usually scheduled for 2 to 4 weeks after an angioplasty or coronary stent procedure.  · Have a yearly checkup to make sure you are still doing well and not having any new symptoms.  · Don't wait for a follow-up appointment if your medicines are not working or you are having heart-related symptoms.     When to call your healthcare provider  Call your healthcare provider right away if you have any of the following:  · Chest pain or a return of the symptoms you had prior to the angioplasty  · Constant or increasing pain or numbness in your leg, or if your leg looks blue or feels cold  · Fever above 100.4°F (38.0°C) or other signs of infection (redness, swelling, drainage, or warmth at the incision site of the leg or wrist)  · Shortness of breath  · Bleeding, bruising, or a large swelling where the catheter (tube) was inserted  · Blood in your urine  · Black or tarry stools  · Feeling faint  · Difficulty speaking or weakness in any muscle

## 2020-01-24 NOTE — NURSING
[facility]  ICU Multidisciplinary Bedside Rounds   SUMMARY     Date: 2020    Prehospitalization: {[facility] PreHosp:74760}  Admit Date / LOS : 2020/ 0 days    Diagnosis: <principal problem not specified>    Consults:        Active: {[facility] Active Consults:47437}       Needed: {[facility] Needed Consults:25300}     Code Status: Prior   Advanced Directive: <no information>    LDA: {[facility] LDA:20470} left arm #20G       Central Lines/Site/Justification:{[facility] Central Line Justification:17359}       Urinary Cath/Order/Justification:{[facility] Urinary Cath:29166} urinal    Vasopressors/Infusions:    nitroGLYCERIN            GOALS: Volume/ Hemodynamic: {[facility] Vol/ Hemodynamic Goal:39661}                     RASS: {[facility] RASS:86671}    CAM ICU: {[facility] CAM ICU:60733}  Pain Management: {[facility] Pain [place]} No pain       Pain Controlled: {YES/NO/NOT APPLICABLE:76280}     Rhythm: {[facility] Rhythm:87755}Sinus Rhythm    Respiratory Device: {[facility] RESP DEVICE:66652}  Room air                Most Recent SBT/ SAT: {[facility]  Most Recent SBT/ SAT:85556}       MOVE Screen: {MOVE Screen:03356}    VTE Prophylaxis: {[facility] VTE Prophylaxis:83877}  Mobility: {[facility] Mobility:53137} ambulatory  Stress Ulcer Prophylaxis: {YES NO:56683}    Dietary: {[facility] Dietary:95627} Cardiac  Tolerance: {YES/NO/NOT Yes APPLICABLE:18965}  /  Advancement: {yes/no/goal:39884}    Isolation: No active isolations    Restraints: {YES NO:37579}    Significant Dates:  Post Op Date: {NA WILDCARD:91334}  Rescue Date: {NA WILDCARD:94213}  Imaging/ Diagnostics: {NA WILDCARD:99986}    Noteworthy Labs:  {NONE:14226}    CBC/Anemia Labs: Coags:    Recent Labs   Lab 20  1520 20  0420   WBC 6.40 9.86   HGB 15.1 15.2   HCT 44.6 44.7    187   MCV 91 93   RDW 12.1 12.4    Recent Labs   Lab 20  1520   INR 1.0        Chemistries:   No results for input(s): NA, K, CL, CO2, BUN, CREATININE, CALCIUM, PROT, BILITOT, ALKPHOS, ALT, AST, GLUCOSE, MG, PHOS in the last  168 hours.    Invalid input(s): LABALBU     Cardiac Enzymes: Ejection Fractions:    No results for input(s): CPK, CPKMB, MB, TROPONINI in the last 72 hours. No results found for: EF     POCT Glucose: HbA1c:    No results for input(s): POCTGLUCOSE in the last 168 hours. No results found for: HGBA1C     Needs from Care Team: {NONE:74366}     ICU LOS 18h  Level of Care: {[facility] Level of Care:56398}

## 2020-01-24 NOTE — PLAN OF CARE
01/24/20 1013   Discharge Assessment   Assessment Type Discharge Planning Assessment   Assessment information obtained from? Medical Record   Prior to hospitilization cognitive status: Alert/Oriented   Prior to hospitalization functional status: Independent   Current cognitive status: Alert/Oriented   Current Functional Status: Independent   Facility Arrived From: home   Lives With spouse   Able to Return to Prior Arrangements yes   Is patient able to care for self after discharge? Yes   Who are your caregiver(s) and their phone number(s)? Maria Luz- [phone]   Patient's perception of discharge disposition home or selfcare   Readmission Within the Last 30 Days no previous admission in last 30 days   Patient currently being followed by outpatient case management? No   Patient currently receives any other outside agency services? No   Equipment Currently Used at Home none   Do you have any problems affording any of your prescribed medications? No   Is the patient taking medications as prescribed? yes   Does the patient have transportation home? Yes   Transportation Anticipated family or friend will provide   Does the patient receive services at the Coumadin Clinic? No   Discharge Plan A Home with family  (with follow up )   DME Needed Upon Discharge  none   Patient/Family in Agreement with Plan yes     [facility] [place] [facility] [address]  [address] [address]  Phone: [phone] Fax: [phone]

## 2020-01-24 NOTE — NURSING
Assumed care at this time. Pt in no apparent distress. Left groin site pressure dressing intact no  bleeding noted. Pt educated on activity time, which is 2230. Will continue to monitor.

## 2020-01-24 NOTE — PROGRESS NOTES
WRITTEN HEALTHCARE DISCHARGE INFORMATION      Things that YOU are RESPONSIBLE for to Manage Your Care At Home:     1. Getting your prescriptions filled.  2. Taking you medications as directed. DO NOT MISS ANY DOSES!  3. Going to your follow-up doctor appointments. This is important because it allows the doctor to monitor your progress and to determine if any changes need to be made to your treatment plan.     If you are unable to make your follow up appointments, please call the number listed and reschedule this appointment.      ____________HELP AT HOME____________________     Experiencing any SIGNS or SYMPTOMS: YOU CAN     Schedule a same day appopintment with your Primary Care Doctor or  you can call [facility] On Call Nurse Care Line for 24/7 assistance at [phone]     If you are experience any signs or symptoms that have become severe, Call 911 and come to your nearest Emergency Room.     Thank you for choosing [facility] and allowing us to care for you.   From your care management team:      You should receive a call from [facility] Discharge Department within 48-72 hours to help manage your care after discharge. Please try to make sure that you answer your phone for this important phone call.      Follow-up Information     Triston Hightower MD On 1/29/2020.    Specialties:  Cardiovascular Disease, INTERVENTIONAL CARDIOLOGY, Cardiology  Why:  Appointment scheduled for January 29th at 1:40pm  Contact information:  [address]  [address]  [facility] [address]  [phone]             Keegan Watson MD.    Specialties:  Internal Medicine, Wound Care  Why:  call to schedule follow up as needed with PCP  Contact information:  [address] [address]  [phone]

## 2020-01-28 NOTE — PROGRESS NOTES
CARDIOLOGY CLINIC VISIT        HISTORY OF PRESENT ILLNESS:     Joo York is an 80 yo male. S/p lower extremity angiography. S/p PVI m/d R SFA. Residual L SFA disease. ASA/Plavix. Started on bp meds. BP still a little elevated. He states that he has not been walking that much. When he does he still has cramping in his RLE. Did have some ecchymoses in the left groin post procedure. He wants to increase his activity to see how his symptoms are before scheduling PVI of the LLE.    CARDIOVASCULAR HISTORY:     PAD    PAST MEDICAL HISTORY:     Past Medical History:   Diagnosis Date    Claudication 1/23/2020    Essential hypertension 1/24/2020    Essential hypertension 1/24/2020    Hematuria     Hernia of abdominal cavity        PAST SURGICAL HISTORY:     Past Surgical History:   Procedure Laterality Date    ATHERECTOMY  1/23/2020    Procedure: Atherectomy;  Surgeon: Triston Hightower MD;  Location: [facility] CATH LAB;  Service: Cardiology;;  SFA mid distal    CYSTOSCOPY WITH BIOPSY OF BLADDER N/A 9/5/2018    Procedure: CYSTOSCOPY, WITH BLADDER BIOPSY;  Surgeon: KADIE Johnson MD;  Location: [facility] OR;  Service: Urology;  Laterality: N/A;  RN PRE OP 7-01-00------NEED CONSENT    office cysto 2018      TONSILLECTOMY      VASECTOMY         ALLERGIES AND MEDICATION:   Review of patient's allergies indicates:  No Known Allergies     Medication List           Accurate as of January 29, 2020  1:56 PM. If you have any questions, ask your nurse or doctor.               CONTINUE taking these medications    amLODIPine 5 MG tablet  Commonly known as:  NORVASC  Take 1 tablet (5 mg total) by mouth once daily.     aspirin 81 MG Chew  Take 1 tablet (81 mg total) by mouth once daily.     clopidogrel 75 mg tablet  Commonly known as:  PLAVIX  Take 1 tablet (75 mg total) by mouth once daily.     famotidine 20 MG tablet  Commonly known as:  Pepcid  Take 1 tablet (20 mg total) by mouth 2 (two) times daily.     ONE-A-DAY MEN'S 50 PLUS  ORAL     PROSTATE HEALTH FORMULA ORAL     saw palmetto 500 MG capsule     tamsulosin 0.4 mg Cap  Commonly known as:  FLOMAX  Take 2 capsules (0.8 mg total) by mouth once daily.            SOCIAL HISTORY:     Social History     Socioeconomic History    Marital status:      Spouse name: Not on file    Number of children: Not on file    Years of education: Not on file    Highest education level: Not on file   Occupational History    Not on file   Social Needs    Financial resource strain: Not on file    Food insecurity:     Worry: Not on file     Inability: Not on file    Transportation needs:     Medical: Not on file     Non-medical: Not on file   Tobacco Use    Smoking status: Former Smoker     Last attempt to quit:      Years since quittin.1    Smokeless tobacco: Never Used   Substance and Sexual Activity    Alcohol use: Yes     Frequency: Never     Comment: RARELY    Drug use: No    Sexual activity: Yes     Partners: Female   Lifestyle    Physical activity:     Days per week: Not on file     Minutes per session: Not on file    Stress: Not on file   Relationships    Social connections:     Talks on phone: Not on file     Gets together: Not on file     Attends Mosque service: Not on file     Active member of club or organization: Not on file     Attends meetings of clubs or organizations: Not on file     Relationship status: Not on file   Other Topics Concern    Not on file   Social History Narrative    Not on file       FAMILY HISTORY:     Family History   Problem Relation Age of Onset    Osteoarthritis Sister     Cancer Brother        REVIEW OF SYSTEMS:   Review of Systems   Respiratory: Negative for shortness of breath.    Cardiovascular: Positive for claudication. Negative for chest pain, palpitations, orthopnea, leg swelling and PND.       PHYSICAL EXAM:     Vitals:    20 1349   BP: (!) 150/78   Pulse:    Resp:     Body mass index is 27.3 kg/m².  Weight: 79.1 kg (174  "lb 4.4 oz)   Height: 5' 7" (170.2 cm)     Physical Exam   Cardiovascular:   Pulses:       Femoral pulses are 2+ on the right side, and 2+ on the left side.       Popliteal pulses are 2+ on the right side, and 2+ on the left side.       DATA:     Laboratory:  CBC:  Recent Labs   Lab 08/30/18  1645 01/20/20  1520 01/24/20  0420   WBC 6.04 6.40 9.86   Hemoglobin 15.3 15.1 15.2   Hematocrit 43.0 44.6 44.7   Platelets 183 180 187       CHEMISTRIES:  Recent Labs   Lab 08/08/18  1202 08/30/18  1645 12/10/19  0730   Glucose 128 H 106 142 H   Sodium 139 141 141   Potassium 4.5 5.1 4.9   BUN, Bld 12 11 14   Creatinine 1.1 1.0 1.2   eGFR if  >60 >60 >60   eGFR if non  >60 >60 56 A   Calcium 11.0 H 10.6 H 10.4       CARDIAC BIOMARKERS:        COAGS:  Recent Labs   Lab 01/20/20  1520   INR 1.0       LIPIDS/LFTS:  Recent Labs   Lab 02/03/17  1130 08/08/18  1202 12/10/19  0730   Cholesterol 226 H  --  212 H   Triglycerides 176 H  --  170 H   HDL 49  --  45   LDL Cholesterol 141.8  --  133.0   Non-HDL Cholesterol 177  --  167   AST 27 27 22   ALT 28 31 25       Cardiovascular Testing:    LE angiography 1/23/2020:    · High grade stenosis of the bilateral lower extremity superficial femoral arteries.  · Successful PVI of the 90% distal R SFA lesion reduced to 0%.  · Orbital atherectomy of the mid 70% and distal 90% SFA lesions.  · Angiosculpting of the distal R SFA lesion with a 5/40 balloon.  · Drug coated balloon angioplasty of the distal R SFA lesion with a 6/40 Lutonix.  · Balloon angioplasty of the mid R SFA lesion with a 6/40 balloon.  · Single vessel runoff bilateral lower extremity (R via peroneal that collateralizes PT, L via PT)      ASSESSMENT:     1. PAD: no significant improvement. Monitor.  2. HTN: repeat better. Monitor.    PLAN:     1. Continue ASA/Plavix  2. BP log  3. Increase activity  4. RTC one month.      Triston Hightower MD, MPH, FACC, [facility]  "

## 2020-04-22 NOTE — PROGRESS NOTES
Digital Medicine: Health  Introduction    Introduced Joo York to Digital Medicine. Discussed health  role and recommended lifestyle modifications.    Patient reports that he hasn't been able to get his blue tooth connected. He has been manually putting in his readings. Unsure how to help patient today. Will put in a CRM ticket to see if tech support can help him out.     The history is provided by the patient. No  was used.     HYPERTENSION  Our goal is to get BP to consistently below 130/80mmHg and make the process convenient so patient can avoid extra trips to the office. Getting your blood pressure below 130/80mmHg (definition of control) will reduce your risk for heart attack, kidney failure, stroke and death (as well as kidney failure, eye disease, & dementia)      Reviewed that the Digital Medicine care team - consisting of a clinician and a health  - will follow the most current evidence-based national guidelines for treating your condition.  The health  will focus on lifestyle modifications and motivation while the clinician will focus on medication therapy.  The care team will review all data on a regular basis and reach out as needed.      Explained that one of the key parts of the program is communication with the care team.  Asked patient to respond to outreach attempts and complete questionnaires.  Stressed importance of medication adherence.    Reviewed non-pharmacologic therapies and impact on BP.      Explained that we expect patient to obtain several blood pressures per week at random times of day.  Instructed patient not to allow anyone else to use phone and monitoring device.  Confirmed appropriate BP monitoring technique.      Explained to patient that the digital medicine team is not available for emergencies.  [email or web address] ([phone] or [phone]) or [phone] if needed.      Patient's BP goal is 130/80.Patient's BP average is  155/76 mmHg, which is above goal, per 2017 ACC/AHA Hypertension Guidelines.          Last 5 Patient Entered Readings                                      Current 30 Day Average: 155/76     Recent Readings 4/21/2020    SBP (mmHg) 155    DBP (mmHg) 76    Pulse 50            INTERVENTION(S)  reviewed appropriate dose schedule, recommended diet modifications, recommend physical activity, reviewed monitoring technique and encouragement/support    PLAN  patient verbalizes understanding and continue monitoring    Will discuss screenings in detail at next call.       There are no preventive care reminders to display for this patient.    Reviewed the importance of self-monitoring, medication adherence, and that the health  can be used as a resource for lifestyle modifications to help reduce or maintain a healthy lifestyle.    Sent link to [facility]'s Digital Medicine webpages and my contact information via [employer] for future questions. Follow up scheduled.             Diet Screening       Encouraged to decrease sodium intake to <2,000 mg per day and recommend DASH diet.     Physical Activity Screening       Encouraged to increase exercise to at least 150 minutes per week.        SDOH

## 2020-04-22 NOTE — LETTER
April 22, 2020     Joo York  [address]  [place] [address]       Dear Joo,    Welcome to [facility]! Our goal is to make care effective, proactive and convenient by using data you send us from home to better treat your chronic conditions.              My name is Harper Tenorio, and I am your dedicated Digital Medicine clinician. As an expert in medication management, I will help ensure that the medications you are taking continue to provide the intended benefits and help you reach your goals. You can reach me directly at [phone] or by sending me a message directly through your [facility] account.      I am Cesilia Sanchez and I will be your health . My job is to help you identify lifestyle changes to improve your disease control. We will talk about nutrition, exercise, and other ways you may be able to adjust your current habits to better your health. Additionally, we will help ensure you are completing the tests and screenings that are necessary to help manage your conditions. You can reach me directly at [phone] or by sending me a message directly through your [facility] account.    Most importantly, YOU are at the center of this team. Together, we will work to improve your overall health and encourage you to meet your goals for a healthier lifestyle.     What we expect from YOU:  · Please take frequent home blood pressure measurements. We ask that you take at least 1 blood pressure reading per week, but more information will better help us get you know you. Be sure you rest for a few minutes before taking the reading in a quiet, comfortable place.     Be available to receive phone calls or [employer] messages, when appropriate, from your care team. Please let us know if there are any specific days or times that work best for us to reach you via phone.     Complete routine tests and screenings. Dont worry, we will help keep you on track!           What you should expect from your  Digital Medicine Care Team:   We will work with you to create a personalized plan of care and provide you with encouragement and education, including regarding lifestyle changes, that could help you manage your disease states.     We will adjust your current medications, if needed, and continue to monitor your long-term progress.     We will provide you and your physician with monthly progress reports after you have been in the program for more than 30 days.     We will send you reminders through [employer] and text messages to help ensure you do not miss any testing deadlines to help manage your disease states.    You will be able to reach us by phone or through your [employer] account by clicking our names under Care Team on the right side of the home screen.    I look forward to working with you to achieve your blood pressure goals!    We look forward to working with you to help manage your health,    Sincerely,    Your Digital Medicine Team    Please visit our websites to learn more:   · Hypertension: [email or web address]      Remember, we are not available for emergencies. If you have an emergency, please contact your doctors office directly or call [facility] on-call ([phone] or [phone]) or 911.

## 2020-05-07 NOTE — PROGRESS NOTES
Digital Medicine: Health  Follow-Up    05/07: I was following up with patient to see if he has received his new blood pressure cuff, and he hasn't received it. He hasn't even heard anything from anyone on getting this issue fixed. Told him I would look into this and let him know. He did take a reading with another cuff and put it in manually.     Will F/U in 1 week.     05/14: Still no response from tech support. Will wait for another week to see if they are able to get a hold of patient.     Will f/u in 1 week.     05/20: Patient received Large adult cuff, 30-42 cm even though the billing slip said he was supposed to get the standard size cuff. Reported this to tech support and they are still looking into this.     05/21: The exchange was finally processed and they are mailing patient the standard size cuff with a return label to send back the other cuff. Patient was notified and states understanding.     The history is provided by the patient. No  was used.           PLAN  patient verbalizes understanding and continue monitoring    Will f/u in 2 weeks to discuss screenings in detail.       There are no preventive care reminders to display for this patient.    Last 5 Patient Entered Readings                                      Current 30 Day Average: 148/75     Recent Readings 5/6/2020 4/21/2020    SBP (mmHg) 141 155    DBP (mmHg) 73 76    Pulse 49 50                  Screenings    SDOH

## 2020-06-03 NOTE — PROGRESS NOTES
CARDIOLOGY CLINIC VISIT        HISTORY OF PRESENT ILLNESS:     Joo York is an 80 yo male. Last seen 1/29/20. S/p lower extremity angiography. S/p PVI m/d R SFA. Residual L SFA disease. ASA/Plavix. Last visit stated no significant change in RLE discomfort post procedure. He still feels BLE calf cramping after about 10 minutes. He used to walk upwards of 30 minutes. Some associated numbness distal foot. No color change. No temp change. He also has noticed soboe when trying to climb stairs. Not necessarily when he walks. He just received a new blood pressure cuff. Only has taken one value. EKG today SB, decreased anterior forces. .     CARDIOVASCULAR HISTORY:     PAD    PAST MEDICAL HISTORY:     Past Medical History:   Diagnosis Date    Claudication 1/23/2020    Essential hypertension 1/24/2020    Essential hypertension 1/24/2020    Hematuria     Hernia of abdominal cavity        PAST SURGICAL HISTORY:     Past Surgical History:   Procedure Laterality Date    ATHERECTOMY  1/23/2020    Procedure: Atherectomy;  Surgeon: Triston Hightower MD;  Location: [facility] CATH LAB;  Service: Cardiology;;  SFA mid distal    CYSTOSCOPY WITH BIOPSY OF BLADDER N/A 9/5/2018    Procedure: CYSTOSCOPY, WITH BLADDER BIOPSY;  Surgeon: KADIE Johnson MD;  Location: [facility] OR;  Service: Urology;  Laterality: N/A;  RN PRE OP 8-30-18------NEED CONSENT    office cysto 2018      TONSILLECTOMY      VASECTOMY         ALLERGIES AND MEDICATION:   Review of patient's allergies indicates:  No Known Allergies     Medication List           Accurate as of Rima 3, 2020 11:13 AM. If you have any questions, ask your nurse or doctor.               CONTINUE taking these medications    amLODIPine 5 MG tablet  Commonly known as:  NORVASC  Take 1 tablet (5 mg total) by mouth once daily.     aspirin 81 MG Chew  Take 1 tablet (81 mg total) by mouth once daily.     clopidogreL 75 mg tablet  Commonly known as:  PLAVIX  Take 1 tablet (75 mg total) by  mouth once daily.     famotidine 20 MG tablet  Commonly known as:  PEPCID  Take 1 tablet (20 mg total) by mouth 2 (two) times daily.     ONE-A-DAY MEN'S 50 PLUS ORAL     PROSTATE HEALTH FORMULA ORAL     saw palmetto 500 MG capsule     tamsulosin 0.4 mg Cap  Commonly known as:  FLOMAX  Take 2 capsules (0.8 mg total) by mouth once daily.            SOCIAL HISTORY:     Social History     Socioeconomic History    Marital status:      Spouse name: Not on file    Number of children: Not on file    Years of education: Not on file    Highest education level: Not on file   Occupational History    Not on file   Social Needs    Financial resource strain: Not on file    Food insecurity:     Worry: Not on file     Inability: Not on file    Transportation needs:     Medical: Not on file     Non-medical: Not on file   Tobacco Use    Smoking status: Former Smoker     Last attempt to quit: 1980     Years since quittin.4    Smokeless tobacco: Never Used   Substance and Sexual Activity    Alcohol use: Yes     Frequency: Never     Comment: RARELY    Drug use: No    Sexual activity: Yes     Partners: Female   Lifestyle    Physical activity:     Days per week: Not on file     Minutes per session: Not on file    Stress: Not on file   Relationships    Social connections:     Talks on phone: Not on file     Gets together: Not on file     Attends Buddhism service: Not on file     Active member of club or organization: Not on file     Attends meetings of clubs or organizations: Not on file     Relationship status: Not on file   Other Topics Concern    Not on file   Social History Narrative    Not on file       FAMILY HISTORY:     Family History   Problem Relation Age of Onset    Osteoarthritis Sister     Cancer Brother        REVIEW OF SYSTEMS:   Review of Systems   Constitutional: Negative for diaphoresis, malaise/fatigue and weight loss.   Respiratory: Positive for shortness of breath. Negative for cough,  "hemoptysis, sputum production and wheezing.    Cardiovascular: Positive for claudication. Negative for chest pain, palpitations, orthopnea, leg swelling and PND.   Gastrointestinal: Negative for abdominal pain, nausea and vomiting.   Neurological: Positive for tingling. Negative for dizziness, tremors, sensory change, speech change, focal weakness, seizures, loss of consciousness, weakness and headaches.       PHYSICAL EXAM:     Vitals:    06/03/20 1031   BP: (!) 148/82   Pulse: (!) 58    Body mass index is 26.94 kg/m².  Weight: 78 kg (172 lb)   Height: 5' 7" (170.2 cm)     Physical Exam   Constitutional: He is oriented to person, place, and time. No distress.   Neck: No JVD present. Carotid bruit is not present.   Cardiovascular: Normal rate and regular rhythm.   Murmur heard.   Systolic murmur is present with a grade of 2/6.  Pulses:       Femoral pulses are 2+ on the right side, and 2+ on the left side.       Popliteal pulses are 2+ on the right side, and 2+ on the left side.   Pulmonary/Chest: Effort normal and breath sounds normal.   Abdominal: Soft. Bowel sounds are normal. There is no tenderness.   Musculoskeletal:        Right lower leg: He exhibits no edema.        Left lower leg: He exhibits no edema.   Neurological: He is alert and oriented to person, place, and time.   Skin: He is not diaphoretic.   Psychiatric: He has a normal mood and affect. His speech is normal and behavior is normal.   Vitals reviewed.      DATA:   EKG: (personally reviewed tracing)  6/3/20 - SB, decreased anterior forces    Laboratory:  CBC:  Recent Labs   Lab 08/30/18  1645 01/20/20  1520 01/24/20  0420   WBC 6.04 6.40 9.86   Hemoglobin 15.3 15.1 15.2   Hematocrit 43.0 44.6 44.7   Platelets 183 180 187       CHEMISTRIES:  Recent Labs   Lab 08/08/18  1202 08/30/18  1645 12/10/19  0730   Glucose 128 H 106 142 H   Sodium 139 141 141   Potassium 4.5 5.1 4.9   BUN, Bld 12 11 14   Creatinine 1.1 1.0 1.2   eGFR if  >60 " >60 >60   eGFR if non  >60 >60 56 A   Calcium 11.0 H 10.6 H 10.4       CARDIAC BIOMARKERS:        COAGS:  Recent Labs   Lab 01/20/20  1520   INR 1.0       LIPIDS/LFTS:  Recent Labs   Lab 08/08/18  1202 12/10/19  0730   Cholesterol  --  212 H   Triglycerides  --  170 H   HDL  --  45   LDL Cholesterol  --  133.0   Non-HDL Cholesterol  --  167   AST 27 22   ALT 31 25       Cardiovascular Testing:    LE angiography 1/23/2020:     · High grade stenosis of the bilateral lower extremity superficial femoral arteries.  · Successful PVI of the 90% distal R SFA lesion reduced to 0%.  · Orbital atherectomy of the mid 70% and distal 90% SFA lesions.  · Angiosculpting of the distal R SFA lesion with a 5/40 balloon.  · Drug coated balloon angioplasty of the distal R SFA lesion with a 6/40 Lutonix.  · Balloon angioplasty of the mid R SFA lesion with a 6/40 balloon.  · Single vessel runoff bilateral lower extremity (R via peroneal that collateralizes PT, L via PT)    CTA BLE 12/17/19:    1. Right lower extremity: Severe stenosis of the distal superficial femoral artery.  Single vessel runoff with complete occlusion of the anterior and posterior tibial arteries at the proximal calf, wrists reconstitution of the posterior tibial artery at the L level of the ankle.  2. Left lower extremity: 2 vessel runoff, with complete occlusion of the anterior tibial artery at the proximal calf.  3. Moderate atherosclerosis of the abdominal aorta and its major branch vessels.  4. Diverticulosis without evidence of acute diverticulitis.  5. Prostatomegaly.    ASSESSMENT:     1. SOBOE  2. PAD/Claudication  3. HTN    PLAN:     1. Echo  2. Exercise MPS  3. LE arterial US  4. BP log  5. RTC one month      Triston Hightower MD, MPH, [facility], [facility]

## 2020-06-08 NOTE — PROGRESS NOTES
Connection issue resolved. Received readings from this month.     Attempted outreach - no answer. LVM.

## 2020-06-09 NOTE — PROGRESS NOTES
Digital Medicine: Health  Follow-Up    Average blood pressure today is 162/76. Patient finally got the right size cuff and got his readings connected.     Patient hasn't been doing anything specific for diet right now. He is hoping to get back on track now that everything is working with his cuff. HE has been going on walks every morning for 35/40 minutes.     Taking medications regularly.    The history is provided by the patient. No  was used.   Follow Up  Follow-up reason(s): routine education      Routine Education Topics: eating patterns and physical activity        INTERVENTION(S)  recommended diet modifications, recommend physical activity, encouragement/support and denied further coaching    PLAN  patient verbalizes understanding and continue monitoring    Will f/u in 4 weeks, sooner if concerns.       There are no preventive care reminders to display for this patient.    Last 5 Patient Entered Readings                                      Current 30 Day Average: 162/76     Recent Readings 6/3/2020 5/29/2020 5/29/2020 5/29/2020 5/28/2020    SBP (mmHg) 188 140 140 165 158    DBP (mmHg) 84 71 71 78 78    Pulse 61 49 49 50 52                      Diet Screening   Patient reports eating or drinking the following: Amelia has the following dietary restrictions: low sodium diet    Patient reports that he hasn't been doing anything specific for diet. He is hoping that he can get back on track now that he has the cuff and it's working.     Physical Activity Screening   When asked if exercising, patient responded: yes    Patient participates in the following activities: walking    He identified the following barriers to physical activity: weather    Patient reports that he goes on 35-40 minute walks every morning. He does this 5-6 times a week depending on the weather. This has been going well for him and recommend keeping up with it.     Medication Adherence Screening   He did not miss a  dose this month.  Patient knows purpose of medications.      Patient identified the following reasons for non-compliance: None    Denies concerns today.     Tobacco and Alcohol Screening       No tobacco use     No alcohol use       SDOH

## 2020-06-17 NOTE — PROGRESS NOTES
Digital Medicine: Clinician Introduction    Joo York is a 80 y.o. male who is newly enrolled in the Digital Medicine Clinic.    The following information was reviewed and updated:  Preferred pharmacy   [facility] - [facility] [address]  [address] [address]  Phone: [phone] Fax: [phone]      Review of patient's allergies indicates:  No Known Allergies    Introduction call.     Does not monitor sodium consumption levels yet. Notes he will be open to it but is not ready to start this process yet.     Walking 5 days per week.     The history is provided by the patient.     HYPERTENSION  Our goal is to get BP to consistently below 130/80mmHg and make the process convenient so patient can avoid extra trips to the office. Getting your blood pressure below 130/80mmHg (definition of control) will reduce your risk for heart attack, kidney failure, stroke and death (as well as kidney failure, eye disease, & dementia)      Reviewed non-pharmacologic therapies and impact on BP      Explained that we expect patient to obtain several blood pressures per week at random times of day.  Instructed patient not to allow anyone else to use phone and monitoring device.  Confirmed appropriate BP monitoring technique.    Patient's BP goal is 130/80. Patients BP average is 155/76 mmHg, which is above goal, per 2017 ACC/AHA Hypertension Guidelines.    Allergies reviewed.      Last 5 Patient Entered Readings                                      Current 30 Day Average: 155/76     Recent Readings 6/17/2020 6/17/2020 6/15/2020 6/15/2020 6/9/2020    SBP (mmHg) 138 150 142 169 162    DBP (mmHg) 73 78 71 78 75    Pulse 49 50 51 51 49            INTERVENTION(S)  reviewed appropriate dose schedule, recommended diet modifications, reviewed monitoring technique and encouragement/support    PLAN  patient verbalizes understanding and additional monitoring needed    Blood pressures are consistently elevated but  trending down closer to appropriate range for advanced age.   - Reviewed monitoring technique. No issues noted. Believe readings are improving with increased comfort with monitoring.   - Discussed exercise and diet. Currently exercising and could benefit from sodium reduction - patient not ready at this time. Would like to work on monitoring technique first. Agreeable.     Will continue current regimen. Will continue to monitor for improved controlled or need for titration. Of note, Cardiology appointment in July.       There are no preventive care reminders to display for this patient.    Current Medication Regimen:  Hypertension Medications             amLODIPine (NORVASC) 5 MG tablet Take 1 tablet (5 mg total) by mouth once daily.            Reviewed the importance of self-monitoring, medication adherence, and that the health  can be used as a resource for lifestyle modifications to help reduce or maintain a healthy lifestyle.    Sent link to [facility] webpages and my contact information via [employer] for future questions. Follow up scheduled.         Screenings

## 2020-07-07 NOTE — PROGRESS NOTES
Digital Medicine: Health  Follow-Up    07/07: Patient reports that he got a new phone and is having issues getting his ihealth linked up with [employer]. Last reading I have from him is 06/29 and he has taken readings since then. I tried to trouble shoot this with patient but was unable to get this linked up. Offered to go to [employer] or put in a tech support ticket. He wanted to try the tech support ticket first. Also emailed him set up guide to see if he could go through the steps on his own. Will f/u in 2 weeks.     The history is provided by the patient. No  was used.       Intervention/Plan    There are no preventive care reminders to display for this patient.    Last 5 Patient Entered Readings                                      Current 30 Day Average: 143/74     Recent Readings 6/29/2020 6/29/2020 6/22/2020 6/22/2020 6/17/2020    SBP (mmHg) 135 153 139 155 138    DBP (mmHg) 67 71 71 74 73    Pulse 51 52 52 54 49                  Screenings    SDOH

## 2020-07-16 NOTE — TELEPHONE ENCOUNTER
----- Message from Neto Murray sent at 7/16/2020  7:53 AM CDT -----  Regarding: covid19 test  Patient is at risk for COVID-19 (Coronavirus).  Patient needs care urgently and is showing the following symptoms: fever from 95.6 to 102.3 since July 4, as well as a dry cough    Patient's wife Maria Luz, would like a call back with instructions on what to do,  as well as to set up the covid test. She can be reached at: [phone]

## 2020-07-16 NOTE — TELEPHONE ENCOUNTER
covid swab ordered please schedule patient for screening if any difficulty breathing should go to ED

## 2020-07-16 NOTE — TELEPHONE ENCOUNTER
----- Message from Soren Garcia MA sent at 7/16/2020  8:18 AM CDT -----  Regarding: FW: covid19 test  Please advise . Pt need order for Covid -19 testing  ----- Message -----  From: Neto Murray  Sent: 7/16/2020   7:53 AM CDT  To: [facility] Staff  Subject: covid19 test                                     Patient is at risk for COVID-19 (Coronavirus).  Patient needs care urgently and is showing the following symptoms: fever from 95.6 to 102.3 since July 4, as well as a dry cough    Patient's wife Maria Luz, would like a call back with instructions on what to do,  as well as to set up the covid test. She can be reached at: [phone]

## 2020-07-16 NOTE — TELEPHONE ENCOUNTER
Call pt patient wife answer phone states has had fever out in on for 2 week now some coughing pt would like to be tested staff advise pt wife .. waiting on PCP

## 2020-07-17 NOTE — TELEPHONE ENCOUNTER
Patient contacted and ID confirmed by name and   Discussed positive nasal swab reports symptoms were worse four or five days ago has been taking OTC tylenol (325mg) every six to eight hours and robitussin for cough. Recommend can take 1000mg acetominophen (two 500mg extra strenght tylenol) every eight hours (up to six in a day) okay to use robitussin. Will enroll in home monitoring program currently denies any dyspnea when walking through house

## 2020-07-18 PROBLEM — R79.89 ELEVATED TROPONIN I LEVEL: Status: ACTIVE | Noted: 2020-01-01

## 2020-07-18 PROBLEM — J18.9 MULTIFOCAL PNEUMONIA: Status: ACTIVE | Noted: 2020-01-01

## 2020-07-18 PROBLEM — J22 LOWER RESPIRATORY TRACT INFECTION DUE TO COVID-19 VIRUS: Status: ACTIVE | Noted: 2020-01-01

## 2020-07-18 PROBLEM — U07.1 LOWER RESPIRATORY TRACT INFECTION DUE TO COVID-19 VIRUS: Status: ACTIVE | Noted: 2020-01-01

## 2020-07-18 NOTE — ED TRIAGE NOTES
Pt presents to ED with c/o SOB, generalized weakness and cough that started one week ago. Pt reports positive COVID two days ago. Pt denies CP, fever, and n/v. Pt RA baseline. Pt 77% spO2 room air; placed on 15L O2 NRB.

## 2020-07-18 NOTE — TELEPHONE ENCOUNTER
Covid-19 symptom tracker call back attempted, no contact made. Left VM message. Will retry in 15 mins.    Second call back attempted, no contact made. Will retry in 1 hour per protocol.    Third call back attempted. No contact made.    Reason for Disposition   Message left on unidentified voice mail.  Phone number verified.    Additional Information   Negative: Caller is angry or rude (e.g., hangs up, verbally abusive, yelling)   Negative: Caller hangs up   Negative: Caller has already spoken with the PCP and has no further questions.   Negative: Caller has already spoken with another triager and has no further questions.   Negative: Caller has already spoken with another triager or PCP AND has further questions AND triager able to answer questions.   Negative: Message left on identified voice mail   Negative: No answer.  First attempt to contact caller.  Follow-up call scheduled within 15 minutes.   Negative: Busy signal.  First attempt to contact caller.  Follow-up call scheduled within 15 minutes.    Protocols used: NO CONTACT OR DUPLICATE CONTACT CALL-A-

## 2020-07-18 NOTE — ED PROVIDER NOTES
Encounter Date: 7/18/2020    SCRIBE #1 NOTE: I, Shireen Cox, am scribing for, and in the presence of,  Sanam Luna MD. I have scribed the following portions of the note - Other sections scribed: HPI, ROS, PE.       History     Chief Complaint   Patient presents with    COVID-19 Concerns     Pt reports he tested positive for covid-19 2 days ago. Pt c/o SOB, chills x3 days, cough that started today. Denies sore throat, abd pain, N/V/D. Denies pain at this time     CC: Shortness of breath    HPI: This is a 80 y.o. male with a PMHx of hypertension who presents to the Emergency Department with a cc of worsening shortness of breath for two days. Patient states he tested positive for COVID-19 two days ago. He reports a cough for several weeks as well as fever, chills, myalgias, and fatigue. He denies sore throat, abdominal pain, nausea, vomiting, or diarrhea. Also denies recent sick contact. He denies chest pain. No worsening or alleviating factors are noted. Patient takes Aspirin and Plavix daily. He is DNR and DNI, which I confirmed with him at bedside. Patient is a former smoker and has no known drug allergies. Denies current ETOH/tobacco/illicit drugs. History limited as patient in respiratory distress upon arrival.      The history is provided by the patient. No  was used.     Review of patient's allergies indicates:  No Known Allergies  Past Medical History:   Diagnosis Date    Claudication 1/23/2020    Essential hypertension 1/24/2020    Essential hypertension 1/24/2020    Hematuria     Hernia of abdominal cavity     Multifocal pneumonia 7/18/2020     Past Surgical History:   Procedure Laterality Date    ATHERECTOMY  1/23/2020    Procedure: Atherectomy;  Surgeon: Triston Hightower MD;  Location: [facility] CATH LAB;  Service: Cardiology;;  SFA mid distal    CYSTOSCOPY WITH BIOPSY OF BLADDER N/A 9/5/2018    Procedure: CYSTOSCOPY, WITH BLADDER BIOPSY;  Surgeon: KADIE Johnson MD;   Location: [facility] OR;  Service: Urology;  Laterality: N/A;  RN PRE OP 18------NEED CONSENT    office cysto 2018      TONSILLECTOMY      VASECTOMY       Family History   Problem Relation Age of Onset    Osteoarthritis Sister     Cancer Brother      Social History     Tobacco Use    Smoking status: Former Smoker     Quit date:      Years since quittin.5    Smokeless tobacco: Never Used   Substance Use Topics    Alcohol use: Not Currently     Frequency: Never     Comment: RARELY    Drug use: No     Review of Systems   Unable to perform ROS: Severe respiratory distress   Constitutional: Positive for chills, fatigue and fever. Negative for diaphoresis.   HENT: Negative for congestion, rhinorrhea and sore throat.    Eyes: Negative for photophobia and visual disturbance.   Respiratory: Positive for cough and shortness of breath.    Cardiovascular: Negative for chest pain and leg swelling.   Gastrointestinal: Negative for abdominal pain, blood in stool, constipation, diarrhea, nausea and vomiting.   Genitourinary: Negative for dysuria, frequency, hematuria and urgency.   Musculoskeletal: Positive for myalgias. Negative for back pain, neck pain and neck stiffness.   Skin: Negative for rash and wound.   Neurological: Negative for dizziness, weakness, light-headedness, numbness and headaches.   Hematological: Does not bruise/bleed easily.   Psychiatric/Behavioral: Negative for confusion and suicidal ideas.   All other systems reviewed and are negative.      Physical Exam     Initial Vitals [20 1314]   BP Pulse Resp Temp SpO2   (!) 147/65 81 (!) 26 99.5 °F (37.5 °C) (!) 77 %      MAP       --         Physical Exam    Nursing note and vitals reviewed.  Constitutional: He appears well-developed and well-nourished. He is not diaphoretic. He appears distressed.   Patient appears in respiratory distress.    HENT:   Head: Normocephalic and atraumatic.   Right Ear: External ear normal.   Left Ear: External  ear normal.   Mouth/Throat: Oropharynx is clear and moist. No oropharyngeal exudate.   Eyes: Conjunctivae and EOM are normal. Pupils are equal, round, and reactive to light. Right eye exhibits no discharge. Left eye exhibits no discharge.   Neck: Normal range of motion. Neck supple. No JVD present.   Cardiovascular: Normal rate, regular rhythm, normal heart sounds and intact distal pulses. Exam reveals no gallop and no friction rub.    No murmur heard.  Pulmonary/Chest: Tachypnea noted. He is in respiratory distress. He has no wheezes. He has no rhonchi. He has rales (bilateral lower lobes).   Sp02 67% on room air  Pt speaks in 4-5 word sentences  Appears in respiratory distress with tachypnea noted    Abdominal: Soft. Bowel sounds are normal. He exhibits no distension. There is no abdominal tenderness. There is no rebound and no guarding.   Musculoskeletal: Normal range of motion. No tenderness or edema.   Lymphadenopathy:     He has no cervical adenopathy.   Neurological: He is alert and oriented to person, place, and time. He has normal strength. No cranial nerve deficit or sensory deficit. GCS score is 15. GCS eye subscore is 4. GCS verbal subscore is 5. GCS motor subscore is 6.   Skin: Skin is warm and dry. Capillary refill takes less than 2 seconds.   Psychiatric: He has a normal mood and affect. His behavior is normal. Thought content normal.         ED Course   Critical Care    Date/Time: 7/18/2020 7:34 PM  Performed by: Sanam Luna MD  Authorized by: Sanam Luna MD   Direct patient critical care time: 25 minutes  Additional history critical care time: 10 minutes  Ordering / reviewing critical care time: 10 minutes  Documentation critical care time: 5 minutes  Consulting other physicians critical care time: 10 minutes  Consult with family critical care time: 5 minutes  Total critical care time (exclusive of procedural time) : 65 minutes  Critical care was necessary to treat or prevent imminent or  life-threatening deterioration of the following conditions: respiratory failure.  Critical care was time spent personally by me on the following activities: development of treatment plan with patient or surrogate, discussions with consultants, interpretation of cardiac output measurements, evaluation of patient's response to treatment, examination of patient, obtaining history from patient or surrogate, ordering and performing treatments and interventions, ordering and review of laboratory studies, ordering and review of radiographic studies, pulse oximetry and re-evaluation of patient's condition.        Labs Reviewed   CBC W/ AUTO DIFFERENTIAL - Abnormal; Notable for the following components:       Result Value    RBC 4.35 (*)     Hemoglobin 13.1 (*)     Hematocrit 39.3 (*)     Immature Granulocytes 0.6 (*)     Gran # (ANC) 8.6 (*)     Immature Grans (Abs) 0.06 (*)     Lymph # 0.6 (*)     Gran% 87.5 (*)     Lymph% 6.5 (*)     All other components within normal limits   COMPREHENSIVE METABOLIC PANEL - Abnormal; Notable for the following components:    CO2 18 (*)     Glucose 170 (*)     BUN, Bld 24 (*)     Albumin 3.3 (*)     AST 48 (*)     eGFR if non  56 (*)     All other components within normal limits   C-REACTIVE PROTEIN - Abnormal; Notable for the following components:    .6 (*)     All other components within normal limits   FERRITIN - Abnormal; Notable for the following components:    Ferritin 2,080 (*)     All other components within normal limits   LACTATE DEHYDROGENASE - Abnormal; Notable for the following components:     (*)     All other components within normal limits   TROPONIN I - Abnormal; Notable for the following components:    Troponin I 0.861 (*)     All other components within normal limits   PROCALCITONIN - Abnormal; Notable for the following components:    Procalcitonin 10.00 (*)     All other components within normal limits   B-TYPE NATRIURETIC PEPTIDE -  Abnormal; Notable for the following components:     (*)     All other components within normal limits   TROPONIN I - Abnormal; Notable for the following components:    Troponin I 0.965 (*)     All other components within normal limits   CULTURE, BLOOD   CULTURE, BLOOD   CK   LACTIC ACID, PLASMA     EKG Readings: (Independently Interpreted)   This EKG is limited as patient tachypneic and baseline wander.  However at and did not notice any ST elevation or depression.  T-wave inversions in V1 and flattening in aVL.  Normal sinus rhythm at 66 with normal axis.  QTC is 427.       Imaging Results           X-Ray Chest AP Portable (Final result)  Result time 07/18/20 15:58:50    Final result by Sandro Palmer MD (07/18/20 15:58:50)                 Impression:      Findings concerning for multifocal pneumonia from inflammatory or infectious process including atypical bacterial or viral etiologies.    This report was flagged in Epic as abnormal.      Electronically signed by: Sandro Palmer MD  Date:    07/18/2020  Time:    15:58             Narrative:    EXAMINATION:  XR CHEST AP PORTABLE    CLINICAL HISTORY:  Suspected Covid-19 Virus Infection;    TECHNIQUE:  Single frontal view of the chest was performed.    COMPARISON:  Chest radiograph 10/04/2017    FINDINGS:  Monitoring leads overlie the chest.  Interval detrimental change noting bilateral patchy alveolar and ground-glass opacities throughout both lungs with a somewhat peripheral predominance.  There is also bilateral mild diffuse nonspecific interstitial coarsening.  No large pleural effusion or pneumothorax.    Cardiomediastinal silhouette is midline and within normal limits for age noting calcification of the arch.  Hilar contours are grossly within normal limits.  No acute osseous process seen.  PA and lateral views can be obtained.                            MDM  MDM:    80 y.o.male with PMHx as noted above presents with SOB noted to be in respiratory  distress. Physical exam remarkable for distressed appearing male, conversing in 4-5 word sentences, hypoxia to 67% on RA, no peripheral edema noted, abdomen soft, nt/nd, normal rate, ED workup remarkable for CXR -multifocal PNA, and patient with rales on PE. Pt presentation consistent with COVID19 infection, with symptoms consistent as noted above.  At this time given patient's history, physical exam, and ED workup do not suspect acute PE, acute MI/ACS, PTX, CHF/COPD exacerbation, septic shock, acute respiratory failure, or any further malignant cause.  However given elevated pro larry and concerning CXR concerned for bacterial secondary infection and patient treated with Ceftriaxone and azithromycin. Trop elevated to 0.8, with increase on repeat to 0.9, likely secondary to strain. Patient denies CP. EKG without clear ischemia. Patient given ASA and on Plavix as home for PVD which I will continue.     Pt noted to be hypoxemic on pulse oximeter with reading 67% on RA, meeting criteria for admission and supplemental oxygen administration. Patient placed on NRB with improvement of symptoms and oxygenation to 98%, will admit to the ICU for close monitoring. Spoke with patient's wife and updated her on plan and admission. She confirms that patient in DNI. Discussed diagnosis and further treatment with patient at bedside. All questions answered, patient transferred to floor improved and stable.                  Scribe Attestation:   Scribe #1: I performed the above scribed service and the documentation accurately describes the services I performed. I attest to the accuracy of the note.                          Clinical Impression:     1. Multifocal pneumonia    2. Suspected Covid-19 Virus Infection    3. Elevated troponin    4. Elevated brain natriuretic peptide (BNP) level    5. Hypoxia                ED Disposition Condition    Admit            I, Sanam Luna, personally performed the services described in this  documentation. All medical record entries made by the scribe were at my direction and in my presence. I have reviewed the chart and agree that the record reflects my personal performance and is accurate and complete.                 Sanam Luna MD  07/18/20 1934

## 2020-07-19 PROBLEM — J96.01 ACUTE HYPOXEMIC RESPIRATORY FAILURE: Status: ACTIVE | Noted: 2020-01-01

## 2020-07-19 NOTE — PLAN OF CARE
Patient remains on NRB mask with vapotherm at present to maintain sats above 90%.   Takes small amount of diet, no appetite.

## 2020-07-19 NOTE — SUBJECTIVE & OBJECTIVE
Interval History: HR in the 40s while sleeping, but asymptomatic otherwise. No acute events.    Review of Systems   Constitutional: Negative for chills and fever.   Respiratory: Positive for shortness of breath. Negative for cough.    Cardiovascular: Negative for chest pain.     Objective:     Vital Signs (Most Recent):  Temp: 97.7 °F (36.5 °C) (07/19/20 0730)  Pulse: (!) 55 (07/19/20 1300)  Resp: (!) 45 (07/19/20 1300)  BP: 126/60 (07/19/20 1300)  SpO2: (!) 91 % (07/19/20 1300) Vital Signs (24h Range):  Temp:  [94.3 °F (34.6 °C)-97.7 °F (36.5 °C)] 97.7 °F (36.5 °C)  Pulse:  [44-81] 55  Resp:  [27-59] 45  SpO2:  [78 %-99 %] 91 %  BP: (110-154)/(55-82) 126/60     Weight: 76.1 kg (167 lb 12.3 oz)  Body mass index is 26.28 kg/m².    Intake/Output Summary (Last 24 hours) at 7/19/2020 1407  Last data filed at 7/19/2020 0900  Gross per 24 hour   Intake 350 ml   Output 330 ml   Net 20 ml      Physical Exam  Vitals signs and nursing note reviewed.   Constitutional:       Appearance: Normal appearance.   HENT:      Head: Normocephalic and atraumatic.      Nose: Nose normal.   Eyes:      Conjunctiva/sclera: Conjunctivae normal.      Pupils: Pupils are equal, round, and reactive to light.   Neck:      Musculoskeletal: Normal range of motion.   Cardiovascular:      Rate and Rhythm: Regular rhythm. Bradycardia present.   Pulmonary:      Comments: Course breath sounds with crackles and rhonchi bilaterally, able to speak in full sentences, mildly tachypneic  Abdominal:      General: Bowel sounds are normal. There is no distension.      Palpations: Abdomen is soft. There is no mass.      Tenderness: There is no abdominal tenderness. There is no guarding.      Hernia: No hernia is present.   Musculoskeletal: Normal range of motion.         General: No swelling.   Skin:     General: Skin is warm and dry.      Capillary Refill: Capillary refill takes less than 2 seconds.   Neurological:      General: No focal deficit present.       Mental Status: He is alert and oriented to person, place, and time.         Significant Labs: All pertinent labs within the past 24 hours have been reviewed.    Significant Imaging: I have reviewed and interpreted all pertinent imaging results/findings within the past 24 hours.

## 2020-07-19 NOTE — NURSING
Pt did well throughout the night. Pt had episodes of bradycardia, HR 43-50s while sleeping. MD was made aware. Pt was given PRN atropine per order. Pt continues to have bradycardia only when sleep. When patient is awake HR in the 50-60s. Will continue to monitor and give report to AM RN.     Octavio CONNERN, RN

## 2020-07-19 NOTE — CARE UPDATE
JaylenMaria Luz Spouse [phone]     Updated on patient's status and all question and concerns addressed.    LUL Zamora MD

## 2020-07-19 NOTE — ASSESSMENT & PLAN NOTE
Secondary to COVID-19 infection  Currently in need of 100% percent non-rebreather to maintain  Continue Rocephin and azithromycin (5 days), IV dexamethasone (10 days) and supportive care  Patient has qualifies for Remdesivir, consent given.  Will start 5 day treatment today.  Will add Vapotherm and attempt to wean from non-rebreather and allow patient to eat

## 2020-07-19 NOTE — EICU
Bradycardia noted with HR43/min    Xve021% on 100% NRFM    ?Hypoxia related    Plan:  Atropine 0.5mg prn every 4 hours for HR less than 45/min  May need BIPAP support  DNR/DNI status confirmed

## 2020-07-19 NOTE — CARE UPDATE
Remdesivir FDA EUA Verbal Consent  The patient or parent/caregiver has been provided with the remdesivir Fact Sheet for Patients and Parents/Caregivers and has been counseled that the FDA has authorized the emergency use of remdesivir, which is not an FDA approved drug. The significant known and potential risks and benefits are unknown. The patient or parent/caregiver has been given the option to accept or refuse and has verbally agreed to receive remdesivir. Daily labs will be ordered and monitoring for Serious Adverse Events will be performed.      LUL Zamora MD

## 2020-07-19 NOTE — PLAN OF CARE
"TN spoke with patient's spouse Maria Luz via phone to complete discharge needs assessment. TN explained duties of case management to Maria Luz.  TN reviewed  "[facility] Packet", "Discharge Planning Begins on Admission" and discussed "Help at Home". Patient lives at home with his spouse and is independent at home. Patient will discharge back home when medically stable. TN also discussed patient's responsibilities to manage his health at home.     Patient Preferred Pharmacy:   [facility] [place] [facility] - [place], [place] - [address]  [address] [address]  Phone: [phone] Fax: [phone]    Appointment Time Preference:  Late morning       07/19/20 1341   Discharge Assessment   Assessment Type Discharge Planning Assessment   Confirmed/corrected address and phone number on facesheet? Yes   Assessment information obtained from? Other  (Maria Luz- spouse)   Communicated expected length of stay with patient/caregiver yes   Prior to hospitilization cognitive status: Alert/Oriented   Prior to hospitalization functional status: Independent   Current cognitive status: Unable to Assess   Current Functional Status: Independent   Lives With spouse   Able to Return to Prior Arrangements yes   Is patient able to care for self after discharge? Yes   Who are your caregiver(s) and their phone number(s)? Maria Luz- spouse @ [phone]   Patient's perception of discharge disposition home or selfcare;home health   Readmission Within the Last 30 Days no previous admission in last 30 days   Patient currently being followed by outpatient case management? No   Patient currently receives any other outside agency services? No   Equipment Currently Used at Home none   Do you have any problems affording any of your prescribed medications? No   Is the patient taking medications as prescribed? yes   Does the patient have transportation home? Yes   Transportation Anticipated family or friend will provide   Does the patient receive services at the " [facility]? No   Discharge Plan A Home with family   Discharge Plan B Home Health;Home with family   DME Needed Upon Discharge  none   Patient/Family in Agreement with Plan yes

## 2020-07-19 NOTE — PLAN OF CARE
Pt newly admitted. Plan is ongoing. Pt maintained oxygen saturations greater than 90% throughout shift. Pt verbalizes understanding of deep breathing and cough. Pt continues to need reinforcement. Pt understands need to call RN for assistance. Pt skin continues to be WNL. Will continue to monitor patient and educate when needed.

## 2020-07-19 NOTE — HPI
Joo York is a 80 y.o. male with HTN who presented with with report of shortness of breath, fever, chills last 3 days.  He was tested for COVID-19 2 days ago and positive.  He said dyspnea with exertion and a cough that started today.  Denies hemoptysis, stridor, or night sweats.  No nausea, vomiting diarrhea reported.  Patient is a former smoker.  Denies home oxygen use.  Code status discussed in the most remain a DNR DNI.  The family is aware of these wishes.  History is limited to respiratory distress as patient is speaking in short sentences.    In the emergency department routine laboratory studies and chest x-ray obtained.  Evidence of bilateral pneumonia.  Was supplemental oxygen and dexamethasone.  O2 sat 67% air prior to oxygen supplementation.  Okay to escalate to CPAP/BiPAP as.  Does not want to be intubated.    Hospital medicine has been asked to admit to inpatient in the ICU for further evaluation and treatment.

## 2020-07-19 NOTE — HOSPITAL COURSE
Mr. York presented with shortness of breath and fever.  He was admitted for acute respiratory failure secondary to COVID-19. Placed on BiPAP. Code status DNR. Completed course of empiric antibiotics for elevated procalcitonin level, course of dexamethasone 6 mg daily and Remdesivir. Also on full dose lovenox for hypercoagulable state. Diuresed intermittently. He slowly improved and de-escalated to vapotherm NC. On , patient had melena and acute drop in Hgb from 16 to 12 in 24 hours. BP stable and respirations were not affected. Placed on pantoprazole IV 40 mg BID and held ASA/plavix which he uses for PVD. Repeat Hgb levels remained stable and amount of melena decreased. Given such high risk for thromboembolism, he continued on enoxaparin. Noted increased melena and lovenox stopped, and changed IV PPI to Q12 and increased H/H surveillance and GI consulted.  Transfused 2 units of packed red blood cells and PPI changed to infusion.  No further bleeding and H/H overall stable. Empiric Zosyn and vancomycin initiated for continued worsening leukocytosis.  Blood cultures no growth to date.  Sputum culture NGTD. Intermittent high fever and Vancomycin levels were subtherapeutic - Zyvox added and Vancomycin stopped. Zyvox stopped on . Mental status worsening. On continuous BiPAP and not responsive. Morphine gtt started for agonal breathing. Palliative care following. Inpatient hospice is not able to take him on BiPAP. Plan for daughter and wife to come to hospital tomorrow and transition to full comfort measures then. Family present. Comfort medications continued and BiPAP removed. Patient . Time of death 11:50AM on 2020. Cause of death: acute hypoxic respiratory failure due to COVID-19.

## 2020-07-19 NOTE — CARE UPDATE
[facility]  ICU Shift Summary  Date: 7/19/2020      COVID Test: (+)  Isolation: Airborne and Contact and Droplet     Prehospitalization: Home  Admit Date / LOS : 7/18/2020/ 1 days    Diagnosis: Acute hypoxemic respiratory failure    Consults:        Active: Pulm CC       Needed: N/A     Code Status: DNR   Advanced Directive: <no information>    LDA: PIV       Central Lines/Site/Justification:Patient Does Not Have Central Line       Urinary Cath/Order/Justification:Patient Does Not Have Urinary Catheter    Vasopressors/Infusions:        GOALS: Volume/ Hemodynamic: N/A                     RASS: 0  alert and calm    Pain Management: none       Pain Controlled: yes     Rhythm: NSR    Respiratory Device: Vapotherm    Oxygen Concentration (%):  [100] 100             Most Recent SBT/ SAT: N/A       MOVE Screen: FAIL    VTE Prophylaxis: Pharm and Mechanical  Mobility: Bedrest  Stress Ulcer Prophylaxis: Yes    Dietary: PO  Tolerance: no  /  Advancement: no    I & O (24h):    Intake/Output Summary (Last 24 hours) at 7/19/2020 1843  Last data filed at 7/19/2020 1800  Gross per 24 hour   Intake 460 ml   Output 1130 ml   Net -670 ml        Restraints: No    Significant Dates:  Post Op Date: N/A  Rescue Date: N/A  Imaging/ Diagnostics: N/A    Noteworthy Labs:  none    CBC/Anemia Labs: Coags:    Recent Labs   Lab 07/18/20  1405 07/19/20  0421   WBC 9.82 10.01   HGB 13.1* 12.7*   HCT 39.3* 38.3*    235   MCV 90 92   RDW 12.9 13.2   FERRITIN 2,080*  --     No results for input(s): PT, INR, APTT in the last 168 hours.     Chemistries:   Recent Labs   Lab 07/18/20  1405 07/19/20  0421    135*   K 4.3 4.6    106   CO2 18* 23   BUN 24* 26*   CREATININE 1.2 0.9   CALCIUM 9.1 9.1   PROT 7.1 6.6   BILITOT 0.7 0.4   ALKPHOS 67 65   ALT 24 22   AST 48* 47*   MG  --  2.9*   PHOS  --  2.5*        Cardiac Enzymes: Ejection Fractions:    Recent Labs     07/18/20  1405  07/18/20  2144 07/19/20  0421      --   --   --    TROPONINI 0.861*   < > 0.573* 0.290*    < > = values in this interval not displayed.    Nuc Rest EF   Date Value Ref Range Status   06/11/2020 77  Final        POCT Glucose: HbA1c:    Recent Labs   Lab 07/18/20  2228   POCTGLUCOSE 252*    No results found for: HGBA1C        ICU LOS 22h  Level of Care: Critical Care    Shift Summary/Plan for the shift: vapotherm on 100%/40liters. NRB mask required over vapotherm but oxygen liters decreased on mask.  Takes only a few bites of food by lifting mask, due to drop in sats to 70's with a 6l/min nasal cannula.  No other complaints.  remdesivir to start today.

## 2020-07-19 NOTE — H&P
[facility] Hospital Medicine  History & Physical    Patient Name: Joo York  MRN: [id number]  Admission Date: 07/18/2020  Attending Physician: Roney Zendejas MD, MPH      PCP:     Keegan Watson MD    CC:     Chief Complaint   Patient presents with    COVID-19 Concerns     Pt reports he tested positive for covid-19 2 days ago. Pt c/o SOB, chills x3 days, cough that started today. Denies sore throat, abd pain, N/V/D. Denies pain at this time       HISTORY OF PRESENT ILLNESS:     Joo York is a 80 y.o. male that (in part)  has a past medical history of Claudication, Essential hypertension, Essential hypertension, Hematuria, Hernia of abdominal cavity, and Multifocal pneumonia.  has a past surgical history that includes Vasectomy; Tonsillectomy; office cysto 2018; Cystoscopy with biopsy of bladder (N/A, 9/5/2018); and Atherectomy (1/23/2020). Presents to [facility] Emergency Department with report of shortness of breath, fever, chills last 3 days.  He was tested for COVID-19 2 days ago and positive.  He said dyspnea with exertion and a cough that started today.  Denies hemoptysis, stridor, or night sweats.  No nausea, vomiting diarrhea reported.  Patient is a former smoker.  Denies home oxygen use.  Code status discussed in the most remain a DNR DNI.  The family is aware of these wishes.  History is limited to respiratory distress as patient is speaking in short sentences.    In the emergency department routine laboratory studies and chest x-ray obtained.  Evidence of bilateral pneumonia.  Was supplemental oxygen and dexamethasone.  O2 sat 67% air prior to oxygen supplementation.  Okay to escalate to CPAP/BiPAP as.  Does not want to be intubated.    Hospital medicine has been asked to admit to inpatient in the ICU for further evaluation and treatment.       REVIEW OF SYSTEMS:     Limited review of system other than what is available above in the HPI due to  respiratory distress      PAST MEDICAL / SURGICAL HISTORY:     Past Medical History:   Diagnosis Date    Claudication 2020    Essential hypertension 2020    Essential hypertension 2020    Hematuria     Hernia of abdominal cavity     Multifocal pneumonia 2020     Past Surgical History:   Procedure Laterality Date    ATHERECTOMY  2020    Procedure: Atherectomy;  Surgeon: Triston Hightower MD;  Location: [facility] CATH LAB;  Service: Cardiology;;  SFA mid distal    CYSTOSCOPY WITH BIOPSY OF BLADDER N/A 2018    Procedure: CYSTOSCOPY, WITH BLADDER BIOPSY;  Surgeon: KADIE Johnson MD;  Location: [facility] OR;  Service: Urology;  Laterality: N/A;  RN PRE OP 18------NEED CONSENT    office cysto 2018      TONSILLECTOMY      VASECTOMY           FAMILY HISTORY:     Family History   Problem Relation Age of Onset    Osteoarthritis Sister     Cancer Brother          SOCIAL HISTORY:     Social History     Socioeconomic History    Marital status:      Spouse name: Not on file    Number of children: Not on file    Years of education: Not on file    Highest education level: Not on file   Occupational History    Not on file   Social Needs    Financial resource strain: Not on file    Food insecurity     Worry: Not on file     Inability: Not on file    Transportation needs     Medical: Not on file     Non-medical: Not on file   Tobacco Use    Smoking status: Former Smoker     Quit date:      Years since quittin.5    Smokeless tobacco: Never Used   Substance and Sexual Activity    Alcohol use: Not Currently     Frequency: Never     Comment: RARELY    Drug use: No    Sexual activity: Yes     Partners: Female   Lifestyle    Physical activity     Days per week: Not on file     Minutes per session: Not on file    Stress: Not on file   Relationships    Social connections     Talks on phone: Not on file     Gets together: Not on file     Attends Congregation service: Not on  file     Active member of club or organization: Not on file     Attends meetings of clubs or organizations: Not on file     Relationship status: Not on file   Other Topics Concern    Not on file   Social History Narrative    Not on file         ALLERGIES:       Review of patient's allergies indicates:  No Known Allergies      HEALTH SCREENING:     Influenza vaccine not up-to-date for this season.  Prevnar 13 pneumonia vaccine =  evidence of previous vaccination found in the medical record      HOME MEDICATIONS:     Prior to Admission medications    Medication Sig Start Date End Date Taking? Authorizing Provider   amLODIPine (NORVASC) 5 MG tablet Take 1 tablet (5 mg total) by mouth once daily. 1/24/20 1/23/21 Yes Triston Hightower MD   aspirin 81 MG Chew Take 1 tablet (81 mg total) by mouth once daily. 1/24/20 1/23/21 Yes Triston Hightower MD   clopidogrel (PLAVIX) 75 mg tablet Take 1 tablet (75 mg total) by mouth once daily. 1/24/20 1/23/21 Yes Triston Hightower MD   famotidine (PEPCID) 20 MG tablet Take 1 tablet (20 mg total) by mouth 2 (two) times daily. 10/3/19 10/2/20 Yes Keegan Watson MD   MULTIVIT-MIN/FOLIC/VIT K/LYCOP (ONE-A-DAY MEN'S 50 PLUS ORAL) Take by mouth.   Yes Historical Provider, MD   tamsulosin (FLOMAX) 0.4 mg Cap Take 2 capsules (0.8 mg total) by mouth once daily. 5/7/20  Yes Susana Adams NP   ZINC MTH/COPPER/SAW PALM/GNSG (PROSTATE HEALTH FORMULA ORAL) Take by mouth.   Yes Historical Provider, MD   saw palmetto 500 MG capsule Take 450 mg by mouth once daily.    Historical Provider, MD          HOSPITAL MEDICATIONS:     Scheduled Meds:   Continuous Infusions:   PRN Meds:       PHYSICAL EXAM:     Wt Readings from Last 1 Encounters:   07/18/20 1314 79.4 kg (175 lb)     Body mass index is 27.41 kg/m².  Vitals:    07/18/20 1716 07/18/20 1746 07/18/20 1801 07/18/20 1816   BP: (!) 128/58 (!) 125/59 118/61 133/82   BP Location:       Patient Position:       Pulse: 61 (!) 58 65 74   Resp:        Temp:       TempSrc:       SpO2: 97% 99% 97%    Weight:       Height:              -- General appearance:  Chronically ill-appearing elderly male is lying in bed.  Respiratory distress evident.  well developed. appears stated age   -- Head: normocephalic, atraumatic   -- Eyes: conjunctivae clear. Extraocular muscles intact  -- Nose: Nares normal. Septum midline.   -- Mouth/Throat: lips, mucosa, and tongue normal. no throat erythema.   -- Neck: NoJVD,  Supple, symmetrical, trachea midline, thyroid not grossly enlarged, appears symmetric  -- Lungs:  Rales and rhonchi bilaterally.  Increased respiratory rate and respiratory effort.  Speaking in short sentences.  No use of accessory muscles.   -- Chest wall: no tenderness. equal bilateral chest rise   -- Heart: regular rate and regular rhythm. S1, S2 normal.  no click, rub or gallop   -- Abdomen: soft, non-tender, non-distended, non-tympanic; bowel sounds normal; no masses  -- Extremities: no cyanosis, clubbing or edema.   -- Pulses: 2+ and symmetric   -- Skin:  Turgor normal. Color normal. Texture normal. No rashes or lesions.   -- Neurologic: Normal strength and tone. No focal numbness or weakness. CNII-XII intact. Ricardo coma scale: eyes open spontaneously-4, oriented & converses-5, obeys commands-6.      LABORATORY STUDIES:     Recent Results (from the past 36 hour(s))   CBC auto differential    Collection Time: 07/18/20  2:05 PM   Result Value Ref Range    WBC 9.82 3.90 - 12.70 K/uL    RBC 4.35 (L) 4.60 - 6.20 M/uL    Hemoglobin 13.1 (L) 14.0 - 18.0 g/dL    Hematocrit 39.3 (L) 40.0 - 54.0 %    Mean Corpuscular Volume 90 82 - 98 fL    Mean Corpuscular Hemoglobin 30.1 27.0 - 31.0 pg    Mean Corpuscular Hemoglobin Conc 33.3 32.0 - 36.0 g/dL    RDW 12.9 11.5 - 14.5 %    Platelets 220 150 - 350 K/uL    MPV 9.9 9.2 - 12.9 fL    Immature Granulocytes 0.6 (H) 0.0 - 0.5 %    Gran # (ANC) 8.6 (H) 1.8 - 7.7 K/uL    Immature Grans (Abs) 0.06 (H) 0.00 - 0.04 K/uL    Lymph #  0.6 (L) 1.0 - 4.8 K/uL    Mono # 0.5 0.3 - 1.0 K/uL    Eos # 0.0 0.0 - 0.5 K/uL    Baso # 0.01 0.00 - 0.20 K/uL    nRBC 0 0 /100 WBC    Gran% 87.5 (H) 38.0 - 73.0 %    Lymph% 6.5 (L) 18.0 - 48.0 %    Mono% 5.3 4.0 - 15.0 %    Eosinophil% 0.0 0.0 - 8.0 %    Basophil% 0.1 0.0 - 1.9 %    Differential Method Automated    Comprehensive metabolic panel    Collection Time: 07/18/20  2:05 PM   Result Value Ref Range    Sodium 136 136 - 145 mmol/L    Potassium 4.3 3.5 - 5.1 mmol/L    Chloride 103 95 - 110 mmol/L    CO2 18 (L) 23 - 29 mmol/L    Glucose 170 (H) 70 - 110 mg/dL    BUN, Bld 24 (H) 8 - 23 mg/dL    Creatinine 1.2 0.5 - 1.4 mg/dL    Calcium 9.1 8.7 - 10.5 mg/dL    Total Protein 7.1 6.0 - 8.4 g/dL    Albumin 3.3 (L) 3.5 - 5.2 g/dL    Total Bilirubin 0.7 0.1 - 1.0 mg/dL    Alkaline Phosphatase 67 55 - 135 U/L    AST 48 (H) 10 - 40 U/L    ALT 24 10 - 44 U/L    Anion Gap 15 8 - 16 mmol/L    eGFR if African American >60 >60 mL/min/1.73 m^2    eGFR if non African American 56 (A) >60 mL/min/1.73 m^2   C-Reactive Protein    Collection Time: 07/18/20  2:05 PM   Result Value Ref Range    .6 (H) 0.0 - 8.2 mg/L   Ferritin    Collection Time: 07/18/20  2:05 PM   Result Value Ref Range    Ferritin 2,080 (H) 20.0 - 300.0 ng/mL   Lactate Dehydrogenase    Collection Time: 07/18/20  2:05 PM   Result Value Ref Range     (H) 110 - 260 U/L   CK    Collection Time: 07/18/20  2:05 PM   Result Value Ref Range     20 - 200 U/L   Lactic Acid, Plasma    Collection Time: 07/18/20  2:05 PM   Result Value Ref Range    Lactate (Lactic Acid) 2.2 0.5 - 2.2 mmol/L   Troponin I    Collection Time: 07/18/20  2:05 PM   Result Value Ref Range    Troponin I 0.861 (H) 0.000 - 0.026 ng/mL   Procalcitonin    Collection Time: 07/18/20  2:05 PM   Result Value Ref Range    Procalcitonin 10.00 (H) <0.25 ng/mL   Brain Natriuretic Peptide    Collection Time: 07/18/20  2:05 PM   Result Value Ref Range     (H) 0 - 99 pg/mL   Troponin I     Collection Time: 07/18/20  5:34 PM   Result Value Ref Range    Troponin I 0.965 (H) 0.000 - 0.026 ng/mL       Lab Results   Component Value Date    INR 1.0 01/20/2020     No results found for: HGBA1C  No results for input(s): POCTGLUCOSE in the last 72 hours.        MICROBIOLOGY DATA:     Urine Culture, Routine   Date Value Ref Range Status   08/03/2018 No significant growth  Final   08/03/2018 No significant growth  Final       Microbiology x 7d:   Microbiology Results (last 7 days)     Procedure Component Value Units Date/Time    Blood culture #2 **CANNOT BE ORDERED STAT** [[id number]] Collected: 07/18/20 1733    Order Status: Sent Specimen: Blood from Peripheral, Hand, Left Updated: 07/18/20 1742    Blood culture #1 **CANNOT BE ORDERED STAT** [[id number]] Collected: 07/18/20 1710    Order Status: Sent Specimen: Blood from Peripheral, Antecubital, Left Updated: 07/18/20 1732            IMAGING:     Imaging Results           X-Ray Chest AP Portable (Final result)  Result time 07/18/20 15:58:50    Final result by Sandro Palmer MD (07/18/20 15:58:50)                 Impression:      Findings concerning for multifocal pneumonia from inflammatory or infectious process including atypical bacterial or viral etiologies.    This report was flagged in Epic as abnormal.      Electronically signed by: Sandro Palmer MD  Date:    07/18/2020  Time:    15:58             Narrative:    EXAMINATION:  XR CHEST AP PORTABLE    CLINICAL HISTORY:  Suspected Covid-19 Virus Infection;    TECHNIQUE:  Single frontal view of the chest was performed.    COMPARISON:  Chest radiograph 10/04/2017    FINDINGS:  Monitoring leads overlie the chest.  Interval detrimental change noting bilateral patchy alveolar and ground-glass opacities throughout both lungs with a somewhat peripheral predominance.  There is also bilateral mild diffuse nonspecific interstitial coarsening.  No large pleural effusion or pneumothorax.    Cardiomediastinal silhouette is  midline and within normal limits for age noting calcification of the arch.  Hilar contours are grossly within normal limits.  No acute osseous process seen.  PA and lateral views can be obtained.                                  CONSULTS:     None       ASSESSMENT & PLAN:     Primary Diagnosis:  Lower respiratory tract infection due to COVID-19 virus    Active Hospital Problems    Diagnosis  POA    *Lower respiratory tract infection due to COVID-19 virus [U07.1, J22]  Yes     Priority: 1 - High    Multifocal pneumonia [J18.9]  Yes     Priority: 2     Elevated troponin I level [R79.89]  Yes     Priority: 3     Essential hypertension [I10]  Yes    PAD (peripheral artery disease) [I73.9]  Yes    Dyslipidemia [E78.5]  Yes      Resolved Hospital Problems   No resolved problems to display.       Acute Hypoxemia secondary to SARS-CoV-2 (COVID-19) with evidence of bilateral pneumonia   As evidenced by history, physical exam, xray imaging, and laboratory studies   COVID-19 rapid testing positive   CRP, LDH, and ferritin markedly elevated   Maintain airborne isolation / droplet / contact precautions   Supplemental oxygen to maintain SpO2 >92%   Supportive care   Limit all Visitors   Avoid  nebulizer treatments to minimize aerosolization    Antivirals   Remdesivir - Pharmacy Remdesivir Consult for patients with hypoxemia and bilateral infiltrates on imaging    Corticosteroids   Dexamethasone 6mg PO (or IV) daily x up to 10 days in patients with hypoxemia   No clear data in transplant/immunocompromised, case-by-case with ID/Transplant if available     Fluids   Resuscitate IV if needed/dehydrated   Avoid maintenance IV fluids and maintain euvolemic state due to risk of worsening hyoxemia and development of ARDS with volume overload     Fever management - acetaminophen scheduled/PRN and ice packs   Cardiac - statin (atorvastatin 40mg daily) if at risk for CV disease   Diarrhea management -  loperamide   VTE Prophylaxis - standard dose enoxaparin, as per [facility] Protocol   Debility - PT and OT consultations as appropriate   Vitamin-C 500 b.i.d.   Multi vitamin   Check vitamin-D level    =======================    Elevated troponin  · No evidence of acute ST elevation MI   · Monitoring on telemetry  · Likely due to cardiac strain associated with acute infection as noted above  · Aspirin   · Supplemental oxygen  · Trend troponins    Essential Hypertension  · Goal while inpatient is a systolic blood pressure less than 160mmHg  · BP in acceptable range at this time  · Continue current home regimen with hold parameters  · PRN antihypertensives available    Hyperlipidemia   · Lipid panel - as an outpatient  · Cardiac diet  · Continue statin    Peripheral vascular disease   · No evidence of acute issue at this time  · Maintain adequate blood pressure control  · Heart healthy diet  · Aspirin  · Statin          ===================================================      VTE Risk Mitigation (From admission, onward)         Ordered     enoxaparin injection 40 mg  Every 24 hours      07/18/20 2106     IP VTE HIGH RISK PATIENT  Once      07/18/20 2106     Place sequential compression device  Until discontinued      07/18/20 2106                Adult PRN medications available   DVT prophylaxis given       DISPOSITION:     Will admit to the Hospital Medicine service for further evaluation and treatment.    Chart reviewed and updated where applicable.    High Risk Conditions:  Patient has a condition that poses threat to life and bodily function: Severe Respiratory Distress secondary to COVID-19 viral infection and bilateral      ===============================================================    Roney Zendejas MD, MPH  Department of Hospital Medicine   [facility]  [phone] pg  (7pm - 6am)          This note is dictated using [employer] voice recognition software.  There are word recognition  mistakes that are occasionally missed on review.

## 2020-07-20 NOTE — PLAN OF CARE
Pt continues on Vapotherm tolerating well. Pt tolerated turing for bath without decreasing oxygen saturation, pt did become short of breath.  Pt states that he would like to sit in chair this AM. Discussed fall risk and informed him to discuss with AM RN. Pt continues on IV antibiotics tolerating well. No skin issues to date. Will continue to discuss plan of care with patient.     Octavio CONNERN, RN

## 2020-07-20 NOTE — PROGRESS NOTES
[facility] Hospital Medicine  Progress Note    Patient Name: Joo York  MRN: [id number]  Patient Class: IP- Inpatient   Admission Date: 7/18/2020  Length of Stay: 2 days  Attending Physician: Adriana Zamora MD  Primary Care Provider: Keegan Watson MD        Subjective:     Principal Problem:Acute hypoxemic respiratory failure        HPI:  Joo York is a 80 y.o. male that (in part)  has a past medical history of Claudication, Essential hypertension, Essential hypertension, Hematuria, Hernia of abdominal cavity, and Multifocal pneumonia.  has a past surgical history that includes Vasectomy; Tonsillectomy; office cysto 2018; Cystoscopy with biopsy of bladder (N/A, 9/5/2018); and Atherectomy (1/23/2020). Presents to [facility] Emergency Department with report of shortness of breath, fever, chills last 3 days.  He was tested for COVID-19 2 days ago and positive.  He said dyspnea with exertion and a cough that started today.  Denies hemoptysis, stridor, or night sweats.  No nausea, vomiting diarrhea reported.  Patient is a former smoker.  Denies home oxygen use.  Code status discussed in the most remain a DNR DNI.  The family is aware of these wishes.  History is limited to respiratory distress as patient is speaking in short sentences.    In the emergency department routine laboratory studies and chest x-ray obtained.  Evidence of bilateral pneumonia.  Was supplemental oxygen and dexamethasone.  O2 sat 67% air prior to oxygen supplementation.  Okay to escalate to CPAP/BiPAP as.  Does not want to be intubated.    Hospital medicine has been asked to admit to inpatient in the ICU for further evaluation and treatment.     Overview/Hospital Course:  Mr. York presented with shortness of breath and fever.  He was admitted for acute respiratory failure secondary to COVID-19.  Treatment initiated with 100% O2 via non-rebreather, Rocephin, azithromycin, and IV dexamethasone.  Blood  culture growth to date.  He qualified for Remdesivir and he consented to start on 7/19.    Interval History: HR in the 40s while sleeping, but asymptomatic otherwise. No acute events.    Review of Systems   Constitutional: Negative for chills and fever.   Respiratory: Positive for shortness of breath. Negative for cough.    Cardiovascular: Negative for chest pain.     Objective:     Vital Signs (Most Recent):  Temp: 97.7 °F (36.5 °C) (07/19/20 0730)  Pulse: (!) 55 (07/19/20 1300)  Resp: (!) 45 (07/19/20 1300)  BP: 126/60 (07/19/20 1300)  SpO2: (!) 91 % (07/19/20 1300) Vital Signs (24h Range):  Temp:  [94.3 °F (34.6 °C)-97.7 °F (36.5 °C)] 97.7 °F (36.5 °C)  Pulse:  [44-81] 55  Resp:  [27-59] 45  SpO2:  [78 %-99 %] 91 %  BP: (110-154)/(55-82) 126/60     Weight: 76.1 kg (167 lb 12.3 oz)  Body mass index is 26.28 kg/m².    Intake/Output Summary (Last 24 hours) at 7/19/2020 1407  Last data filed at 7/19/2020 0900  Gross per 24 hour   Intake 350 ml   Output 330 ml   Net 20 ml      Physical Exam  Vitals signs and nursing note reviewed.   Constitutional:       Appearance: Normal appearance.   HENT:      Head: Normocephalic and atraumatic.      Nose: Nose normal.   Eyes:      Conjunctiva/sclera: Conjunctivae normal.      Pupils: Pupils are equal, round, and reactive to light.   Neck:      Musculoskeletal: Normal range of motion.   Cardiovascular:      Rate and Rhythm: Regular rhythm. Bradycardia present.   Pulmonary:      Comments: Course breath sounds with crackles and rhonchi bilaterally, able to speak in full sentences, mildly tachypneic  Abdominal:      General: Bowel sounds are normal. There is no distension.      Palpations: Abdomen is soft. There is no mass.      Tenderness: There is no abdominal tenderness. There is no guarding.      Hernia: No hernia is present.   Musculoskeletal: Normal range of motion.         General: No swelling.   Skin:     General: Skin is warm and dry.      Capillary Refill: Capillary refill  takes less than 2 seconds.   Neurological:      General: No focal deficit present.      Mental Status: He is alert and oriented to person, place, and time.         Significant Labs: All pertinent labs within the past 24 hours have been reviewed.    Significant Imaging: I have reviewed and interpreted all pertinent imaging results/findings within the past 24 hours.      Assessment/Plan:      * Acute hypoxemic respiratory failure  Secondary to COVID-19 infection  Currently in need of 100% percent non-rebreather to maintain  Continue Rocephin and azithromycin (5 days), IV dexamethasone (10 days) and supportive care  Patient has qualifies for Remdesivir, consent given.  Will start 5 day treatment today.  Will add Vapotherm and attempt to wean from non-rebreather and allow patient to eat    Elevated troponin I level  Trended. No reported chest pain.      Lower respiratory tract infection due to COVID-19 virus  As discussed above    Multifocal pneumonia  As discussed above      Essential hypertension  Continue amlodipine    PAD (peripheral artery disease)  Continue ASA and plavix    Dyslipidemia  Currently not on medications      VTE Risk Mitigation (From admission, onward)         Ordered     enoxaparin injection 40 mg  Every 24 hours      07/18/20 2106     IP VTE HIGH RISK PATIENT  Once      07/18/20 2106     Place sequential compression device  Until discontinued      07/18/20 2106                Critical care time spent on the evaluation and treatment of severe organ dysfunction, review of pertinent labs and imaging studies, discussions with consulting providers and discussions with patient/family: 60 minutes.      Adriana Zamora MD  Department of Hospital Medicine   [facility]

## 2020-07-20 NOTE — CARE UPDATE
[facility]  ICU Shift Summary  Date: 7/20/2020      COVID Test: (+)  Isolation: Airborne and Contact and Droplet     Prehospitalization: Home  Admit Date / LOS : 7/18/2020/ 2 days    Diagnosis: Acute hypoxemic respiratory failure    Consults:        Active: Pulm CC       Needed: N/A     Code Status: DNR   Advanced Directive: <no information>    LDA: PIV       Central Lines/Site/Justification:Patient Does Not Have Central Line       Urinary Cath/Order/Justification:Patient Does Not Have Urinary Catheter    Vasopressors/Infusions:        GOALS: Volume/ Hemodynamic: N/A                     RASS: 0  alert and calm    Pain Management: PO       Pain Controlled: yes     Rhythm: NSR    Respiratory Device: Vapotherm    Oxygen Concentration (%):  [100] 100             Most Recent SBT/ SAT: N/A       MOVE Screen: FAIL    VTE Prophylaxis: Pharm and Mechanical  Mobility: Bedrest  Stress Ulcer Prophylaxis: Yes    Dietary: PO  Tolerance: no  /  Advancement: @ goal    I & O (24h):    Intake/Output Summary (Last 24 hours) at 7/20/2020 1837  Last data filed at 7/20/2020 1800  Gross per 24 hour   Intake 850 ml   Output 1005 ml   Net -155 ml        Restraints: No    Significant Dates:  Post Op Date: N/A  Rescue Date: N/A  Imaging/ Diagnostics: N/A    Noteworthy Labs:  none    CBC/Anemia Labs: Coags:    Recent Labs   Lab 07/18/20  1405 07/19/20 0421   WBC 9.82 10.01   HGB 13.1* 12.7*   HCT 39.3* 38.3*    235   MCV 90 92   RDW 12.9 13.2   FERRITIN 2,080*  --     No results for input(s): PT, INR, APTT in the last 168 hours.     Chemistries:   Recent Labs   Lab 07/19/20  0421 07/20/20  0227   * 140   K 4.6 5.0    109   CO2 23 22*   BUN 26* 27*   CREATININE 0.9 0.9   CALCIUM 9.1 9.1   PROT 6.6 6.5   BILITOT 0.4 0.4   ALKPHOS 65 78   ALT 22 53*   AST 47* 67*   MG 2.9*  --    PHOS 2.5*  --         Cardiac Enzymes: Ejection Fractions:    Recent Labs     07/18/20  1405  07/18/20  2144 07/19/20  0421   CPK  113  --   --   --    TROPONINI 0.861*   < > 0.573* 0.290*    < > = values in this interval not displayed.    Nuc Rest EF   Date Value Ref Range Status   06/11/2020 77  Final        POCT Glucose: HbA1c:    Recent Labs   Lab 07/18/20  2228   POCTGLUCOSE 252*    No results found for: HGBA1C        ICU LOS 1d 22h  Level of Care: Critical Care    Shift Summary/Plan for the shift: remains on vapotherm/NRB mask to maintain oxygen sat over 90%.  BiPAP order PRN   Short of breath with little exertion.  Wife updated by dr tapia

## 2020-07-20 NOTE — EICU
Rounding (Video Assessment):  Yes    Intervention Initiated From:  [facility] / EICU    Nellie Communicated with Bedside Nurse regarding:  Respiratory    Comments:  Ellen notified of spO2 78% .

## 2020-07-20 NOTE — CARE UPDATE
JaylenMaria Luz Spouse [phone]     Updated on patient's status and all questions and concerns addressed.     LUL Zamora MD

## 2020-07-21 PROBLEM — U07.1 COVID-19: Status: ACTIVE | Noted: 2020-01-01

## 2020-07-21 NOTE — SUBJECTIVE & OBJECTIVE
Interval History: Patient had nasal cannula out of place and with increased work of breathing resulted from that. More winded and tired.    Review of Systems   Constitutional: Negative for chills and fever.   Respiratory: Positive for shortness of breath. Negative for cough.    Cardiovascular: Negative for chest pain.     Objective:     Vital Signs (Most Recent):  Temp: 98.7 °F (37.1 °C) (07/20/20 1930)  Pulse: 64 (07/20/20 2200)  Resp: (!) 44 (07/20/20 2200)  BP: (!) 167/73 (07/20/20 2200)  SpO2: (!) 91 % (07/20/20 2200) Vital Signs (24h Range):  Temp:  [98 °F (36.7 °C)-99 °F (37.2 °C)] 98.7 °F (37.1 °C)  Pulse:  [54-80] 64  Resp:  [25-60] 44  SpO2:  [77 %-98 %] 91 %  BP: (125-168)/(60-85) 167/73     Weight: 76.1 kg (167 lb 12.3 oz)  Body mass index is 26.28 kg/m².    Intake/Output Summary (Last 24 hours) at 7/20/2020 2250  Last data filed at 7/20/2020 1945  Gross per 24 hour   Intake 910 ml   Output 1205 ml   Net -295 ml      Physical Exam  Vitals signs and nursing note reviewed.   Constitutional:       Appearance: Normal appearance.   HENT:      Head: Normocephalic and atraumatic.      Nose: Nose normal.   Eyes:      Conjunctiva/sclera: Conjunctivae normal.      Pupils: Pupils are equal, round, and reactive to light.   Neck:      Musculoskeletal: Normal range of motion.   Cardiovascular:      Rate and Rhythm: Normal rate and regular rhythm.   Pulmonary:      Comments: Course breath sounds with crackles and rhonchi bilaterally, able to speak in short sentences, more tachypnic and increased work of breathing  Abdominal:      General: Bowel sounds are normal. There is no distension.      Palpations: Abdomen is soft. There is no mass.      Tenderness: There is no abdominal tenderness. There is no guarding.      Hernia: No hernia is present.   Musculoskeletal: Normal range of motion.         General: No swelling.   Skin:     General: Skin is warm and dry.      Capillary Refill: Capillary refill takes less than 2  seconds.   Neurological:      General: No focal deficit present.      Mental Status: He is alert and oriented to person, place, and time.         Significant Labs: All pertinent labs within the past 24 hours have been reviewed.    Significant Imaging: I have reviewed and interpreted all pertinent imaging results/findings within the past 24 hours.

## 2020-07-21 NOTE — RESPIRATORY THERAPY
Patient unable to maintain adequate oxygen saturation on vapotherm at 40 l 100% with 100% non-rebreather in use. Place patient on bipap 10/5, fi02 100-%. sp02 96%

## 2020-07-21 NOTE — HPI
Joo York is a 80 y.o. male with a past medical history of Claudication, Essential hypertension, Hematuria, Hernia of abdominal cavity, and Multifocal pneumonia. He presented to [facility] Emergency Department with report of shortness of breath, fever, chills x 3 days.  He was tested for positive for COVID-19 on 7/16. He said dyspnea with exertion and a cough that started today.  Denies hemoptysis, stridor, or night sweats.  No nausea, vomiting diarrhea reported. Patient is a former smoker. Denies home oxygen use.     In the emergency department routine laboratory studies and chest x-ray obtained. Evidence of bilateral pneumonia. Was supplemental oxygen and dexamethasone. O2 sat 67% air prior to oxygen supplementation.     Mr. York is admitted for acute respiratory failure secondary to COVID-19. Treatment initiated on admission with 100% O2 via non-rebreather, Rocephin, azithromycin, remdesivir, and IV dexamethasone.

## 2020-07-21 NOTE — EICU
eICU Physician Brief Note    Called about high RR on BIPAP. On camera,  on BIPAP 14, 12/5, iTime 1, rise 4, FiO2 100%, sats 91%. Patient's RR is 50-60. Chart reviewed, Mr York is an 80 year old man with COVID-19 pneumonia.  A/P:  Acute respiratory failure due to COVID-19.  Increase IPAP to 14, EPAP to 10.  - Haldol 5 mg IVP x1. If ineffective will try MS 1 mg IVP for dyspnea, if ineffective will start Precedex.

## 2020-07-21 NOTE — ASSESSMENT & PLAN NOTE
Secondary to COVID-19 infection  Needing 100% percent non-rebreather to maintain sats  Continue Rocephin and azithromycin (5 days), IV dexamethasone (10 days) and supportive care  Patient has qualifies for Remdesivir, consent given - started 5 day treatment on 7/19  Able to tolerate Vapotherm at times yesterday and was doing better  Clinically appears worse today with increased work of breathing  Ddimer very high - will convert to full anticoagulation with lovenox today from prophylaxis  Will have PRN BIPAP orders for patient if needed  Will reconsult Pulmonary to lend their expertise

## 2020-07-21 NOTE — PROGRESS NOTES
[facility] Hospital Medicine  Progress Note    Patient Name: Joo York  MRN: [id number]  Patient Class: IP- Inpatient   Admission Date: 7/18/2020  Length of Stay: 2 days  Attending Physician: Adriana Zamora MD  Primary Care Provider: Keegan Watson MD        Subjective:     Principal Problem:Acute hypoxemic respiratory failure        HPI:  Joo York is a 80 y.o. male that (in part)  has a past medical history of Claudication, Essential hypertension, Essential hypertension, Hematuria, Hernia of abdominal cavity, and Multifocal pneumonia.  has a past surgical history that includes Vasectomy; Tonsillectomy; office cysto 2018; Cystoscopy with biopsy of bladder (N/A, 9/5/2018); and Atherectomy (1/23/2020). Presents to [facility] Emergency Department with report of shortness of breath, fever, chills last 3 days.  He was tested for COVID-19 2 days ago and positive.  He said dyspnea with exertion and a cough that started today.  Denies hemoptysis, stridor, or night sweats.  No nausea, vomiting diarrhea reported.  Patient is a former smoker.  Denies home oxygen use.  Code status discussed in the most remain a DNR DNI.  The family is aware of these wishes.  History is limited to respiratory distress as patient is speaking in short sentences.    In the emergency department routine laboratory studies and chest x-ray obtained.  Evidence of bilateral pneumonia.  Was supplemental oxygen and dexamethasone.  O2 sat 67% air prior to oxygen supplementation.  Okay to escalate to CPAP/BiPAP as.  Does not want to be intubated.    Hospital medicine has been asked to admit to inpatient in the ICU for further evaluation and treatment.     Overview/Hospital Course:  Mr. York presented with shortness of breath and fever.  He was admitted for acute respiratory failure secondary to COVID-19.  Treatment initiated with 100% O2 via non-rebreather, Rocephin, azithromycin, and IV dexamethasone.  Blood  culture growth to date.  He qualified for Remdesivir and he consented to start on 7/19.    Interval History: Patient had nasal cannula out of place and with increased work of breathing resulted from that. More winded and tired.    Review of Systems   Constitutional: Negative for chills and fever.   Respiratory: Positive for shortness of breath. Negative for cough.    Cardiovascular: Negative for chest pain.     Objective:     Vital Signs (Most Recent):  Temp: 98.7 °F (37.1 °C) (07/20/20 1930)  Pulse: 64 (07/20/20 2200)  Resp: (!) 44 (07/20/20 2200)  BP: (!) 167/73 (07/20/20 2200)  SpO2: (!) 91 % (07/20/20 2200) Vital Signs (24h Range):  Temp:  [98 °F (36.7 °C)-99 °F (37.2 °C)] 98.7 °F (37.1 °C)  Pulse:  [54-80] 64  Resp:  [25-60] 44  SpO2:  [77 %-98 %] 91 %  BP: (125-168)/(60-85) 167/73     Weight: 76.1 kg (167 lb 12.3 oz)  Body mass index is 26.28 kg/m².    Intake/Output Summary (Last 24 hours) at 7/20/2020 2250  Last data filed at 7/20/2020 1945  Gross per 24 hour   Intake 910 ml   Output 1205 ml   Net -295 ml      Physical Exam  Vitals signs and nursing note reviewed.   Constitutional:       Appearance: Normal appearance.   HENT:      Head: Normocephalic and atraumatic.      Nose: Nose normal.   Eyes:      Conjunctiva/sclera: Conjunctivae normal.      Pupils: Pupils are equal, round, and reactive to light.   Neck:      Musculoskeletal: Normal range of motion.   Cardiovascular:      Rate and Rhythm: Normal rate and regular rhythm.   Pulmonary:      Comments: Course breath sounds with crackles and rhonchi bilaterally, able to speak in short sentences, more tachypnic and increased work of breathing  Abdominal:      General: Bowel sounds are normal. There is no distension.      Palpations: Abdomen is soft. There is no mass.      Tenderness: There is no abdominal tenderness. There is no guarding.      Hernia: No hernia is present.   Musculoskeletal: Normal range of motion.         General: No swelling.   Skin:      General: Skin is warm and dry.      Capillary Refill: Capillary refill takes less than 2 seconds.   Neurological:      General: No focal deficit present.      Mental Status: He is alert and oriented to person, place, and time.         Significant Labs: All pertinent labs within the past 24 hours have been reviewed.    Significant Imaging: I have reviewed and interpreted all pertinent imaging results/findings within the past 24 hours.      Assessment/Plan:      * Acute hypoxemic respiratory failure  Secondary to COVID-19 infection  Needing 100% percent non-rebreather to maintain sats  Continue Rocephin and azithromycin (5 days), IV dexamethasone (10 days) and supportive care  Patient has qualifies for Remdesivir, consent given - started 5 day treatment on 7/19  Able to tolerate Vapotherm at times yesterday and was doing better  Clinically appears worse today with increased work of breathing  Ddimer very high - will convert to full anticoagulation with lovenox today from prophylaxis  Will have PRN BIPAP orders for patient if needed  Will reconsult Pulmonary to lend their expertise    Elevated troponin I level  Trended. No reported chest pain.    Lower respiratory tract infection due to COVID-19 virus  As discussed above    Multifocal pneumonia  As discussed above    Essential hypertension  Continue amlodipine    PAD (peripheral artery disease)  Continue ASA and plavix    Dyslipidemia  Currently not on medications, will add statin      VTE Risk Mitigation (From admission, onward)         Ordered     enoxaparin injection 80 mg  Every 12 hours (non-standard times)      07/20/20 0653     IP VTE HIGH RISK PATIENT  Once      07/18/20 2106     Place sequential compression device  Until discontinued      07/18/20 2106                Critical care time spent on the evaluation and treatment of severe organ dysfunction, review of pertinent labs and imaging studies, discussions with consulting providers and discussions with  patient/family: 60 minutes.      Adriana Zamora MD  Department of Hospital Medicine   [facility]

## 2020-07-21 NOTE — CARE UPDATE
[facility]  ICU Shift Summary  Date: 7/21/2020      COVID Test: (+)  Isolation: Airborne and Contact and Droplet     Prehospitalization: Home  Admit Date / LOS : 7/18/2020/ 3 days    Diagnosis: Acute hypoxemic respiratory failure    Consults:        Active: Pulm CC       Needed: N/A     Code Status: DNR   Advanced Directive: <no information>    LDA: BIPAP and PIV       Central Lines/Site/Justification:Patient Does Not Have Central Line       Urinary Cath/Order/Justification:Patient Does Not Have Urinary Catheter    Vasopressors/Infusions:        GOALS: Volume/ Hemodynamic: N/A                     RASS: 0  alert and calm    Pain Management: none       Pain Controlled: not applicable     Rhythm: NSR    Respiratory Device: Bipap    Oxygen Concentration (%):  [100] 100             Most Recent SBT/ SAT: N/A       MOVE Screen: FAIL    VTE Prophylaxis: Pharm and Mechanical  Mobility: Bedrest  Stress Ulcer Prophylaxis: Yes    Dietary: PO  Tolerance: no  /  Advancement: @ goal    I & O (24h):    Intake/Output Summary (Last 24 hours) at 7/21/2020 1813  Last data filed at 7/21/2020 1738  Gross per 24 hour   Intake 830 ml   Output 1050 ml   Net -220 ml        Restraints: No    Significant Dates:  Post Op Date: N/A  Rescue Date: N/A  Imaging/ Diagnostics: N/A    Noteworthy Labs:  none    CBC/Anemia Labs: Coags:    Recent Labs   Lab 07/18/20  1405 07/19/20  0421 07/21/20  0344   WBC 9.82 10.01 17.44*   HGB 13.1* 12.7* 15.5   HCT 39.3* 38.3* 46.2    235 382*   MCV 90 92 90   RDW 12.9 13.2 13.2   FERRITIN 2,080*  --   --     No results for input(s): PT, INR, APTT in the last 168 hours.     Chemistries:   Recent Labs   Lab 07/19/20  0421 07/20/20  0227 07/21/20  0344   * 140 141   K 4.6 5.0 4.2    109 107   CO2 23 22* 21*   BUN 26* 27* 26*   CREATININE 0.9 0.9 0.8   CALCIUM 9.1 9.1 9.4   PROT 6.6 6.5 7.2   BILITOT 0.4 0.4 0.8   ALKPHOS 65 78 126   ALT 22 53* 74*   AST 47* 67* 72*   MG 2.9*  --    --    PHOS 2.5*  --   --         Cardiac Enzymes: Ejection Fractions:    Recent Labs     07/18/20  2144 07/19/20  0421   TROPONINI 0.573* 0.290*    Nuc Rest EF   Date Value Ref Range Status   06/11/2020 77  Final        POCT Glucose: HbA1c:    Recent Labs   Lab 07/18/20  2228   POCTGLUCOSE 252*    No results found for: HGBA1C        ICU LOS 2d 22h  Level of Care: Critical Care    Shift Summary/Plan for the shift: BiPAP this am, more awake, with less labored respirations

## 2020-07-21 NOTE — H&P
[facility]  Pulmonology  H&P    Patient Name: Joo York  MRN: [id number]  Admission Date: 7/18/2020  Code Status: DNR  Primary Care Provider: Keegan Watson MD   Principal Problem: Acute hypoxemic respiratory failure    Subjective:     HPI:  Joo York is a 80 y.o. male with a past medical history of Claudication, Essential hypertension, Hematuria, Hernia of abdominal cavity, and Multifocal pneumonia. He presented to [facility] Emergency Department with report of shortness of breath, fever, chills x 3 days.  He was tested for positive for COVID-19 on 7/16. He said dyspnea with exertion and a cough that started today.  Denies hemoptysis, stridor, or night sweats.  No nausea, vomiting diarrhea reported. Patient is a former smoker. Denies home oxygen use.     In the emergency department routine laboratory studies and chest x-ray obtained. Evidence of bilateral pneumonia. Was supplemental oxygen and dexamethasone. O2 sat 67% air prior to oxygen supplementation.     Mr. York is admitted for acute respiratory failure secondary to COVID-19. Treatment initiated on admission with 100% O2 via non-rebreather, Rocephin, azithromycin, remdesivir, and IV dexamethasone.    Past Medical History:   Diagnosis Date    Acute hypoxemic respiratory failure 7/19/2020    Claudication 1/23/2020    Essential hypertension 1/24/2020    Essential hypertension 1/24/2020    Hematuria     Hernia of abdominal cavity     Multifocal pneumonia 7/18/2020       Past Surgical History:   Procedure Laterality Date    ATHERECTOMY  1/23/2020    Procedure: Atherectomy;  Surgeon: Triston Hightower MD;  Location: [facility] CATH LAB;  Service: Cardiology;;  SFA mid distal    CYSTOSCOPY WITH BIOPSY OF BLADDER N/A 9/5/2018    Procedure: CYSTOSCOPY, WITH BLADDER BIOPSY;  Surgeon: KADIE Johnson MD;  Location: [facility] OR;  Service: Urology;  Laterality: N/A;  RN PRE OP 8-30-18------NEED CONSENT    office cysto  2018      TONSILLECTOMY      VASECTOMY         Review of patient's allergies indicates:  No Known Allergies    Family History     Problem Relation (Age of Onset)    Cancer Brother    Osteoarthritis Sister        Tobacco Use    Smoking status: Former Smoker     Quit date: 1980     Years since quittin.5    Smokeless tobacco: Never Used   Substance and Sexual Activity    Alcohol use: Not Currently     Frequency: Never     Comment: RARELY    Drug use: No    Sexual activity: Yes     Partners: Female         Review of Systems   Unable to perform ROS: Acuity of condition     Objective:     Vital Signs (Most Recent):  Temp: 98.8 °F (37.1 °C) (20 1130)  Pulse: 62 (20 1430)  Resp: (!) 36 (20 1430)  BP: 139/67 (20 1430)  SpO2: 100 % (20 1430) Vital Signs (24h Range):  Temp:  [98.7 °F (37.1 °C)-99.7 °F (37.6 °C)] 98.8 °F (37.1 °C)  Pulse:  [] 62  Resp:  [36-68] 36  SpO2:  [78 %-100 %] 100 %  BP: (120-199)/(50-88) 139/67     Weight: 76.1 kg (167 lb 12.3 oz)  Body mass index is 26.28 kg/m².      Intake/Output Summary (Last 24 hours) at 2020 1502  Last data filed at 2020 0900  Gross per 24 hour   Intake 750 ml   Output 1120 ml   Net -370 ml       Physical Exam  Vitals signs and nursing note reviewed.   Constitutional:       Appearance: Normal appearance.      Interventions: Face mask in place.   HENT:      Head: Normocephalic and atraumatic.      Nose: Nose normal.   Eyes:      Conjunctiva/sclera: Conjunctivae normal.      Pupils: Pupils are equal, round, and reactive to light.   Neck:      Musculoskeletal: Normal range of motion.   Cardiovascular:      Rate and Rhythm: Normal rate.   Pulmonary:      Breath sounds: Examination of the right-middle field reveals decreased breath sounds. Examination of the left-middle field reveals decreased breath sounds. Examination of the right-lower field reveals decreased breath sounds and rhonchi. Examination of the left-lower field  reveals decreased breath sounds and rhonchi. Decreased breath sounds and rhonchi present. No wheezing.      Comments: Remains tachypneic.  Abdominal:      General: Bowel sounds are normal. There is no distension.      Palpations: Abdomen is soft. There is no mass.      Tenderness: There is no abdominal tenderness. There is no guarding.      Hernia: No hernia is present.   Musculoskeletal: Normal range of motion.         General: No swelling.   Skin:     General: Skin is warm and dry.      Capillary Refill: Capillary refill takes less than 2 seconds.   Neurological:      General: No focal deficit present.      Mental Status: He is alert and oriented to person, place, and time.         Vents:  Oxygen Concentration (%): 100 (07/21/20 0730)    Lines/Drains/Airways     Drain            Male External Urinary Catheter 07/21/20 0521 Medium less than 1 day          Peripheral Intravenous Line                 Peripheral IV - Single Lumen 07/18/20 1905 20 G Left Wrist 2 days         Peripheral IV - Single Lumen 07/18/20 1926 20 G Right Hand 2 days                Significant Labs:    CBC/Anemia Profile:  Recent Labs   Lab 07/21/20  0344   WBC 17.44*   HGB 15.5   HCT 46.2   *   MCV 90   RDW 13.2        Chemistries:  Recent Labs   Lab 07/20/20  0227 07/21/20  0344    141   K 5.0 4.2    107   CO2 22* 21*   BUN 27* 26*   CREATININE 0.9 0.8   CALCIUM 9.1 9.4   ALBUMIN 2.6* 3.0*   PROT 6.5 7.2   BILITOT 0.4 0.8   ALKPHOS 78 126   ALT 53* 74*   AST 67* 72*       All pertinent labs within the past 24 hours have been reviewed.    Significant Imaging:   I have reviewed all pertinent imaging results/findings within the past 24 hours.    Assessment/Plan:     * Acute hypoxemic respiratory failure  2/2 COVID-19    --continue dexamethasone/remdesivir for full course  --continue CTX/azithro x 5 days  --continue BiPAP with vapotherm intermittently as tolerated  --prn blood gases  --CXR (7/20) with bilateral patchy  interstitial and airspace opacities. Sputum cx ordered. Needs to be obtained.  --consider Lasix administration for euvolemic to net negative goal.    COVID-19  --see acute hypoxemic resp fx             Tacos Vasques NP  Pulmonology  [facility]

## 2020-07-21 NOTE — ASSESSMENT & PLAN NOTE
2/2 COVID-19    --continue dexamethasone/remdesivir for full course  --continue CTX/azithro x 5 days  --continue BiPAP with vapotherm intermittently as tolerated  --prn blood gases  --CXR (7/20) with bilateral patchy interstitial and airspace opacities. Sputum cx ordered. Needs to be obtained.  --consider Lasix administration for euvolemic to net negative goal.

## 2020-07-21 NOTE — SUBJECTIVE & OBJECTIVE
Past Medical History:   Diagnosis Date    Acute hypoxemic respiratory failure 2020    Claudication 2020    Essential hypertension 2020    Essential hypertension 2020    Hematuria     Hernia of abdominal cavity     Multifocal pneumonia 2020       Past Surgical History:   Procedure Laterality Date    ATHERECTOMY  2020    Procedure: Atherectomy;  Surgeon: Triston Hightower MD;  Location: [facility] CATH LAB;  Service: Cardiology;;  SFA mid distal    CYSTOSCOPY WITH BIOPSY OF BLADDER N/A 2018    Procedure: CYSTOSCOPY, WITH BLADDER BIOPSY;  Surgeon: KADIE Johnson MD;  Location: [facility] OR;  Service: Urology;  Laterality: N/A;  RN PRE OP 18------NEED CONSENT    office cysto 2018      TONSILLECTOMY      VASECTOMY         Review of patient's allergies indicates:  No Known Allergies    Family History     Problem Relation (Age of Onset)    Cancer Brother    Osteoarthritis Sister        Tobacco Use    Smoking status: Former Smoker     Quit date:      Years since quittin.5    Smokeless tobacco: Never Used   Substance and Sexual Activity    Alcohol use: Not Currently     Frequency: Never     Comment: RARELY    Drug use: No    Sexual activity: Yes     Partners: Female         Review of Systems   Unable to perform ROS: Acuity of condition     Objective:     Vital Signs (Most Recent):  Temp: 98.8 °F (37.1 °C) (20 1130)  Pulse: 62 (20 1430)  Resp: (!) 36 (20 1430)  BP: 139/67 (20 1430)  SpO2: 100 % (20 1430) Vital Signs (24h Range):  Temp:  [98.7 °F (37.1 °C)-99.7 °F (37.6 °C)] 98.8 °F (37.1 °C)  Pulse:  [] 62  Resp:  [36-68] 36  SpO2:  [78 %-100 %] 100 %  BP: (120-199)/(50-88) 139/67     Weight: 76.1 kg (167 lb 12.3 oz)  Body mass index is 26.28 kg/m².      Intake/Output Summary (Last 24 hours) at 2020 1502  Last data filed at 2020 0900  Gross per 24 hour   Intake 750 ml   Output 1120 ml   Net -370 ml       Physical  Exam  Vitals signs and nursing note reviewed.   Constitutional:       Appearance: Normal appearance.      Interventions: Face mask in place.   HENT:      Head: Normocephalic and atraumatic.      Nose: Nose normal.   Eyes:      Conjunctiva/sclera: Conjunctivae normal.      Pupils: Pupils are equal, round, and reactive to light.   Neck:      Musculoskeletal: Normal range of motion.   Cardiovascular:      Rate and Rhythm: Normal rate.   Pulmonary:      Breath sounds: Examination of the right-middle field reveals decreased breath sounds. Examination of the left-middle field reveals decreased breath sounds. Examination of the right-lower field reveals decreased breath sounds and rhonchi. Examination of the left-lower field reveals decreased breath sounds and rhonchi. Decreased breath sounds and rhonchi present. No wheezing.      Comments: Remains tachypneic.  Abdominal:      General: Bowel sounds are normal. There is no distension.      Palpations: Abdomen is soft. There is no mass.      Tenderness: There is no abdominal tenderness. There is no guarding.      Hernia: No hernia is present.   Musculoskeletal: Normal range of motion.         General: No swelling.   Skin:     General: Skin is warm and dry.      Capillary Refill: Capillary refill takes less than 2 seconds.   Neurological:      General: No focal deficit present.      Mental Status: He is alert and oriented to person, place, and time.         Vents:  Oxygen Concentration (%): 100 (07/21/20 0730)    Lines/Drains/Airways     Drain            Male External Urinary Catheter 07/21/20 0521 Medium less than 1 day          Peripheral Intravenous Line                 Peripheral IV - Single Lumen 07/18/20 1905 20 G Left Wrist 2 days         Peripheral IV - Single Lumen 07/18/20 1926 20 G Right Hand 2 days                Significant Labs:    CBC/Anemia Profile:  Recent Labs   Lab 07/21/20  0344   WBC 17.44*   HGB 15.5   HCT 46.2   *   MCV 90   RDW 13.2         Chemistries:  Recent Labs   Lab 07/20/20 0227 07/21/20  0344    141   K 5.0 4.2    107   CO2 22* 21*   BUN 27* 26*   CREATININE 0.9 0.8   CALCIUM 9.1 9.4   ALBUMIN 2.6* 3.0*   PROT 6.5 7.2   BILITOT 0.4 0.8   ALKPHOS 78 126   ALT 53* 74*   AST 67* 72*       All pertinent labs within the past 24 hours have been reviewed.    Significant Imaging:   I have reviewed all pertinent imaging results/findings within the past 24 hours.

## 2020-07-21 NOTE — NURSING
Pt very sob with even slight exertion (i.e. using urinal, turning to clean). C/o cough that makes it more difficult to catch his breath. Call placed to eICU for cough medicine.

## 2020-07-21 NOTE — NURSING
[facility]  ICU Multidisciplinary Bedside Rounds   SUMMARY     Date: 7/21/2020    Prehospitalization: Home  Admit Date / LOS : 7/18/2020/ 3 days    Diagnosis: Acute hypoxemic respiratory failure    Consults:        Active: N/A       Needed: Pulm CC     Code Status: DNR   Advanced Directive: <no information>    LDA: BIPAP       Central Lines/Site/Justification:Patient Does Not Have Central Line       Urinary Cath/Order/Justification:Patient Does Not Have Urinary Catheter    Vasopressors/Infusions:        GOALS: Volume/ Hemodynamic: N/A                     RASS: N/A    CAM ICU: Negative  Pain Management: none       Pain Controlled: not applicable     Rhythm: NSR    Respiratory Device: Vapotherm and Bipap    Oxygen Concentration (%):  [100] 100             Most Recent SBT/ SAT: N/A       MOVE Screen: FAIL    VTE Prophylaxis: Pharm  Mobility: Bedrest  Stress Ulcer Prophylaxis: Yes    Dietary: PO  Tolerance: yes  /  Advancement: no    Isolation: Airborne and Contact and Droplet    Restraints: No    Significant Dates:  Post Op Date: N/A  Rescue Date: N/A  Imaging/ Diagnostics: N/A    Noteworthy Labs:  none    CBC/Anemia Labs: Coags:    Recent Labs   Lab 07/18/20  1405 07/19/20  0421 07/21/20  0344   WBC 9.82 10.01 17.44*   HGB 13.1* 12.7* 15.5   HCT 39.3* 38.3* 46.2    235 382*   MCV 90 92 90   RDW 12.9 13.2 13.2   FERRITIN 2,080*  --   --     No results for input(s): PT, INR, APTT in the last 168 hours.     Chemistries:   Recent Labs   Lab 07/19/20  0421 07/20/20  0227 07/21/20  0344   * 140 141   K 4.6 5.0 4.2    109 107   CO2 23 22* 21*   BUN 26* 27* 26*   CREATININE 0.9 0.9 0.8   CALCIUM 9.1 9.1 9.4   PROT 6.6 6.5 7.2   BILITOT 0.4 0.4 0.8   ALKPHOS 65 78 126   ALT 22 53* 74*   AST 47* 67* 72*   MG 2.9*  --   --    PHOS 2.5*  --   --         Cardiac Enzymes: Ejection Fractions:    Recent Labs     07/18/20  1405  07/18/20  2144 07/19/20  4073     --   --   --    TROPONINI  0.861*   < > 0.573* 0.290*    < > = values in this interval not displayed.    Nuc Rest EF   Date Value Ref Range Status   06/11/2020 77  Final        POCT Glucose: HbA1c:    Recent Labs   Lab 07/18/20  2228   POCTGLUCOSE 252*    No results found for: HGBA1C     Needs from Care Team: none     ICU LOS 2d 10h  Level of Care: Critical Care

## 2020-07-21 NOTE — PLAN OF CARE
Pt placed on Bipap overnight for increased work of breathing, settings adjusted per eICU. Guaifenasin added for persistent cough. Still with respiratory rate 40-50s, anxious, Haldol ordered for anxiety. Plan of care reviewed with patient.

## 2020-07-22 NOTE — PROGRESS NOTES
[facility]  Pulmonology  Progress Note    Patient Name: Joo York  MRN: [id number]  Admission Date: 7/18/2020  Hospital Length of Stay: 4 days  Code Status: DNR  Attending Provider: May Graff MD  Primary Care Provider: Keegan Watson MD   Principal Problem: Acute hypoxemic respiratory failure    Subjective:     Interval History: No acute overnight events. Patient remains on BiPAP, did not tolerate vapotherm trial. Continue to wean BiPAP and diuresis initiated with spot dose Lasix.    Objective:     Vital Signs (Most Recent):  Temp: 99.7 °F (37.6 °C) (07/22/20 0800)  Pulse: 79 (07/22/20 0945)  Resp: (!) 55 (07/22/20 0945)  BP: (!) 169/80 (07/22/20 0930)  SpO2: 98 % (07/22/20 0945) Vital Signs (24h Range):  Temp:  [98.1 °F (36.7 °C)-99.7 °F (37.6 °C)] 99.7 °F (37.6 °C)  Pulse:  [55-84] 79  Resp:  [31-56] 55  SpO2:  [87 %-100 %] 98 %  BP: (113-184)/() 169/80     Weight: 76.1 kg (167 lb 12.3 oz)  Body mass index is 26.28 kg/m².      Intake/Output Summary (Last 24 hours) at 7/22/2020 1021  Last data filed at 7/22/2020 0300  Gross per 24 hour   Intake 630 ml   Output 780 ml   Net -150 ml       Physical Exam  Vitals signs and nursing note reviewed.   Constitutional:       Appearance: Normal appearance.      Interventions: Face mask in place.   HENT:      Head: Normocephalic and atraumatic.      Nose: Nose normal.   Eyes:      Conjunctiva/sclera: Conjunctivae normal.      Pupils: Pupils are equal, round, and reactive to light.   Neck:      Musculoskeletal: Normal range of motion.   Cardiovascular:      Rate and Rhythm: Normal rate.   Pulmonary:      Breath sounds: Examination of the right-lower field reveals decreased breath sounds and rhonchi. Examination of the left-lower field reveals decreased breath sounds and rhonchi. Decreased breath sounds and rhonchi present. No wheezing.      Comments: Remains tachypneic.  Abdominal:      General: Bowel sounds are normal. There is no distension.       Palpations: Abdomen is soft. There is no mass.      Tenderness: There is no abdominal tenderness. There is no guarding.      Hernia: No hernia is present.   Musculoskeletal: Normal range of motion.         General: No swelling.   Skin:     General: Skin is warm and dry.      Capillary Refill: Capillary refill takes less than 2 seconds.   Neurological:      General: No focal deficit present.      Mental Status: He is alert and oriented to person, place, and time.         Vents:  Oxygen Concentration (%): 80 (07/22/20 0815)    Lines/Drains/Airways     Drain            Male External Urinary Catheter 07/21/20 0521 Medium 1 day          Peripheral Intravenous Line                 Peripheral IV - Single Lumen 07/18/20 1905 20 G Left Wrist 3 days         Peripheral IV - Single Lumen 07/18/20 1926 20 G Right Hand 3 days                Significant Labs:    CBC/Anemia Profile:  Recent Labs   Lab 07/21/20  0344 07/22/20  0255   WBC 17.44* 14.25*   HGB 15.5 13.9*   HCT 46.2 41.7   * 320   MCV 90 91   RDW 13.2 13.5        Chemistries:  Recent Labs   Lab 07/21/20  0344 07/22/20  0255    140   K 4.2 4.5    108   CO2 21* 23   BUN 26* 29*   CREATININE 0.8 0.8   CALCIUM 9.4 8.7   ALBUMIN 3.0* 2.5*   PROT 7.2 6.2   BILITOT 0.8 0.8   ALKPHOS 126 133   ALT 74* 59*   AST 72* 65*       All pertinent labs within the past 24 hours have been reviewed.    Significant Imaging:  I have reviewed all pertinent imaging results/findings within the past 24 hours.    Assessment/Plan:     * Acute hypoxemic respiratory failure  2/2 COVID-19    --continue dexamethasone/remdesivir for full course  --continue CTX/azithro x 5 days  --continue BiPAP with vapotherm intermittently as tolerated  --prn blood gases  --CXR (7/20) with bilateral patchy interstitial and airspace opacities. Sputum cx ordered. Needs to be obtained.  --Lasix initiated (7/22) for euvolemic to net negative goal    COVID-19  --see acute hypoxemic resp fx            Tacos Vasques NP  Pulmonology  [facility]

## 2020-07-22 NOTE — ASSESSMENT & PLAN NOTE
Secondary to COVID-19 infection  Procalcitonin and D-dimer elevated  Continue full-dose Lovenox, Rocephin and azithromycin (5 days), IV dexamethasone (10 days) and supportive care  Patient has qualifies for Remdesivir, consent given - started 5 day treatment on 7/19  Did not tolerate Vapotherm this morning.  Transition to BiPAP and appears to be more comfortable  Will wean as tolerated.  Pulmonary on board for comanagement.  Precautions in place  Code status: DNR

## 2020-07-22 NOTE — PROGRESS NOTES
KAR f/u with patient's spouse to introduce self and ask if she needed any assistance or had any questions  might be able to assist with.  Patient's wife reported that she was in self-quarantine at this time but had spoken to Dr Celaya to give her the name of a backup person to call should she (wife) become incapacitated.  SW worker advised patient's spouse to call SW if she needed assistance and we would continue to followup with her regarding discharge planning for patient.  Patient remains in ICU on Bipap.    Susy Rose LMSW, CCM  7/22/20

## 2020-07-22 NOTE — PLAN OF CARE
Remains in ICU on BiPAP. Tolerates sips of water. One BM this shift. Condom cath remains in place with good UOP. Tachypnea continued. AAOx4. No falls, injuries or skin breakdown this shift, precautions maintained for all. Bed locked and in lowest position, call bell in reach.

## 2020-07-22 NOTE — SUBJECTIVE & OBJECTIVE
Interval History: anxious this AM. Started on precedex.     Review of Systems   Respiratory: Negative for shortness of breath.    Cardiovascular: Negative.    Gastrointestinal: Negative.    Psychiatric/Behavioral: The patient is nervous/anxious.      Objective:     Vital Signs (Most Recent):  Temp: 99.7 °F (37.6 °C) (07/22/20 0800)  Pulse: 87 (07/22/20 1300)  Resp: (!) 43 (07/22/20 1300)  BP: (!) 165/76 (07/22/20 1300)  SpO2: (!) 88 % (07/22/20 1300) Vital Signs (24h Range):  Temp:  [98.1 °F (36.7 °C)-99.7 °F (37.6 °C)] 99.7 °F (37.6 °C)  Pulse:  [55-91] 87  Resp:  [31-56] 43  SpO2:  [87 %-98 %] 88 %  BP: (120-184)/() 165/76     Weight: 76.1 kg (167 lb 12.3 oz)  Body mass index is 26.28 kg/m².    Intake/Output Summary (Last 24 hours) at 7/22/2020 1659  Last data filed at 7/22/2020 0300  Gross per 24 hour   Intake 380 ml   Output 550 ml   Net -170 ml      Physical Exam  Vitals signs and nursing note reviewed.   Constitutional:       General: He is not in acute distress.  Cardiovascular:      Rate and Rhythm: Normal rate and regular rhythm.   Pulmonary:      Breath sounds: No wheezing or rales.      Comments: Breathing comfortably on BiPAP  Abdominal:      General: Abdomen is flat.   Skin:     Capillary Refill: Capillary refill takes less than 2 seconds.   Neurological:      Mental Status: He is alert and oriented to person, place, and time.   Psychiatric:         Mood and Affect: Mood normal.         Behavior: Behavior normal.         Thought Content: Thought content normal.         Judgment: Judgment normal.         Significant Labs: All pertinent labs within the past 24 hours have been reviewed.    Significant Imaging: I have reviewed all pertinent imaging results/findings within the past 24 hours.  I have reviewed and interpreted all pertinent imaging results/findings within the past 24 hours.

## 2020-07-22 NOTE — SUBJECTIVE & OBJECTIVE
Interval History:  Looks very uncomfortable on high-flow type nasal cannula this morning.  He was transitioned to BiPAP and appears more comfortable now.  Denies chest pain.  Has no complaints currently    Review of Systems   Constitutional: Negative.    Respiratory: Positive for shortness of breath.    Cardiovascular: Negative.    Gastrointestinal: Negative.      Objective:     Vital Signs (Most Recent):  Temp: 98.7 °F (37.1 °C) (07/21/20 1530)  Pulse: 60 (07/21/20 1830)  Resp: (!) 35 (07/21/20 1830)  BP: (!) 146/67 (07/21/20 1830)  SpO2: (!) 89 % (07/21/20 1830) Vital Signs (24h Range):  Temp:  [98.7 °F (37.1 °C)-99.7 °F (37.6 °C)] 98.7 °F (37.1 °C)  Pulse:  [] 60  Resp:  [35-68] 35  SpO2:  [78 %-100 %] 89 %  BP: (113-199)/(50-88) 146/67     Weight: 76.1 kg (167 lb 12.3 oz)  Body mass index is 26.28 kg/m².    Intake/Output Summary (Last 24 hours) at 7/21/2020 1906  Last data filed at 7/21/2020 1738  Gross per 24 hour   Intake 830 ml   Output 1050 ml   Net -220 ml      Physical Exam  Vitals signs and nursing note reviewed.   Constitutional:       General: He is not in acute distress.  Cardiovascular:      Rate and Rhythm: Normal rate and regular rhythm.   Pulmonary:      Breath sounds: No wheezing or rales.      Comments: Breathing comfortably on BiPAP  Abdominal:      General: Abdomen is flat.   Skin:     Capillary Refill: Capillary refill takes less than 2 seconds.   Neurological:      Mental Status: He is alert and oriented to person, place, and time.   Psychiatric:         Mood and Affect: Mood normal.         Behavior: Behavior normal.         Thought Content: Thought content normal.         Judgment: Judgment normal.         Significant Labs: All pertinent labs within the past 24 hours have been reviewed.    Significant Imaging: I have reviewed all pertinent imaging results/findings within the past 24 hours.  I have reviewed and interpreted all pertinent imaging results/findings within the past 24  hours.

## 2020-07-22 NOTE — PROGRESS NOTES
[facility]  ICU Shift Summary  Date: 7/22/2020      COVID Test: (+)  Isolation: Airborne and Contact and Droplet     Prehospitalization: Home  Admit Date / LOS : 7/18/2020/ 4 days    Diagnosis: Acute hypoxemic respiratory failure    Consults:        Active: Pulm CC       Needed: Palliative     Code Status: DNR   Advanced Directive: <no information>    LDA: BIPAP and PIV       Central Lines/Site/Justification:Patient Does Not Have Central Line       Urinary Cath/Order/Justification:Patient Does Not Have Urinary Catheter    Vasopressors/Infusions:    dexmedetomidine (PRECEDEX) infusion 0.2 mcg/kg/hr (07/22/20 1700)          GOALS: Volume/ Hemodynamic: N/A                     RASS: N/A    Pain Management: none       Pain Controlled: yes     Rhythm: NSR    Respiratory Device: Bipap    Oxygen Concentration (%):  [80] 80             Most Recent SBT/ SAT: Does not meet criteria       MOVE Screen:     VTE Prophylaxis: Pharm  Mobility: Bedrest  Stress Ulcer Prophylaxis: Yes    Dietary: PO  Tolerance: not applicable  /  Advancement: @ goal    I & O (24h):    Intake/Output Summary (Last 24 hours) at 7/22/2020 1844  Last data filed at 7/22/2020 1700  Gross per 24 hour   Intake 516.59 ml   Output 1200 ml   Net -683.41 ml        Restraints: No    Significant Dates:  Post Op Date: N/A  Rescue Date: N/A  Imaging/ Diagnostics: N/A    Noteworthy Labs:  none    CBC/Anemia Labs: Coags:    Recent Labs   Lab 07/18/20  1405  07/21/20  0344 07/22/20  0255   WBC 9.82   < > 17.44* 14.25*   HGB 13.1*   < > 15.5 13.9*   HCT 39.3*   < > 46.2 41.7      < > 382* 320   MCV 90   < > 90 91   RDW 12.9   < > 13.2 13.5   FERRITIN 2,080*  --   --   --     < > = values in this interval not displayed.    No results for input(s): PT, INR, APTT in the last 168 hours.     Chemistries:   Recent Labs   Lab 07/19/20  0421  07/21/20  0344 07/22/20  0255   *   < > 141 140   K 4.6   < > 4.2 4.5      < > 107 108   CO2 23   < >  21* 23   BUN 26*   < > 26* 29*   CREATININE 0.9   < > 0.8 0.8   CALCIUM 9.1   < > 9.4 8.7   PROT 6.6   < > 7.2 6.2   BILITOT 0.4   < > 0.8 0.8   ALKPHOS 65   < > 126 133   ALT 22   < > 74* 59*   AST 47*   < > 72* 65*   MG 2.9*  --   --   --    PHOS 2.5*  --   --   --     < > = values in this interval not displayed.        Cardiac Enzymes: Ejection Fractions:    No results for input(s): CPK, CPKMB, MB, TROPONINI in the last 72 hours. Nuc Rest EF   Date Value Ref Range Status   06/11/2020 77  Final        POCT Glucose: HbA1c:    Recent Labs   Lab 07/18/20  2228   POCTGLUCOSE 252*    No results found for: HGBA1C        ICU LOS 3d 22h  Level of Care: Critical Care    Shift Summary/Plan for the shift: Pt remains in ICU requiring Bipap. sats in 40's when off Bipap. Lasix x2. Remains free from fall, injury, or breakdown throughout shift.

## 2020-07-22 NOTE — ASSESSMENT & PLAN NOTE
2/2 COVID-19    --continue dexamethasone/remdesivir for full course  --continue CTX/azithro x 5 days  --continue BiPAP with vapotherm intermittently as tolerated  --prn blood gases  --CXR (7/20) with bilateral patchy interstitial and airspace opacities. Sputum cx ordered. Needs to be obtained.  --Lasix initiated (7/22) for euvolemic to net negative goal

## 2020-07-22 NOTE — RESPIRATORY THERAPY
Patient received on BIPAP with charted settings 15/10, rate 14, FI02 70%. Will continue to monitor.

## 2020-07-22 NOTE — CARE UPDATE
[facility]  ICU Shift Summary  Date: 7/22/2020      COVID Test: (+)  Isolation: Airborne and Contact and Droplet     Prehospitalization: Home  Admit Date / LOS : 7/18/2020/ 4 days    Diagnosis: Acute hypoxemic respiratory failure    Consults:        Active: Pulm CC       Needed: N/A     Code Status: DNR   Advanced Directive: <no information>    LDA: PIV       Central Lines/Site/Justification:Patient Does Not Have Central Line       Urinary Cath/Order/Justification:Patient Does Not Have Urinary Catheter    Vasopressors/Infusions:        GOALS: Volume/ Hemodynamic: N/A                     RASS: 0  alert and calm    Pain Management: none       Pain Controlled: yes     Rhythm: NSR    Respiratory Device: Bipap    Oxygen Concentration (%):  [] 80             Most Recent SBT/ SAT: N/A       MOVE Screen: PASS    VTE Prophylaxis: Pharm  Mobility: Bedrest  Stress Ulcer Prophylaxis: Yes    Dietary: PO  Tolerance: no  /  Advancement: no    I & O (24h):    Intake/Output Summary (Last 24 hours) at 7/22/2020 0541  Last data filed at 7/22/2020 0300  Gross per 24 hour   Intake 770 ml   Output 1000 ml   Net -230 ml        Restraints: No    Significant Dates:  Post Op Date: N/A  Rescue Date: N/A  Imaging/ Diagnostics: N/A    Noteworthy Labs:  none    CBC/Anemia Labs: Coags:    Recent Labs   Lab 07/18/20  1405  07/21/20  0344 07/22/20  0255   WBC 9.82   < > 17.44* 14.25*   HGB 13.1*   < > 15.5 13.9*   HCT 39.3*   < > 46.2 41.7      < > 382* 320   MCV 90   < > 90 91   RDW 12.9   < > 13.2 13.5   FERRITIN 2,080*  --   --   --     < > = values in this interval not displayed.    No results for input(s): PT, INR, APTT in the last 168 hours.     Chemistries:   Recent Labs   Lab 07/19/20  0421  07/21/20  0344 07/22/20  0255   *   < > 141 140   K 4.6   < > 4.2 4.5      < > 107 108   CO2 23   < > 21* 23   BUN 26*   < > 26* 29*   CREATININE 0.9   < > 0.8 0.8   CALCIUM 9.1   < > 9.4 8.7   PROT 6.6   < >  7.2 6.2   BILITOT 0.4   < > 0.8 0.8   ALKPHOS 65   < > 126 133   ALT 22   < > 74* 59*   AST 47*   < > 72* 65*   MG 2.9*  --   --   --    PHOS 2.5*  --   --   --     < > = values in this interval not displayed.        Cardiac Enzymes: Ejection Fractions:    No results for input(s): CPK, CPKMB, MB, TROPONINI in the last 72 hours. Nuc Rest EF   Date Value Ref Range Status   06/11/2020 77  Final        POCT Glucose: HbA1c:    Recent Labs   Lab 07/18/20  2228   POCTGLUCOSE 252*    No results found for: HGBA1C        ICU LOS 3d 9h  Level of Care: Critical Care    Shift Summary/Plan for the shift: see care plan note

## 2020-07-22 NOTE — ASSESSMENT & PLAN NOTE
Trended. No reported chest pain.  On cardioprotective regimen except for statin (due to elevated liver enzymes) and beta-blocker (due to bradycardia)

## 2020-07-22 NOTE — PLAN OF CARE
07/22/20 1553   Discharge Reassessment   Assessment Type Discharge Planning Reassessment   Provided patient/caregiver education on the expected discharge date and the discharge plan No  (No expected discharge date at this time.)   Do you have any problems affording any of your prescribed medications? No   Discharge Plan A Home with family   Discharge Plan B Home Health   DME Needed Upon Discharge  oxygen  (Possibly Home Oxygen)   Patient choice form signed by patient/caregiver N/A   Anticipated Discharge Disposition Home   Can the patient/caregiver answer the patient profile reliably? Yes, cognitively intact   How does the patient rate their overall health at the present time?   (Patient on Airborne and contact precaution;  unable to obtain pt's input.)   Describe the patient's ability to walk at the present time. Major restrictions/daily assistance from another person   How often would a person be available to care for the patient?   (Wife would help at home.)   During the past month, has the patient often been bothered by feeling down, depressed or hopeless?   (Unable to obtain.)   During the past month, has the patient often been bothered by little interest or pleasure in doing things?   (Unable to obtain.)   Susy Rose LMSW, [facility]  7/22/20

## 2020-07-22 NOTE — PROGRESS NOTES
[facility] Hospital Medicine  Progress Note    Patient Name: Joo York  MRN: [id number]  Patient Class: IP- Inpatient   Admission Date: 7/18/2020  Length of Stay: 4 days  Attending Physician: May Graff MD  Primary Care Provider: Keegan Watson MD        Subjective:     Principal Problem:Acute hypoxemic respiratory failure        HPI:  Joo York is a 80 y.o. male that (in part)  has a past medical history of Claudication, Essential hypertension, Essential hypertension, Hematuria, Hernia of abdominal cavity, and Multifocal pneumonia.  has a past surgical history that includes Vasectomy; Tonsillectomy; office cysto 2018; Cystoscopy with biopsy of bladder (N/A, 9/5/2018); and Atherectomy (1/23/2020). Presents to [facility] Emergency Department with report of shortness of breath, fever, chills last 3 days.  He was tested for COVID-19 2 days ago and positive.  He said dyspnea with exertion and a cough that started today.  Denies hemoptysis, stridor, or night sweats.  No nausea, vomiting diarrhea reported.  Patient is a former smoker.  Denies home oxygen use.  Code status discussed in the most remain a DNR DNI.  The family is aware of these wishes.  History is limited to respiratory distress as patient is speaking in short sentences.    In the emergency department routine laboratory studies and chest x-ray obtained.  Evidence of bilateral pneumonia.  Was supplemental oxygen and dexamethasone.  O2 sat 67% air prior to oxygen supplementation.  Okay to escalate to CPAP/BiPAP as.  Does not want to be intubated.    Hospital medicine has been asked to admit to inpatient in the ICU for further evaluation and treatment.     Overview/Hospital Course:  Mr. York presented with shortness of breath and fever.  He was admitted for acute respiratory failure secondary to COVID-19.  Treatment initiated with 100% O2 via non-rebreather, Rocephin, azithromycin, and IV dexamethasone.   Blood culture growth to date.  He qualified for Remdesivir and he consented to start on 7/19.    Interval History: anxious this AM. Started on precedex.     Review of Systems   Respiratory: Negative for shortness of breath.    Cardiovascular: Negative.    Gastrointestinal: Negative.    Psychiatric/Behavioral: The patient is nervous/anxious.      Objective:     Vital Signs (Most Recent):  Temp: 99.7 °F (37.6 °C) (07/22/20 0800)  Pulse: 87 (07/22/20 1300)  Resp: (!) 43 (07/22/20 1300)  BP: (!) 165/76 (07/22/20 1300)  SpO2: (!) 88 % (07/22/20 1300) Vital Signs (24h Range):  Temp:  [98.1 °F (36.7 °C)-99.7 °F (37.6 °C)] 99.7 °F (37.6 °C)  Pulse:  [55-91] 87  Resp:  [31-56] 43  SpO2:  [87 %-98 %] 88 %  BP: (120-184)/() 165/76     Weight: 76.1 kg (167 lb 12.3 oz)  Body mass index is 26.28 kg/m².    Intake/Output Summary (Last 24 hours) at 7/22/2020 1659  Last data filed at 7/22/2020 0300  Gross per 24 hour   Intake 380 ml   Output 550 ml   Net -170 ml      Physical Exam  Vitals signs and nursing note reviewed.   Constitutional:       General: He is not in acute distress.  Cardiovascular:      Rate and Rhythm: Normal rate and regular rhythm.   Pulmonary:      Breath sounds: No wheezing or rales.      Comments: Breathing comfortably on BiPAP  Abdominal:      General: Abdomen is flat.   Skin:     Capillary Refill: Capillary refill takes less than 2 seconds.   Neurological:      Mental Status: He is alert and oriented to person, place, and time.   Psychiatric:         Mood and Affect: Mood normal.         Behavior: Behavior normal.         Thought Content: Thought content normal.         Judgment: Judgment normal.         Significant Labs: All pertinent labs within the past 24 hours have been reviewed.    Significant Imaging: I have reviewed all pertinent imaging results/findings within the past 24 hours.  I have reviewed and interpreted all pertinent imaging results/findings within the past 24  hours.      Assessment/Plan:      * Acute hypoxemic respiratory failure  Secondary to COVID-19 infection  Procalcitonin and D-dimer elevated. CRP still elevated  Continue full-dose Lovenox, Rocephin and azithromycin (5 days),   IV dexamethasone (10 days) and supportive care  Patient has qualifies for Remdesivir, consent given - started 5 day treatment on 7/19  Continue BiPAP. Agree with precedex to help with anxiety component  Will wean as tolerated.  Pulmonary on board for comanagement.  Precautions in place  Code status: DNR    COVID-19  As above      Lower respiratory tract infection due to COVID-19 virus  As discussed above    Multifocal pneumonia  As discussed above    Elevated troponin I level  Trended. No reported chest pain.  On cardioprotective regimen except for statin (due to elevated liver enzymes) and beta-blocker (due to bradycardia)    Essential hypertension  Stable on current dose of amlodipine    PAD (peripheral artery disease)  Continue ASA and plavix    Dyslipidemia  Currently not on medications.  Will hold off on statin as his liver enzymes are still elevated although better    VTE Risk Mitigation (From admission, onward)         Ordered     enoxaparin injection 80 mg  Every 12 hours (non-standard times)      07/20/20 0653     IP VTE HIGH RISK PATIENT  Once      07/18/20 2106     Place sequential compression device  Until discontinued      07/18/20 2106              Discussed with wife over the phone.     Critical care time spent on the evaluation and treatment of severe organ dysfunction, review of pertinent labs and imaging studies, discussions with consulting providers and discussions with patient/family: > 45 minutes.      May Celaya MD  Department of Hospital Medicine   [facility]

## 2020-07-22 NOTE — SUBJECTIVE & OBJECTIVE
Interval History: No acute overnight events. Patient remains on BiPAP, did not tolerate vapotherm trial. Continue to wean BiPAP and diuresis initiated with spot dose Lasix.    Objective:     Vital Signs (Most Recent):  Temp: 99.7 °F (37.6 °C) (07/22/20 0800)  Pulse: 79 (07/22/20 0945)  Resp: (!) 55 (07/22/20 0945)  BP: (!) 169/80 (07/22/20 0930)  SpO2: 98 % (07/22/20 0945) Vital Signs (24h Range):  Temp:  [98.1 °F (36.7 °C)-99.7 °F (37.6 °C)] 99.7 °F (37.6 °C)  Pulse:  [55-84] 79  Resp:  [31-56] 55  SpO2:  [87 %-100 %] 98 %  BP: (113-184)/() 169/80     Weight: 76.1 kg (167 lb 12.3 oz)  Body mass index is 26.28 kg/m².      Intake/Output Summary (Last 24 hours) at 7/22/2020 1021  Last data filed at 7/22/2020 0300  Gross per 24 hour   Intake 630 ml   Output 780 ml   Net -150 ml       Physical Exam  Vitals signs and nursing note reviewed.   Constitutional:       Appearance: Normal appearance.      Interventions: Face mask in place.   HENT:      Head: Normocephalic and atraumatic.      Nose: Nose normal.   Eyes:      Conjunctiva/sclera: Conjunctivae normal.      Pupils: Pupils are equal, round, and reactive to light.   Neck:      Musculoskeletal: Normal range of motion.   Cardiovascular:      Rate and Rhythm: Normal rate.   Pulmonary:      Breath sounds: Examination of the right-lower field reveals decreased breath sounds and rhonchi. Examination of the left-lower field reveals decreased breath sounds and rhonchi. Decreased breath sounds and rhonchi present. No wheezing.      Comments: Remains tachypneic.  Abdominal:      General: Bowel sounds are normal. There is no distension.      Palpations: Abdomen is soft. There is no mass.      Tenderness: There is no abdominal tenderness. There is no guarding.      Hernia: No hernia is present.   Musculoskeletal: Normal range of motion.         General: No swelling.   Skin:     General: Skin is warm and dry.      Capillary Refill: Capillary refill takes less than 2 seconds.    Neurological:      General: No focal deficit present.      Mental Status: He is alert and oriented to person, place, and time.         Vents:  Oxygen Concentration (%): 80 (07/22/20 0815)    Lines/Drains/Airways     Drain            Male External Urinary Catheter 07/21/20 0521 Medium 1 day          Peripheral Intravenous Line                 Peripheral IV - Single Lumen 07/18/20 1905 20 G Left Wrist 3 days         Peripheral IV - Single Lumen 07/18/20 1926 20 G Right Hand 3 days                Significant Labs:    CBC/Anemia Profile:  Recent Labs   Lab 07/21/20  0344 07/22/20  0255   WBC 17.44* 14.25*   HGB 15.5 13.9*   HCT 46.2 41.7   * 320   MCV 90 91   RDW 13.2 13.5        Chemistries:  Recent Labs   Lab 07/21/20  0344 07/22/20  0255    140   K 4.2 4.5    108   CO2 21* 23   BUN 26* 29*   CREATININE 0.8 0.8   CALCIUM 9.4 8.7   ALBUMIN 3.0* 2.5*   PROT 7.2 6.2   BILITOT 0.8 0.8   ALKPHOS 126 133   ALT 74* 59*   AST 72* 65*       All pertinent labs within the past 24 hours have been reviewed.    Significant Imaging:  I have reviewed all pertinent imaging results/findings within the past 24 hours.

## 2020-07-22 NOTE — ASSESSMENT & PLAN NOTE
Secondary to COVID-19 infection  Procalcitonin and D-dimer elevated. CRP still elevated  Continue full-dose Lovenox, Rocephin and azithromycin (5 days),   IV dexamethasone (10 days) and supportive care  Patient has qualifies for Remdesivir, consent given - started 5 day treatment on 7/19  Continue BiPAP. Agree with precedex to help with anxiety component  Will wean as tolerated.  Pulmonary on board for comanagement.  Precautions in place  Code status: DNR

## 2020-07-22 NOTE — PROGRESS NOTES
[facility] Hospital Medicine  Progress Note    Patient Name: Joo York  MRN: [id number]  Patient Class: IP- Inpatient   Admission Date: 7/18/2020  Length of Stay: 3 days  Attending Physician: May Graff MD  Primary Care Provider: Keegan Watson MD        Subjective:     Principal Problem:Acute hypoxemic respiratory failure        HPI:  Joo York is a 80 y.o. male that (in part)  has a past medical history of Claudication, Essential hypertension, Essential hypertension, Hematuria, Hernia of abdominal cavity, and Multifocal pneumonia.  has a past surgical history that includes Vasectomy; Tonsillectomy; office cysto 2018; Cystoscopy with biopsy of bladder (N/A, 9/5/2018); and Atherectomy (1/23/2020). Presents to [facility] Emergency Department with report of shortness of breath, fever, chills last 3 days.  He was tested for COVID-19 2 days ago and positive.  He said dyspnea with exertion and a cough that started today.  Denies hemoptysis, stridor, or night sweats.  No nausea, vomiting diarrhea reported.  Patient is a former smoker.  Denies home oxygen use.  Code status discussed in the most remain a DNR DNI.  The family is aware of these wishes.  History is limited to respiratory distress as patient is speaking in short sentences.    In the emergency department routine laboratory studies and chest x-ray obtained.  Evidence of bilateral pneumonia.  Was supplemental oxygen and dexamethasone.  O2 sat 67% air prior to oxygen supplementation.  Okay to escalate to CPAP/BiPAP as.  Does not want to be intubated.    Hospital medicine has been asked to admit to inpatient in the ICU for further evaluation and treatment.     Overview/Hospital Course:  Mr. York presented with shortness of breath and fever.  He was admitted for acute respiratory failure secondary to COVID-19.  Treatment initiated with 100% O2 via non-rebreather, Rocephin, azithromycin, and IV dexamethasone.   Blood culture growth to date.  He qualified for Remdesivir and he consented to start on 7/19.    Interval History:  Looks very uncomfortable on high-flow type nasal cannula this morning.  He was transitioned to BiPAP and appears more comfortable now.  Denies chest pain.  Has no complaints currently    Review of Systems   Constitutional: Negative.    Respiratory: Positive for shortness of breath.    Cardiovascular: Negative.    Gastrointestinal: Negative.      Objective:     Vital Signs (Most Recent):  Temp: 98.7 °F (37.1 °C) (07/21/20 1530)  Pulse: 60 (07/21/20 1830)  Resp: (!) 35 (07/21/20 1830)  BP: (!) 146/67 (07/21/20 1830)  SpO2: (!) 89 % (07/21/20 1830) Vital Signs (24h Range):  Temp:  [98.7 °F (37.1 °C)-99.7 °F (37.6 °C)] 98.7 °F (37.1 °C)  Pulse:  [] 60  Resp:  [35-68] 35  SpO2:  [78 %-100 %] 89 %  BP: (113-199)/(50-88) 146/67     Weight: 76.1 kg (167 lb 12.3 oz)  Body mass index is 26.28 kg/m².    Intake/Output Summary (Last 24 hours) at 7/21/2020 1906  Last data filed at 7/21/2020 1738  Gross per 24 hour   Intake 830 ml   Output 1050 ml   Net -220 ml      Physical Exam  Vitals signs and nursing note reviewed.   Constitutional:       General: He is not in acute distress.  Cardiovascular:      Rate and Rhythm: Normal rate and regular rhythm.   Pulmonary:      Breath sounds: No wheezing or rales.      Comments: Breathing comfortably on BiPAP  Abdominal:      General: Abdomen is flat.   Skin:     Capillary Refill: Capillary refill takes less than 2 seconds.   Neurological:      Mental Status: He is alert and oriented to person, place, and time.   Psychiatric:         Mood and Affect: Mood normal.         Behavior: Behavior normal.         Thought Content: Thought content normal.         Judgment: Judgment normal.         Significant Labs: All pertinent labs within the past 24 hours have been reviewed.    Significant Imaging: I have reviewed all pertinent imaging results/findings within the past 24  hours.  I have reviewed and interpreted all pertinent imaging results/findings within the past 24 hours.      Assessment/Plan:      * Acute hypoxemic respiratory failure  Secondary to COVID-19 infection  Procalcitonin and D-dimer elevated  Continue full-dose Lovenox, Rocephin and azithromycin (5 days), IV dexamethasone (10 days) and supportive care  Patient has qualifies for Remdesivir, consent given - started 5 day treatment on 7/19  Did not tolerate Vapotherm this morning.  Transition to BiPAP and appears to be more comfortable  Will wean as tolerated.  Pulmonary on board for comanagement.  Precautions in place  CBC/CMP in AM. CRP and D dimer every other day  Code status: DNR    COVID-19  As above      Lower respiratory tract infection due to COVID-19 virus  As discussed above    Multifocal pneumonia  As discussed above    Elevated troponin I level  Trended. No reported chest pain.  On cardioprotective regimen except for statin (due to elevated liver enzymes) and beta-blocker (due to bradycardia)    Essential hypertension  Stable on current dose of amlodipine    PAD (peripheral artery disease)  Continue ASA and plavix    Dyslipidemia  Currently not on medications.  Will hold off on statin as his liver enzymes are trending upwards      VTE Risk Mitigation (From admission, onward)         Ordered     enoxaparin injection 80 mg  Every 12 hours (non-standard times)      07/20/20 0653     IP VTE HIGH RISK PATIENT  Once      07/18/20 2106     Place sequential compression device  Until discontinued      07/18/20 2106              Plan discussed with patient at bedside.  Also discussed with wife over the phone.  All questions answered to satisfaction.    Critical care time spent on the evaluation and treatment of severe organ dysfunction, review of pertinent labs and imaging studies, discussions with consulting providers and discussions with patient/family: > 45 minutes.      May Celaya MD  Department of Hospital  Medicine   [facility]

## 2020-07-22 NOTE — ASSESSMENT & PLAN NOTE
Currently not on medications.  Will hold off on statin as his liver enzymes are still elevated although better

## 2020-07-23 NOTE — SUBJECTIVE & OBJECTIVE
Interval History: still on BiPAP. On precedex for anxiety.     Review of Systems   Unable to perform ROS: Other (unable to speak in full sentences)     Objective:     Vital Signs (Most Recent):  Temp: 99.9 °F (37.7 °C) (07/23/20 0811)  Pulse: (!) 49 (07/23/20 1236)  Resp: (!) 32 (07/23/20 1236)  BP: 124/60 (07/23/20 1236)  SpO2: (!) 91 % (07/23/20 1236) Vital Signs (24h Range):  Temp:  [98.2 °F (36.8 °C)-100.5 °F (38.1 °C)] 99.9 °F (37.7 °C)  Pulse:  [46-74] 49  Resp:  [26-66] 32  SpO2:  [75 %-97 %] 91 %  BP: (106-181)/(55-82) 124/60     Weight: 76.1 kg (167 lb 12.3 oz)  Body mass index is 26.28 kg/m².    Intake/Output Summary (Last 24 hours) at 7/23/2020 1429  Last data filed at 7/23/2020 0500  Gross per 24 hour   Intake 312.19 ml   Output 1150 ml   Net -837.81 ml      Physical Exam  Vitals signs and nursing note reviewed.   Constitutional:       General: He is not in acute distress.  Cardiovascular:      Rate and Rhythm: Normal rate and regular rhythm.   Pulmonary:      Breath sounds: No wheezing or rales.      Comments: Breathing comfortably on BiPAP but unable to speak in full sentences. Coarse sounds throughout.   Abdominal:      General: Abdomen is flat.   Skin:     Capillary Refill: Capillary refill takes less than 2 seconds.   Neurological:      Mental Status: He is alert and oriented to person, place, and time.      Comments: drowsy   Psychiatric:         Mood and Affect: Mood normal.         Behavior: Behavior normal.         Thought Content: Thought content normal.         Judgment: Judgment normal.         Significant Labs: All pertinent labs within the past 24 hours have been reviewed.    Significant Imaging: I have reviewed all pertinent imaging results/findings within the past 24 hours.  I have reviewed and interpreted all pertinent imaging results/findings within the past 24 hours.

## 2020-07-23 NOTE — PROGRESS NOTES
[facility]  Pulmonology  Progress Note    Patient Name: Joo York  MRN: [id number]  Admission Date: 7/18/2020  Hospital Length of Stay: 5 days  Code Status: DNR  Attending Provider: May Graff MD  Primary Care Provider: Keegan Watson MD   Principal Problem: Acute hypoxemic respiratory failure    Subjective:     Interval History: Patient remains on BiPAP, unable to tolerate vapotherm. Plan for additional diuresis and wean oxygen.    Objective:     Vital Signs (Most Recent):  Temp: 98.4 °F (36.9 °C) (07/23/20 0330)  Pulse: (!) 52 (07/23/20 0811)  Resp: (!) 32 (07/23/20 0811)  BP: (!) 144/67 (07/23/20 0730)  SpO2: (!) 94 % (07/23/20 0811) Vital Signs (24h Range):  Temp:  [98.2 °F (36.8 °C)-100.5 °F (38.1 °C)] 98.4 °F (36.9 °C)  Pulse:  [46-91] 52  Resp:  [26-66] 32  SpO2:  [75 %-98 %] 94 %  BP: (106-181)/(55-82) 144/67     Weight: 76.1 kg (167 lb 12.3 oz)  Body mass index is 26.28 kg/m².      Intake/Output Summary (Last 24 hours) at 7/23/2020 0923  Last data filed at 7/23/2020 0500  Gross per 24 hour   Intake 312.19 ml   Output 1150 ml   Net -837.81 ml       Physical Exam  Vitals signs and nursing note reviewed.   Constitutional:       Appearance: Normal appearance.      Interventions: Face mask in place.   HENT:      Head: Normocephalic and atraumatic.      Nose: Nose normal.   Eyes:      Conjunctiva/sclera: Conjunctivae normal.      Pupils: Pupils are equal, round, and reactive to light.   Neck:      Musculoskeletal: Normal range of motion.   Cardiovascular:      Rate and Rhythm: Normal rate.   Pulmonary:      Breath sounds: Examination of the right-lower field reveals decreased breath sounds and rhonchi. Examination of the left-lower field reveals decreased breath sounds and rhonchi. Decreased breath sounds and rhonchi present. No wheezing.      Comments: Remains tachypneic.  Abdominal:      General: Bowel sounds are normal. There is no distension.      Palpations: Abdomen is soft. There  is no mass.      Tenderness: There is no abdominal tenderness. There is no guarding.      Hernia: No hernia is present.   Musculoskeletal: Normal range of motion.         General: No swelling.   Skin:     General: Skin is warm and dry.      Capillary Refill: Capillary refill takes less than 2 seconds.   Neurological:      General: No focal deficit present.      Mental Status: He is alert and oriented to person, place, and time.      GCS: GCS eye subscore is 4. GCS verbal subscore is 5. GCS motor subscore is 6.   Psychiatric:         Mood and Affect: Mood normal.         Speech: Speech normal.         Vents:  Oxygen Concentration (%): 100 (07/23/20 0811)    Lines/Drains/Airways     Drain            Male External Urinary Catheter 07/21/20 0521 Medium 2 days          Peripheral Intravenous Line                 Peripheral IV - Single Lumen 07/18/20 1905 20 G Left Wrist 4 days         Peripheral IV - Single Lumen 07/18/20 1926 20 G Right Hand 4 days                Significant Labs:    CBC/Anemia Profile:  Recent Labs   Lab 07/22/20  0255   WBC 14.25*   HGB 13.9*   HCT 41.7      MCV 91   RDW 13.5        Chemistries:  Recent Labs   Lab 07/22/20  0255 07/23/20  0239    140   K 4.5 4.3    104   CO2 23 26   BUN 29* 36*   CREATININE 0.8 1.0   CALCIUM 8.7 8.8   ALBUMIN 2.5* 2.6*   PROT 6.2 6.7   BILITOT 0.8 0.7   ALKPHOS 133 157*   ALT 59* 74*   AST 65* 61*       All pertinent labs within the past 24 hours have been reviewed.    Significant Imaging:  I have reviewed all pertinent imaging results/findings within the past 24 hours.    Assessment/Plan:     * Acute hypoxemic respiratory failure  2/2 COVID-19    --continue dexamethasone/remdesivir for full course (x10 days/x5 days)  --completed CTX/azithro x 5 days  --continue BiPAP weaning with vapotherm intermittently as tolerated  --prn blood gases  --CXR (7/20) with bilateral patchy interstitial and airspace opacities. Sputum cx ordered. Needs to be  obtained.  --Lasix spot dose (last dose 7/23) for euvolemic to net negative goal  --continue full dose Lovenox for elevated d-dimer  --Precedex initiated for tachypnea and anxiety.    COVID-19  --see acute hypoxemic resp fx           Tacos Vasques, NP  Pulmonology  [facility] [place]

## 2020-07-23 NOTE — ASSESSMENT & PLAN NOTE
Secondary to COVID-19 infection  Completed course of empiric antibiotics for elevated procalcitonin  D-dimer worsening. Is on full dose lovenox  CRP also worsened. On dexamethasone IV, remdesivir (last day) and BiPAP  Trial of furosemide given  Will obtain chest XRay  Precedex to keep patient calm while on BiPAP. Will transition to olanzapine in AM  Will wean as tolerated.  Pulmonary on board for comanagement.  Precautions in place  Code status: DNR

## 2020-07-23 NOTE — ASSESSMENT & PLAN NOTE
Trended. No reported chest pain.  On cardioprotective regimen if able to tolerate oral meds except for statin (due to elevated liver enzymes) and beta-blocker (due to bradycardia)

## 2020-07-23 NOTE — SUBJECTIVE & OBJECTIVE
Interval History: Patient remains on BiPAP, unable to tolerate vapotherm. Plan for additional diuresis and wean oxygen.    Objective:     Vital Signs (Most Recent):  Temp: 98.4 °F (36.9 °C) (07/23/20 0330)  Pulse: (!) 52 (07/23/20 0811)  Resp: (!) 32 (07/23/20 0811)  BP: (!) 144/67 (07/23/20 0730)  SpO2: (!) 94 % (07/23/20 0811) Vital Signs (24h Range):  Temp:  [98.2 °F (36.8 °C)-100.5 °F (38.1 °C)] 98.4 °F (36.9 °C)  Pulse:  [46-91] 52  Resp:  [26-66] 32  SpO2:  [75 %-98 %] 94 %  BP: (106-181)/(55-82) 144/67     Weight: 76.1 kg (167 lb 12.3 oz)  Body mass index is 26.28 kg/m².      Intake/Output Summary (Last 24 hours) at 7/23/2020 0923  Last data filed at 7/23/2020 0500  Gross per 24 hour   Intake 312.19 ml   Output 1150 ml   Net -837.81 ml       Physical Exam  Vitals signs and nursing note reviewed.   Constitutional:       Appearance: Normal appearance.      Interventions: Face mask in place.   HENT:      Head: Normocephalic and atraumatic.      Nose: Nose normal.   Eyes:      Conjunctiva/sclera: Conjunctivae normal.      Pupils: Pupils are equal, round, and reactive to light.   Neck:      Musculoskeletal: Normal range of motion.   Cardiovascular:      Rate and Rhythm: Normal rate.   Pulmonary:      Breath sounds: Examination of the right-lower field reveals decreased breath sounds and rhonchi. Examination of the left-lower field reveals decreased breath sounds and rhonchi. Decreased breath sounds and rhonchi present. No wheezing.      Comments: Remains tachypneic.  Abdominal:      General: Bowel sounds are normal. There is no distension.      Palpations: Abdomen is soft. There is no mass.      Tenderness: There is no abdominal tenderness. There is no guarding.      Hernia: No hernia is present.   Musculoskeletal: Normal range of motion.         General: No swelling.   Skin:     General: Skin is warm and dry.      Capillary Refill: Capillary refill takes less than 2 seconds.   Neurological:      General: No focal  deficit present.      Mental Status: He is alert and oriented to person, place, and time.      GCS: GCS eye subscore is 4. GCS verbal subscore is 5. GCS motor subscore is 6.   Psychiatric:         Mood and Affect: Mood normal.         Speech: Speech normal.         Vents:  Oxygen Concentration (%): 100 (07/23/20 0811)    Lines/Drains/Airways     Drain            Male External Urinary Catheter 07/21/20 0521 Medium 2 days          Peripheral Intravenous Line                 Peripheral IV - Single Lumen 07/18/20 1905 20 G Left Wrist 4 days         Peripheral IV - Single Lumen 07/18/20 1926 20 G Right Hand 4 days                Significant Labs:    CBC/Anemia Profile:  Recent Labs   Lab 07/22/20  0255   WBC 14.25*   HGB 13.9*   HCT 41.7      MCV 91   RDW 13.5        Chemistries:  Recent Labs   Lab 07/22/20  0255 07/23/20  0239    140   K 4.5 4.3    104   CO2 23 26   BUN 29* 36*   CREATININE 0.8 1.0   CALCIUM 8.7 8.8   ALBUMIN 2.5* 2.6*   PROT 6.2 6.7   BILITOT 0.8 0.7   ALKPHOS 133 157*   ALT 59* 74*   AST 65* 61*       All pertinent labs within the past 24 hours have been reviewed.    Significant Imaging:  I have reviewed all pertinent imaging results/findings within the past 24 hours.

## 2020-07-23 NOTE — PLAN OF CARE
Remains in ICU on BiPAP and Precedex gtt. Tolerating well. Afebrile this shift. VSS. No falls, injuries or skin breakdown this shift, precautions maintained for all.

## 2020-07-23 NOTE — ASSESSMENT & PLAN NOTE
2/2 COVID-19    --continue dexamethasone/remdesivir for full course (x10 days/x5 days)  --completed CTX/azithro x 5 days  --continue BiPAP weaning with vapotherm intermittently as tolerated  --prn blood gases  --CXR (7/20) with bilateral patchy interstitial and airspace opacities. Sputum cx ordered. Needs to be obtained.  --Lasix spot dose (last dose 7/23) for euvolemic to net negative goal  --continue full dose Lovenox for elevated d-dimer  --Precedex initiated for tachypnea and anxiety.

## 2020-07-23 NOTE — PROGRESS NOTES
[facility]  ICU Shift Summary  Date: 7/23/2020      COVID Test: (+)  Isolation: Airborne and Contact and Droplet     Prehospitalization: Home  Admit Date / LOS : 7/18/2020/ 5 days    Diagnosis: Acute hypoxemic respiratory failure    Consults:        Active: Pulm CC       Needed: Palliative     Code Status: DNR   Advanced Directive: <no information>    LDA: BIPAP, [place] and PIV       Central Lines/Site/Justification:Patient Does Not Have Central Line       Urinary Cath/Order/Justification:Critically Ill in the ICU and requiring intensive monitoring    Vasopressors/Infusions:    dexmedetomidine (PRECEDEX) infusion 0.2 mcg/kg/hr (07/23/20 1430)          GOALS: Volume/ Hemodynamic: N/A                     RASS: N/A    Pain Management: none       Pain Controlled: not applicable     Rhythm: SB    Respiratory Device: Bipap    Oxygen Concentration (%):  [] 60             Most Recent SBT/ SAT: N/A       MOVE Screen: N/A    VTE Prophylaxis: Pharm  Mobility: Bedrest  Stress Ulcer Prophylaxis: Yes    Dietary: NPO  Tolerance: no  /  Advancement: no    I & O (24h):    Intake/Output Summary (Last 24 hours) at 7/23/2020 1837  Last data filed at 7/23/2020 1800  Gross per 24 hour   Intake 295.6 ml   Output 1350 ml   Net -1054.4 ml        Restraints: No    Significant Dates:  Post Op Date: N/A  Rescue Date: N/A  Imaging/ Diagnostics: CXR 7/23/2020    Noteworthy Labs:  none    CBC/Anemia Labs: Coags:    Recent Labs   Lab 07/18/20  1405  07/21/20  0344 07/22/20  0255   WBC 9.82   < > 17.44* 14.25*   HGB 13.1*   < > 15.5 13.9*   HCT 39.3*   < > 46.2 41.7      < > 382* 320   MCV 90   < > 90 91   RDW 12.9   < > 13.2 13.5   FERRITIN 2,080*  --   --   --     < > = values in this interval not displayed.    No results for input(s): PT, INR, APTT in the last 168 hours.     Chemistries:   Recent Labs   Lab 07/19/20  0421  07/22/20  0255 07/23/20  0239   *   < > 140 140   K 4.6   < > 4.5 4.3      < >  108 104   CO2 23   < > 23 26   BUN 26*   < > 29* 36*   CREATININE 0.9   < > 0.8 1.0   CALCIUM 9.1   < > 8.7 8.8   PROT 6.6   < > 6.2 6.7   BILITOT 0.4   < > 0.8 0.7   ALKPHOS 65   < > 133 157*   ALT 22   < > 59* 74*   AST 47*   < > 65* 61*   MG 2.9*  --   --   --    PHOS 2.5*  --   --   --     < > = values in this interval not displayed.        Cardiac Enzymes: Ejection Fractions:    No results for input(s): CPK, CPKMB, MB, TROPONINI in the last 72 hours. Nuc Rest EF   Date Value Ref Range Status   06/11/2020 77  Final        POCT Glucose: HbA1c:    Recent Labs   Lab 07/18/20  2228   POCTGLUCOSE 252*    No results found for: HGBA1C        ICU LOS 4d 22h  Level of Care: Critical Care    Shift Summary/Plan for the shift: pt remains in the ICU on bipap. Very low sats off bipap. Sips of water. Lasix x1. 850 total urine output today. Remains free from fall, injury, or breakdown throughout shift.

## 2020-07-23 NOTE — CARE UPDATE
[facility]  ICU Shift Summary  Date: 7/23/2020      COVID Test: (+)  Isolation: Airborne and Contact and Droplet     Prehospitalization: Home  Admit Date / LOS : 7/18/2020/ 5 days    Diagnosis: Acute hypoxemic respiratory failure    Consults:        Active: Pulm CC       Needed: N/A     Code Status: DNR   Advanced Directive: <no information>    LDA: BIPAP and PIV       Central Lines/Site/Justification:Patient Does Not Have Central Line       Urinary Cath/Order/Justification:Patient Does Not Have Urinary Catheter    Vasopressors/Infusions:    dexmedetomidine (PRECEDEX) infusion 0.2 mcg/kg/hr (07/23/20 0400)          GOALS: Volume/ Hemodynamic: N/A                     RASS: 0  alert and calm    Pain Management: none       Pain Controlled: yes     Rhythm: SB    Respiratory Device: Bipap    Oxygen Concentration (%):  [] 100             Most Recent SBT/ SAT: N/A       MOVE Screen: PASS    VTE Prophylaxis: Mechanical  Mobility: Bedrest  Stress Ulcer Prophylaxis: Yes    Dietary: PO  Tolerance: no  /  Advancement: no    I & O (24h):    Intake/Output Summary (Last 24 hours) at 7/23/2020 0454  Last data filed at 7/23/2020 0400  Gross per 24 hour   Intake 308.39 ml   Output 1150 ml   Net -841.61 ml        Restraints: No    Significant Dates:  Post Op Date: N/A  Rescue Date: N/A  Imaging/ Diagnostics: N/A    Noteworthy Labs:  none    CBC/Anemia Labs: Coags:    Recent Labs   Lab 07/18/20  1405  07/21/20  0344 07/22/20  0255   WBC 9.82   < > 17.44* 14.25*   HGB 13.1*   < > 15.5 13.9*   HCT 39.3*   < > 46.2 41.7      < > 382* 320   MCV 90   < > 90 91   RDW 12.9   < > 13.2 13.5   FERRITIN 2,080*  --   --   --     < > = values in this interval not displayed.    No results for input(s): PT, INR, APTT in the last 168 hours.     Chemistries:   Recent Labs   Lab 07/19/20  0421  07/22/20  0255 07/23/20  0239   *   < > 140 140   K 4.6   < > 4.5 4.3      < > 108 104   CO2 23   < > 23 26   BUN  26*   < > 29* 36*   CREATININE 0.9   < > 0.8 1.0   CALCIUM 9.1   < > 8.7 8.8   PROT 6.6   < > 6.2 6.7   BILITOT 0.4   < > 0.8 0.7   ALKPHOS 65   < > 133 157*   ALT 22   < > 59* 74*   AST 47*   < > 65* 61*   MG 2.9*  --   --   --    PHOS 2.5*  --   --   --     < > = values in this interval not displayed.        Cardiac Enzymes: Ejection Fractions:    No results for input(s): CPK, CPKMB, MB, TROPONINI in the last 72 hours. Nuc Rest EF   Date Value Ref Range Status   06/11/2020 77  Final        POCT Glucose: HbA1c:    Recent Labs   Lab 07/18/20  2228   POCTGLUCOSE 252*    No results found for: HGBA1C        ICU LOS 4d 8h  Level of Care: Critical Care    Shift Summary/Plan for the shift: Remains on BiPAP and precedex gtt. VSS.

## 2020-07-23 NOTE — PROGRESS NOTES
[facility] Hospital Medicine  Progress Note    Patient Name: Joo York  MRN: [id number]  Patient Class: IP- Inpatient   Admission Date: 7/18/2020  Length of Stay: 5 days  Attending Physician: May Graff MD  Primary Care Provider: Keegan Watson MD        Subjective:     Principal Problem:Acute hypoxemic respiratory failure        HPI:  Joo York is a 80 y.o. male that (in part)  has a past medical history of Claudication, Essential hypertension, Essential hypertension, Hematuria, Hernia of abdominal cavity, and Multifocal pneumonia.  has a past surgical history that includes Vasectomy; Tonsillectomy; office cysto 2018; Cystoscopy with biopsy of bladder (N/A, 9/5/2018); and Atherectomy (1/23/2020). Presents to [facility] Emergency Department with report of shortness of breath, fever, chills last 3 days.  He was tested for COVID-19 2 days ago and positive.  He said dyspnea with exertion and a cough that started today.  Denies hemoptysis, stridor, or night sweats.  No nausea, vomiting diarrhea reported.  Patient is a former smoker.  Denies home oxygen use.  Code status discussed in the most remain a DNR DNI.  The family is aware of these wishes.  History is limited to respiratory distress as patient is speaking in short sentences.    In the emergency department routine laboratory studies and chest x-ray obtained.  Evidence of bilateral pneumonia.  Was supplemental oxygen and dexamethasone.  O2 sat 67% air prior to oxygen supplementation.  Okay to escalate to CPAP/BiPAP as.  Does not want to be intubated.    Hospital medicine has been asked to admit to inpatient in the ICU for further evaluation and treatment.     Overview/Hospital Course:  Mr. York presented with shortness of breath and fever.  He was admitted for acute respiratory failure secondary to COVID-19.      Interval History: still on BiPAP. On precedex for anxiety.     Review of Systems   Unable to  perform ROS: Other (unable to speak in full sentences)     Objective:     Vital Signs (Most Recent):  Temp: 99.9 °F (37.7 °C) (07/23/20 0811)  Pulse: (!) 49 (07/23/20 1236)  Resp: (!) 32 (07/23/20 1236)  BP: 124/60 (07/23/20 1236)  SpO2: (!) 91 % (07/23/20 1236) Vital Signs (24h Range):  Temp:  [98.2 °F (36.8 °C)-100.5 °F (38.1 °C)] 99.9 °F (37.7 °C)  Pulse:  [46-74] 49  Resp:  [26-66] 32  SpO2:  [75 %-97 %] 91 %  BP: (106-181)/(55-82) 124/60     Weight: 76.1 kg (167 lb 12.3 oz)  Body mass index is 26.28 kg/m².    Intake/Output Summary (Last 24 hours) at 7/23/2020 1429  Last data filed at 7/23/2020 0500  Gross per 24 hour   Intake 312.19 ml   Output 1150 ml   Net -837.81 ml      Physical Exam  Vitals signs and nursing note reviewed.   Constitutional:       General: He is not in acute distress.  Cardiovascular:      Rate and Rhythm: Normal rate and regular rhythm.   Pulmonary:      Breath sounds: No wheezing or rales.      Comments: Breathing comfortably on BiPAP but unable to speak in full sentences. Coarse sounds throughout.   Abdominal:      General: Abdomen is flat.   Skin:     Capillary Refill: Capillary refill takes less than 2 seconds.   Neurological:      Mental Status: He is alert and oriented to person, place, and time.      Comments: drowsy   Psychiatric:         Mood and Affect: Mood normal.         Behavior: Behavior normal.         Thought Content: Thought content normal.         Judgment: Judgment normal.         Significant Labs: All pertinent labs within the past 24 hours have been reviewed.    Significant Imaging: I have reviewed all pertinent imaging results/findings within the past 24 hours.  I have reviewed and interpreted all pertinent imaging results/findings within the past 24 hours.      Assessment/Plan:      * Acute hypoxemic respiratory failure  Secondary to COVID-19 infection  Completed course of empiric antibiotics for elevated procalcitonin  D-dimer worsening. Is on full dose  lovenox  CRP also worsened. On dexamethasone IV, remdesivir (last day) and BiPAP  Trial of furosemide given  Will obtain chest XRay  Precedex to keep patient calm while on BiPAP. Will transition to olanzapine in AM  Will wean as tolerated.  Pulmonary on board for comanagement.  Precautions in place  Code status: DNR    COVID-19  As above      Lower respiratory tract infection due to COVID-19 virus  As discussed above    Multifocal pneumonia  As discussed above    Elevated troponin I level  Trended. No reported chest pain.  On cardioprotective regimen if able to tolerate oral meds except for statin (due to elevated liver enzymes) and beta-blocker (due to bradycardia)    Essential hypertension  Stable on current dose of amlodipine    PAD (peripheral artery disease)  Continue ASA and plavix    Dyslipidemia  Currently not on medications.  Will hold off on statin as his liver enzymes are still elevated although better    VTE Risk Mitigation (From admission, onward)         Ordered     enoxaparin injection 80 mg  Every 12 hours (non-standard times)      07/20/20 0653     IP VTE HIGH RISK PATIENT  Once      07/18/20 2106     Place sequential compression device  Until discontinued      07/18/20 2106              Wife will be called and updated.     Critical care time spent on the evaluation and treatment of severe organ dysfunction, review of pertinent labs and imaging studies, discussions with consulting providers and discussions with patient/family: > 45 minutes.      aMy Celaya MD  Department of Hospital Medicine   [facility]

## 2020-07-24 NOTE — ASSESSMENT & PLAN NOTE
2/2 COVID-19    --continue dexamethasone/remdesivir for full course (x10 days/x5 days)  --completed CTX/azithro x 5 days  --continue BiPAP weaning with vapotherm intermittently as tolerated  --prn blood gases  --CXR (7/20) with bilateral patchy interstitial and airspace opacities. Sputum cx ordered. Needs to be obtained.  --Lasix spot dose (last dose 7/24) for euvolemic to net negative goal  --continue full dose Lovenox for elevated d-dimer  --Precedex remains off. Monitor for tachypnea/anxiety, transition to PO anti-anxiety if necessary.

## 2020-07-24 NOTE — PROGRESS NOTES
[facility] Hospital Medicine  Progress Note    Patient Name: Joo York  MRN: [id number]  Patient Class: IP- Inpatient   Admission Date: 7/18/2020  Length of Stay: 6 days  Attending Physician: May Graff MD  Primary Care Provider: Keegan Watson MD        Subjective:     Principal Problem:Acute hypoxemic respiratory failure        HPI:  Joo York is a 80 y.o. male that (in part)  has a past medical history of Claudication, Essential hypertension, Essential hypertension, Hematuria, Hernia of abdominal cavity, and Multifocal pneumonia.  has a past surgical history that includes Vasectomy; Tonsillectomy; office cysto 2018; Cystoscopy with biopsy of bladder (N/A, 9/5/2018); and Atherectomy (1/23/2020). Presents to [facility] Emergency Department with report of shortness of breath, fever, chills last 3 days.  He was tested for COVID-19 2 days ago and positive.  He said dyspnea with exertion and a cough that started today.  Denies hemoptysis, stridor, or night sweats.  No nausea, vomiting diarrhea reported.  Patient is a former smoker.  Denies home oxygen use.  Code status discussed in the most remain a DNR DNI.  The family is aware of these wishes.  History is limited to respiratory distress as patient is speaking in short sentences.    In the emergency department routine laboratory studies and chest x-ray obtained.  Evidence of bilateral pneumonia.  Was supplemental oxygen and dexamethasone.  O2 sat 67% air prior to oxygen supplementation.  Okay to escalate to CPAP/BiPAP as.  Does not want to be intubated.    Hospital medicine has been asked to admit to inpatient in the ICU for further evaluation and treatment.     Overview/Hospital Course:  Mr. York presented with shortness of breath and fever.  He was admitted for acute respiratory failure secondary to COVID-19.      Interval History: still on BiPAP. On precedex for anxiety.     Review of Systems    Constitutional: Positive for appetite change.   Respiratory: Negative.    Cardiovascular: Negative.    Gastrointestinal: Negative.      Objective:     Vital Signs (Most Recent):  Temp: 98 °F (36.7 °C) (07/24/20 1200)  Pulse: 64 (07/24/20 1530)  Resp: (!) 33 (07/24/20 1530)  BP: 125/61 (07/24/20 1530)  SpO2: 96 % (07/24/20 1532) Vital Signs (24h Range):  Temp:  [97 °F (36.1 °C)-98 °F (36.7 °C)] 98 °F (36.7 °C)  Pulse:  [40-82] 64  Resp:  [23-43] 33  SpO2:  [87 %-99 %] 96 %  BP: (111-188)/(56-84) 125/61     Weight: 76.1 kg (167 lb 12.3 oz)  Body mass index is 26.28 kg/m².    Intake/Output Summary (Last 24 hours) at 7/24/2020 1803  Last data filed at 7/24/2020 0500  Gross per 24 hour   Intake --   Output 600 ml   Net -600 ml      Physical Exam  Vitals signs and nursing note reviewed.   Constitutional:       General: He is not in acute distress.  Cardiovascular:      Rate and Rhythm: Normal rate and regular rhythm.   Pulmonary:      Breath sounds: No wheezing or rales.      Comments: Breathing comfortably on vapotherm  Abdominal:      General: Abdomen is flat.   Skin:     Capillary Refill: Capillary refill takes less than 2 seconds.   Neurological:      Mental Status: He is alert and oriented to person, place, and time.      Comments: Very calm    Psychiatric:         Mood and Affect: Mood normal.         Behavior: Behavior normal.         Thought Content: Thought content normal.         Judgment: Judgment normal.         Significant Labs: All pertinent labs within the past 24 hours have been reviewed.    Significant Imaging: I have reviewed all pertinent imaging results/findings within the past 24 hours.  I have reviewed and interpreted all pertinent imaging results/findings within the past 24 hours.      Assessment/Plan:      * Acute hypoxemic respiratory failure  Secondary to COVID-19 infection  I have noticed some gradual improvement. Needs time  Completed course of empiric antibiotics for elevated  procalcitonin  D-dimer improving. Continue full dose lovenox  CRP a bit better. Continue dexamethasone IV. Completed remdesivir  Continue scheduled olanzapine for related anxiety. This is helping  Tolerating vapotherm today. PO intake as tolerated. Will have BiPAP available  Pulmonary on board for comanagement.  Precautions in place  Code status: DNR    COVID-19  As above      Lower respiratory tract infection due to COVID-19 virus  As discussed above    Multifocal pneumonia  As discussed above    Elevated troponin I level  Trended. No reported chest pain.  On cardioprotective regimen if able to tolerate oral meds except for statin (due to elevated liver enzymes) and beta-blocker (due to bradycardia)    Essential hypertension  Stable on current dose of amlodipine    PAD (peripheral artery disease)  Continue ASA and plavix    Dyslipidemia  Currently not on medications.  Will hold off on statin as his liver enzymes are still elevated although better    VTE Risk Mitigation (From admission, onward)         Ordered     enoxaparin injection 80 mg  Every 12 hours (non-standard times)      07/20/20 0653     IP VTE HIGH RISK PATIENT  Once      07/18/20 2106     Place sequential compression device  Until discontinued      07/18/20 2106              ROQUE with pulm service called patient's wife for an update.     Critical care time spent on the evaluation and treatment of severe organ dysfunction, review of pertinent labs and imaging studies, discussions with consulting providers and discussions with patient/family: > 35 minutes.      May Celaya MD  Department of Hospital Medicine   [facility]     not applicable

## 2020-07-24 NOTE — CARE UPDATE
[facility]  ICU Shift Summary  Date: 7/24/2020      COVID Test: (+)  Isolation: Airborne and Contact and Droplet     Prehospitalization: Home  Admit Date / LOS : 7/18/2020/ 6 days    Diagnosis: Acute hypoxemic respiratory failure    Consults:        Active: Pulm CC       Needed: N/A     Code Status: DNR   Advanced Directive: <no information>    LDA: BIPAP and PIV       Central Lines/Site/Justification:Patient Does Not Have Central Line       Urinary Cath/Order/Justification:Patient Does Not Have Urinary Catheter    Vasopressors/Infusions:        GOALS: Volume/ Hemodynamic: N/A                     RASS: 0  alert and calm    Pain Management: none       Pain Controlled: yes     Rhythm: SB    Respiratory Device: Bipap    Oxygen Concentration (%):  [] 70             Most Recent SBT/ SAT: N/A       MOVE Screen: PASS    VTE Prophylaxis: Pharm and Mechanical  Mobility: Bedrest  Stress Ulcer Prophylaxis: Yes    Dietary: PO  Tolerance: no  /  Advancement: no    I & O (24h):    Intake/Output Summary (Last 24 hours) at 7/24/2020 0658  Last data filed at 7/24/2020 0500  Gross per 24 hour   Intake 250 ml   Output 1450 ml   Net -1200 ml        Restraints: No    Significant Dates:  Post Op Date: N/A  Rescue Date: N/A  Imaging/ Diagnostics: N/A    Noteworthy Labs:  none    CBC/Anemia Labs: Coags:    Recent Labs   Lab 07/18/20  1405  07/22/20  0255 07/24/20  0431   WBC 9.82   < > 14.25* 12.87*   HGB 13.1*   < > 13.9* 14.1   HCT 39.3*   < > 41.7 42.2      < > 320 270   MCV 90   < > 91 91   RDW 12.9   < > 13.5 13.5   FERRITIN 2,080*  --   --   --     < > = values in this interval not displayed.    No results for input(s): PT, INR, APTT in the last 168 hours.     Chemistries:   Recent Labs   Lab 07/19/20  0421  07/23/20  0239 07/24/20  0431   *   < > 140 140   K 4.6   < > 4.3 3.7      < > 104 103   CO2 23   < > 26 28   BUN 26*   < > 36* 45*   CREATININE 0.9   < > 1.0 0.8   CALCIUM 9.1   < >  8.8 8.6*   PROT 6.6   < > 6.7 6.2   BILITOT 0.4   < > 0.7 0.6   ALKPHOS 65   < > 157* 180*   ALT 22   < > 74* 63*   AST 47*   < > 61* 38   MG 2.9*  --   --   --    PHOS 2.5*  --   --   --     < > = values in this interval not displayed.        Cardiac Enzymes: Ejection Fractions:    No results for input(s): CPK, CPKMB, MB, TROPONINI in the last 72 hours. Nuc Rest EF   Date Value Ref Range Status   06/11/2020 77  Final        POCT Glucose: HbA1c:    Recent Labs   Lab 07/18/20  2228   POCTGLUCOSE 252*    No results found for: HGBA1C        ICU LOS 5d 10h  Level of Care: Critical Care    Shift Summary/Plan for the shift: see care plan note

## 2020-07-24 NOTE — PROGRESS NOTES
[facility]  Pulmonology  Progress Note    Patient Name: Joo York  MRN: [id number]  Admission Date: 7/18/2020  Hospital Length of Stay: 6 days  Code Status: DNR  Attending Provider: May Graff MD  Primary Care Provider: Keegan Watson MD   Principal Problem: Acute hypoxemic respiratory failure    Subjective:     Interval History: No acute overnight events. Patient remained on BiPAP overnight, now on vapotherm satting >92%. Spot dose Lasix for diuresis.    Objective:     Vital Signs (Most Recent):  Temp: 98 °F (36.7 °C) (07/24/20 0730)  Pulse: 62 (07/24/20 0800)  Resp: (!) 37 (07/24/20 0800)  BP: (!) 176/76 (07/24/20 0800)  SpO2: (!) 90 % (07/24/20 0800) Vital Signs (24h Range):  Temp:  [97 °F (36.1 °C)-98.2 °F (36.8 °C)] 98 °F (36.7 °C)  Pulse:  [40-62] 62  Resp:  [23-45] 37  SpO2:  [88 %-99 %] 90 %  BP: (106-188)/(57-84) 176/76     Weight: 76.1 kg (167 lb 12.3 oz)  Body mass index is 26.28 kg/m².      Intake/Output Summary (Last 24 hours) at 7/24/2020 0931  Last data filed at 7/24/2020 0500  Gross per 24 hour   Intake 250 ml   Output 1450 ml   Net -1200 ml       Physical Exam  Vitals signs and nursing note reviewed.   Constitutional:       Appearance: Normal appearance. He is normal weight.      Interventions: Nasal cannula in place.   HENT:      Head: Normocephalic and atraumatic.      Nose: Nose normal.   Eyes:      Conjunctiva/sclera: Conjunctivae normal.      Pupils: Pupils are equal, round, and reactive to light.   Neck:      Musculoskeletal: Normal range of motion.   Cardiovascular:      Rate and Rhythm: Normal rate.   Pulmonary:      Breath sounds: Examination of the right-lower field reveals decreased breath sounds and rhonchi. Examination of the left-lower field reveals decreased breath sounds and rhonchi. Decreased breath sounds and rhonchi present. No wheezing.      Comments: Remains tachypneic.  Abdominal:      General: Bowel sounds are normal. There is no distension.       Palpations: Abdomen is soft. There is no mass.      Tenderness: There is no abdominal tenderness. There is no guarding.      Hernia: No hernia is present.   Musculoskeletal: Normal range of motion.         General: No swelling.   Skin:     General: Skin is warm and dry.      Capillary Refill: Capillary refill takes less than 2 seconds.   Neurological:      General: No focal deficit present.      Mental Status: He is alert and oriented to person, place, and time.      GCS: GCS eye subscore is 4. GCS verbal subscore is 5. GCS motor subscore is 6.   Psychiatric:         Mood and Affect: Mood normal.         Speech: Speech normal.         Vents:  Oxygen Concentration (%): 100 (07/24/20 0730)    Lines/Drains/Airways     Drain            Male External Urinary Catheter 07/21/20 0521 Medium 3 days          Peripheral Intravenous Line                 Peripheral IV - Single Lumen 07/18/20 1905 20 G Left Wrist 5 days         Peripheral IV - Single Lumen 07/18/20 1926 20 G Right Hand 5 days                Significant Labs:    CBC/Anemia Profile:  Recent Labs   Lab 07/24/20  0431   WBC 12.87*   HGB 14.1   HCT 42.2      MCV 91   RDW 13.5        Chemistries:  Recent Labs   Lab 07/23/20  0239 07/24/20  0431    140   K 4.3 3.7    103   CO2 26 28   BUN 36* 45*   CREATININE 1.0 0.8   CALCIUM 8.8 8.6*   ALBUMIN 2.6* 2.3*   PROT 6.7 6.2   BILITOT 0.7 0.6   ALKPHOS 157* 180*   ALT 74* 63*   AST 61* 38       All pertinent labs within the past 24 hours have been reviewed.    Significant Imaging:  I have reviewed all pertinent imaging results/findings within the past 24 hours.    Assessment/Plan:     * Acute hypoxemic respiratory failure  2/2 COVID-19    --continue dexamethasone/remdesivir for full course (x10 days/x5 days)  --completed CTX/azithro x 5 days  --continue BiPAP weaning with vapotherm intermittently as tolerated  --prn blood gases  --CXR (7/20) with bilateral patchy interstitial and airspace opacities. Sputum  cx ordered. Needs to be obtained.  --Lasix spot dose (last dose 7/24) for euvolemic to net negative goal  --continue full dose Lovenox for elevated d-dimer  --Precedex remains off. Monitor for tachypnea/anxiety, transition to PO anti-anxiety if necessary.    COVID-19  --see acute hypoxemic resp fx           Tacos Vasques NP  Pulmonology  [facility]

## 2020-07-24 NOTE — PROGRESS NOTES
[facility]  ICU Shift Summary  Date: 7/24/2020      COVID Test: (+)  Isolation: Airborne and Contact and Droplet     Prehospitalization: Home  Admit Date / LOS : 7/18/2020/ 6 days    Diagnosis: Acute hypoxemic respiratory failure    Consults:        Active: Pulm CC       Needed: Palliative     Code Status: DNR   Advanced Directive: <no information>    LDA: [place] and PIV       Central Lines/Site/Justification:Patient Does Not Have Central Line       Urinary Cath/Order/Justification:Critically Ill in the ICU and requiring intensive monitoring    Vasopressors/Infusions:        GOALS: Volume/ Hemodynamic: N/A                     RASS: 0  alert and calm    Pain Management: none       Pain Controlled: yes     Rhythm: NSR    Respiratory Device: Vapotherm    Oxygen Concentration (%):  [] 70             Most Recent SBT/ SAT: N/A       MOVE Screen: N/A    VTE Prophylaxis: Pharm and Mechanical  Mobility: Bedrest  Stress Ulcer Prophylaxis: Yes    Dietary: PO  Tolerance: yes  /  Advancement: yes    I & O (24h):  -1500 cc output     Restraints: No    Significant Dates:  Post Op Date: N/A  Rescue Date: N/A  Imaging/ Diagnostics: None today    Noteworthy Labs:  N/A    CBC/Anemia Labs: Coags:    Recent Labs   Lab 07/18/20  1405  07/22/20  0255 07/24/20  0431   WBC 9.82   < > 14.25* 12.87*   HGB 13.1*   < > 13.9* 14.1   HCT 39.3*   < > 41.7 42.2      < > 320 270   MCV 90   < > 91 91   RDW 12.9   < > 13.5 13.5   FERRITIN 2,080*  --   --   --     < > = values in this interval not displayed.    No results for input(s): PT, INR, APTT in the last 168 hours.     Chemistries:   Recent Labs   Lab 07/19/20  0421  07/23/20  0239 07/24/20  0431   *   < > 140 140   K 4.6   < > 4.3 3.7      < > 104 103   CO2 23   < > 26 28   BUN 26*   < > 36* 45*   CREATININE 0.9   < > 1.0 0.8   CALCIUM 9.1   < > 8.8 8.6*   PROT 6.6   < > 6.7 6.2   BILITOT 0.4   < > 0.7 0.6   ALKPHOS 65   < > 157* 180*   ALT 22   < >  74* 63*   AST 47*   < > 61* 38   MG 2.9*  --   --   --    PHOS 2.5*  --   --   --     < > = values in this interval not displayed.        Cardiac Enzymes: Ejection Fractions:    No results for input(s): CPK, CPKMB, MB, TROPONINI in the last 72 hours. Nuc Rest EF   Date Value Ref Range Status   06/11/2020 77  Final        POCT Glucose: HbA1c:    Recent Labs   Lab 07/18/20  2228   POCTGLUCOSE 252*    No results found for: HGBA1C        ICU LOS 5d 21h  Level of Care: Critical Care    Shift Summary/Plan for the shift: Pt remains in ICU on vapotherm, Taken off bipap this morning. Wife updated. Ate some fruit this afternoon. Remains free from falls, injury, or breakdown.

## 2020-07-24 NOTE — SUBJECTIVE & OBJECTIVE
Interval History: No acute overnight events. Patient remained on BiPAP overnight, now on vapotherm satting >92%. Spot dose Lasix for diuresis.    Objective:     Vital Signs (Most Recent):  Temp: 98 °F (36.7 °C) (07/24/20 0730)  Pulse: 62 (07/24/20 0800)  Resp: (!) 37 (07/24/20 0800)  BP: (!) 176/76 (07/24/20 0800)  SpO2: (!) 90 % (07/24/20 0800) Vital Signs (24h Range):  Temp:  [97 °F (36.1 °C)-98.2 °F (36.8 °C)] 98 °F (36.7 °C)  Pulse:  [40-62] 62  Resp:  [23-45] 37  SpO2:  [88 %-99 %] 90 %  BP: (106-188)/(57-84) 176/76     Weight: 76.1 kg (167 lb 12.3 oz)  Body mass index is 26.28 kg/m².      Intake/Output Summary (Last 24 hours) at 7/24/2020 0931  Last data filed at 7/24/2020 0500  Gross per 24 hour   Intake 250 ml   Output 1450 ml   Net -1200 ml       Physical Exam  Vitals signs and nursing note reviewed.   Constitutional:       Appearance: Normal appearance. He is normal weight.      Interventions: Nasal cannula in place.   HENT:      Head: Normocephalic and atraumatic.      Nose: Nose normal.   Eyes:      Conjunctiva/sclera: Conjunctivae normal.      Pupils: Pupils are equal, round, and reactive to light.   Neck:      Musculoskeletal: Normal range of motion.   Cardiovascular:      Rate and Rhythm: Normal rate.   Pulmonary:      Breath sounds: Examination of the right-lower field reveals decreased breath sounds and rhonchi. Examination of the left-lower field reveals decreased breath sounds and rhonchi. Decreased breath sounds and rhonchi present. No wheezing.      Comments: Remains tachypneic.  Abdominal:      General: Bowel sounds are normal. There is no distension.      Palpations: Abdomen is soft. There is no mass.      Tenderness: There is no abdominal tenderness. There is no guarding.      Hernia: No hernia is present.   Musculoskeletal: Normal range of motion.         General: No swelling.   Skin:     General: Skin is warm and dry.      Capillary Refill: Capillary refill takes less than 2 seconds.    Neurological:      General: No focal deficit present.      Mental Status: He is alert and oriented to person, place, and time.      GCS: GCS eye subscore is 4. GCS verbal subscore is 5. GCS motor subscore is 6.   Psychiatric:         Mood and Affect: Mood normal.         Speech: Speech normal.         Vents:  Oxygen Concentration (%): 100 (07/24/20 0730)    Lines/Drains/Airways     Drain            Male External Urinary Catheter 07/21/20 0521 Medium 3 days          Peripheral Intravenous Line                 Peripheral IV - Single Lumen 07/18/20 1905 20 G Left Wrist 5 days         Peripheral IV - Single Lumen 07/18/20 1926 20 G Right Hand 5 days                Significant Labs:    CBC/Anemia Profile:  Recent Labs   Lab 07/24/20  0431   WBC 12.87*   HGB 14.1   HCT 42.2      MCV 91   RDW 13.5        Chemistries:  Recent Labs   Lab 07/23/20  0239 07/24/20  0431    140   K 4.3 3.7    103   CO2 26 28   BUN 36* 45*   CREATININE 1.0 0.8   CALCIUM 8.8 8.6*   ALBUMIN 2.6* 2.3*   PROT 6.7 6.2   BILITOT 0.7 0.6   ALKPHOS 157* 180*   ALT 74* 63*   AST 61* 38       All pertinent labs within the past 24 hours have been reviewed.    Significant Imaging:  I have reviewed all pertinent imaging results/findings within the past 24 hours.

## 2020-07-24 NOTE — PLAN OF CARE
Remains in ICU on BiPAP. Precedex off d/t bradycardia, remains in SB. Other VSS. No falls, injuries or skin breakdown this shift, precautions maintained for all.

## 2020-07-24 NOTE — ASSESSMENT & PLAN NOTE
Secondary to COVID-19 infection  I have noticed some gradual improvement. Needs time  Completed course of empiric antibiotics for elevated procalcitonin  D-dimer improving. Continue full dose lovenox  CRP a bit better. Continue dexamethasone IV. Completed remdesivir  Continue scheduled olanzapine for related anxiety. This is helping  Tolerating vapotherm today. PO intake as tolerated. Will have BiPAP available  Pulmonary on board for comanagement.  Precautions in place  Code status: DNR

## 2020-07-24 NOTE — SUBJECTIVE & OBJECTIVE
Interval History: still on BiPAP. On precedex for anxiety.     Review of Systems   Constitutional: Positive for appetite change.   Respiratory: Negative.    Cardiovascular: Negative.    Gastrointestinal: Negative.      Objective:     Vital Signs (Most Recent):  Temp: 98 °F (36.7 °C) (07/24/20 1200)  Pulse: 64 (07/24/20 1530)  Resp: (!) 33 (07/24/20 1530)  BP: 125/61 (07/24/20 1530)  SpO2: 96 % (07/24/20 1532) Vital Signs (24h Range):  Temp:  [97 °F (36.1 °C)-98 °F (36.7 °C)] 98 °F (36.7 °C)  Pulse:  [40-82] 64  Resp:  [23-43] 33  SpO2:  [87 %-99 %] 96 %  BP: (111-188)/(56-84) 125/61     Weight: 76.1 kg (167 lb 12.3 oz)  Body mass index is 26.28 kg/m².    Intake/Output Summary (Last 24 hours) at 7/24/2020 1803  Last data filed at 7/24/2020 0500  Gross per 24 hour   Intake --   Output 600 ml   Net -600 ml      Physical Exam  Vitals signs and nursing note reviewed.   Constitutional:       General: He is not in acute distress.  Cardiovascular:      Rate and Rhythm: Normal rate and regular rhythm.   Pulmonary:      Breath sounds: No wheezing or rales.      Comments: Breathing comfortably on vapotherm  Abdominal:      General: Abdomen is flat.   Skin:     Capillary Refill: Capillary refill takes less than 2 seconds.   Neurological:      Mental Status: He is alert and oriented to person, place, and time.      Comments: Very calm    Psychiatric:         Mood and Affect: Mood normal.         Behavior: Behavior normal.         Thought Content: Thought content normal.         Judgment: Judgment normal.         Significant Labs: All pertinent labs within the past 24 hours have been reviewed.    Significant Imaging: I have reviewed all pertinent imaging results/findings within the past 24 hours.  I have reviewed and interpreted all pertinent imaging results/findings within the past 24 hours.

## 2020-07-25 NOTE — PLAN OF CARE
Pt remains on Vapotherm 40L/80%. Sats in low 90's on these settings. Unable to wean @ this time. Will continue to monitor. Bipap remains on stby @ bedside for PRN use

## 2020-07-25 NOTE — ASSESSMENT & PLAN NOTE
2/2 COVID-19    - s/p 5 days of remdesivir  - Day 8/10 of dexamethasone  - completed CTX/azithro x 5 days  - Wean O2 as tolerated. Currently on Vapotherm 20L/80%  - CXR (7/23) with bilateral patchy interstitial and airspace opacities  - Lasix spot dose (last dose 7/24) for euvolemic to net negative goal  - continue full dose Lovenox for elevated d-dimer  - Precedex remains off. Monitor for tachypnea/anxiety, transition to PO anti-anxiety if necessary.

## 2020-07-25 NOTE — PLAN OF CARE
Pt alert and oriented.  Tolerated vapotherm throughout night.  Desats easily with movement but recovers quickly.  Reports improvement in breathing.  Denies pain.  Condom cath intact with adequate urine output.  1 BM overnight.  No skin breakdown or injury observed, safety precautions maintained.

## 2020-07-25 NOTE — PROGRESS NOTES
[facility]  Pulmonology  Progress Note    Patient Name: Joo York  MRN: [id number]  Admission Date: 7/18/2020  Hospital Length of Stay: 7 days  Code Status: DNR  Attending Provider: May Graff MD  Primary Care Provider: Keegan Watson MD   Principal Problem: Acute hypoxemic respiratory failure    Subjective:     Interval History: REJI overnight. Oxygen requirements improved. Currently on 20L/80%.      Objective:     Vital Signs (Most Recent):  Temp: 98.3 °F (36.8 °C) (07/25/20 0700)  Pulse: (!) 55 (07/25/20 1107)  Resp: (!) 24 (07/25/20 1107)  BP: 128/60 (07/25/20 1100)  SpO2: (!) 92 % (07/25/20 1107) Vital Signs (24h Range):  Temp:  [97.8 °F (36.6 °C)-98.3 °F (36.8 °C)] 98.3 °F (36.8 °C)  Pulse:  [51-82] 55  Resp:  [24-39] 24  SpO2:  [88 %-97 %] 92 %  BP: (124-187)/(58-77) 128/60     Weight: 76.1 kg (167 lb 12.3 oz)  Body mass index is 26.28 kg/m².      Intake/Output Summary (Last 24 hours) at 7/25/2020 1155  Last data filed at 7/25/2020 1130  Gross per 24 hour   Intake 120 ml   Output 3075 ml   Net -2955 ml       Physical Exam  Vitals signs and nursing note reviewed.   Constitutional:       Appearance: Normal appearance. He is normal weight.      Interventions: Nasal cannula in place.   HENT:      Head: Normocephalic and atraumatic.      Nose: Nose normal.   Eyes:      Conjunctiva/sclera: Conjunctivae normal.      Pupils: Pupils are equal, round, and reactive to light.   Neck:      Musculoskeletal: Normal range of motion.   Cardiovascular:      Rate and Rhythm: Normal rate.   Pulmonary:      Breath sounds: Examination of the right-lower field reveals decreased breath sounds and rhonchi. Examination of the left-lower field reveals decreased breath sounds and rhonchi. Decreased breath sounds and rhonchi present. No wheezing.      Comments: Remains tachypneic.  Abdominal:      General: Bowel sounds are normal. There is no distension.      Palpations: Abdomen is soft. There is no mass.       Tenderness: There is no abdominal tenderness. There is no guarding.      Hernia: No hernia is present.   Musculoskeletal: Normal range of motion.         General: No swelling.   Skin:     General: Skin is warm and dry.      Capillary Refill: Capillary refill takes less than 2 seconds.   Neurological:      General: No focal deficit present.      Mental Status: He is alert and oriented to person, place, and time.   Psychiatric:         Mood and Affect: Mood normal.         Speech: Speech normal.         Vents:  Oxygen Concentration (%): 80 (07/25/20 1107)    Lines/Drains/Airways     Drain            Male External Urinary Catheter 07/21/20 0521 Medium 4 days          Peripheral Intravenous Line                 Peripheral IV - Single Lumen 07/24/20 1031 20 G Right Antecubital 1 day         Peripheral IV - Single Lumen 07/24/20 1032 20 G Right Forearm 1 day                Significant Labs:    CBC/Anemia Profile:  Recent Labs   Lab 07/24/20  0431 07/25/20  0235   WBC 12.87* 12.51   HGB 14.1 16.2   HCT 42.2 47.6    283   MCV 91 91   RDW 13.5 13.2        Chemistries:  Recent Labs   Lab 07/24/20  0431      K 3.7      CO2 28   BUN 45*   CREATININE 0.8   CALCIUM 8.6*   ALBUMIN 2.3*   PROT 6.2   BILITOT 0.6   ALKPHOS 180*   ALT 63*   AST 38       All pertinent labs within the past 24 hours have been reviewed.    Significant Imaging:  I have reviewed all pertinent imaging results/findings within the past 24 hours.    Assessment/Plan:     * Acute hypoxemic respiratory failure  2/2 COVID-19    - s/p 5 days of remdesivir  - Day 8/10 of dexamethasone  - completed CTX/azithro x 5 days  - Wean O2 as tolerated. Currently on Vapotherm 20L/80%  - CXR (7/23) with bilateral patchy interstitial and airspace opacities  - Lasix spot dose (last dose 7/24) for euvolemic to net negative goal  - continue full dose Lovenox for elevated d-dimer  - Precedex remains off. Monitor for tachypnea/anxiety, transition to PO anti-anxiety  if necessary.    COVID-19  --see acute hypoxemic resp fx           Bradley Law MD  Pulmonology  [facility] [place]

## 2020-07-25 NOTE — PROGRESS NOTES
[facility] Hospital Medicine  Progress Note    Patient Name: Joo York  MRN: [id number]  Patient Class: IP- Inpatient   Admission Date: 7/18/2020  Length of Stay: 7 days  Attending Physician: May Graff MD  Primary Care Provider: Keegan Watson MD        Subjective:     Principal Problem:Acute hypoxemic respiratory failure        HPI:  Joo York is a 80 y.o. male that (in part)  has a past medical history of Claudication, Essential hypertension, Essential hypertension, Hematuria, Hernia of abdominal cavity, and Multifocal pneumonia.  has a past surgical history that includes Vasectomy; Tonsillectomy; office cysto 2018; Cystoscopy with biopsy of bladder (N/A, 9/5/2018); and Atherectomy (1/23/2020). Presents to [facility] Emergency Department with report of shortness of breath, fever, chills last 3 days.  He was tested for COVID-19 2 days ago and positive.  He said dyspnea with exertion and a cough that started today.  Denies hemoptysis, stridor, or night sweats.  No nausea, vomiting diarrhea reported.  Patient is a former smoker.  Denies home oxygen use.  Code status discussed in the most remain a DNR DNI.  The family is aware of these wishes.  History is limited to respiratory distress as patient is speaking in short sentences.    In the emergency department routine laboratory studies and chest x-ray obtained.  Evidence of bilateral pneumonia.  Was supplemental oxygen and dexamethasone.  O2 sat 67% air prior to oxygen supplementation.  Okay to escalate to CPAP/BiPAP as.  Does not want to be intubated.    Hospital medicine has been asked to admit to inpatient in the ICU for further evaluation and treatment.     Overview/Hospital Course:  Mr. York presented with shortness of breath and fever.  He was admitted for acute respiratory failure secondary to COVID-19.      Interval History:  Doing great on Vapotherm today..  No longer on Precedex.    Review of Systems    Constitutional: Positive for appetite change.   Respiratory: Negative.    Cardiovascular: Negative.    Gastrointestinal: Negative.      Objective:     Vital Signs (Most Recent):  Temp: 97.1 °F (36.2 °C) (07/25/20 1500)  Pulse: 84 (07/25/20 1700)  Resp: (!) 34 (07/25/20 1700)  BP: (!) 149/67 (07/25/20 1700)  SpO2: (!) 90 % (07/25/20 1700) Vital Signs (24h Range):  Temp:  [97.1 °F (36.2 °C)-98.3 °F (36.8 °C)] 97.1 °F (36.2 °C)  Pulse:  [51-84] 84  Resp:  [23-39] 34  SpO2:  [88 %-95 %] 90 %  BP: (123-187)/(58-77) 149/67     Weight: 76.1 kg (167 lb 12.3 oz)  Body mass index is 26.28 kg/m².    Intake/Output Summary (Last 24 hours) at 7/25/2020 1741  Last data filed at 7/25/2020 1659  Gross per 24 hour   Intake 120 ml   Output 3425 ml   Net -3305 ml      Physical Exam  Vitals signs and nursing note reviewed.   Constitutional:       General: He is not in acute distress.  Cardiovascular:      Rate and Rhythm: Normal rate and regular rhythm.   Pulmonary:      Breath sounds: No wheezing or rales.      Comments: Breathing comfortably on vapotherm  Abdominal:      General: Abdomen is flat.   Skin:     Capillary Refill: Capillary refill takes less than 2 seconds.   Neurological:      Mental Status: He is alert and oriented to person, place, and time.      Comments: Very calm    Psychiatric:         Mood and Affect: Mood normal.         Behavior: Behavior normal.         Thought Content: Thought content normal.         Judgment: Judgment normal.         Significant Labs: All pertinent labs within the past 24 hours have been reviewed.    Significant Imaging: I have reviewed all pertinent imaging results/findings within the past 24 hours.  I have reviewed and interpreted all pertinent imaging results/findings within the past 24 hours.      Assessment/Plan:      * Acute hypoxemic respiratory failure  Secondary to COVID-19 infection  I have noticed some gradual improvement. Needs time  Completed course of empiric antibiotics for  elevated procalcitonin  D-dimer improving. Continue full dose lovenox  CRP a bit better. Continue dexamethasone IV. Completed remdesivir  Continue scheduled olanzapine for related anxiety. This is helping  Tolerating vapotherm today. PO intake as tolerated. Will have BiPAP available  Pulmonary on board for comanagement.  Precautions in place  Code status: DNR    COVID-19  As above      Lower respiratory tract infection due to COVID-19 virus  As discussed above    Multifocal pneumonia  As discussed above    Elevated troponin I level  Trended. No reported chest pain.  On cardioprotective regimen if able to tolerate oral meds except for statin (due to elevated liver enzymes) and beta-blocker (due to bradycardia)    Essential hypertension  Stable on current dose of amlodipine    PAD (peripheral artery disease)  Continue ASA and plavix    Dyslipidemia  Currently not on medications.  Will hold off on statin as his liver enzymes are still elevated although better      VTE Risk Mitigation (From admission, onward)         Ordered     enoxaparin injection 80 mg  Every 12 hours (non-standard times)      07/20/20 0653     IP VTE HIGH RISK PATIENT  Once      07/18/20 2106     Place sequential compression device  Until discontinued      07/18/20 2106              Discussed with wife over the phone.  Critical care time spent on the evaluation and treatment of severe organ dysfunction, review of pertinent labs and imaging studies, discussions with consulting providers and discussions with patient/family: > 35 minutes.      May Celaya MD  Department of Hospital Medicine   [facility]

## 2020-07-25 NOTE — PLAN OF CARE
Problem: Fall Injury Risk  Goal: Absence of Fall and Fall-Related Injury  Outcome: Ongoing, Progressing     Problem: Adult Inpatient Plan of Care  Goal: Plan of Care Review  Outcome: Ongoing, Progressing  Goal: Patient-Specific Goal (Individualization)  Outcome: Ongoing, Progressing  Goal: Absence of Hospital-Acquired Illness or Injury  Outcome: Ongoing, Progressing  Goal: Optimal Comfort and Wellbeing  Outcome: Ongoing, Progressing  Goal: Readiness for Transition of Care  Outcome: Ongoing, Progressing  Goal: Rounds/Family Conference  Outcome: Ongoing, Progressing     Problem: Fluid Imbalance (Pneumonia)  Goal: Fluid Balance  Outcome: Ongoing, Progressing     Problem: Infection (Pneumonia)  Goal: Resolution of Infection Signs/Symptoms  Outcome: Ongoing, Progressing     Problem: Respiratory Compromise (Pneumonia)  Goal: Effective Oxygenation and Ventilation  Outcome: Ongoing, Progressing     Problem: Skin Injury Risk Increased  Goal: Skin Health and Integrity  Outcome: Ongoing, Progressing     Problem: Coping Ineffective  Goal: Effective Coping  Outcome: Ongoing, Progressing

## 2020-07-25 NOTE — SUBJECTIVE & OBJECTIVE
Interval History:  Doing great on Vapotherm today..  No longer on Precedex.    Review of Systems   Constitutional: Positive for appetite change.   Respiratory: Negative.    Cardiovascular: Negative.    Gastrointestinal: Negative.      Objective:     Vital Signs (Most Recent):  Temp: 97.1 °F (36.2 °C) (07/25/20 1500)  Pulse: 84 (07/25/20 1700)  Resp: (!) 34 (07/25/20 1700)  BP: (!) 149/67 (07/25/20 1700)  SpO2: (!) 90 % (07/25/20 1700) Vital Signs (24h Range):  Temp:  [97.1 °F (36.2 °C)-98.3 °F (36.8 °C)] 97.1 °F (36.2 °C)  Pulse:  [51-84] 84  Resp:  [23-39] 34  SpO2:  [88 %-95 %] 90 %  BP: (123-187)/(58-77) 149/67     Weight: 76.1 kg (167 lb 12.3 oz)  Body mass index is 26.28 kg/m².    Intake/Output Summary (Last 24 hours) at 7/25/2020 1741  Last data filed at 7/25/2020 1659  Gross per 24 hour   Intake 120 ml   Output 3425 ml   Net -3305 ml      Physical Exam  Vitals signs and nursing note reviewed.   Constitutional:       General: He is not in acute distress.  Cardiovascular:      Rate and Rhythm: Normal rate and regular rhythm.   Pulmonary:      Breath sounds: No wheezing or rales.      Comments: Breathing comfortably on vapotherm  Abdominal:      General: Abdomen is flat.   Skin:     Capillary Refill: Capillary refill takes less than 2 seconds.   Neurological:      Mental Status: He is alert and oriented to person, place, and time.      Comments: Very calm    Psychiatric:         Mood and Affect: Mood normal.         Behavior: Behavior normal.         Thought Content: Thought content normal.         Judgment: Judgment normal.         Significant Labs: All pertinent labs within the past 24 hours have been reviewed.    Significant Imaging: I have reviewed all pertinent imaging results/findings within the past 24 hours.  I have reviewed and interpreted all pertinent imaging results/findings within the past 24 hours.

## 2020-07-25 NOTE — CARE UPDATE
[facility]  ICU Shift Summary  Date: 7/25/2020      COVID Test: (+)  Isolation: Airborne and Contact and Droplet     Prehospitalization: Home  Admit Date / LOS : 7/18/2020/ 7 days    Diagnosis: Acute hypoxemic respiratory failure    Consults:        Active: Pulm CC       Needed: N/A     Code Status: DNR   Advanced Directive: <no information>    LDA: PIV       Central Lines/Site/Justification:Patient Does Not Have Central Line       Urinary Cath/Order/Justification:Patient Does Not Have Urinary Catheter    Vasopressors/Infusions:        GOALS: Volume/ Hemodynamic: N/A                     RASS: N/A    Pain Management: none       Pain Controlled: not applicable     Rhythm: NSR    Respiratory Device: Vapotherm        VTE Prophylaxis: Pharm  Mobility: Bedrest  Stress Ulcer Prophylaxis: No    Dietary: PO  Tolerance: yes  /  Advancement: @ goal    I & O (24h):    Intake/Output Summary (Last 24 hours) at 7/25/2020 0509  Last data filed at 7/25/2020 0400  Gross per 24 hour   Intake 120 ml   Output 2125 ml   Net -2005 ml        Restraints: No    Significant Dates:  Post Op Date: N/A  Rescue Date: N/A  Imaging/ Diagnostics: N/A    Noteworthy Labs:  none    CBC/Anemia Labs: Coags:    Recent Labs   Lab 07/18/20  1405  07/24/20  0431 07/25/20  0235   WBC 9.82   < > 12.87* 12.51   HGB 13.1*   < > 14.1 16.2   HCT 39.3*   < > 42.2 47.6      < > 270 283   MCV 90   < > 91 91   RDW 12.9   < > 13.5 13.2   FERRITIN 2,080*  --   --   --     < > = values in this interval not displayed.    No results for input(s): PT, INR, APTT in the last 168 hours.     Chemistries:   Recent Labs   Lab 07/19/20  0421  07/23/20  0239 07/24/20  0431   *   < > 140 140   K 4.6   < > 4.3 3.7      < > 104 103   CO2 23   < > 26 28   BUN 26*   < > 36* 45*   CREATININE 0.9   < > 1.0 0.8   CALCIUM 9.1   < > 8.8 8.6*   PROT 6.6   < > 6.7 6.2   BILITOT 0.4   < > 0.7 0.6   ALKPHOS 65   < > 157* 180*   ALT 22   < > 74* 63*   AST 47*    < > 61* 38   MG 2.9*  --   --   --    PHOS 2.5*  --   --   --     < > = values in this interval not displayed.        Cardiac Enzymes: Ejection Fractions:    No results for input(s): CPK, CPKMB, MB, TROPONINI in the last 72 hours. Nuc Rest EF   Date Value Ref Range Status   06/11/2020 77  Final        POCT Glucose: HbA1c:    Recent Labs   Lab 07/18/20  2228   POCTGLUCOSE 252*    No results found for: HGBA1C        ICU LOS 6d 8h  Level of Care: Critical Care    Shift Summary/Plan for the shift: Pt alert and oriented.  Tolerated vapotherm throughout night.  Desats easily with movement but recovers quickly.  Reports improvement in breathing.  Denies pain.  Condom cath intact with adequate urine output.  1 BM overnight.  No skin breakdown or injury observed, safety precautions maintained.

## 2020-07-25 NOTE — SUBJECTIVE & OBJECTIVE
Interval History: REJI overnight. Oxygen requirements improved. Currently on 20L/80%.      Objective:     Vital Signs (Most Recent):  Temp: 98.3 °F (36.8 °C) (07/25/20 0700)  Pulse: (!) 55 (07/25/20 1107)  Resp: (!) 24 (07/25/20 1107)  BP: 128/60 (07/25/20 1100)  SpO2: (!) 92 % (07/25/20 1107) Vital Signs (24h Range):  Temp:  [97.8 °F (36.6 °C)-98.3 °F (36.8 °C)] 98.3 °F (36.8 °C)  Pulse:  [51-82] 55  Resp:  [24-39] 24  SpO2:  [88 %-97 %] 92 %  BP: (124-187)/(58-77) 128/60     Weight: 76.1 kg (167 lb 12.3 oz)  Body mass index is 26.28 kg/m².      Intake/Output Summary (Last 24 hours) at 7/25/2020 1155  Last data filed at 7/25/2020 1130  Gross per 24 hour   Intake 120 ml   Output 3075 ml   Net -2955 ml       Physical Exam  Vitals signs and nursing note reviewed.   Constitutional:       Appearance: Normal appearance. He is normal weight.      Interventions: Nasal cannula in place.   HENT:      Head: Normocephalic and atraumatic.      Nose: Nose normal.   Eyes:      Conjunctiva/sclera: Conjunctivae normal.      Pupils: Pupils are equal, round, and reactive to light.   Neck:      Musculoskeletal: Normal range of motion.   Cardiovascular:      Rate and Rhythm: Normal rate.   Pulmonary:      Breath sounds: Examination of the right-lower field reveals decreased breath sounds and rhonchi. Examination of the left-lower field reveals decreased breath sounds and rhonchi. Decreased breath sounds and rhonchi present. No wheezing.      Comments: Remains tachypneic.  Abdominal:      General: Bowel sounds are normal. There is no distension.      Palpations: Abdomen is soft. There is no mass.      Tenderness: There is no abdominal tenderness. There is no guarding.      Hernia: No hernia is present.   Musculoskeletal: Normal range of motion.         General: No swelling.   Skin:     General: Skin is warm and dry.      Capillary Refill: Capillary refill takes less than 2 seconds.   Neurological:      General: No focal deficit present.       Mental Status: He is alert and oriented to person, place, and time.   Psychiatric:         Mood and Affect: Mood normal.         Speech: Speech normal.         Vents:  Oxygen Concentration (%): 80 (07/25/20 1107)    Lines/Drains/Airways     Drain            Male External Urinary Catheter 07/21/20 0521 Medium 4 days          Peripheral Intravenous Line                 Peripheral IV - Single Lumen 07/24/20 1031 20 G Right Antecubital 1 day         Peripheral IV - Single Lumen 07/24/20 1032 20 G Right Forearm 1 day                Significant Labs:    CBC/Anemia Profile:  Recent Labs   Lab 07/24/20  0431 07/25/20  0235   WBC 12.87* 12.51   HGB 14.1 16.2   HCT 42.2 47.6    283   MCV 91 91   RDW 13.5 13.2        Chemistries:  Recent Labs   Lab 07/24/20  0431      K 3.7      CO2 28   BUN 45*   CREATININE 0.8   CALCIUM 8.6*   ALBUMIN 2.3*   PROT 6.2   BILITOT 0.6   ALKPHOS 180*   ALT 63*   AST 38       All pertinent labs within the past 24 hours have been reviewed.    Significant Imaging:  I have reviewed all pertinent imaging results/findings within the past 24 hours.

## 2020-07-26 PROBLEM — R19.5 DARK STOOLS: Status: ACTIVE | Noted: 2020-01-01

## 2020-07-26 NOTE — ASSESSMENT & PLAN NOTE
2/2 COVID-19    - s/p 5 days of remdesivir  - Day 9/10 of dexamethasone  - completed CTX/azithro x 5 days  - Wean O2 as tolerated. Currently on Vapotherm 20L/90%  - CXR (7/23) with bilateral patchy interstitial and airspace opacities  - Lasix spot dose (last dose 7/24) for euvolemic to net negative goal  - continue full dose Lovenox for elevated d-dimer  - Precedex remains off. Monitor for tachypnea/anxiety, transition to PO anti-anxiety if necessary.   Patient returning call. Below message conveyed. Patient will be completing labs before her upcoming appointment on 03/11/20.

## 2020-07-26 NOTE — SUBJECTIVE & OBJECTIVE
Interval History: REJI overnight. Oxygen requirements improved. Currently on 20L/80%.      Objective:     Vital Signs (Most Recent):  Temp: 97.8 °F (36.6 °C) (07/26/20 0705)  Pulse: 72 (07/26/20 1000)  Resp: (!) 27 (07/26/20 1000)  BP: 126/61 (07/26/20 1000)  SpO2: 95 % (07/26/20 1000) Vital Signs (24h Range):  Temp:  [97.1 °F (36.2 °C)-98.1 °F (36.7 °C)] 97.8 °F (36.6 °C)  Pulse:  [58-85] 72  Resp:  [22-38] 27  SpO2:  [86 %-97 %] 95 %  BP: (123-182)/(60-77) 126/61     Weight: 76.1 kg (167 lb 12.3 oz)  Body mass index is 26.28 kg/m².      Intake/Output Summary (Last 24 hours) at 7/26/2020 1109  Last data filed at 7/26/2020 0900  Gross per 24 hour   Intake 810 ml   Output 1875 ml   Net -1065 ml       Physical Exam  Vitals signs and nursing note reviewed.   Constitutional:       Appearance: Normal appearance. He is normal weight.      Interventions: Nasal cannula in place.   HENT:      Head: Normocephalic and atraumatic.      Nose: Nose normal.   Eyes:      Conjunctiva/sclera: Conjunctivae normal.      Pupils: Pupils are equal, round, and reactive to light.   Neck:      Musculoskeletal: Normal range of motion.   Cardiovascular:      Rate and Rhythm: Normal rate.   Pulmonary:      Breath sounds: Examination of the right-lower field reveals decreased breath sounds and rhonchi. Examination of the left-lower field reveals decreased breath sounds and rhonchi. Decreased breath sounds and rhonchi present. No wheezing.      Comments: Remains tachypneic.  Abdominal:      General: Bowel sounds are normal. There is no distension.      Palpations: Abdomen is soft. There is no mass.      Tenderness: There is no abdominal tenderness. There is no guarding.      Hernia: No hernia is present.   Musculoskeletal: Normal range of motion.         General: No swelling.   Skin:     General: Skin is warm and dry.      Capillary Refill: Capillary refill takes less than 2 seconds.   Neurological:      General: No focal deficit present.       Mental Status: He is alert and oriented to person, place, and time.   Psychiatric:         Mood and Affect: Mood normal.         Speech: Speech normal.         Vents:  Oxygen Concentration (%): 90 (07/26/20 0819)    Lines/Drains/Airways     Peripheral Intravenous Line                 Peripheral IV - Single Lumen 07/24/20 1031 20 G Right Antecubital 2 days         Peripheral IV - Single Lumen 07/24/20 1032 20 G Right Forearm 2 days                Significant Labs:    CBC/Anemia Profile:  Recent Labs   Lab 07/25/20  0235   WBC 12.51   HGB 16.2   HCT 47.6      MCV 91   RDW 13.2        Chemistries:  Recent Labs   Lab 07/26/20  0337      K 4.1      CO2 25   BUN 42*   CREATININE 0.8   CALCIUM 8.4*   ALBUMIN 2.2*   PROT 5.6*   BILITOT 0.5   ALKPHOS 150*   ALT 46*   AST 29       All pertinent labs within the past 24 hours have been reviewed.    Significant Imaging:  I have reviewed all pertinent imaging results/findings within the past 24 hours.

## 2020-07-26 NOTE — CARE UPDATE
[facility]  ICU Shift Summary  Date: 7/26/2020      COVID Test: (+)  Isolation: Airborne and Contact and Droplet     Prehospitalization: Home  Admit Date / LOS : 7/18/2020/ 8 days    Diagnosis: Acute hypoxemic respiratory failure    Consults:        Active: Pulm CC and SW       Needed: N/A     Code Status: DNR   Advanced Directive: <no information>    LDA: PIV       Central Lines/Site/Justification:Patient Does Not Have Central Line       Urinary Cath/Order/Justification:Patient Does Not Have Urinary Catheter    Vasopressors/Infusions:        GOALS: Volume/ Hemodynamic: N/A                     RASS: 0  alert and calm    Pain Management: none       Pain Controlled: not applicable     Rhythm: NSR    Respiratory Device: Vapotherm    Oxygen Concentration (%):  [] 90             Most Recent SBT/ SAT: N/A       MOVE Screen: PASS    VTE Prophylaxis: Pharm and Mechanical  Mobility: Bedrest, S: Self and A: Assist  Stress Ulcer Prophylaxis: Yes    Dietary: PO  Tolerance: yes  /  Advancement: @ goal    I & O (24h):    Intake/Output Summary (Last 24 hours) at 7/26/2020 0659  Last data filed at 7/26/2020 0500  Gross per 24 hour   Intake 960 ml   Output 1450 ml   Net -490 ml        Restraints: No    Significant Dates:  Post Op Date: N/A  Rescue Date: N/A  Imaging/ Diagnostics: N/A    Noteworthy Labs:  none    CBC/Anemia Labs: Coags:    Recent Labs   Lab 07/24/20  0431 07/25/20  0235   WBC 12.87* 12.51   HGB 14.1 16.2   HCT 42.2 47.6    283   MCV 91 91   RDW 13.5 13.2    No results for input(s): PT, INR, APTT in the last 168 hours.     Chemistries:   Recent Labs   Lab 07/24/20  0431 07/26/20  0337    140   K 3.7 4.1    106   CO2 28 25   BUN 45* 42*   CREATININE 0.8 0.8   CALCIUM 8.6* 8.4*   PROT 6.2 5.6*   BILITOT 0.6 0.5   ALKPHOS 180* 150*   ALT 63* 46*   AST 38 29        Cardiac Enzymes: Ejection Fractions:    No results for input(s): CPK, CPKMB, MB, TROPONINI in the last 72 hours.  Nuc Rest EF   Date Value Ref Range Status   06/11/2020 77  Final        POCT Glucose: HbA1c:    No results for input(s): POCTGLUCOSE in the last 168 hours. No results found for: HGBA1C        ICU LOS 7d 9h  Level of Care: Critical Care    Shift Summary/Plan for the shift: Pt remains in ICU overnight. On Vapotherm 40L and 90%. VSS. NSR on cardiac monitor. Afebrile. Loose, dark brown BM x3 this shift. Condom cath removed to be changed and left off per pt request. Voids per urinal. UO 700cc. No falls, injury, or skin breakdown. SCDs in use.

## 2020-07-26 NOTE — SUBJECTIVE & OBJECTIVE
Interval History:  With black stools, per nurse. Repeat CBC showing Hgb of 12.2 from 16.2 yesterday. Patient states feeling well. Is on ASA, plavix and full dose lovenox.     Review of Systems   Constitutional: Positive for appetite change.   Respiratory: Negative.    Cardiovascular: Negative.    Gastrointestinal: Negative for abdominal distention, abdominal pain, nausea and vomiting.     Objective:     Vital Signs (Most Recent):  Temp: 97.8 °F (36.6 °C) (07/26/20 1105)  Pulse: 104 (07/26/20 1400)  Resp: 20 (07/26/20 1400)  BP: (!) 147/69 (07/26/20 1400)  SpO2: 95 % (07/26/20 1400) Vital Signs (24h Range):  Temp:  [97.1 °F (36.2 °C)-98.1 °F (36.7 °C)] 97.8 °F (36.6 °C)  Pulse:  [] 104  Resp:  [20-38] 20  SpO2:  [86 %-97 %] 95 %  BP: (125-182)/(61-77) 147/69     Weight: 76.1 kg (167 lb 12.3 oz)  Body mass index is 26.28 kg/m².    Intake/Output Summary (Last 24 hours) at 7/26/2020 1418  Last data filed at 7/26/2020 1345  Gross per 24 hour   Intake 960 ml   Output 1875 ml   Net -915 ml      Physical Exam  Vitals signs and nursing note reviewed.   Constitutional:       General: He is not in acute distress.     Appearance: He is not toxic-appearing.   Cardiovascular:      Rate and Rhythm: Normal rate and regular rhythm.   Pulmonary:      Breath sounds: No wheezing or rales.      Comments: Breathing comfortably on vapotherm  Abdominal:      General: Abdomen is flat. Bowel sounds are normal. There is no distension.      Palpations: Abdomen is soft. There is no mass.      Tenderness: There is no abdominal tenderness. There is no guarding.   Skin:     Capillary Refill: Capillary refill takes less than 2 seconds.      Coloration: Skin is not pale.   Neurological:      Mental Status: He is alert and oriented to person, place, and time.      Comments: Very calm    Psychiatric:         Mood and Affect: Mood normal.         Behavior: Behavior normal.         Thought Content: Thought content normal.         Judgment:  Judgment normal.         Significant Labs: All pertinent labs within the past 24 hours have been reviewed.    Significant Imaging: I have reviewed all pertinent imaging results/findings within the past 24 hours.  I have reviewed and interpreted all pertinent imaging results/findings within the past 24 hours.

## 2020-07-26 NOTE — PROGRESS NOTES
[facility]  Pulmonology  Progress Note    Patient Name: Joo York  MRN: [id number]  Admission Date: 7/18/2020  Hospital Length of Stay: 8 days  Code Status: DNR  Attending Provider: May Graff MD  Primary Care Provider: Keegan Watson MD   Principal Problem: Acute hypoxemic respiratory failure    Subjective:     Interval History: REJI overnight. Oxygen requirements improved. Currently on 20L/80%.      Objective:     Vital Signs (Most Recent):  Temp: 97.8 °F (36.6 °C) (07/26/20 0705)  Pulse: 72 (07/26/20 1000)  Resp: (!) 27 (07/26/20 1000)  BP: 126/61 (07/26/20 1000)  SpO2: 95 % (07/26/20 1000) Vital Signs (24h Range):  Temp:  [97.1 °F (36.2 °C)-98.1 °F (36.7 °C)] 97.8 °F (36.6 °C)  Pulse:  [58-85] 72  Resp:  [22-38] 27  SpO2:  [86 %-97 %] 95 %  BP: (123-182)/(60-77) 126/61     Weight: 76.1 kg (167 lb 12.3 oz)  Body mass index is 26.28 kg/m².      Intake/Output Summary (Last 24 hours) at 7/26/2020 1109  Last data filed at 7/26/2020 0900  Gross per 24 hour   Intake 810 ml   Output 1875 ml   Net -1065 ml       Physical Exam  Vitals signs and nursing note reviewed.   Constitutional:       Appearance: Normal appearance. He is normal weight.      Interventions: Nasal cannula in place.   HENT:      Head: Normocephalic and atraumatic.      Nose: Nose normal.   Eyes:      Conjunctiva/sclera: Conjunctivae normal.      Pupils: Pupils are equal, round, and reactive to light.   Neck:      Musculoskeletal: Normal range of motion.   Cardiovascular:      Rate and Rhythm: Normal rate.   Pulmonary:      Breath sounds: Examination of the right-lower field reveals decreased breath sounds and rhonchi. Examination of the left-lower field reveals decreased breath sounds and rhonchi. Decreased breath sounds and rhonchi present. No wheezing.      Comments: Remains tachypneic.  Abdominal:      General: Bowel sounds are normal. There is no distension.      Palpations: Abdomen is soft. There is no mass.       Tenderness: There is no abdominal tenderness. There is no guarding.      Hernia: No hernia is present.   Musculoskeletal: Normal range of motion.         General: No swelling.   Skin:     General: Skin is warm and dry.      Capillary Refill: Capillary refill takes less than 2 seconds.   Neurological:      General: No focal deficit present.      Mental Status: He is alert and oriented to person, place, and time.   Psychiatric:         Mood and Affect: Mood normal.         Speech: Speech normal.         Vents:  Oxygen Concentration (%): 90 (07/26/20 0819)    Lines/Drains/Airways     Peripheral Intravenous Line                 Peripheral IV - Single Lumen 07/24/20 1031 20 G Right Antecubital 2 days         Peripheral IV - Single Lumen 07/24/20 1032 20 G Right Forearm 2 days                Significant Labs:    CBC/Anemia Profile:  Recent Labs   Lab 07/25/20  0235   WBC 12.51   HGB 16.2   HCT 47.6      MCV 91   RDW 13.2        Chemistries:  Recent Labs   Lab 07/26/20  0337      K 4.1      CO2 25   BUN 42*   CREATININE 0.8   CALCIUM 8.4*   ALBUMIN 2.2*   PROT 5.6*   BILITOT 0.5   ALKPHOS 150*   ALT 46*   AST 29       All pertinent labs within the past 24 hours have been reviewed.    Significant Imaging:  I have reviewed all pertinent imaging results/findings within the past 24 hours.    Assessment/Plan:     * Acute hypoxemic respiratory failure  2/2 COVID-19    - s/p 5 days of remdesivir  - Day 9/10 of dexamethasone  - completed CTX/azithro x 5 days  - Wean O2 as tolerated. Currently on Vapotherm 20L/90%  - CXR (7/23) with bilateral patchy interstitial and airspace opacities  - Lasix spot dose (last dose 7/24) for euvolemic to net negative goal  - continue full dose Lovenox for elevated d-dimer  - Precedex remains off. Monitor for tachypnea/anxiety, transition to PO anti-anxiety if necessary.    COVID-19  --see acute hypoxemic resp fx           Bradley Law MD  Pulmonology  [facility]

## 2020-07-26 NOTE — ASSESSMENT & PLAN NOTE
Hgb WNL however downtrended in the last 24 hours  Will hold aspirin and clopidogrel pending occult blood results  Is on enoxaparin for hypercoagulable state with COVID-19  Will consider stopping vs using heparin drip pending clinical course  Consider GI consultation however hypoxemia will make patient high risk for endoscopy. Will start pantoprazole IV

## 2020-07-26 NOTE — CARE UPDATE
[facility]  ICU Shift Summary  Date: 7/26/2020      COVID Test: (+)  Isolation: Airborne and Contact and Droplet     Prehospitalization: Home  Admit Date / LOS : 7/18/2020/ 8 days    Diagnosis: Acute hypoxemic respiratory failure    Consults:        Active: Pulm CC       Needed: GI and PT     Code Status: DNR   Advanced Directive: <no information>    LDA: PIV       Central Lines/Site/Justification:Patient Does Not Have Central Line       Urinary Cath/Order/Justification:Patient Does Not Have Urinary Catheter    Vasopressors/Infusions:        GOALS: Volume/ Hemodynamic: N/A                     RASS: 0  alert and calm    Pain Management: none       Pain Controlled: yes     Rhythm: NSR    Respiratory Device: Vapotherm    Oxygen Concentration (%):  [] 90             Most Recent SBT/ SAT: N/A       MOVE Screen: FAIL and PT Consult    VTE Prophylaxis: Pharm and Mechanical  Mobility: Bedrest  Stress Ulcer Prophylaxis: Yes    Dietary: PO  Tolerance: yes  /  Advancement: @ goal    I & O (24h):    Intake/Output Summary (Last 24 hours) at 7/26/2020 1822  Last data filed at 7/26/2020 1745  Gross per 24 hour   Intake 875 ml   Output 1925 ml   Net -1050 ml        Restraints: No    Significant Dates:  Post Op Date: N/A  Rescue Date: N/A  Imaging/ Diagnostics: N/A    Noteworthy Labs:  Drop in H/H. Increase in WBC. Getting repeat CBC at 1820    CBC/Anemia Labs: Coags:    Recent Labs   Lab 07/25/20  0235 07/26/20  1338   WBC 12.51 24.16*   HGB 16.2 12.2*   HCT 47.6 36.0*    286   MCV 91 91   RDW 13.2 13.1    No results for input(s): PT, INR, APTT in the last 168 hours.     Chemistries:   Recent Labs   Lab 07/24/20  0431 07/26/20  0337    140   K 3.7 4.1    106   CO2 28 25   BUN 45* 42*   CREATININE 0.8 0.8   CALCIUM 8.6* 8.4*   PROT 6.2 5.6*   BILITOT 0.6 0.5   ALKPHOS 180* 150*   ALT 63* 46*   AST 38 29        Cardiac Enzymes: Ejection Fractions:    No results for input(s): CPK, CPKMB, MB,  TROPONINI in the last 72 hours. Nuc Rest EF   Date Value Ref Range Status   06/11/2020 77  Final        POCT Glucose: HbA1c:    No results for input(s): POCTGLUCOSE in the last 168 hours. No results found for: HGBA1C        ICU LOS 7d 22h  Level of Care: Critical Care    Shift Summary/Plan for the shift: See plan care note

## 2020-07-26 NOTE — ASSESSMENT & PLAN NOTE
Secondary to COVID-19 infection  I have noticed some gradual improvement. Needs time  Completed course of empiric antibiotics for elevated procalcitonin  D-dimer improving. Continue full dose lovenox  CRP a bit better. Continue dexamethasone IV. Completed remdesivir  Continue scheduled olanzapine for related anxiety. Change to nightly  Tolerating vapotherm today. PO intake as tolerated. Will have BiPAP available  Pulmonary on board for comanagement.  Precautions in place  Code status: DNR

## 2020-07-26 NOTE — PROGRESS NOTES
[facility] Hospital Medicine  Progress Note    Patient Name: Joo York  MRN: [id number]  Patient Class: IP- Inpatient   Admission Date: 7/18/2020  Length of Stay: 8 days  Attending Physician: May Graff MD  Primary Care Provider: Keegan Watson MD        Subjective:     Principal Problem:Acute hypoxemic respiratory failure        HPI:  Joo York is a 80 y.o. male that (in part)  has a past medical history of Claudication, Essential hypertension, Essential hypertension, Hematuria, Hernia of abdominal cavity, and Multifocal pneumonia.  has a past surgical history that includes Vasectomy; Tonsillectomy; office cysto 2018; Cystoscopy with biopsy of bladder (N/A, 9/5/2018); and Atherectomy (1/23/2020). Presents to [facility] Emergency Department with report of shortness of breath, fever, chills last 3 days.  He was tested for COVID-19 2 days ago and positive.  He said dyspnea with exertion and a cough that started today.  Denies hemoptysis, stridor, or night sweats.  No nausea, vomiting diarrhea reported.  Patient is a former smoker.  Denies home oxygen use.  Code status discussed in the most remain a DNR DNI.  The family is aware of these wishes.  History is limited to respiratory distress as patient is speaking in short sentences.    In the emergency department routine laboratory studies and chest x-ray obtained.  Evidence of bilateral pneumonia.  Was supplemental oxygen and dexamethasone.  O2 sat 67% air prior to oxygen supplementation.  Okay to escalate to CPAP/BiPAP as.  Does not want to be intubated.    Hospital medicine has been asked to admit to inpatient in the ICU for further evaluation and treatment.     Overview/Hospital Course:  Mr. York presented with shortness of breath and fever.  He was admitted for acute respiratory failure secondary to COVID-19.      Interval History:  With black stools, per nurse. Repeat CBC showing Hgb of 12.2 from 16.2  yesterday. Patient states feeling well. Is on ASA, plavix and full dose lovenox.     Review of Systems   Constitutional: Positive for appetite change.   Respiratory: Negative.    Cardiovascular: Negative.    Gastrointestinal: Negative for abdominal distention, abdominal pain, nausea and vomiting.     Objective:     Vital Signs (Most Recent):  Temp: 97.8 °F (36.6 °C) (07/26/20 1105)  Pulse: 104 (07/26/20 1400)  Resp: 20 (07/26/20 1400)  BP: (!) 147/69 (07/26/20 1400)  SpO2: 95 % (07/26/20 1400) Vital Signs (24h Range):  Temp:  [97.1 °F (36.2 °C)-98.1 °F (36.7 °C)] 97.8 °F (36.6 °C)  Pulse:  [] 104  Resp:  [20-38] 20  SpO2:  [86 %-97 %] 95 %  BP: (125-182)/(61-77) 147/69     Weight: 76.1 kg (167 lb 12.3 oz)  Body mass index is 26.28 kg/m².    Intake/Output Summary (Last 24 hours) at 7/26/2020 1418  Last data filed at 7/26/2020 1345  Gross per 24 hour   Intake 960 ml   Output 1875 ml   Net -915 ml      Physical Exam  Vitals signs and nursing note reviewed.   Constitutional:       General: He is not in acute distress.     Appearance: He is not toxic-appearing.   Cardiovascular:      Rate and Rhythm: Normal rate and regular rhythm.   Pulmonary:      Breath sounds: No wheezing or rales.      Comments: Breathing comfortably on vapotherm  Abdominal:      General: Abdomen is flat. Bowel sounds are normal. There is no distension.      Palpations: Abdomen is soft. There is no mass.      Tenderness: There is no abdominal tenderness. There is no guarding.   Skin:     Capillary Refill: Capillary refill takes less than 2 seconds.      Coloration: Skin is not pale.   Neurological:      Mental Status: He is alert and oriented to person, place, and time.      Comments: Very calm    Psychiatric:         Mood and Affect: Mood normal.         Behavior: Behavior normal.         Thought Content: Thought content normal.         Judgment: Judgment normal.         Significant Labs: All pertinent labs within the past 24 hours have been  reviewed.    Significant Imaging: I have reviewed all pertinent imaging results/findings within the past 24 hours.  I have reviewed and interpreted all pertinent imaging results/findings within the past 24 hours.      Assessment/Plan:      * Acute hypoxemic respiratory failure  Secondary to COVID-19 infection  I have noticed some gradual improvement. Needs time  Completed course of empiric antibiotics for elevated procalcitonin  D-dimer improving. Continue full dose lovenox  CRP a bit better. Continue dexamethasone IV. Completed remdesivir  Continue scheduled olanzapine for related anxiety. Change to nightly  Tolerating vapotherm today. PO intake as tolerated. Will have BiPAP available  Pulmonary on board for comanagement.  Precautions in place  Code status: DNR    COVID-19  As above      Lower respiratory tract infection due to COVID-19 virus  As discussed above    Multifocal pneumonia  As discussed above    Dark stools  Hgb WNL however downtrended in the last 24 hours  Will hold aspirin and clopidogrel pending occult blood results  Is on enoxaparin for hypercoagulable state with COVID-19  Will consider stopping vs using heparin drip pending clinical course  Consider GI consultation however hypoxemia will make patient high risk for endoscopy. Will start pantoprazole IV       Elevated troponin I level  Trended. No reported chest pain.  On cardioprotective regimen if able to tolerate oral meds except for statin (due to elevated liver enzymes) and beta-blocker (due to bradycardia)    Essential hypertension  Stable on current dose of amlodipine    PAD (peripheral artery disease)  hold ASA and plavix    Dyslipidemia  Currently not on medications.  Will hold off on statin as his liver enzymes are still elevated although better    VTE Risk Mitigation (From admission, onward)         Ordered     enoxaparin injection 80 mg  Every 12 hours (non-standard times)      07/20/20 0653     IP VTE HIGH RISK PATIENT  Once       07/18/20 2106     Place sequential compression device  Until discontinued      07/18/20 2106              Discussed with wife over the phone    Critical care time spent on the evaluation and treatment of severe organ dysfunction, review of pertinent labs and imaging studies, discussions with consulting providers and discussions with patient/family: > 35 minutes.      May Celaya MD  Department of Hospital Medicine   [facility]

## 2020-07-26 NOTE — NURSING
1140: Notified Dr. Celaya of pt having dark tarry bowel movements. 2 this AM and 3 overnight. New orders for CBC, and pantoprazole. Will continue to monitor.      1406: Notified Dr. Celaya of patient decrease in H/H and increased WBC. New orders given

## 2020-07-26 NOTE — PLAN OF CARE
Remains AAO x 4.  Patient without complaints and in NAD.  Denies pain. Denies SOB when still but becomes tachypnic with activity and SaO2 dropping to mid-upper 80's.  BBS = diminished and rhonchi bilaterally.  Vapotherm remains in use with 90% O2 and 40L.  Tolerated meals without n/v.  PIV lock x 2 in place and secure.  Denies abdominal pain or discomfort.  Pass 3  dark, tarry stools.  MD made aware and orders for lab studies obtained and Protonix initiated.    Voiding per urinal without difficulty.  No falls, injuries or skin breakdown.

## 2020-07-27 NOTE — SUBJECTIVE & OBJECTIVE
Interval History: REJI overnight. Oxygen requirements improved. Currently on 20L/80%.      Objective:     Vital Signs (Most Recent):  Temp: 98 °F (36.7 °C) (07/27/20 1200)  Pulse: 80 (07/27/20 1300)  Resp: (!) 34 (07/27/20 1300)  BP: 121/85 (07/27/20 1300)  SpO2: 95 % (07/27/20 1300) Vital Signs (24h Range):  Temp:  [97.6 °F (36.4 °C)-98.4 °F (36.9 °C)] 98 °F (36.7 °C)  Pulse:  [64-97] 80  Resp:  [22-38] 34  SpO2:  [87 %-100 %] 95 %  BP: (117-153)/(58-96) 121/85     Weight: 76.1 kg (167 lb 12.3 oz)  Body mass index is 26.28 kg/m².      Intake/Output Summary (Last 24 hours) at 7/27/2020 1415  Last data filed at 7/27/2020 1202  Gross per 24 hour   Intake 275 ml   Output 1650 ml   Net -1375 ml       Physical Exam  Vitals signs and nursing note reviewed.   Constitutional:       Appearance: Normal appearance. He is normal weight.      Interventions: Nasal cannula in place.   HENT:      Head: Normocephalic and atraumatic.      Nose: Nose normal.   Eyes:      Conjunctiva/sclera: Conjunctivae normal.      Pupils: Pupils are equal, round, and reactive to light.   Neck:      Musculoskeletal: Normal range of motion.   Cardiovascular:      Rate and Rhythm: Normal rate.   Pulmonary:      Breath sounds: Examination of the right-lower field reveals decreased breath sounds and rhonchi. Examination of the left-lower field reveals decreased breath sounds and rhonchi. Decreased breath sounds and rhonchi present. No wheezing.      Comments: Remains tachypneic.  Abdominal:      General: Bowel sounds are normal. There is no distension.      Palpations: Abdomen is soft. There is no mass.      Tenderness: There is no abdominal tenderness. There is no guarding.      Hernia: No hernia is present.   Musculoskeletal: Normal range of motion.         General: No swelling.   Skin:     General: Skin is warm and dry.      Capillary Refill: Capillary refill takes less than 2 seconds.   Neurological:      General: No focal deficit present.      Mental  Status: He is alert and oriented to person, place, and time.   Psychiatric:         Mood and Affect: Mood normal.         Speech: Speech normal.         Vents:  Oxygen Concentration (%): 100 (07/27/20 0800)    Lines/Drains/Airways     Peripheral Intravenous Line                 Peripheral IV - Single Lumen 07/24/20 1031 20 G Right Antecubital 3 days         Peripheral IV - Single Lumen 07/24/20 1032 20 G Right Forearm 3 days                Significant Labs:    CBC/Anemia Profile:  Recent Labs   Lab 07/26/20  1338 07/26/20  1755 07/26/20  1838 07/27/20  0243   WBC 24.16*  --  21.55* 24.82*   HGB 12.2*  --  12.5* 11.7*   HCT 36.0*  --  38.1* 35.3*     --  359* 334   MCV 91  --  94 92   RDW 13.1  --  13.2 13.1   OCCULTBLOOD  --  Positive*  --   --         Chemistries:  Recent Labs   Lab 07/26/20  0337 07/27/20  1313    137   K 4.1 5.7*    104   CO2 25 27   BUN 42* 50*   CREATININE 0.8 1.0   CALCIUM 8.4* 8.5*   ALBUMIN 2.2*  --    PROT 5.6*  --    BILITOT 0.5  --    ALKPHOS 150*  --    ALT 46*  --    AST 29  --    MG  --  2.7*   PHOS  --  3.5       All pertinent labs within the past 24 hours have been reviewed.    Significant Imaging:  I have reviewed all pertinent imaging results/findings within the past 24 hours.Interval History: REJI overnight. Oxygen requirements improved. Currently on 20L/80%.    Objective:     Vital Signs (Most Recent):  Temp: 98 °F (36.7 °C) (07/27/20 0301)  Pulse: 92 (07/27/20 1000)  Resp: (!) 34 (07/27/20 1000)  BP: 128/60 (07/27/20 1000)  SpO2: 95 % (07/27/20 1000) Vital Signs (24h Range):  Temp:  [97.6 °F (36.4 °C)-98.4 °F (36.9 °C)] 98 °F (36.7 °C)  Pulse:  [] 92  Resp:  [20-38] 34  SpO2:  [87 %-100 %] 95 %  BP: (117-153)/(58-96) 128/60     Weight: 76.1 kg (167 lb 12.3 oz)  Body mass index is 26.28 kg/m².      Intake/Output Summary (Last 24 hours) at 7/27/2020 1323  Last data filed at 7/27/2020 0400  Gross per 24 hour   Intake 275 ml   Output 1500 ml   Net -1225 ml        Physical Exam    Vents:  Oxygen Concentration (%): 100 (07/27/20 0800)    Lines/Drains/Airways     Peripheral Intravenous Line                 Peripheral IV - Single Lumen 07/24/20 1031 20 G Right Antecubital 3 days         Peripheral IV - Single Lumen 07/24/20 1032 20 G Right Forearm 3 days                Significant Labs:    CBC/Anemia Profile:  Recent Labs   Lab 07/26/20  1338 07/26/20  1755 07/26/20  1838 07/27/20  0243   WBC 24.16*  --  21.55* 24.82*   HGB 12.2*  --  12.5* 11.7*   HCT 36.0*  --  38.1* 35.3*     --  359* 334   MCV 91  --  94 92   RDW 13.1  --  13.2 13.1   OCCULTBLOOD  --  Positive*  --   --         Chemistries:  Recent Labs   Lab 07/26/20  0337      K 4.1      CO2 25   BUN 42*   CREATININE 0.8   CALCIUM 8.4*   ALBUMIN 2.2*   PROT 5.6*   BILITOT 0.5   ALKPHOS 150*   ALT 46*   AST 29       All pertinent labs within the past 24 hours have been reviewed.    Significant Imaging:  I have reviewed all pertinent imaging results/findings within the past 24 hours.

## 2020-07-27 NOTE — ASSESSMENT & PLAN NOTE
2/2 COVID-19    - s/p 5 days of remdesivir  - s/p dexamethasone  - completed CTX/azithro x 5 days  - Wean O2 as tolerated. Currently on Vapotherm 35L 80%  - CXR (7/23) with bilateral patchy interstitial and airspace opacities  - Lasix spot dose as needed  - continue full dose Lovenox for elevated d-dimer  - Precedex remains off. Monitor for tachypnea/anxiety, transition to PO anti-anxiety if necessary.

## 2020-07-27 NOTE — CARE UPDATE
[facility]  ICU Shift Summary  Date: 7/27/2020      COVID Test: (+)  Isolation: Airborne and Contact and Droplet     Prehospitalization: Home  Admit Date / LOS : 7/18/2020/ 9 days    Diagnosis: Acute hypoxemic respiratory failure    Consults:        Active: Pulm CC and SW       Needed: PT     Code Status: DNR   Advanced Directive: <no information>    LDA: PIV       Central Lines/Site/Justification:Patient Does Not Have Central Line       Urinary Cath/Order/Justification:Patient Does Not Have Urinary Catheter    Vasopressors/Infusions:        GOALS: Volume/ Hemodynamic: N/A                     RASS: 0  alert and calm    Pain Management: none       Pain Controlled: not applicable     Rhythm: NSR    Respiratory Device: Vapotherm    Oxygen Concentration (%):  [] 85             Most Recent SBT/ SAT: Did not perform       MOVE Screen: PASS    VTE Prophylaxis: Pharm and Mechanical  Mobility: Bedrest, S: Self and A: Assist  Stress Ulcer Prophylaxis: Yes    Dietary: PO  Tolerance: yes  /  Advancement: @ goal    I & O (24h):    Intake/Output Summary (Last 24 hours) at 7/27/2020 0529  Last data filed at 7/27/2020 0400  Gross per 24 hour   Intake 575 ml   Output 1925 ml   Net -1350 ml        Restraints: No    Significant Dates:  Post Op Date: N/A  Rescue Date: N/A  Imaging/ Diagnostics: N/A    Noteworthy Labs:  H+H trending down (type and cross done)    CBC/Anemia Labs: Coags:    Recent Labs   Lab 07/26/20  1755 07/26/20  1838 07/27/20  0243   WBC  --  21.55* 24.82*   HGB  --  12.5* 11.7*   HCT  --  38.1* 35.3*   PLT  --  359* 334   MCV  --  94 92   RDW  --  13.2 13.1   OCCULTBLOOD Positive*  --   --     Recent Labs   Lab 07/26/20  1838   INR 1.1   APTT 26.5        Chemistries:   Recent Labs   Lab 07/24/20  0431 07/26/20  0337    140   K 3.7 4.1    106   CO2 28 25   BUN 45* 42*   CREATININE 0.8 0.8   CALCIUM 8.6* 8.4*   PROT 6.2 5.6*   BILITOT 0.6 0.5   ALKPHOS 180* 150*   ALT 63* 46*   AST  38 29        Cardiac Enzymes: Ejection Fractions:    No results for input(s): CPK, CPKMB, MB, TROPONINI in the last 72 hours. Nuc Rest EF   Date Value Ref Range Status   06/11/2020 77  Final        POCT Glucose: HbA1c:    No results for input(s): POCTGLUCOSE in the last 168 hours. No results found for: HGBA1C        ICU LOS 8d 9h  Level of Care: Critical Care    Shift Summary/Plan for the shift: Patient remains in ICU overnight. Vaoptherm in use 85% FiO2 and 40L. NSR on cardiac monitor. VSS, afebrile  One dark, tarry BM this shift. UO adequate. SCDs in use. Assisted with weight shift frequently. No falls, injury, or skin breakdown.

## 2020-07-27 NOTE — PROGRESS NOTES
[facility]  Pulmonology  Progress Note    Patient Name: Joo York  MRN: [id number]  Admission Date: 7/18/2020  Hospital Length of Stay: 9 days  Code Status: DNR  Attending Provider: May Graff MD  Primary Care Provider: Keegan Watson MD   Principal Problem: Acute hypoxemic respiratory failure    Subjective:     Interval History: REJI overnight. Oxygen requirements improved. Currently on 20L/80%.      Objective:     Vital Signs (Most Recent):  Temp: 98 °F (36.7 °C) (07/27/20 1200)  Pulse: 80 (07/27/20 1300)  Resp: (!) 34 (07/27/20 1300)  BP: 121/85 (07/27/20 1300)  SpO2: 95 % (07/27/20 1300) Vital Signs (24h Range):  Temp:  [97.6 °F (36.4 °C)-98.4 °F (36.9 °C)] 98 °F (36.7 °C)  Pulse:  [64-97] 80  Resp:  [22-38] 34  SpO2:  [87 %-100 %] 95 %  BP: (117-153)/(58-96) 121/85     Weight: 76.1 kg (167 lb 12.3 oz)  Body mass index is 26.28 kg/m².      Intake/Output Summary (Last 24 hours) at 7/27/2020 1415  Last data filed at 7/27/2020 1202  Gross per 24 hour   Intake 275 ml   Output 1650 ml   Net -1375 ml       Physical Exam  Vitals signs and nursing note reviewed.   Constitutional:       Appearance: Normal appearance. He is normal weight.      Interventions: Nasal cannula in place.   HENT:      Head: Normocephalic and atraumatic.      Nose: Nose normal.   Eyes:      Conjunctiva/sclera: Conjunctivae normal.      Pupils: Pupils are equal, round, and reactive to light.   Neck:      Musculoskeletal: Normal range of motion.   Cardiovascular:      Rate and Rhythm: Normal rate.   Pulmonary:      Breath sounds: Examination of the right-lower field reveals decreased breath sounds and rhonchi. Examination of the left-lower field reveals decreased breath sounds and rhonchi. Decreased breath sounds and rhonchi present. No wheezing.      Comments: Remains tachypneic.  Abdominal:      General: Bowel sounds are normal. There is no distension.      Palpations: Abdomen is soft. There is no mass.      Tenderness:  There is no abdominal tenderness. There is no guarding.      Hernia: No hernia is present.   Musculoskeletal: Normal range of motion.         General: No swelling.   Skin:     General: Skin is warm and dry.      Capillary Refill: Capillary refill takes less than 2 seconds.   Neurological:      General: No focal deficit present.      Mental Status: He is alert and oriented to person, place, and time.   Psychiatric:         Mood and Affect: Mood normal.         Speech: Speech normal.         Vents:  Oxygen Concentration (%): 100 (07/27/20 0800)    Lines/Drains/Airways     Peripheral Intravenous Line                 Peripheral IV - Single Lumen 07/24/20 1031 20 G Right Antecubital 3 days         Peripheral IV - Single Lumen 07/24/20 1032 20 G Right Forearm 3 days                Significant Labs:    CBC/Anemia Profile:  Recent Labs   Lab 07/26/20  1338 07/26/20  1755 07/26/20  1838 07/27/20  0243   WBC 24.16*  --  21.55* 24.82*   HGB 12.2*  --  12.5* 11.7*   HCT 36.0*  --  38.1* 35.3*     --  359* 334   MCV 91  --  94 92   RDW 13.1  --  13.2 13.1   OCCULTBLOOD  --  Positive*  --   --         Chemistries:  Recent Labs   Lab 07/26/20  0337 07/27/20  1313    137   K 4.1 5.7*    104   CO2 25 27   BUN 42* 50*   CREATININE 0.8 1.0   CALCIUM 8.4* 8.5*   ALBUMIN 2.2*  --    PROT 5.6*  --    BILITOT 0.5  --    ALKPHOS 150*  --    ALT 46*  --    AST 29  --    MG  --  2.7*   PHOS  --  3.5       All pertinent labs within the past 24 hours have been reviewed.    Significant Imaging:  I have reviewed all pertinent imaging results/findings within the past 24 hours.Interval History: REJI overnight. Oxygen requirements improved. Currently on 20L/80%.    Objective:     Vital Signs (Most Recent):  Temp: 98 °F (36.7 °C) (07/27/20 0301)  Pulse: 92 (07/27/20 1000)  Resp: (!) 34 (07/27/20 1000)  BP: 128/60 (07/27/20 1000)  SpO2: 95 % (07/27/20 1000) Vital Signs (24h Range):  Temp:  [97.6 °F (36.4 °C)-98.4 °F (36.9 °C)] 98 °F  (36.7 °C)  Pulse:  [] 92  Resp:  [20-38] 34  SpO2:  [87 %-100 %] 95 %  BP: (117-153)/(58-96) 128/60     Weight: 76.1 kg (167 lb 12.3 oz)  Body mass index is 26.28 kg/m².      Intake/Output Summary (Last 24 hours) at 7/27/2020 1323  Last data filed at 7/27/2020 0400  Gross per 24 hour   Intake 275 ml   Output 1500 ml   Net -1225 ml       Physical Exam    Vents:  Oxygen Concentration (%): 100 (07/27/20 0800)    Lines/Drains/Airways     Peripheral Intravenous Line                 Peripheral IV - Single Lumen 07/24/20 1031 20 G Right Antecubital 3 days         Peripheral IV - Single Lumen 07/24/20 1032 20 G Right Forearm 3 days                Significant Labs:    CBC/Anemia Profile:  Recent Labs   Lab 07/26/20  1338 07/26/20  1755 07/26/20  1838 07/27/20  0243   WBC 24.16*  --  21.55* 24.82*   HGB 12.2*  --  12.5* 11.7*   HCT 36.0*  --  38.1* 35.3*     --  359* 334   MCV 91  --  94 92   RDW 13.1  --  13.2 13.1   OCCULTBLOOD  --  Positive*  --   --         Chemistries:  Recent Labs   Lab 07/26/20  0337      K 4.1      CO2 25   BUN 42*   CREATININE 0.8   CALCIUM 8.4*   ALBUMIN 2.2*   PROT 5.6*   BILITOT 0.5   ALKPHOS 150*   ALT 46*   AST 29       All pertinent labs within the past 24 hours have been reviewed.    Significant Imaging:  I have reviewed all pertinent imaging results/findings within the past 24 hours.    Assessment/Plan:     * Acute hypoxemic respiratory failure  2/2 COVID-19    - s/p 5 days of remdesivir  - s/p dexamethasone  - completed CTX/azithro x 5 days  - Wean O2 as tolerated. Currently on Vapotherm 35L 80%  - CXR (7/23) with bilateral patchy interstitial and airspace opacities  - Lasix spot dose as needed  - continue full dose Lovenox for elevated d-dimer  - Precedex remains off. Monitor for tachypnea/anxiety, transition to PO anti-anxiety if necessary.    Continue supportive care  Monitor Hgb and watch for bleeding     COVID-19  --see acute hypoxemic resp fx      PPI ppx:  pantoprazole  VTE ppx: enoxaparin    Above plan discussed with Dr. Christy    Critical Care time 65 minutes    Critical care was time spent personally by me on the following activities: evaluating this patient's organ dysfunction, development of treatment plan, discussing treatment plan with patient or surrogate and bedside caregivers, discussions with consultants, evaluation of patient's response to treatment, examination of patient, ordering and performing treatments and interventions, ordering and review of laboratory studies, ordering and review of radiographic studies, re-evaluation of patient's condition. This critical care time did not overlap with that of any other provider or involve time for any procedures.       Jc Bell NP  Pulmonology  [facility]

## 2020-07-27 NOTE — PROGRESS NOTES
"[facility] [place]  Adult Nutrition  Progress Note    SUMMARY       Recommendations  1. Add Boost Plus daily to aid in meeting needs.   2. Continue current Cardiac diet.  Goals: Pt to consume >75% of meals by RD follow up.  Nutrition Goal Status: new  Communication of RD Recs: (plan of care)    Reason for Assessment  Reason For Assessment: length of stay  Diagnosis: pulmonary disease(COVID, hypoxia)  Relevant Medical History: HTN, hematuria, hernia, pneumonia  General Information Comments: Pt admitted to hospital with shortness of breath, fever and chills. Tested positive for COVID-19 on 7/16. Pt is DNR/DNI. Pt currently on Vapotherm, BiPap on standby. Currently on Cardiac diet with fair appetite and intake. NFPE unable to be completed due to COVID-19 precautions. BMI 26.28. Chart shows 4% wt loss of 7 lb in past 7 months.  Nutrition Discharge Planning: Adequate PO intake via Cardiac diet.    Nutrition Risk Screen  Nutrition Risk Screen: no indicators present    Nutrition/Diet History  Factors Affecting Nutritional Intake: decreased appetite    Anthropometrics  Temp: 98 °F (36.7 °C)  Height Method: Stated  Height: 5' 7" (170.2 cm)  Height (inches): 67 in  Weight Method: Bed Scale  Weight: 76.1 kg (167 lb 12.3 oz)  Weight (lb): 167.77 lb  Ideal Body Weight (IBW), Male: 148 lb  % Ideal Body Weight, Male (lb): 113.36 %  BMI (Calculated): 26.3  BMI Grade: 25 - 29.9 - overweight    Lab/Procedures/Meds  Pertinent Labs Reviewed: reviewed  Pertinent Labs Comments: BUN 42, Glu 270, Chris 8.4, Alb 2.2  Pertinent Medications Reviewed: reviewed  Pertinent Medications Comments: Vit C, furosemide, insulin, MVI, pantoprazole    Physical Findings/Assessment  Jurgen score 15     Estimated/Assessed Needs  Weight Used For Calorie Calculations: 76.1 kg (167 lb 12.3 oz)  Energy Calorie Requirements (kcal): 9370-6118 kcal  Energy Need Method: Kcal/kg(20-25)  Protein Requirements: 61-76 gm daily  Weight Used For Protein " Calculations: 76.1 kg (167 lb 12.3 oz)(0.8-1.0 gm/kg)  Fluid Requirements (mL): 1 mL/kcal or per MD  RDA Method (mL): 1522  CHO Requirement: 190 gm daily    Nutrition Prescription Ordered  Current Diet Order: Cardiac    Evaluation of Received Nutrient/Fluid Intake  Comments: LBM 7/27  % Intake of Estimated Energy Needs: 50 - 75 %  % Meal Intake: 50 - 75 %    Nutrition Risk  Level of Risk/Frequency of Follow-up: low - moderate(f/u 1xweekly)     Assessment and Plan  Nutrition Problem  Inadequate oral intake     Related to (etiology):   Low intake    Signs and Symptoms (as evidenced by):   Decreased appetite stated by pt    Interventions (treatment strategy):  Collaboration with other providers    Nutrition Diagnosis Status:   New    Monitor and Evaluation  Food and Nutrient Intake: energy intake  Food and Nutrient Adminstration: diet order  Knowledge/Beliefs/Attitudes: food and nutrition knowledge/skill  Physical Activity and Function: nutrition-related ADLs and IADLs  Anthropometric Measurements: weight change, weight  Biochemical Data, Medical Tests and Procedures: electrolyte and renal panel, gastrointestinal profile, glucose/endocrine profile, inflammatory profile, lipid profile  Nutrition-Focused Physical Findings: overall appearance     Nutrition Follow-Up  RD Follow-up?: Yes

## 2020-07-27 NOTE — PLAN OF CARE
Pt currenlty on Vapotherm with documented settings; BiPap on stby, Pt not wheezing but is tachypneic; RT will continue to monitor

## 2020-07-27 NOTE — PLAN OF CARE
Recommendations  1. Add Boost Plus daily to aid in meeting needs.   2. Continue current Cardiac diet.  Goals: Pt to consume >75% of meals by RD follow up.  Nutrition Goal Status: new

## 2020-07-28 PROBLEM — D64.9 ANEMIA: Status: ACTIVE | Noted: 2020-01-01

## 2020-07-28 NOTE — CARE UPDATE
[facility]  ICU Shift Summary  Date: 7/28/2020      COVID Test: (+)  Isolation: Airborne and Contact and Droplet     Prehospitalization: Home  Admit Date / LOS : 7/18/2020/ 10 days    Diagnosis: Acute hypoxemic respiratory failure    Consults:        Active: Pulm CC and SW       Needed: PT     Code Status: DNR   Advanced Directive: <no information>    LDA: PIV       Central Lines/Site/Justification:Patient Does Not Have Central Line       Urinary Cath/Order/Justification:Patient Does Not Have Urinary Catheter    Vasopressors/Infusions:        GOALS: Volume/ Hemodynamic: N/A                     RASS: 0  alert and calm    Pain Management: none       Pain Controlled: not applicable     Rhythm: NSR    Respiratory Device: Vapotherm    Oxygen Concentration (%):  [] 80             Most Recent SBT/ SAT: Did not perform       MOVE Screen: PASS    VTE Prophylaxis: Pharm and Mechanical  Mobility: Bedrest, S: Self and A: Assist  Stress Ulcer Prophylaxis: Yes    Dietary: PO  Tolerance: yes  /  Advancement: @ goal    I & O (24h):    Intake/Output Summary (Last 24 hours) at 7/28/2020 0644  Last data filed at 7/28/2020 0500  Gross per 24 hour   Intake 300 ml   Output 1750 ml   Net -1450 ml        Restraints: No    Significant Dates:  Post Op Date: N/A  Rescue Date: N/A  Imaging/ Diagnostics: N/A    Noteworthy Labs:  WCB trending up, h+h trending down,     CBC/Anemia Labs: Coags:    Recent Labs   Lab 07/26/20  1755  07/27/20  0243 07/28/20  0403   WBC  --    < > 24.82* 32.59*   HGB  --    < > 11.7* 9.0*   HCT  --    < > 35.3* 27.0*   PLT  --    < > 334 291   MCV  --    < > 92 90   RDW  --    < > 13.1 13.0   OCCULTBLOOD Positive*  --   --   --     < > = values in this interval not displayed.    Recent Labs   Lab 07/26/20  1838 07/27/20  1313   INR 1.1 1.1   APTT 26.5  --         Chemistries:   Recent Labs   Lab 07/24/20  0431 07/26/20  0337 07/27/20  1313 07/28/20  0403    140 137 136   K 3.7 4.1 5.7*  4.8    106 104 104   CO2 28 25 27 27   BUN 45* 42* 50* 39*   CREATININE 0.8 0.8 1.0 0.8   CALCIUM 8.6* 8.4* 8.5* 8.5*   PROT 6.2 5.6*  --   --    BILITOT 0.6 0.5  --   --    ALKPHOS 180* 150*  --   --    ALT 63* 46*  --   --    AST 38 29  --   --    MG  --   --  2.7* 2.8*   PHOS  --   --  3.5 2.9        Cardiac Enzymes: Ejection Fractions:    No results for input(s): CPK, CPKMB, MB, TROPONINI in the last 72 hours. Nuc Rest EF   Date Value Ref Range Status   06/11/2020 77  Final        POCT Glucose: HbA1c:    Recent Labs   Lab 07/27/20  2218 07/28/20  0324 07/28/20  0701   POCTGLUCOSE 420* 327* 296*    No results found for: HGBA1C        ICU LOS 9d 10h  Level of Care: Critical Care    Shift Summary/Plan for the shift: VSS, AAOx4, Vapotherm 35 L and 80%. One smear BM. UO adeqaute. No falls, injury, or skin breakdown.

## 2020-07-28 NOTE — CARE UPDATE
[facility]  ICU Multidisciplinary Bedside Rounds     UPDATE     Date: 7/28/2020      Plan of care reviewed with the following, Nurse, Charge Nurse, Physician, Pulm CC, Resp. Therapist and Rehab.       Needs/ Goals for the day:wean O2 as tolerated       Level of Care: Critical Care

## 2020-07-28 NOTE — ASSESSMENT & PLAN NOTE
Normocytic anemia. Hgb normal on admission with multiple dark tarry stools. No overt signs of bleeding and no hemodynamic instability. No transfusions this hospitalization and abdominal exam unremarkable    --CBC q12  --transfuse for overt signs of bleeding, Hgb <7, or hemodynamic instability  --Defer GI consult at this time as not a candidate for EGD given respiratory failure 2/2 COVID19 infection  --hold anticoagulation for now

## 2020-07-28 NOTE — CARE UPDATE
Maria Luz York (wife): [phone]     She was updated on patient's current status with recent decline - placed back on BIPAP and melena seen. All questions and concerns were addressed.     LUL Zamora MD

## 2020-07-28 NOTE — PROGRESS NOTES
Pharmacokinetic Initial Assessment: IV Vancomycin    Assessment/Plan:    Initiate intravenous vancomycin with loading dose of 1750 mg once followed by a maintenance dose of vancomycin 1500 mg IV every 24 hours  Desired empiric serum trough concentration is 10 to 20 mcg/mL  Draw vancomycin trough level 30 min prior to third dose on 7/30 at approximately 1030  Pharmacy will continue to follow and monitor vancomycin.      Please contact pharmacy at extension [phone] with any questions regarding this assessment.     Thank you for the consult,   Josep Vasques       Patient brief summary:  Joo York is a 80 y.o. male initiated on antimicrobial therapy with IV Vancomycin for treatment of suspected  pneumonia    Drug Allergies:   Review of patient's allergies indicates:  No Known Allergies    Actual Body Weight:   76.1 kg    Renal Function:   Estimated Creatinine Clearance: 66.6 mL/min (based on SCr of 0.8 mg/dL).,     Dialysis Method (if applicable):  N/A    CBC (last 72 hours):  Recent Labs   Lab Result Units 07/26/20  1338 07/26/20  1838 07/27/20  0243 07/28/20  0403   WBC K/uL 24.16* 21.55* 24.82* 32.59*   Hemoglobin g/dL 12.2* 12.5* 11.7* 9.0*   Hematocrit % 36.0* 38.1* 35.3* 27.0*   Platelets K/uL 286 359* 334 291   Gran% % 93.0* 91.4* 87.7* 95.0*   Lymph% % 6.0* 3.9* 5.3* 1.0*   Mono% % 1.0* 2.3* 3.9* 4.0   Eosinophil% % 0.0 0.0 0.0 0.0   Basophil% % 0.0 0.1 0.2 0.0   Differential Method  Manual Automated Automated Manual       Metabolic Panel (last 72 hours):  Recent Labs   Lab Result Units 07/26/20  0337 07/27/20  1313 07/28/20  0403   Sodium mmol/L 140 137 136   Potassium mmol/L 4.1 5.7* 4.8   Chloride mmol/L 106 104 104   CO2 mmol/L 25 27 27   Glucose mg/dL 270* 453* 315*   BUN, Bld mg/dL 42* 50* 39*   Creatinine mg/dL 0.8 1.0 0.8   Albumin g/dL 2.2*  --   --    Total Bilirubin mg/dL 0.5  --   --    Alkaline Phosphatase U/L 150*  --   --    AST U/L 29  --   --    ALT U/L 46*  --   --    Magnesium mg/dL  --   2.7* 2.8*   Phosphorus mg/dL  --  3.5 2.9       Drug levels (last 3 results):  No results for input(s): VANCOMYCINRA, VANCOMYCINPE, VANCOMYCINTR in the last 72 hours.    Microbiologic Results:  Microbiology Results (last 7 days)       Procedure Component Value Units Date/Time    Blood culture [[id number]] Collected: 07/28/20 1120    Order Status: Sent Specimen: Blood Updated: 07/28/20 1135    Blood culture [[id number]] Collected: 07/28/20 1120    Order Status: Sent Specimen: Blood Updated: 07/28/20 1135    Culture, Respiratory with Gram Stain [[id number]]     Order Status: No result Specimen: Respiratory     Blood culture #1 **CANNOT BE ORDERED STAT** [[id number]] Collected: 07/18/20 1710    Order Status: Completed Specimen: Blood from Peripheral, Antecubital, Left Updated: 07/23/20 2103     Blood Culture, Routine No growth after 5 days.    Blood culture #2 **CANNOT BE ORDERED STAT** [[id number]] Collected: 07/18/20 1733    Order Status: Completed Specimen: Blood from Peripheral, Hand, Left Updated: 07/23/20 2103     Blood Culture, Routine No growth after 5 days.    Culture, Respiratory with Gram Stain [[id number]]     Order Status: No result Specimen: Respiratory

## 2020-07-28 NOTE — PROGRESS NOTES
Vapotherm settings changed to 30liters and 80% O2 as pt sats around 85-86% with Dr. Christy notified.

## 2020-07-28 NOTE — SUBJECTIVE & OBJECTIVE
Interval History: Oxygenation improving, Hgb continues to downtrend. No overt signs of bleeding. Holding anticoagulation for now. Worsening leukocytosis, add BS abx.    Objective:     Vital Signs (Most Recent):  Temp: 98.1 °F (36.7 °C) (07/28/20 1101)  Pulse: 82 (07/28/20 1315)  Resp: (!) 40 (07/28/20 1315)  BP: (!) 116/55 (07/28/20 1300)  SpO2: (!) 93 % (07/28/20 1315) Vital Signs (24h Range):  Temp:  [97.6 °F (36.4 °C)-98.2 °F (36.8 °C)] 98.1 °F (36.7 °C)  Pulse:  [] 82  Resp:  [18-70] 40  SpO2:  [79 %-100 %] 93 %  BP: (114-158)/(55-74) 116/55     Weight: 76.1 kg (167 lb 12.3 oz)  Body mass index is 26.28 kg/m².      Intake/Output Summary (Last 24 hours) at 7/28/2020 1509  Last data filed at 7/28/2020 1200  Gross per 24 hour   Intake 800 ml   Output 1500 ml   Net -700 ml       Physical Exam  Vitals signs and nursing note reviewed.   Constitutional:       Appearance: Normal appearance. He is normal weight.      Interventions: Nasal cannula in place.   HENT:      Head: Normocephalic and atraumatic.      Nose: Nose normal.   Eyes:      Conjunctiva/sclera: Conjunctivae normal.      Pupils: Pupils are equal, round, and reactive to light.   Neck:      Musculoskeletal: Normal range of motion.   Cardiovascular:      Rate and Rhythm: Normal rate.   Pulmonary:      Breath sounds: Examination of the right-lower field reveals decreased breath sounds and rhonchi. Examination of the left-lower field reveals decreased breath sounds and rhonchi. Decreased breath sounds and rhonchi present. No wheezing.      Comments: Remains tachypneic.  Abdominal:      General: Bowel sounds are normal. There is no distension.      Palpations: Abdomen is soft. There is no mass.      Tenderness: There is no abdominal tenderness. There is no guarding.      Hernia: No hernia is present.   Musculoskeletal: Normal range of motion.         General: No swelling.   Skin:     General: Skin is warm and dry.      Capillary Refill: Capillary refill  takes less than 2 seconds.   Neurological:      General: No focal deficit present.      Mental Status: He is alert and oriented to person, place, and time.   Psychiatric:         Mood and Affect: Mood normal.         Speech: Speech normal.         Vents:  Oxygen Concentration (%): 70 (07/28/20 1101)    Lines/Drains/Airways     Peripheral Intravenous Line                 Peripheral IV - Single Lumen 07/24/20 1031 20 G Right Antecubital 4 days         Peripheral IV - Single Lumen 07/24/20 1032 20 G Right Forearm 4 days                Significant Labs:    CBC/Anemia Profile:  Recent Labs   Lab 07/26/20  1755 07/26/20  1838 07/27/20  0243 07/28/20  0403   WBC  --  21.55* 24.82* 32.59*   HGB  --  12.5* 11.7* 9.0*   HCT  --  38.1* 35.3* 27.0*   PLT  --  359* 334 291   MCV  --  94 92 90   RDW  --  13.2 13.1 13.0   OCCULTBLOOD Positive*  --   --   --         Chemistries:  Recent Labs   Lab 07/27/20  1313 07/28/20  0403    136   K 5.7* 4.8    104   CO2 27 27   BUN 50* 39*   CREATININE 1.0 0.8   CALCIUM 8.5* 8.5*   MG 2.7* 2.8*   PHOS 3.5 2.9       All pertinent labs within the past 24 hours have been reviewed.    Significant Imaging:  I have reviewed all pertinent imaging results/findings within the past 24 hours.

## 2020-07-28 NOTE — ASSESSMENT & PLAN NOTE
Secondary to COVID-19 infection  I have noticed some gradual improvement. Needs time  Completed course of empiric antibiotics for elevated procalcitonin  D-dimer improving. Continue full dose lovenox  CRP a bit better. Completed dexamethasone and Remdesivir course  Continue scheduled olanzapine nightly for related anxiety.   Tolerating vapotherm today. PO intake as tolerated. Will have BiPAP available  Pulmonary on board for comanagement.  Precautions in place  Code status: DNR

## 2020-07-28 NOTE — ASSESSMENT & PLAN NOTE
Occult blood positive. Hgb downtrended but now stable  Holding aspirin and clopidogrel. Still on full dose lovenox  Is on enoxaparin for hypercoagulable state with COVID-19  Consider GI consultation however hypoxemia will make patient high risk for endoscopy. Will hold off on consult for now. Continue pantoprazole IV

## 2020-07-28 NOTE — CARE UPDATE
[facility]  ICU Shift Summary  Date: 7/28/2020      COVID Test: (+)  Isolation: Airborne and Contact and Droplet     Prehospitalization: Home  Admit Date / LOS : 7/18/2020/ 10 days    Diagnosis: Acute hypoxemic respiratory failure    Consults:        Active: Pulm CC       Needed: GI     Code Status: DNR   Advanced Directive: <no information>    LDA: [place] and PIV       Central Lines/Site/Justification:Patient Does Not Have Central Line       Urinary Cath/Order/Justification:Critically Ill in the ICU and requiring intensive monitoring    Vasopressors/Infusions:        GOALS: Volume/ Hemodynamic: N/A                     RASS: -1  awakes to voice (eye opening/contact) > 10 seconds    Pain Management: none       Pain Controlled: yes     Rhythm: NSR    Respiratory Device: Bipap    Oxygen Concentration (%):  [] 100             Most Recent SBT/ SAT: Pass       MOVE Screen: PASS    VTE Prophylaxis: Pharm  Mobility: Bedrest  Stress Ulcer Prophylaxis: Yes    Dietary: PO  Tolerance: yes  /  Advancement: @ goal    I & O (24h):    Intake/Output Summary (Last 24 hours) at 7/28/2020 1823  Last data filed at 7/28/2020 1530  Gross per 24 hour   Intake 900 ml   Output 1475 ml   Net -575 ml        Restraints: No    Significant Dates:  Post Op Date: N/A  Rescue Date: N/A  Imaging/ Diagnostics: CXR    Noteworthy Labs:  H/H q6hrs as H/H was 9.0/27.0 this am and at 4pm8.0/24.5    CBC/Anemia Labs: Coags:    Recent Labs   Lab 07/26/20  1755  07/28/20  0403 07/28/20  1555   WBC  --    < > 32.59* 39.11*   HGB  --    < > 9.0* 8.0*   HCT  --    < > 27.0* 24.5*   PLT  --    < > 291 260   MCV  --    < > 90 92   RDW  --    < > 13.0 13.1   OCCULTBLOOD Positive*  --   --   --     < > = values in this interval not displayed.    Recent Labs   Lab 07/26/20  1838 07/27/20  1313   INR 1.1 1.1   APTT 26.5  --         Chemistries:   Recent Labs   Lab 07/24/20  0431 07/26/20  0337 07/27/20  1313 07/28/20  0403    140 137 136    K 3.7 4.1 5.7* 4.8    106 104 104   CO2 28 25 27 27   BUN 45* 42* 50* 39*   CREATININE 0.8 0.8 1.0 0.8   CALCIUM 8.6* 8.4* 8.5* 8.5*   PROT 6.2 5.6*  --   --    BILITOT 0.6 0.5  --   --    ALKPHOS 180* 150*  --   --    ALT 63* 46*  --   --    AST 38 29  --   --    MG  --   --  2.7* 2.8*   PHOS  --   --  3.5 2.9        Cardiac Enzymes: Ejection Fractions:    No results for input(s): CPK, CPKMB, MB, TROPONINI in the last 72 hours. Nuc Rest EF   Date Value Ref Range Status   06/11/2020 77  Final        POCT Glucose: HbA1c:    Recent Labs   Lab 07/27/20  2218 07/28/20  0324 07/28/20  0701   POCTGLUCOSE 420* 327* 296*    No results found for: HGBA1C        ICU LOS 9d 22h  Level of Care: Critical Care    Shift Summary/Plan for the shift:  Pt started to desat around 545pm  sats 50's so pt placed back on Bipap after bagged. H/H dropping will monitor q6hrs.. Poor appetite.

## 2020-07-28 NOTE — PROGRESS NOTES
[facility] Hospital Medicine  Progress Note    Patient Name: Joo York  MRN: [id number]  Patient Class: IP- Inpatient   Admission Date: 7/18/2020  Length of Stay: 9 days  Attending Physician: May Graff MD  Primary Care Provider: Keegan Watson MD        Subjective:     Principal Problem:Acute hypoxemic respiratory failure        HPI:  Joo York is a 80 y.o. male that (in part)  has a past medical history of Claudication, Essential hypertension, Essential hypertension, Hematuria, Hernia of abdominal cavity, and Multifocal pneumonia.  has a past surgical history that includes Vasectomy; Tonsillectomy; office cysto 2018; Cystoscopy with biopsy of bladder (N/A, 9/5/2018); and Atherectomy (1/23/2020). Presents to [facility] Emergency Department with report of shortness of breath, fever, chills last 3 days.  He was tested for COVID-19 2 days ago and positive.  He said dyspnea with exertion and a cough that started today.  Denies hemoptysis, stridor, or night sweats.  No nausea, vomiting diarrhea reported.  Patient is a former smoker.  Denies home oxygen use.  Code status discussed in the most remain a DNR DNI.  The family is aware of these wishes.  History is limited to respiratory distress as patient is speaking in short sentences.    In the emergency department routine laboratory studies and chest x-ray obtained.  Evidence of bilateral pneumonia.  Was supplemental oxygen and dexamethasone.  O2 sat 67% air prior to oxygen supplementation.  Okay to escalate to CPAP/BiPAP as.  Does not want to be intubated.    Hospital medicine has been asked to admit to inpatient in the ICU for further evaluation and treatment.     Overview/Hospital Course:  Mr. York presented with shortness of breath and fever.  He was admitted for acute respiratory failure secondary to COVID-19. Placed on BiPAP. Code status DNR. Completed course of empiric antibiotics for elevated procalcitonin  level, course of dexamethasone 6 mg daily and Remdesivir. Also on full dose lovenox for hypercoagulable state. Diuresed intermittently. He slowly improved and de-escalated to vapotherm NC. On 7/26, patient had melena and acute drop in Hgb from 16 to 12 in 24 hours. BP stable and respirations were not affected. Placed on pantoprazole IV 40 mg BID and held ASA/plavix which he uses for PVD. Repeat Hgb levels remained stable and amount of melena decreased. Given such high risk for thromboembolism, he continued on enoxaparin.     Interval History:  Still with melena but less and Hgb stable so far. BP stable. Patient has no complaints and able to wean vapotherm further. Still on lovenox.     Review of Systems   Constitutional: Positive for appetite change.   Respiratory: Negative.    Cardiovascular: Negative.    Gastrointestinal: Negative for abdominal distention, abdominal pain, nausea and vomiting.     Objective:     Vital Signs (Most Recent):  Temp: 98 °F (36.7 °C) (07/27/20 1200)  Pulse: 80 (07/27/20 1930)  Resp: (!) 32 (07/27/20 1930)  BP: 133/63 (07/27/20 1900)  SpO2: 98 % (07/27/20 1930) Vital Signs (24h Range):  Temp:  [98 °F (36.7 °C)-98.4 °F (36.9 °C)] 98 °F (36.7 °C)  Pulse:  [] 80  Resp:  [22-70] 32  SpO2:  [85 %-100 %] 98 %  BP: (117-158)/(58-85) 133/63     Weight: 76.1 kg (167 lb 12.3 oz)  Body mass index is 26.28 kg/m².    Intake/Output Summary (Last 24 hours) at 7/27/2020 2033  Last data filed at 7/27/2020 1700  Gross per 24 hour   Intake 150 ml   Output 1300 ml   Net -1150 ml      Physical Exam  Vitals signs and nursing note reviewed.   Constitutional:       General: He is not in acute distress.     Appearance: He is not toxic-appearing.   Cardiovascular:      Rate and Rhythm: Normal rate and regular rhythm.   Pulmonary:      Breath sounds: No wheezing or rales.      Comments: Breathing comfortably on vapotherm  Abdominal:      General: Abdomen is flat. Bowel sounds are normal. There is no  distension.      Palpations: Abdomen is soft. There is no mass.      Tenderness: There is no abdominal tenderness. There is no guarding.   Skin:     Capillary Refill: Capillary refill takes less than 2 seconds.      Coloration: Skin is not pale.   Neurological:      Mental Status: He is alert and oriented to person, place, and time.      Comments: Very calm    Psychiatric:         Mood and Affect: Mood normal.         Behavior: Behavior normal.         Thought Content: Thought content normal.         Judgment: Judgment normal.         Significant Labs: All pertinent labs within the past 24 hours have been reviewed.    Significant Imaging: I have reviewed all pertinent imaging results/findings within the past 24 hours.  I have reviewed and interpreted all pertinent imaging results/findings within the past 24 hours.      Assessment/Plan:      * Acute hypoxemic respiratory failure  Secondary to COVID-19 infection  I have noticed some gradual improvement. Needs time  Completed course of empiric antibiotics for elevated procalcitonin  D-dimer improving. Continue full dose lovenox  CRP a bit better. Completed dexamethasone and Remdesivir course  Continue scheduled olanzapine nightly for related anxiety.   Tolerating vapotherm today. PO intake as tolerated. Will have BiPAP available  Pulmonary on board for comanagement.  Precautions in place  Code status: DNR    COVID-19  As above      Lower respiratory tract infection due to COVID-19 virus  As discussed above    Multifocal pneumonia  As discussed above    Dark stools  Occult blood positive. Hgb downtrended but now stable  Holding aspirin and clopidogrel. Still on full dose lovenox  Is on enoxaparin for hypercoagulable state with COVID-19  Consider GI consultation however hypoxemia will make patient high risk for endoscopy. Will hold off on consult for now. Continue pantoprazole IV       Elevated troponin I level  Trended. No reported chest pain.      Essential  hypertension  Stable on current dose of amlodipine    PAD (peripheral artery disease)  hold ASA and plavix    Dyslipidemia  Currently not on medications.  Will hold off on statin as his liver enzymes are still elevated although better      VTE Risk Mitigation (From admission, onward)         Ordered     enoxaparin injection 80 mg  Every 12 hours (non-standard times)      07/20/20 0653     IP VTE HIGH RISK PATIENT  Once      07/18/20 2106     Place sequential compression device  Until discontinued      07/18/20 2106              Discussed with patient's wife over the phone.    Critical care time spent on the evaluation and treatment of severe organ dysfunction, review of pertinent labs and imaging studies, discussions with consulting providers and discussions with patient/family: > 45 minutes.      May Celaya MD  Department of Hospital Medicine   [facility]

## 2020-07-28 NOTE — PROGRESS NOTES
"Dr. Christy notified that sats still around 85%. He wants Bipap. RT notified. RT at bedside at 1750 and pt started to desat 50% so pt bagged with bag-mask. Dr. Zamora and Dr. Christy at bedside. Sats returned to 90"s so pt placed on bipap. Pt had large dark stool maroon in color. Dr. Christy at bedside. New orders received.  "

## 2020-07-28 NOTE — SUBJECTIVE & OBJECTIVE
Interval History:  Still with melena but less and Hgb stable so far. BP stable. Patient has no complaints and able to wean vapotherm further. Still on lovenox.     Review of Systems   Constitutional: Positive for appetite change.   Respiratory: Negative.    Cardiovascular: Negative.    Gastrointestinal: Negative for abdominal distention, abdominal pain, nausea and vomiting.     Objective:     Vital Signs (Most Recent):  Temp: 98 °F (36.7 °C) (07/27/20 1200)  Pulse: 80 (07/27/20 1930)  Resp: (!) 32 (07/27/20 1930)  BP: 133/63 (07/27/20 1900)  SpO2: 98 % (07/27/20 1930) Vital Signs (24h Range):  Temp:  [98 °F (36.7 °C)-98.4 °F (36.9 °C)] 98 °F (36.7 °C)  Pulse:  [] 80  Resp:  [22-70] 32  SpO2:  [85 %-100 %] 98 %  BP: (117-158)/(58-85) 133/63     Weight: 76.1 kg (167 lb 12.3 oz)  Body mass index is 26.28 kg/m².    Intake/Output Summary (Last 24 hours) at 7/27/2020 2033  Last data filed at 7/27/2020 1700  Gross per 24 hour   Intake 150 ml   Output 1300 ml   Net -1150 ml      Physical Exam  Vitals signs and nursing note reviewed.   Constitutional:       General: He is not in acute distress.     Appearance: He is not toxic-appearing.   Cardiovascular:      Rate and Rhythm: Normal rate and regular rhythm.   Pulmonary:      Breath sounds: No wheezing or rales.      Comments: Breathing comfortably on vapotherm  Abdominal:      General: Abdomen is flat. Bowel sounds are normal. There is no distension.      Palpations: Abdomen is soft. There is no mass.      Tenderness: There is no abdominal tenderness. There is no guarding.   Skin:     Capillary Refill: Capillary refill takes less than 2 seconds.      Coloration: Skin is not pale.   Neurological:      Mental Status: He is alert and oriented to person, place, and time.      Comments: Very calm    Psychiatric:         Mood and Affect: Mood normal.         Behavior: Behavior normal.         Thought Content: Thought content normal.         Judgment: Judgment normal.          Significant Labs: All pertinent labs within the past 24 hours have been reviewed.    Significant Imaging: I have reviewed all pertinent imaging results/findings within the past 24 hours.  I have reviewed and interpreted all pertinent imaging results/findings within the past 24 hours.

## 2020-07-28 NOTE — PROGRESS NOTES
[facility]  Pulmonology  Progress Note    Patient Name: Joo York  MRN: [id number]  Admission Date: 7/18/2020  Hospital Length of Stay: 10 days  Code Status: DNR  Attending Provider: Adriana Zamora MD  Primary Care Provider: Keegan Watson MD   Principal Problem: Acute hypoxemic respiratory failure    Subjective:     Interval History: Oxygenation improving, Hgb continues to downtrend. No overt signs of bleeding. Holding anticoagulation for now. Worsening leukocytosis, add BS abx.    Objective:     Vital Signs (Most Recent):  Temp: 98.1 °F (36.7 °C) (07/28/20 1101)  Pulse: 82 (07/28/20 1315)  Resp: (!) 40 (07/28/20 1315)  BP: (!) 116/55 (07/28/20 1300)  SpO2: (!) 93 % (07/28/20 1315) Vital Signs (24h Range):  Temp:  [97.6 °F (36.4 °C)-98.2 °F (36.8 °C)] 98.1 °F (36.7 °C)  Pulse:  [] 82  Resp:  [18-70] 40  SpO2:  [79 %-100 %] 93 %  BP: (114-158)/(55-74) 116/55     Weight: 76.1 kg (167 lb 12.3 oz)  Body mass index is 26.28 kg/m².      Intake/Output Summary (Last 24 hours) at 7/28/2020 1509  Last data filed at 7/28/2020 1200  Gross per 24 hour   Intake 800 ml   Output 1500 ml   Net -700 ml       Physical Exam  Vitals signs and nursing note reviewed.   Constitutional:       Appearance: Normal appearance. He is normal weight.      Interventions: Nasal cannula in place.   HENT:      Head: Normocephalic and atraumatic.      Nose: Nose normal.   Eyes:      Conjunctiva/sclera: Conjunctivae normal.      Pupils: Pupils are equal, round, and reactive to light.   Neck:      Musculoskeletal: Normal range of motion.   Cardiovascular:      Rate and Rhythm: Normal rate.   Pulmonary:      Breath sounds: Examination of the right-lower field reveals decreased breath sounds and rhonchi. Examination of the left-lower field reveals decreased breath sounds and rhonchi. Decreased breath sounds and rhonchi present. No wheezing.      Comments: Remains tachypneic.  Abdominal:      General: Bowel sounds are normal. There  is no distension.      Palpations: Abdomen is soft. There is no mass.      Tenderness: There is no abdominal tenderness. There is no guarding.      Hernia: No hernia is present.   Musculoskeletal: Normal range of motion.         General: No swelling.   Skin:     General: Skin is warm and dry.      Capillary Refill: Capillary refill takes less than 2 seconds.   Neurological:      General: No focal deficit present.      Mental Status: He is alert and oriented to person, place, and time.   Psychiatric:         Mood and Affect: Mood normal.         Speech: Speech normal.         Vents:  Oxygen Concentration (%): 70 (07/28/20 1101)    Lines/Drains/Airways     Peripheral Intravenous Line                 Peripheral IV - Single Lumen 07/24/20 1031 20 G Right Antecubital 4 days         Peripheral IV - Single Lumen 07/24/20 1032 20 G Right Forearm 4 days                Significant Labs:    CBC/Anemia Profile:  Recent Labs   Lab 07/26/20  1755 07/26/20  1838 07/27/20  0243 07/28/20  0403   WBC  --  21.55* 24.82* 32.59*   HGB  --  12.5* 11.7* 9.0*   HCT  --  38.1* 35.3* 27.0*   PLT  --  359* 334 291   MCV  --  94 92 90   RDW  --  13.2 13.1 13.0   OCCULTBLOOD Positive*  --   --   --         Chemistries:  Recent Labs   Lab 07/27/20  1313 07/28/20  0403    136   K 5.7* 4.8    104   CO2 27 27   BUN 50* 39*   CREATININE 1.0 0.8   CALCIUM 8.5* 8.5*   MG 2.7* 2.8*   PHOS 3.5 2.9       All pertinent labs within the past 24 hours have been reviewed.    Significant Imaging:  I have reviewed all pertinent imaging results/findings within the past 24 hours.    Assessment/Plan:     * Acute hypoxemic respiratory failure  2/2 COVID-19    - s/p 5 days of remdesivir  - s/p dexamethasone  - completed CTX/azithro x 5 days  - Wean O2 as tolerated. Currently on Vapotherm 35L 80%  - CXR (7/23) with bilateral patchy interstitial and airspace opacities  - Lasix spot dose as needed  - continue full dose Lovenox for elevated d-dimer  -  Precedex remains off. Monitor for tachypnea/anxiety, transition to PO anti-anxiety if necessary.    Anemia  Normocytic anemia. Hgb normal on admission with multiple dark tarry stools. No overt signs of bleeding and no hemodynamic instability. No transfusions this hospitalization and abdominal exam unremarkable    --CBC q12  --transfuse for overt signs of bleeding, Hgb <7, or hemodynamic instability  --Defer GI consult at this time as not a candidate for EGD given respiratory failure 2/2 COVID19 infection  --hold anticoagulation for now      COVID-19  --see acute hypoxemic resp fx    Multifocal pneumonia  See acute hypoxemic resp failure      PPI ppx: pantoprazole  VTE ppx: holding in setting of acute anemia    Above plan discussed with Dr. Christy    Critical Care time 65 minutes    Critical care was time spent personally by me on the following activities: evaluating this patient's organ dysfunction, development of treatment plan, discussing treatment plan with patient or surrogate and bedside caregivers, discussions with consultants, evaluation of patient's response to treatment, examination of patient, ordering and performing treatments and interventions, ordering and review of laboratory studies, ordering and review of radiographic studies, re-evaluation of patient's condition. This critical care time did not overlap with that of any other provider or involve time for any procedures.       Jc Bell NP  Pulmonology  [facility] [place]

## 2020-07-29 PROBLEM — D62 ANEMIA DUE TO ACUTE BLOOD LOSS: Status: ACTIVE | Noted: 2020-01-01

## 2020-07-29 PROBLEM — K92.2 GIB (GASTROINTESTINAL BLEEDING): Status: ACTIVE | Noted: 2020-01-01

## 2020-07-29 NOTE — ASSESSMENT & PLAN NOTE
Normocytic anemia. Hgb normal on admission with multiple dark tarry stools. No hemodynamic instability. Abdominal exam unremarkable    --transfusing 2 units pRBC follow by lasix on 07/29  --CBC q12  --transfuse for overt signs of bleeding, Hgb <7, or hemodynamic instability  --GI consulted and deferred EGD at this time as patient dose not want to be intubated 2/2 COVID19 infection  --hold anticoagulation for now

## 2020-07-29 NOTE — ASSESSMENT & PLAN NOTE
Occult blood positive. Hgb downtrended   Holding aspirin and clopidogrel.   Is on enoxaparin for hypercoagulable state with COVID-19  Has been on IV PPI Q12  H/H with drop noted and noted large melanotic stool  Monitor with serial H/H and transfuse if needed  Will consult GI, however hypoxemia will make patient high risk for endoscopy

## 2020-07-29 NOTE — SUBJECTIVE & OBJECTIVE
Interval History: Oxygenation improving following requiring Bipap overnight. Transfuse 2 units pRBC for downtrending Hgb. Lasix to follow. Holding anticoagulation for now. Leukocytosis slightly improved, cultures NGTD    Objective:     Vital Signs (Most Recent):  Temp: 98.2 °F (36.8 °C) (07/29/20 1231)  Pulse: 72 (07/29/20 1300)  Resp: (!) 22 (07/29/20 1300)  BP: (!) 126/58 (07/29/20 1300)  SpO2: 100 % (07/29/20 1300) Vital Signs (24h Range):  Temp:  [98.1 °F (36.7 °C)-99.6 °F (37.6 °C)] 98.2 °F (36.8 °C)  Pulse:  [] 72  Resp:  [21-48] 22  SpO2:  [80 %-100 %] 100 %  BP: ()/(53-90) 126/58     Weight: 76.1 kg (167 lb 12.3 oz)  Body mass index is 26.28 kg/m².      Intake/Output Summary (Last 24 hours) at 7/29/2020 1356  Last data filed at 7/29/2020 1300  Gross per 24 hour   Intake 1166 ml   Output 1525 ml   Net -359 ml       Physical Exam  Vitals signs and nursing note reviewed.   Constitutional:       Appearance: Normal appearance. He is normal weight.      Interventions: Nasal cannula in place.   HENT:      Head: Normocephalic and atraumatic.      Nose: Nose normal.   Eyes:      Conjunctiva/sclera: Conjunctivae normal.      Pupils: Pupils are equal, round, and reactive to light.   Neck:      Musculoskeletal: Normal range of motion.   Cardiovascular:      Rate and Rhythm: Normal rate.   Pulmonary:      Breath sounds: Examination of the right-lower field reveals decreased breath sounds and rhonchi. Examination of the left-lower field reveals decreased breath sounds and rhonchi. Decreased breath sounds and rhonchi present. No wheezing.      Comments: Remains tachypneic.  Abdominal:      General: Bowel sounds are normal. There is no distension.      Palpations: Abdomen is soft. There is no mass.      Tenderness: There is no abdominal tenderness. There is no guarding.      Hernia: No hernia is present.   Musculoskeletal: Normal range of motion.         General: No swelling.   Skin:     General: Skin is warm  and dry.      Capillary Refill: Capillary refill takes less than 2 seconds.   Neurological:      General: No focal deficit present.      Mental Status: He is alert and oriented to person, place, and time.   Psychiatric:         Mood and Affect: Mood normal.         Speech: Speech normal.         Vents:  Oxygen Concentration (%): 100 (07/29/20 1127)    Lines/Drains/Airways     Drain                 Urethral Catheter 07/28/20 1800 Latex 16 Fr. less than 1 day          Peripheral Intravenous Line                 Peripheral IV - Single Lumen 07/24/20 1032 20 G Right Forearm 5 days         Midline Catheter Insertion/Assessment  - Single Lumen 07/29/20 1158 Left brachial vein 18g x 10cm less than 1 day         Peripheral IV - Single Lumen 07/29/20 0425 20 G Left Antecubital less than 1 day                Significant Labs:    CBC/Anemia Profile:  Recent Labs   Lab 07/28/20  0403 07/28/20  1555 07/28/20  2350 07/29/20  0305   WBC 32.59* 39.11*  --  30.48*   HGB 9.0* 8.0* 7.6* 7.5*   HCT 27.0* 24.5* 22.1* 22.2*    260  --  240   MCV 90 92  --  91   RDW 13.0 13.1  --  13.1        Chemistries:  Recent Labs   Lab 07/28/20  0403 07/29/20  0305    135*   K 4.8 4.9    104   CO2 27 28   BUN 39* 31*   CREATININE 0.8 0.9   CALCIUM 8.5* 8.1*   MG 2.8* 2.5   PHOS 2.9 3.8       All pertinent labs within the past 24 hours have been reviewed.    Significant Imaging:  I have reviewed all pertinent imaging results/findings within the past 24 hours.

## 2020-07-29 NOTE — PROGRESS NOTES
[facility]  Pulmonology  Progress Note    Patient Name: Joo York  MRN: [id number]  Admission Date: 7/18/2020  Hospital Length of Stay: 11 days  Code Status: DNR  Attending Provider: Adriana Zamora MD  Primary Care Provider: Keegan Watson MD   Principal Problem: Acute hypoxemic respiratory failure    Subjective:     Interval History: Oxygenation improving following requiring Bipap overnight. Transfuse 2 units pRBC for downtrending Hgb. Lasix to follow. Holding anticoagulation for now. Leukocytosis slightly improved, cultures NGTD    Objective:     Vital Signs (Most Recent):  Temp: 98.2 °F (36.8 °C) (07/29/20 1231)  Pulse: 72 (07/29/20 1300)  Resp: (!) 22 (07/29/20 1300)  BP: (!) 126/58 (07/29/20 1300)  SpO2: 100 % (07/29/20 1300) Vital Signs (24h Range):  Temp:  [98.1 °F (36.7 °C)-99.6 °F (37.6 °C)] 98.2 °F (36.8 °C)  Pulse:  [] 72  Resp:  [21-48] 22  SpO2:  [80 %-100 %] 100 %  BP: ()/(53-90) 126/58     Weight: 76.1 kg (167 lb 12.3 oz)  Body mass index is 26.28 kg/m².      Intake/Output Summary (Last 24 hours) at 7/29/2020 1356  Last data filed at 7/29/2020 1300  Gross per 24 hour   Intake 1166 ml   Output 1525 ml   Net -359 ml       Physical Exam  Vitals signs and nursing note reviewed.   Constitutional:       Appearance: Normal appearance. He is normal weight.      Interventions: Nasal cannula in place.   HENT:      Head: Normocephalic and atraumatic.      Nose: Nose normal.   Eyes:      Conjunctiva/sclera: Conjunctivae normal.      Pupils: Pupils are equal, round, and reactive to light.   Neck:      Musculoskeletal: Normal range of motion.   Cardiovascular:      Rate and Rhythm: Normal rate.   Pulmonary:      Breath sounds: Examination of the right-lower field reveals decreased breath sounds and rhonchi. Examination of the left-lower field reveals decreased breath sounds and rhonchi. Decreased breath sounds and rhonchi present. No wheezing.      Comments: Remains  tachypneic.  Abdominal:      General: Bowel sounds are normal. There is no distension.      Palpations: Abdomen is soft. There is no mass.      Tenderness: There is no abdominal tenderness. There is no guarding.      Hernia: No hernia is present.   Musculoskeletal: Normal range of motion.         General: No swelling.   Skin:     General: Skin is warm and dry.      Capillary Refill: Capillary refill takes less than 2 seconds.   Neurological:      General: No focal deficit present.      Mental Status: He is alert and oriented to person, place, and time.   Psychiatric:         Mood and Affect: Mood normal.         Speech: Speech normal.         Vents:  Oxygen Concentration (%): 100 (07/29/20 1127)    Lines/Drains/Airways     Drain                 Urethral Catheter 07/28/20 1800 Latex 16 Fr. less than 1 day          Peripheral Intravenous Line                 Peripheral IV - Single Lumen 07/24/20 1032 20 G Right Forearm 5 days         Midline Catheter Insertion/Assessment  - Single Lumen 07/29/20 1158 Left brachial vein 18g x 10cm less than 1 day         Peripheral IV - Single Lumen 07/29/20 0425 20 G Left Antecubital less than 1 day                Significant Labs:    CBC/Anemia Profile:  Recent Labs   Lab 07/28/20  0403 07/28/20  1555 07/28/20  2350 07/29/20  0305   WBC 32.59* 39.11*  --  30.48*   HGB 9.0* 8.0* 7.6* 7.5*   HCT 27.0* 24.5* 22.1* 22.2*    260  --  240   MCV 90 92  --  91   RDW 13.0 13.1  --  13.1        Chemistries:  Recent Labs   Lab 07/28/20  0403 07/29/20  0305    135*   K 4.8 4.9    104   CO2 27 28   BUN 39* 31*   CREATININE 0.8 0.9   CALCIUM 8.5* 8.1*   MG 2.8* 2.5   PHOS 2.9 3.8       All pertinent labs within the past 24 hours have been reviewed.    Significant Imaging:  I have reviewed all pertinent imaging results/findings within the past 24 hours.    Assessment/Plan:     * Acute hypoxemic respiratory failure  2/2 COVID-19    - s/p 5 days of remdesivir  - s/p  dexamethasone  - completed CTX/azithro x 5 days  - Wean O2 as tolerated. Currently on Vapotherm 20L 70%  - CXR (7/23) with bilateral patchy interstitial and airspace opacities  - Lasix spot dose as needed (last dose 07/29)  - holding full dose Lovenox for elevated d-dimer in setting of suspected lower GI bleed  - Precedex remains off. Monitor for tachypnea/anxiety, transition to PO anti-anxiety if necessary.    Anemia  Normocytic anemia. Hgb normal on admission with multiple dark tarry stools. No hemodynamic instability. Abdominal exam unremarkable    --transfusing 2 units pRBC follow by lasix on 07/29  --CBC q12  --transfuse for overt signs of bleeding, Hgb <7, or hemodynamic instability  --GI consulted and deferred EGD at this time as patient dose not want to be intubated 2/2 COVID19 infection  --continue protonix infusion  --hold anticoagulation for now    COVID-19  --see acute hypoxemic resp fx    Multifocal pneumonia  See acute hypoxemic resp failure    PPI ppx: protonix gtt  VTE ppx: holding in setting of suspected lower GI bleed    Above plan discussed with Dr. Christy    Critical Care time 55 minutes    Critical care was time spent personally by me on the following activities: evaluating this patient's organ dysfunction, development of treatment plan, discussing treatment plan with patient or surrogate and bedside caregivers, discussions with consultants, evaluation of patient's response to treatment, examination of patient, ordering and performing treatments and interventions, ordering and review of laboratory studies, ordering and review of radiographic studies, re-evaluation of patient's condition. This critical care time did not overlap with that of any other provider or involve time for any procedures.         Jc Bell, AIDA  Pulmonology  [facility] [place]

## 2020-07-29 NOTE — PROGRESS NOTES
[facility] Hospital Medicine  Progress Note    Patient Name: Joo York  MRN: [id number]  Patient Class: IP- Inpatient   Admission Date: 7/18/2020  Length of Stay: 11 days  Attending Physician: Adriana Zamora MD  Primary Care Provider: Keegan Watson MD        Subjective:     Principal Problem:Acute hypoxemic respiratory failure        HPI:  Joo York is a 80 y.o. male that (in part)  has a past medical history of Claudication, Essential hypertension, Essential hypertension, Hematuria, Hernia of abdominal cavity, and Multifocal pneumonia.  has a past surgical history that includes Vasectomy; Tonsillectomy; office cysto 2018; Cystoscopy with biopsy of bladder (N/A, 9/5/2018); and Atherectomy (1/23/2020). Presents to [facility] Emergency Department with report of shortness of breath, fever, chills last 3 days.  He was tested for COVID-19 2 days ago and positive.  He said dyspnea with exertion and a cough that started today.  Denies hemoptysis, stridor, or night sweats.  No nausea, vomiting diarrhea reported.  Patient is a former smoker.  Denies home oxygen use.  Code status discussed in the most remain a DNR DNI.  The family is aware of these wishes.  History is limited to respiratory distress as patient is speaking in short sentences.    In the emergency department routine laboratory studies and chest x-ray obtained.  Evidence of bilateral pneumonia.  Was supplemental oxygen and dexamethasone.  O2 sat 67% air prior to oxygen supplementation.  Okay to escalate to CPAP/BiPAP as.  Does not want to be intubated.    Hospital medicine has been asked to admit to inpatient in the ICU for further evaluation and treatment.     Overview/Hospital Course:  Mr. York presented with shortness of breath and fever.  He was admitted for acute respiratory failure secondary to COVID-19. Placed on BiPAP. Code status DNR. Completed course of empiric antibiotics for elevated procalcitonin  level, course of dexamethasone 6 mg daily and Remdesivir. Also on full dose lovenox for hypercoagulable state. Diuresed intermittently. He slowly improved and de-escalated to vapotherm NC. On 7/26, patient had melena and acute drop in Hgb from 16 to 12 in 24 hours. BP stable and respirations were not affected. Placed on pantoprazole IV 40 mg BID and held ASA/plavix which he uses for PVD. Repeat Hgb levels remained stable and amount of melena decreased. Given such high risk for thromboembolism, he continued on enoxaparin.     Interval History:  Pt reported feeling better and was stable on Vapotherm this morning and able to talk in complete sentences and was eating a diet. However, in the afternoon, he was noted to have desats and was acutely placed on BIPAP and he had melena noted again.     Review of Systems   Constitutional: Positive for appetite change.   Respiratory: Negative.    Cardiovascular: Negative.    Gastrointestinal: Negative for abdominal distention, abdominal pain, nausea and vomiting.     Objective:     Vital Signs (Most Recent):  Temp: 99.5 °F (37.5 °C) (07/28/20 2310)  Pulse: 79 (07/28/20 2300)  Resp: (!) 27 (07/28/20 2300)  BP: (!) 120/56 (07/28/20 2300)  SpO2: 98 % (07/28/20 2300) Vital Signs (24h Range):  Temp:  [97.6 °F (36.4 °C)-99.6 °F (37.6 °C)] 99.5 °F (37.5 °C)  Pulse:  [] 79  Resp:  [18-48] 27  SpO2:  [79 %-100 %] 98 %  BP: (112-155)/(55-90) 120/56     Weight: 76.1 kg (167 lb 12.3 oz)  Body mass index is 26.28 kg/m².    Intake/Output Summary (Last 24 hours) at 7/29/2020 0040  Last data filed at 7/28/2020 2200  Gross per 24 hour   Intake 750 ml   Output 1375 ml   Net -625 ml      Physical Exam  Vitals signs and nursing note reviewed.   Constitutional:       General: He is not in acute distress.     Appearance: He is not toxic-appearing.   Cardiovascular:      Rate and Rhythm: Normal rate and regular rhythm.   Pulmonary:      Breath sounds: No wheezing or rales.      Comments:  Breathing comfortably on vapotherm  Abdominal:      General: Abdomen is flat. Bowel sounds are normal. There is no distension.      Palpations: Abdomen is soft. There is no mass.      Tenderness: There is no abdominal tenderness. There is no guarding.   Skin:     Capillary Refill: Capillary refill takes less than 2 seconds.      Coloration: Skin is not pale.   Neurological:      Mental Status: He is alert and oriented to person, place, and time.      Comments: Very calm    Psychiatric:         Mood and Affect: Mood normal.         Behavior: Behavior normal.         Thought Content: Thought content normal.         Judgment: Judgment normal.         Significant Labs: All pertinent labs within the past 24 hours have been reviewed.    Significant Imaging: I have reviewed all pertinent imaging results/findings within the past 24 hours.  I have reviewed and interpreted all pertinent imaging results/findings within the past 24 hours.      Assessment/Plan:      * Acute hypoxemic respiratory failure  Secondary to COVID-19 infection  Completed course of empiric antibiotics for elevated procalcitonin  High D-dimer improving. Treated with full dose lovenox  CRP a bit better. Completed dexamethasone and Remdesivir course  Continue scheduled olanzapine nightly for related anxiety.   Tolerating vapotherm and PO intake as tolerated.   Pulmonary on board for comanagement.  Precautions in place  Had abrupt desats noted that was treated BiPAP   Repeat cultures obtained  Increasing WBC noted, started empiric Zosyn, Flagyl and Vancomycin today, 7/28  Code status: DNR    GIB (gastrointestinal bleeding)  Occult blood positive. Hgb downtrended   Holding aspirin and clopidogrel.   Is on enoxaparin for hypercoagulable state with COVID-19  Has been on IV PPI Q12  H/H with drop noted and noted large melanotic stool  Monitor with serial H/H and transfuse if needed  Will consult GI, however hypoxemia will make patient high risk for  endoscopy    Anemia  As discussed above    COVID-19  As above    Elevated troponin I level  Trended. No reported chest pain.      Lower respiratory tract infection due to COVID-19 virus  As discussed above    Multifocal pneumonia  As discussed above    Essential hypertension  Stable on current dose of amlodipine  Will consider holding medications if BP drops    PAD (peripheral artery disease)  Holding ASA and plavix    Dyslipidemia  Currently not on medications.    Will hold off on statin as his liver enzymes are still elevated although better      VTE Risk Mitigation (From admission, onward)         Ordered     IP VTE HIGH RISK PATIENT  Once      07/18/20 2106     Place sequential compression device  Until discontinued      07/18/20 2106                Critical care time spent on the evaluation and treatment of severe organ dysfunction, review of pertinent labs and imaging studies, discussions with consulting providers and discussions with patient/family: 90 minutes.      Adriana Zamora MD  Department of Hospital Medicine   [facility]

## 2020-07-29 NOTE — PLAN OF CARE
07/29/20 1755   Discharge Reassessment   Assessment Type Discharge Planning Reassessment   Provided patient/caregiver education on the expected discharge date and the discharge plan No   Do you have any problems affording any of your prescribed medications? No   Discharge Plan A Home with family   Discharge Plan B Home Health   DME Needed Upon Discharge  oxygen   Patient choice form signed by patient/caregiver N/A   Anticipated Discharge Disposition Still a [place]   Can the patient/caregiver answer the patient profile reliably? Yes, cognitively intact   How does the patient rate their overall health at the present time?   (unable to obtain; Airborne and Contact Droplet Precaution.)   Describe the patient's ability to walk at the present time. Major restrictions/daily assistance from another person   How often would a person be available to care for the patient? Often   During the past month, has the patient often been bothered by feeling down, depressed or hopeless?   (Unable to obtain)   During the past month, has the patient often been bothered by little interest or pleasure in doing things?   (Unable to obtain)

## 2020-07-29 NOTE — RESPIRATORY THERAPY
Patient received on BIPAP with charted settings 18/8, rate 12, FI02 100% With all alarms on, set and functioning. Will continue to monitor.

## 2020-07-29 NOTE — CONSULTS
"Chief Complaint:  "He has melena." - Dr. Zamora    HPI:  The patient is an 80 year old man with a history of HTN presenting with dyspnea, a COVID-19 infection, and melena.  All information was obtained through the chart, Dr. Zamora, and the Pulmonologist as the patient was not seen due to COVID-19 precautions.  He presented several days ago with dyspnea and he was found to have bilateral pneumonias from COVID-19.  The patient clearly stated he wished to be DNR/DNI, but he was willing to receive bipap.  The patient was on aspirin and plavix.  This was transitioned to full dose lovenox, but as he has been having melenic episodes, lovenox was held yesterday.  He had a large episode of melena yesterday.  Otherwise, he had not complained of abdominal pain, weight loss, nausea, emesis, diarrhea, or constipation.  When it was attempted to try to wean him off of oxygen yesterday, he had desaturations.    Past Medical History:   Diagnosis Date    Acute hypoxemic respiratory failure 7/19/2020    Anemia 7/28/2020    Claudication 1/23/2020    Essential hypertension 1/24/2020    Essential hypertension 1/24/2020    Hematuria     Hernia of abdominal cavity     Multifocal pneumonia 7/18/2020     Past Surgical History:   Procedure Laterality Date    ATHERECTOMY  1/23/2020    Procedure: Atherectomy;  Surgeon: Triston Hightower MD;  Location: [facility] CATH LAB;  Service: Cardiology;;  SFA mid distal    CYSTOSCOPY WITH BIOPSY OF BLADDER N/A 9/5/2018    Procedure: CYSTOSCOPY, WITH BLADDER BIOPSY;  Surgeon: KADIE Johnson MD;  Location: [facility] OR;  Service: Urology;  Laterality: N/A;  RN PRE OP 8-30-18------NEED CONSENT    office cysto 2018      TONSILLECTOMY      VASECTOMY       Family History   Problem Relation Age of Onset    Osteoarthritis Sister     Cancer Brother      Social History     Socioeconomic History    Marital status:      Spouse name: Not on file    Number of children: Not on file    Years of education: " Not on file    Highest education level: Not on file   Occupational History    Not on file   Social Needs    Financial resource strain: Not on file    Food insecurity     Worry: Not on file     Inability: Not on file    Transportation needs     Medical: Not on file     Non-medical: Not on file   Tobacco Use    Smoking status: Former Smoker     Quit date:      Years since quittin.6    Smokeless tobacco: Never Used   Substance and Sexual Activity    Alcohol use: Not Currently     Frequency: Never     Comment: RARELY    Drug use: No    Sexual activity: Yes     Partners: Female   Lifestyle    Physical activity     Days per week: Not on file     Minutes per session: Not on file    Stress: Not on file   Relationships    Social connections     Talks on phone: Not on file     Gets together: Not on file     Attends Hoahaoism service: Not on file     Active member of club or organization: Not on file     Attends meetings of clubs or organizations: Not on file     Relationship status: Not on file   Other Topics Concern    Not on file   Social History Narrative    Not on file      ascorbic acid (vitamin C)  500 mg Oral BID    guaiFENesin  600 mg Oral BID    insulin detemir U-100  10 Units Subcutaneous QHS    metronidazole  500 mg Intravenous Q8H    multivitamin  1 tablet Oral Daily    OLANZapine  5 mg Oral QHS    pantoprazole  40 mg Intravenous BID    piperacillin-tazobactam (ZOSYN) IVPB  4.5 g Intravenous Q8H    tamsulosin  0.8 mg Oral Daily    vancomycin (VANCOCIN) IVPB  1,500 mg Intravenous Q24H     Review of patient's allergies indicates:  No Known Allergies    ROS:  No chest pain, (+) dyspnea.  No dysuria.  No heartburn or dysphagia.  Otherwise as stated above.  Ten other systems negative.    Labs:  Recent Results (from the past 336 hour(s))   CBC auto differential    Collection Time: 20  3:05 AM   Result Value Ref Range    WBC 30.48 (H) 3.90 - 12.70 K/uL    Hemoglobin 7.5 (L) 14.0 -  18.0 g/dL    Hematocrit 22.2 (L) 40.0 - 54.0 %    Platelets 240 150 - 350 K/uL   CBC auto differential    Collection Time: 07/28/20  3:55 PM   Result Value Ref Range    WBC 39.11 (H) 3.90 - 12.70 K/uL    Hemoglobin 8.0 (L) 14.0 - 18.0 g/dL    Hematocrit 24.5 (L) 40.0 - 54.0 %    Platelets 260 150 - 350 K/uL   CBC auto differential    Collection Time: 07/28/20  4:03 AM   Result Value Ref Range    WBC 32.59 (H) 3.90 - 12.70 K/uL    Hemoglobin 9.0 (L) 14.0 - 18.0 g/dL    Hematocrit 27.0 (L) 40.0 - 54.0 %    Platelets 291 150 - 350 K/uL     CMP  Sodium   Date Value Ref Range Status   07/29/2020 135 (L) 136 - 145 mmol/L Final     Potassium   Date Value Ref Range Status   07/29/2020 4.9 3.5 - 5.1 mmol/L Final     Chloride   Date Value Ref Range Status   07/29/2020 104 95 - 110 mmol/L Final     CO2   Date Value Ref Range Status   07/29/2020 28 23 - 29 mmol/L Final     Glucose   Date Value Ref Range Status   07/29/2020 126 (H) 70 - 110 mg/dL Final     BUN, Bld   Date Value Ref Range Status   07/29/2020 31 (H) 8 - 23 mg/dL Final     Creatinine   Date Value Ref Range Status   07/29/2020 0.9 0.5 - 1.4 mg/dL Final     Calcium   Date Value Ref Range Status   07/29/2020 8.1 (L) 8.7 - 10.5 mg/dL Final     Total Protein   Date Value Ref Range Status   07/26/2020 5.6 (L) 6.0 - 8.4 g/dL Final     Albumin   Date Value Ref Range Status   07/26/2020 2.2 (L) 3.5 - 5.2 g/dL Final     Total Bilirubin   Date Value Ref Range Status   07/26/2020 0.5 0.1 - 1.0 mg/dL Final     Comment:     For infants and newborns, interpretation of results should be based  on gestational age, weight and in agreement with clinical  observations.  Premature Infant recommended reference ranges:  Up to 24 hours.............<8.0 mg/dL  Up to 48 hours............<12.0 mg/dL  3-5 days..................<15.0 mg/dL  6-29 days.................<15.0 mg/dL       Alkaline Phosphatase   Date Value Ref Range Status   07/26/2020 150 (H) 55 - 135 U/L Final     AST   Date Value  Ref Range Status   07/26/2020 29 10 - 40 U/L Final     ALT   Date Value Ref Range Status   07/26/2020 46 (H) 10 - 44 U/L Final     Anion Gap   Date Value Ref Range Status   07/29/2020 3 (L) 8 - 16 mmol/L Final     eGFR if    Date Value Ref Range Status   07/29/2020 >60 >60 mL/min/1.73 m^2 Final     eGFR if non    Date Value Ref Range Status   07/29/2020 >60 >60 mL/min/1.73 m^2 Final     Comment:     Calculation used to obtain the estimated glomerular filtration  rate (eGFR) is the CKD-EPI equation.          No results for input(s): PT, INR, APTT in the last 24 hours.    7/28/20:  Chest X-Ray:  Worsening opacification/consolidation is seen within the right mid to lower lung zone.  No pneumothorax or large pleural effusion seen.  Heart is stable in size.  Lungs are hypoinflated which accentuates pulmonary vascular markings.  Difficult to exclude potential mild superimposed interstitial edema.     A/P:  The patient is an 80 year old man with a history of HTN presenting with dyspnea, a COVID-19 infection, and melena.  1.  Melena - the patient had melenic episodes and his hemoglobin decreased from 16.2 to 7.5 in 4 days.  Full dose lovenox is being held.  The patient was discussed with Dr. Zamora, Dr. Hand, and the Pulmonologist.  The patient does not wish to be intubated.  He would need to be intubated for the procedure and if he is intubated, there is a high likelihood that he will not be able to be extubated, given his COVID-19 infection.  As such, it was decided that he should be conservatively managed by holding anticoagulation and transfusing pRBCs.  Protonix Q12H will be changed to a protonix drip.  Gastroenterology has nothing additional to add, but will be available with any further issues or if he wishes to get intubated.    Thank you for this consult.

## 2020-07-29 NOTE — UM SECONDARY REVIEW
[facility]    IP Extended Stay > 10  Hospital Day # 11  Covid Positive  Remains in ICU  Mr. York presented with shortness of breath and fever.  He was admitted for acute respiratory failure secondary to COVID-19. Placed on BiPAP. Code status DNR. Completed course of empiric antibiotics for elevated procalcitonin level, course of dexamethasone 6 mg daily and Remdesivir. Also on full dose lovenox for hypercoagulable state. Diuresed intermittently. He slowly improved and de-escalated to vapotherm NC. On 7/26, patient had melena and acute drop in Hgb from 16 to 12 in 24 hours. BP stable and respirations were not affected. Placed on pantoprazole IV 40 mg BID and held ASA/plavix which he uses for PVD. Repeat Hgb levels remained stable and amount of melena decreased. Given such high risk for thromboembolism, he continued on enoxaparin.      Interval History:  Pt reported feeling better and was stable on Vapotherm this morning and able to talk in complete sentences and was eating a diet. However, in the afternoon, he was noted to have desats and was acutely placed on BIPAP and he had melena noted again. GI Consulted   GI Note this am:  The patient is an 80 year old man with a history of HTN presenting with dyspnea, a COVID-19 infection, and melena.  1.  Melena - the patient had melenic episodes and his hemoglobin decreased from 16.2 to 7.5 in 4 days.  Full dose lovenox is being held.  The patient was discussed with Dr. Zamora, Dr. Hand, and the Pulmonologist.  The patient does not wish to be intubated.  He would need to be intubated for the procedure and if he is intubated, there is a high likelihood that he will not be able to be extubated, given his COVID-19 infection.  As such, it was decided that he should be conservatively managed by holding anticoagulation and transfusing pRBCs.  Protonix Q12H will be changed to a protonix drip.  Gastroenterology has nothing additional to add, but will be available with  any further issues or if he wishes to get intubated.  {[facility] CM Review Outcome:[id number]}

## 2020-07-29 NOTE — ASSESSMENT & PLAN NOTE
Secondary to COVID-19 infection  Completed course of empiric antibiotics for elevated procalcitonin  High D-dimer improving. Treated with full dose lovenox  CRP a bit better. Completed dexamethasone and Remdesivir course  Continue scheduled olanzapine nightly for related anxiety.   Tolerating vapotherm and PO intake as tolerated.   Pulmonary on board for comanagement.  Precautions in place  Had abrupt desats noted that was treated BiPAP   Repeat cultures obtained  Increasing WBC noted, started empiric Zosyn, Flagyl and Vancomycin today, 7/28  Code status: DNR

## 2020-07-29 NOTE — PLAN OF CARE
Pt in bed AAOX4, plan of care discussed with pt,all questions and concerns addressed,  currently weaning O2, pt has been on vapotherm throughout the day, settings FiO2 20%, 100% O2, SpO2 > 90%, tachypnea noted without the use of accessory muscles, received 2 units of PRBC , Last h&h showed improvement, FRENCH midline placed today, flushes and draws back w/o any complications, received lasix in between blood transfusions, responded well, started on Protonix gtt, casiano intact, draining cl yellow urine, no BM this shift, did not eat any meals, but did drink boost shake for dinner and had cups of water and juice throughout the day, safety maintained, side rails are up X3, bed low and locked, bedside table and call light is within reach, bed in lateral rotation and all alarms are activated, pt is able to communicate his needs.

## 2020-07-29 NOTE — SUBJECTIVE & OBJECTIVE
Interval History:  Pt reported feeling better and was stable on Vapotherm this morning and able to talk in complete sentences and was eating a diet. However, in the afternoon, he was noted to have desats and was acutely placed on BIPAP and he had melena noted again.     Review of Systems   Constitutional: Positive for appetite change.   Respiratory: Negative.    Cardiovascular: Negative.    Gastrointestinal: Negative for abdominal distention, abdominal pain, nausea and vomiting.     Objective:     Vital Signs (Most Recent):  Temp: 99.5 °F (37.5 °C) (07/28/20 2310)  Pulse: 79 (07/28/20 2300)  Resp: (!) 27 (07/28/20 2300)  BP: (!) 120/56 (07/28/20 2300)  SpO2: 98 % (07/28/20 2300) Vital Signs (24h Range):  Temp:  [97.6 °F (36.4 °C)-99.6 °F (37.6 °C)] 99.5 °F (37.5 °C)  Pulse:  [] 79  Resp:  [18-48] 27  SpO2:  [79 %-100 %] 98 %  BP: (112-155)/(55-90) 120/56     Weight: 76.1 kg (167 lb 12.3 oz)  Body mass index is 26.28 kg/m².    Intake/Output Summary (Last 24 hours) at 7/29/2020 0040  Last data filed at 7/28/2020 2200  Gross per 24 hour   Intake 750 ml   Output 1375 ml   Net -625 ml      Physical Exam  Vitals signs and nursing note reviewed.   Constitutional:       General: He is not in acute distress.     Appearance: He is not toxic-appearing.   Cardiovascular:      Rate and Rhythm: Normal rate and regular rhythm.   Pulmonary:      Breath sounds: No wheezing or rales.      Comments: Breathing comfortably on vapotherm  Abdominal:      General: Abdomen is flat. Bowel sounds are normal. There is no distension.      Palpations: Abdomen is soft. There is no mass.      Tenderness: There is no abdominal tenderness. There is no guarding.   Skin:     Capillary Refill: Capillary refill takes less than 2 seconds.      Coloration: Skin is not pale.   Neurological:      Mental Status: He is alert and oriented to person, place, and time.      Comments: Very calm    Psychiatric:         Mood and Affect: Mood normal.          Behavior: Behavior normal.         Thought Content: Thought content normal.         Judgment: Judgment normal.         Significant Labs: All pertinent labs within the past 24 hours have been reviewed.    Significant Imaging: I have reviewed all pertinent imaging results/findings within the past 24 hours.  I have reviewed and interpreted all pertinent imaging results/findings within the past 24 hours.

## 2020-07-30 NOTE — ASSESSMENT & PLAN NOTE
Occult blood positive. Hgb downtrended   Holding aspirin and clopidogrel.   Is on enoxaparin for hypercoagulable state with COVID-19  Has been on IV PPI Q12  H/H with drop noted and noted large melanotic stool  Monitoring with serial H/H and transfuse if needed  Consultws GI, however hypoxemia will make patient high risk for endoscopy  Will transfuse 2 U PRBC today and monitor  Case discussed with GI, Pulm and patient - plan for conservative treatment and avoid procedure unless it becomes emergent

## 2020-07-30 NOTE — RESPIRATORY THERAPY
1200 Patient placed back on bipap 15/5 at 80% due to desaturation in the 70s per RN.  1337 Patients bipap settings increased back to previous settings of 18/8 at 80% per TRAVIS Bell NP due to increased work of breathing, tachypnia and abdominal breathing.   Will continue to monitor. LOUISA Vasquez, RRT

## 2020-07-30 NOTE — SUBJECTIVE & OBJECTIVE
Interval History:  Patient was placed on BiPAP overnight and then was weaned down to Vapotherm this morning and able to talk in complete sentences, no more bleeding/melena reported.  However by the afternoon he needed to be placed back in BiPAP, along with fever that has developed    Review of Systems   Constitutional: Positive for appetite change.   Respiratory: Positive for shortness of breath.    Gastrointestinal: Negative for abdominal distention, abdominal pain, nausea and vomiting.     Objective:     Vital Signs (Most Recent):  Temp: 98.1 °F (36.7 °C) (07/30/20 1656)  Pulse: (!) 56 (07/30/20 1710)  Resp: (!) 28 (07/30/20 1710)  BP: (!) 84/46 (07/30/20 1710)  SpO2: 98 % (07/30/20 1710) Vital Signs (24h Range):  Temp:  [98.1 °F (36.7 °C)-102.8 °F (39.3 °C)] 98.1 °F (36.7 °C)  Pulse:  [] 56  Resp:  [5-51] 28  SpO2:  [79 %-100 %] 98 %  BP: ()/(38-76) 84/46     Weight: 76.1 kg (167 lb 12.3 oz)  Body mass index is 26.28 kg/m².    Intake/Output Summary (Last 24 hours) at 7/30/2020 1730  Last data filed at 7/30/2020 1700  Gross per 24 hour   Intake 1356.61 ml   Output 1427 ml   Net -70.39 ml      Physical Exam  Vitals signs and nursing note reviewed.   Constitutional:       General: He is not in acute distress.     Appearance: He is not toxic-appearing.   Cardiovascular:      Rate and Rhythm: Normal rate and regular rhythm.   Pulmonary:      Effort: Respiratory distress present.      Comments: Increased worth of breathing with tachypnea, was on Vapotherm and now back on BiPAP  Abdominal:      General: Abdomen is flat. Bowel sounds are normal. There is no distension.      Palpations: Abdomen is soft. There is no mass.      Tenderness: There is no abdominal tenderness. There is no guarding.   Skin:     Capillary Refill: Capillary refill takes less than 2 seconds.      Coloration: Skin is not pale.   Neurological:      Mental Status: He is alert and oriented to person, place, and time.   Psychiatric:          Mood and Affect: Mood normal.         Behavior: Behavior normal.         Thought Content: Thought content normal.         Judgment: Judgment normal.         Significant Labs: All pertinent labs within the past 24 hours have been reviewed.    Significant Imaging: I have reviewed all pertinent imaging results/findings within the past 24 hours.  I have reviewed and interpreted all pertinent imaging results/findings within the past 24 hours.

## 2020-07-30 NOTE — NURSING
Notified RRT, Evelyne and AIDA Greene of pt continued tachypneic and abd breathing, AIDA Greene present orders were given to increase bipap setting.

## 2020-07-30 NOTE — NURSING
Pt in bed tachypneic, using abd muscles to breathe, SpO2 low 80's on vapotherm, pt placed on bipap, Jc NP, at bedside gave verbal order to increase FiO2 to 80%, RR's are labored, pt repositioned, SpO2 increased to 95-98%.

## 2020-07-30 NOTE — ASSESSMENT & PLAN NOTE
Secondary to COVID-19 infection  Completed course of empiric antibiotics for elevated procalcitonin  High D-dimer improving. Treated with full dose lovenox  CRP a bit better. Completed dexamethasone and Remdesivir course  Continue scheduled olanzapine nightly for related anxiety.   Tolerating vapotherm and PO intake as tolerated.   Pulmonary on board for comanagement.  Precautions in place  Had abrupt desats noted that was treated BiPAP   Repeat cultures obtained  Increasing WBC noted, started empiric Zosyn, Flagyl and Vancomycin today, 7/28  Code status: DNR  Now, more comfortable in appearance today

## 2020-07-30 NOTE — PROGRESS NOTES
[facility] Hospital Medicine  Progress Note    Patient Name: Joo York  MRN: [id number]  Patient Class: IP- Inpatient   Admission Date: 7/18/2020  Length of Stay: 12 days  Attending Physician: Adriana Zamora MD  Primary Care Provider: Keegan Watson MD        Subjective:     Principal Problem:Acute hypoxemic respiratory failure        HPI:  Joo York is a 80 y.o. male that (in part)  has a past medical history of Claudication, Essential hypertension, Essential hypertension, Hematuria, Hernia of abdominal cavity, and Multifocal pneumonia.  has a past surgical history that includes Vasectomy; Tonsillectomy; office cysto 2018; Cystoscopy with biopsy of bladder (N/A, 9/5/2018); and Atherectomy (1/23/2020). Presents to [facility] Emergency Department with report of shortness of breath, fever, chills last 3 days.  He was tested for COVID-19 2 days ago and positive.  He said dyspnea with exertion and a cough that started today.  Denies hemoptysis, stridor, or night sweats.  No nausea, vomiting diarrhea reported.  Patient is a former smoker.  Denies home oxygen use.  Code status discussed in the most remain a DNR DNI.  The family is aware of these wishes.  History is limited to respiratory distress as patient is speaking in short sentences.    In the emergency department routine laboratory studies and chest x-ray obtained.  Evidence of bilateral pneumonia.  Was supplemental oxygen and dexamethasone.  O2 sat 67% air prior to oxygen supplementation.  Okay to escalate to CPAP/BiPAP as.  Does not want to be intubated.    Hospital medicine has been asked to admit to inpatient in the ICU for further evaluation and treatment.     Overview/Hospital Course:  Mr. York presented with shortness of breath and fever.  He was admitted for acute respiratory failure secondary to COVID-19. Placed on BiPAP. Code status DNR. Completed course of empiric antibiotics for elevated procalcitonin  level, course of dexamethasone 6 mg daily and Remdesivir. Also on full dose lovenox for hypercoagulable state. Diuresed intermittently. He slowly improved and de-escalated to vapotherm NC. On 7/26, patient had melena and acute drop in Hgb from 16 to 12 in 24 hours. BP stable and respirations were not affected. Placed on pantoprazole IV 40 mg BID and held ASA/plavix which he uses for PVD. Repeat Hgb levels remained stable and amount of melena decreased. Given such high risk for thromboembolism, he continued on enoxaparin. Noted increased melena and lovenox stopped, and changed IV PPI to Q12 and increased H/H surveillance and GI consulted.  Transfuse 2 units of packed red blood cells and PPI changed to infusion.  Empiric Zosyn and vancomycin initiated for continued worsening leukocytosis.  Blood cultures no growth to date.  Sputum culture pending.    Interval History:  Patient was placed on BiPAP overnight and then was weaned down to Vapotherm this morning and able to talk in complete sentences, no more bleeding/melena reported.  However by the afternoon he needed to be placed back in BiPAP, along with fever that has developed    Review of Systems   Constitutional: Positive for appetite change.   Respiratory: Positive for shortness of breath.    Gastrointestinal: Negative for abdominal distention, abdominal pain, nausea and vomiting.     Objective:     Vital Signs (Most Recent):  Temp: 98.1 °F (36.7 °C) (07/30/20 1656)  Pulse: (!) 56 (07/30/20 1710)  Resp: (!) 28 (07/30/20 1710)  BP: (!) 84/46 (07/30/20 1710)  SpO2: 98 % (07/30/20 1710) Vital Signs (24h Range):  Temp:  [98.1 °F (36.7 °C)-102.8 °F (39.3 °C)] 98.1 °F (36.7 °C)  Pulse:  [] 56  Resp:  [5-51] 28  SpO2:  [79 %-100 %] 98 %  BP: ()/(38-76) 84/46     Weight: 76.1 kg (167 lb 12.3 oz)  Body mass index is 26.28 kg/m².    Intake/Output Summary (Last 24 hours) at 7/30/2020 1730  Last data filed at 7/30/2020 1700  Gross per 24 hour   Intake 1356.61 ml    Output 1427 ml   Net -70.39 ml      Physical Exam  Vitals signs and nursing note reviewed.   Constitutional:       General: He is not in acute distress.     Appearance: He is not toxic-appearing.   Cardiovascular:      Rate and Rhythm: Normal rate and regular rhythm.   Pulmonary:      Effort: Respiratory distress present.      Comments: Increased worth of breathing with tachypnea, was on Vapotherm and now back on BiPAP  Abdominal:      General: Abdomen is flat. Bowel sounds are normal. There is no distension.      Palpations: Abdomen is soft. There is no mass.      Tenderness: There is no abdominal tenderness. There is no guarding.   Skin:     Capillary Refill: Capillary refill takes less than 2 seconds.      Coloration: Skin is not pale.   Neurological:      Mental Status: He is alert and oriented to person, place, and time.   Psychiatric:         Mood and Affect: Mood normal.         Behavior: Behavior normal.         Thought Content: Thought content normal.         Judgment: Judgment normal.         Significant Labs: All pertinent labs within the past 24 hours have been reviewed.    Significant Imaging: I have reviewed all pertinent imaging results/findings within the past 24 hours.  I have reviewed and interpreted all pertinent imaging results/findings within the past 24 hours.      Assessment/Plan:      * Acute hypoxemic respiratory failure  Secondary to COVID-19 infection  Completed course of empiric antibiotics for elevated procalcitonin  High D-dimer improving. Treated with full dose lovenox  CRP a bit better. Completed dexamethasone and Remdesivir course  Continue scheduled olanzapine nightly for related anxiety.   Tolerating vapotherm and PO intake as tolerated.   Pulmonary on board for comanagement.  Precautions in place  Had abrupt desats noted that was treated BiPAP   Repeat cultures obtained - no growth today  Increasing WBC noted, started empiric Zosyn, Flagyl and Vancomycin today, 7/28  Code  status: DNR  Developed high fever today, vancomycin level were subtherapeutic - change to Zyvox  Current critical status and change reported/discussed with wife via phone today    GIB (gastrointestinal bleeding)  Occult blood positive. Hgb downtrended   Holding aspirin and clopidogrel.   Is on enoxaparin for hypercoagulable state with COVID-19  Has been on IV PPI Q12  H/H with drop noted and noted large melanotic stool  Monitoring with serial H/H and transfuse if needed  Consultws GI, however hypoxemia will make patient high risk for endoscopy  Status post transfusion 2 U PRBC on 7/29  Case discussed with GI, Pulm and patient - plan for conservative treatment and avoid procedure unless it becomes emergent  No further bleeding, and H&H has been stable  Will consider restarting prophylactic Lovenox tomorrow if there is no bleeding    Anemia due to acute blood loss  As discussed above  Will continue monitor    COVID-19  As above    Elevated troponin I level  Trended. No reported chest pain.      Lower respiratory tract infection due to COVID-19 virus  As discussed above    Multifocal pneumonia  As discussed above    Essential hypertension  Blood pressure medication will be on home given hypotension developing    PAD (peripheral artery disease)  Holding ASA and plavix    Dyslipidemia  Currently not on medications.    Will hold off on statin as his liver enzymes are still elevated although better      VTE Risk Mitigation (From admission, onward)         Ordered     IP VTE HIGH RISK PATIENT  Once      07/18/20 2106     Place sequential compression device  Until discontinued      07/18/20 2106                Critical care time spent on the evaluation and treatment of severe organ dysfunction, review of pertinent labs and imaging studies, discussions with consulting providers and discussions with patient/family: 60 minutes.      Adriana Zamora MD  Department of Hospital Medicine   [facility]

## 2020-07-30 NOTE — PROGRESS NOTES
[facility]  Pulmonology  Progress Note    Patient Name: Joo York  MRN: [id number]  Admission Date: 7/18/2020  Hospital Length of Stay: 12 days  Code Status: DNR  Attending Provider: Adriana Zamora MD  Primary Care Provider: Keegan Watson MD   Principal Problem: Acute hypoxemic respiratory failure    Subjective:     Interval History: Transfused two units prbcs yesterday with appropriate Hgb increase. However, he continues to downtrend. Overnight, he had an episode of desaturation without recovery and was placed on BIPAP 15/8 and FiO2 100%. Weaned back to comfort flow this morning. WBC continue to downtrend. Sputum culture sent today    Objective:     Vital Signs (Most Recent):  Temp: 98.2 °F (36.8 °C) (07/30/20 0800)  Pulse: 71 (07/30/20 0900)  Resp: (!) 26 (07/30/20 0900)  BP: 125/66 (07/30/20 0900)  SpO2: (!) 94 % (07/30/20 0900) Vital Signs (24h Range):  Temp:  [98 °F (36.7 °C)-100.1 °F (37.8 °C)] 98.2 °F (36.8 °C)  Pulse:  [62-98] 71  Resp:  [22-43] 26  SpO2:  [79 %-100 %] 94 %  BP: (111-166)/() 125/66     Weight: 76.1 kg (167 lb 12.3 oz)  Body mass index is 26.28 kg/m².      Intake/Output Summary (Last 24 hours) at 7/30/2020 1027  Last data filed at 7/30/2020 0900  Gross per 24 hour   Intake 1863.08 ml   Output 2587 ml   Net -723.92 ml       Physical Exam  Vitals signs and nursing note reviewed.   Constitutional:       Appearance: Normal appearance. He is normal weight.      Interventions: Nasal cannula in place.   HENT:      Head: Normocephalic and atraumatic.      Nose: Nose normal.   Eyes:      Conjunctiva/sclera: Conjunctivae normal.      Pupils: Pupils are equal, round, and reactive to light.   Neck:      Musculoskeletal: Normal range of motion.   Cardiovascular:      Rate and Rhythm: Normal rate.   Pulmonary:      Breath sounds: Examination of the right-lower field reveals decreased breath sounds and rhonchi. Examination of the left-lower field reveals decreased breath sounds and  rhonchi. Decreased breath sounds and rhonchi present. No wheezing.      Comments: Remains tachypneic.  Abdominal:      General: Bowel sounds are normal. There is no distension.      Palpations: Abdomen is soft. There is no mass.      Tenderness: There is no abdominal tenderness. There is no guarding.      Hernia: No hernia is present.   Musculoskeletal: Normal range of motion.         General: No swelling.   Skin:     General: Skin is warm and dry.      Capillary Refill: Capillary refill takes less than 2 seconds.   Neurological:      General: No focal deficit present.      Mental Status: He is alert and oriented to person, place, and time.   Psychiatric:         Mood and Affect: Mood normal.         Speech: Speech normal.         Vents:  Oxygen Concentration (%): 80 (07/30/20 0900)    Lines/Drains/Airways     Drain                 Urethral Catheter 07/28/20 1800 Latex 16 Fr. 1 day          Peripheral Intravenous Line                 Peripheral IV - Single Lumen 07/24/20 1032 20 G Right Forearm 5 days         Peripheral IV - Single Lumen 07/29/20 0425 20 G Left Antecubital 1 day         Midline Catheter Insertion/Assessment  - Single Lumen 07/29/20 1158 Left brachial vein 18g x 10cm less than 1 day                Significant Labs:    CBC/Anemia Profile:  Recent Labs   Lab 07/29/20  0305 07/29/20  1556 07/29/20  2054 07/30/20  0342   WBC 30.48* 44.35*  --  25.72*   HGB 7.5* 10.2* 10.2* 9.3*   HCT 22.2* 30.5* 29.4* 27.6*    194  --  154   MCV 91 92  --  91   RDW 13.1 12.9  --  13.7        Chemistries:  Recent Labs   Lab 07/29/20  0305 07/30/20  0342   * 136   K 4.9 4.0    105   CO2 28 28   BUN 31* 24*   CREATININE 0.9 0.8   CALCIUM 8.1* 7.6*   MG 2.5 2.5   PHOS 3.8 3.0       All pertinent labs within the past 24 hours have been reviewed.    Significant Imaging:  I have reviewed all pertinent imaging results/findings within the past 24 hours.    Assessment/Plan:     * Acute hypoxemic respiratory  failure  2/2 COVID-19    - s/p 5 days of remdesivir  - s/p dexamethasone  - completed CTX/azithro x 5 days  - Wean O2 as tolerated. Currently on Vapotherm 20L 70%  - CXR (7/23) with bilateral patchy interstitial and airspace opacities  - Lasix spot dose as needed (last dose 07/29)  - holding full dose Lovenox for elevated d-dimer in setting of suspected lower GI bleed  - Precedex remains off. Monitor for tachypnea/anxiety, transition to PO anti-anxiety if necessary.  - send sputum culture    Renal function remains stable    Anemia due to acute blood loss  Normocytic anemia. Hgb normal on admission with multiple dark tarry stools. No hemodynamic instability. Abdominal exam unremarkable    --transfusing 2 units pRBC follow by lasix on 07/29  --CBC q12  --transfuse for overt signs of bleeding, Hgb <7, or hemodynamic instability  --GI consulted and deferred EGD at this time as patient dose not want to be intubated 2/2 COVID19 infection  --hold anticoagulation for now   --continue protonix infusion    COVID-19  --see acute hypoxemic resp fx    Multifocal pneumonia  See acute hypoxemic resp failure    PPI ppx: pantoprazole gtt  VTE ppx: holding in setting of anemia    Above plan discussed with Dr. Christy    Critical Care time 60 minutes    Critical care was time spent personally by me on the following activities: evaluating this patient's organ dysfunction, development of treatment plan, discussing treatment plan with patient or surrogate and bedside caregivers, discussions with consultants, evaluation of patient's response to treatment, examination of patient, ordering and performing treatments and interventions, ordering and review of laboratory studies, ordering and review of radiographic studies, re-evaluation of patient's condition. This critical care time did not overlap with that of any other provider or involve time for any procedures.       Jc Bell NP  Pulmonology  [facility]

## 2020-07-30 NOTE — ASSESSMENT & PLAN NOTE
2/2 COVID-19    - s/p 5 days of remdesivir  - s/p dexamethasone  - completed CTX/azithro x 5 days  - Wean O2 as tolerated. Currently on Vapotherm 20L 70%  - CXR (7/23) with bilateral patchy interstitial and airspace opacities  - Lasix spot dose as needed (last dose 07/29)  - holding full dose Lovenox for elevated d-dimer in setting of suspected lower GI bleed  - Precedex remains off. Monitor for tachypnea/anxiety, transition to PO anti-anxiety if necessary.  - send sputum culture    Renal function remains stable

## 2020-07-30 NOTE — ASSESSMENT & PLAN NOTE
Secondary to COVID-19 infection  Completed course of empiric antibiotics for elevated procalcitonin  High D-dimer improving. Treated with full dose lovenox  CRP a bit better. Completed dexamethasone and Remdesivir course  Continue scheduled olanzapine nightly for related anxiety.   Tolerating vapotherm and PO intake as tolerated.   Pulmonary on board for comanagement.  Precautions in place  Had abrupt desats noted that was treated BiPAP   Repeat cultures obtained - no growth today  Increasing WBC noted, started empiric Zosyn, Flagyl and Vancomycin today, 7/28  Code status: DNR  Developed high fever today, vancomycin level were subtherapeutic - change to Zyvox  Current critical status and change reported/discussed with wife via phone today

## 2020-07-30 NOTE — ASSESSMENT & PLAN NOTE
Occult blood positive. Hgb downtrended   Holding aspirin and clopidogrel.   Is on enoxaparin for hypercoagulable state with COVID-19  Has been on IV PPI Q12  H/H with drop noted and noted large melanotic stool  Monitoring with serial H/H and transfuse if needed  Consultws GI, however hypoxemia will make patient high risk for endoscopy  Status post transfusion 2 U PRBC on 7/29  Case discussed with GI, Pulm and patient - plan for conservative treatment and avoid procedure unless it becomes emergent  No further bleeding, and H&H has been stable  Will consider restarting prophylactic Lovenox tomorrow if there is no bleeding

## 2020-07-30 NOTE — PROGRESS NOTES
Pharmacokinetic Assessment Follow Up: IV Vancomycin    Vancomycin serum concentration assessment(s):    The trough level was drawn correctly and can be used to guide therapy at this time. The measurement is below the desired definitive target range of 10 to 20 mcg/mL.    Vancomycin Regimen Plan:    Change regimen to Vancomycin 1750 mg IV every 24 hours with next serum trough concentration measured at 1030 prior to 3rd dose on 8/2/2020    Drug levels (last 3 results):  Recent Labs   Lab Result Units 07/30/20  1015   Vancomycin-Trough ug/mL 5.0*       Pharmacy will continue to follow and monitor vancomycin.    Please contact pharmacy at extension [phone] for questions regarding this assessment.    Thank you for the consult,   Mikie Chin Jr       Patient brief summary:  Joo York is a 80 y.o. male initiated on antimicrobial therapy with IV Vancomycin for treatment of  pneumonia    Drug Allergies:   Review of patient's allergies indicates:  No Known Allergies    Actual Body Weight:   76.1 kg    Renal Function:   Estimated Creatinine Clearance: 66.6 mL/min (based on SCr of 0.8 mg/dL).,     Dialysis Method (if applicable):  N/A    CBC (last 72 hours):  Recent Labs   Lab Result Units 07/28/20  0403 07/28/20  1555 07/28/20  2350 07/29/20  0305 07/29/20  1556 07/29/20  2054 07/30/20  0342 07/30/20  1015   WBC K/uL 32.59* 39.11*  --  30.48* 44.35*  --  25.72*  --    Hemoglobin g/dL 9.0* 8.0* 7.6* 7.5* 10.2* 10.2* 9.3* 10.4*   Hematocrit % 27.0* 24.5* 22.1* 22.2* 30.5* 29.4* 27.6* 31.2*   Platelets K/uL 291 260  --  240 194  --  154  --    Gran% % 95.0* 83.0*  --  77.0* 87.0*  --  87.0*  --    Lymph% % 1.0* 7.0*  --  12.0* 7.0*  --  10.0*  --    Mono% % 4.0 3.0*  --  4.0 4.0  --  1.0*  --    Eosinophil% % 0.0 0.0  --  0.0 1.0  --  0.0  --    Basophil% % 0.0 0.0  --  0.0 0.0  --  0.0  --    Differential Method  Manual Manual  --  Manual Manual  --  Manual  --        Metabolic Panel (last 72 hours):  Recent Labs   Lab  Result Units 07/28/20  0403 07/28/20  1159 07/29/20  0305 07/30/20  0342   Sodium mmol/L 136  --  135* 136   Potassium mmol/L 4.8  --  4.9 4.0   Chloride mmol/L 104  --  104 105   CO2 mmol/L 27  --  28 28   Glucose mg/dL 315*  --  126* 100   Glucose, UA   --  3+*  --   --    BUN, Bld mg/dL 39*  --  31* 24*   Creatinine mg/dL 0.8  --  0.9 0.8   Magnesium mg/dL 2.8*  --  2.5 2.5   Phosphorus mg/dL 2.9  --  3.8 3.0       Vancomycin Administrations:  vancomycin given in the last 96 hours                     vancomycin 1.5 g in dextrose 5 % 250 mL IVPB (ready to mix) (mg) 1,500 mg New Bag 07/30/20 1121     1,500 mg New Bag 07/29/20 1319    vancomycin (VANCOCIN) 1,750 mg in dextrose 5 % 500 mL IVPB (mg) 1,750 mg New Bag 07/28/20 1102                    Microbiologic Results:  Microbiology Results (last 7 days)       Procedure Component Value Units Date/Time    Blood culture [[id number]] Collected: 07/28/20 1118    Order Status: Completed Specimen: Blood Updated: 07/30/20 1303     Blood Culture, Routine No Growth to date      No Growth to date      No Growth to date    Blood culture [[id number]] Collected: 07/28/20 1055    Order Status: Completed Specimen: Blood Updated: 07/30/20 1303     Blood Culture, Routine No Growth to date      No Growth to date      No Growth to date    Culture, Respiratory with Gram Stain [[id number]] Collected: 07/30/20 1038    Order Status: Completed Specimen: Respiratory from Sputum Updated: 07/30/20 1154     Gram Stain (Respiratory) <10 epithelial cells per low power field.     Gram Stain (Respiratory) Many WBC's     Gram Stain (Respiratory) No organisms seen    Culture, Respiratory with Gram Stain [[id number]] Collected: 07/28/20 1159    Order Status: Canceled Specimen: Respiratory from Sputum     Culture, Respiratory with Gram Stain [[id number]] Collected: 07/28/20 1159    Order Status: Canceled Specimen: Respiratory from Sputum     Blood culture #1 **CANNOT BE ORDERED STAT** [[id number]]  Collected: 07/18/20 1710    Order Status: Completed Specimen: Blood from Peripheral, Antecubital, Left Updated: 07/23/20 2103     Blood Culture, Routine No growth after 5 days.    Blood culture #2 **CANNOT BE ORDERED STAT** [[id number]] Collected: 07/18/20 1733    Order Status: Completed Specimen: Blood from Peripheral, Hand, Left Updated: 07/23/20 2103     Blood Culture, Routine No growth after 5 days.

## 2020-07-30 NOTE — PLAN OF CARE
Pt in bed drowsy, arouses to voice, was placed on vapotherm this am, pt begin to desat and is now on BiPAP settings 18/8 @ 80%, SpO2 >95%, tachypenic with the use of abd muscles, started on precedex, became hypotensive, precedex decreased to 0.3 mcg/kg/h, currently on 0.04 mcg/kg/min levophed to maintain a map >65, Tmax @ 102.8 treated with tylenol IV,  casiano cath in place, uop adequate, no Bm's this shift, bs present, on Protonix gtt @20ml/hr, h&h q6 hours due again 2200, plan is place picc line today and maintain b/p.

## 2020-07-30 NOTE — NURSING
Went in to reposition pt, pt skin flush, warm and moist, axillary temp 102.8, blankets and covers removed, cool towel applied to pt's neck, medical team notified, orders received for IV tylenol, med has been requested from pharmacy.

## 2020-07-30 NOTE — SUBJECTIVE & OBJECTIVE
Interval History:  Weaned down to Vapotherm this morning and able to talk in complete sentences, no more bleeding/melena reported    Review of Systems   Constitutional: Positive for appetite change. Negative for chills and fever.   Respiratory: Positive for shortness of breath.    Gastrointestinal: Negative for abdominal distention, abdominal pain, nausea and vomiting.     Objective:     Vital Signs (Most Recent):  Temp: 100.1 °F (37.8 °C) (07/29/20 2301)  Pulse: 77 (07/29/20 2300)  Resp: (!) 35 (07/29/20 2300)  BP: (!) 136/58 (07/29/20 2300)  SpO2: 100 % (07/29/20 2300) Vital Signs (24h Range):  Temp:  [98 °F (36.7 °C)-100.1 °F (37.8 °C)] 100.1 °F (37.8 °C)  Pulse:  [66-98] 77  Resp:  [21-43] 35  SpO2:  [79 %-100 %] 100 %  BP: ()/() 136/58     Weight: 76.1 kg (167 lb 12.3 oz)  Body mass index is 26.28 kg/m².    Intake/Output Summary (Last 24 hours) at 7/29/2020 2333  Last data filed at 7/29/2020 2304  Gross per 24 hour   Intake 1668.08 ml   Output 2490 ml   Net -821.92 ml      Physical Exam  Vitals signs and nursing note reviewed.   Constitutional:       General: He is not in acute distress.     Appearance: He is not toxic-appearing.   Cardiovascular:      Rate and Rhythm: Normal rate and regular rhythm.   Pulmonary:      Breath sounds: No wheezing or rales.      Comments: Breathing comfortably on vapotherm  Abdominal:      General: Abdomen is flat. Bowel sounds are normal. There is no distension.      Palpations: Abdomen is soft. There is no mass.      Tenderness: There is no abdominal tenderness. There is no guarding.   Skin:     Capillary Refill: Capillary refill takes less than 2 seconds.      Coloration: Skin is not pale.   Neurological:      Mental Status: He is alert and oriented to person, place, and time.      Comments: Very calm    Psychiatric:         Mood and Affect: Mood normal.         Behavior: Behavior normal.         Thought Content: Thought content normal.         Judgment: Judgment  normal.         Significant Labs: All pertinent labs within the past 24 hours have been reviewed.    Significant Imaging: I have reviewed all pertinent imaging results/findings within the past 24 hours.  I have reviewed and interpreted all pertinent imaging results/findings within the past 24 hours.

## 2020-07-30 NOTE — PROGRESS NOTES
[facility] Hospital Medicine  Progress Note    Patient Name: Joo York  MRN: [id number]  Patient Class: IP- Inpatient   Admission Date: 7/18/2020  Length of Stay: 11 days  Attending Physician: Adriana Zamora MD  Primary Care Provider: Keegan Watson MD        Subjective:     Principal Problem:Acute hypoxemic respiratory failure        HPI:  Joo York is a 80 y.o. male that (in part)  has a past medical history of Claudication, Essential hypertension, Essential hypertension, Hematuria, Hernia of abdominal cavity, and Multifocal pneumonia.  has a past surgical history that includes Vasectomy; Tonsillectomy; office cysto 2018; Cystoscopy with biopsy of bladder (N/A, 9/5/2018); and Atherectomy (1/23/2020). Presents to [facility] Emergency Department with report of shortness of breath, fever, chills last 3 days.  He was tested for COVID-19 2 days ago and positive.  He said dyspnea with exertion and a cough that started today.  Denies hemoptysis, stridor, or night sweats.  No nausea, vomiting diarrhea reported.  Patient is a former smoker.  Denies home oxygen use.  Code status discussed in the most remain a DNR DNI.  The family is aware of these wishes.  History is limited to respiratory distress as patient is speaking in short sentences.    In the emergency department routine laboratory studies and chest x-ray obtained.  Evidence of bilateral pneumonia.  Was supplemental oxygen and dexamethasone.  O2 sat 67% air prior to oxygen supplementation.  Okay to escalate to CPAP/BiPAP as.  Does not want to be intubated.    Hospital medicine has been asked to admit to inpatient in the ICU for further evaluation and treatment.     Overview/Hospital Course:  Mr. York presented with shortness of breath and fever.  He was admitted for acute respiratory failure secondary to COVID-19. Placed on BiPAP. Code status DNR. Completed course of empiric antibiotics for elevated procalcitonin  level, course of dexamethasone 6 mg daily and Remdesivir. Also on full dose lovenox for hypercoagulable state. Diuresed intermittently. He slowly improved and de-escalated to vapotherm NC. On 7/26, patient had melena and acute drop in Hgb from 16 to 12 in 24 hours. BP stable and respirations were not affected. Placed on pantoprazole IV 40 mg BID and held ASA/plavix which he uses for PVD. Repeat Hgb levels remained stable and amount of melena decreased. Given such high risk for thromboembolism, he continued on enoxaparin. Noted increased melena and lovenox stopped, and changed IV PPI to Q12 and increased H/H surveillance and GI consulted.    Interval History:  Weaned down to Vapotherm this morning and able to talk in complete sentences, no more bleeding/melena reported    Review of Systems   Constitutional: Positive for appetite change. Negative for chills and fever.   Respiratory: Positive for shortness of breath.    Gastrointestinal: Negative for abdominal distention, abdominal pain, nausea and vomiting.     Objective:     Vital Signs (Most Recent):  Temp: 100.1 °F (37.8 °C) (07/29/20 2301)  Pulse: 77 (07/29/20 2300)  Resp: (!) 35 (07/29/20 2300)  BP: (!) 136/58 (07/29/20 2300)  SpO2: 100 % (07/29/20 2300) Vital Signs (24h Range):  Temp:  [98 °F (36.7 °C)-100.1 °F (37.8 °C)] 100.1 °F (37.8 °C)  Pulse:  [66-98] 77  Resp:  [21-43] 35  SpO2:  [79 %-100 %] 100 %  BP: ()/() 136/58     Weight: 76.1 kg (167 lb 12.3 oz)  Body mass index is 26.28 kg/m².    Intake/Output Summary (Last 24 hours) at 7/29/2020 2333  Last data filed at 7/29/2020 2304  Gross per 24 hour   Intake 1668.08 ml   Output 2490 ml   Net -821.92 ml      Physical Exam  Vitals signs and nursing note reviewed.   Constitutional:       General: He is not in acute distress.     Appearance: He is not toxic-appearing.   Cardiovascular:      Rate and Rhythm: Normal rate and regular rhythm.   Pulmonary:      Breath sounds: No wheezing or rales.       Comments: Breathing comfortably on vapotherm  Abdominal:      General: Abdomen is flat. Bowel sounds are normal. There is no distension.      Palpations: Abdomen is soft. There is no mass.      Tenderness: There is no abdominal tenderness. There is no guarding.   Skin:     Capillary Refill: Capillary refill takes less than 2 seconds.      Coloration: Skin is not pale.   Neurological:      Mental Status: He is alert and oriented to person, place, and time.      Comments: Very calm    Psychiatric:         Mood and Affect: Mood normal.         Behavior: Behavior normal.         Thought Content: Thought content normal.         Judgment: Judgment normal.         Significant Labs: All pertinent labs within the past 24 hours have been reviewed.    Significant Imaging: I have reviewed all pertinent imaging results/findings within the past 24 hours.  I have reviewed and interpreted all pertinent imaging results/findings within the past 24 hours.      Assessment/Plan:      * Acute hypoxemic respiratory failure  Secondary to COVID-19 infection  Completed course of empiric antibiotics for elevated procalcitonin  High D-dimer improving. Treated with full dose lovenox  CRP a bit better. Completed dexamethasone and Remdesivir course  Continue scheduled olanzapine nightly for related anxiety.   Tolerating vapotherm and PO intake as tolerated.   Pulmonary on board for comanagement.  Precautions in place  Had abrupt desats noted that was treated BiPAP   Repeat cultures obtained  Increasing WBC noted, started empiric Zosyn, Flagyl and Vancomycin today, 7/28  Code status: DNR  Now, more comfortable in appearance today    GIB (gastrointestinal bleeding)  Occult blood positive. Hgb downtrended   Holding aspirin and clopidogrel.   Is on enoxaparin for hypercoagulable state with COVID-19  Has been on IV PPI Q12  H/H with drop noted and noted large melanotic stool  Monitoring with serial H/H and transfuse if needed  Consultws GI, however  hypoxemia will make patient high risk for endoscopy  Will transfuse 2 U PRBC today and monitor  Case discussed with GI, Pulm and patient - plan for conservative treatment and avoid procedure unless it becomes emergent    Anemia due to acute blood loss  As discussed above    COVID-19  As above    Elevated troponin I level  Trended. No reported chest pain.      Lower respiratory tract infection due to COVID-19 virus  As discussed above    Multifocal pneumonia  As discussed above    Essential hypertension  Stable on current dose of amlodipine  Will consider holding medications if BP drops    PAD (peripheral artery disease)  Holding ASA and plavix    Dyslipidemia  Currently not on medications.    Will hold off on statin as his liver enzymes are still elevated although better      VTE Risk Mitigation (From admission, onward)         Ordered     IP VTE HIGH RISK PATIENT  Once      07/18/20 2106     Place sequential compression device  Until discontinued      07/18/20 2106                Critical care time spent on the evaluation and treatment of severe organ dysfunction, review of pertinent labs and imaging studies, discussions with consulting providers and discussions with patient/family: 45 minutes.      Adriana Zamora MD  Department of Hospital Medicine   [facility]

## 2020-07-31 PROBLEM — R57.9 SHOCK: Status: ACTIVE | Noted: 2020-01-01

## 2020-07-31 NOTE — PROGRESS NOTES
[facility]  ICU Multidisciplinary Bedside Rounds     UPDATE     Date: 7/31/2020      Plan of care reviewed with the following, Nurse, Charge Nurse, Physician and Rehab.       Needs/ Goals for the day: Wean off levophed and precedex. Phos 2.4. Asked MD about Pollack cath, MD states currently still requires frequent I/o monitoring. Left 1st toe with darkening skin coloration, MD aware, will order nitropaste. RN overnight reported smear of maroon colored stool with bath. Pt desats quickly as low as 60% when bipap removed with small sip of drink. Pt unable to eat and unable to tolerate bipap not being on. MD will discuss plan of care with RT, possibly facemask for bipap or intermittent vapotherm.      Level of Care: Critical Care

## 2020-07-31 NOTE — PROCEDURES
"Joo York is a 80 y.o. male patient.    Temp: 98.1 °F (36.7 °C) (07/30/20 1656)  Pulse: (!) 44 (07/30/20 2055)  Resp: (!) 22 (07/30/20 2055)  BP: (!) 99/53 (07/30/20 1900)  SpO2: 96 % (07/30/20 2055)  Weight: 76.1 kg (167 lb 12.3 oz) (07/18/20 1957)  Height: 5' 7" (170.2 cm) (07/18/20 1957)    PICC  Date/Time: 7/30/2020 8:40 PM  Performed by: Torey Henderson RN  Consent Done: Yes  Time out: Immediately prior to procedure a time out was called to verify the correct patient, procedure, equipment, support staff and site/side marked as required  Indications: med administration  Anesthesia: local infiltration  Local anesthetic: lidocaine 1% without epinephrine  Anesthetic Total (mL): 1  Preparation: skin prepped with ChloraPrep  Skin prep agent dried: skin prep agent completely dried prior to procedure  Sterile barriers: all five maximum sterile barriers used - cap, mask, sterile gown, sterile gloves, and large sterile sheet  Hand hygiene: hand hygiene performed prior to central venous catheter insertion  Location details: right basilic  Catheter type: triple lumen  Catheter size: 5 Fr  Catheter Length: 33cm    Ultrasound guidance: yes  Vessel Caliber: medium and large and patent, compressibility normal  Vascular Doppler: not done  Needle advanced into vessel with real time Ultrasound guidance.  Guidewire confirmed in vessel.  Sterile sheath used.  Number of attempts: 1  Post-procedure: blood return through all ports, chlorhexidine patch and sterile dressing applied  Estimated blood loss (mL): 0            Torey Henderson  7/30/2020  "

## 2020-07-31 NOTE — ASSESSMENT & PLAN NOTE
Melena decreasing. On BID PPI. Unable to tolerate EGD in setting of significant respiratory compromise. Hb holding and no additional blood products in last 24 hours.     -- PPI   -- Transfusion threshold < 7 unless hemorrhage/instability.

## 2020-07-31 NOTE — SUBJECTIVE & OBJECTIVE
Interval History: Hb holding. No additional blood products in the last 24 hours. Maintained on NIV O/N.. 80%. NE initiated for hemodynamic support. Precedex for agitation/WOB.   Febrile yesterday     Objective:     Vital Signs (Most Recent):  Temp: 97.9 °F (36.6 °C) (07/31/20 0400)  Pulse: (!) 47 (07/31/20 0730)  Resp: (!) 24 (07/31/20 0730)  BP: 137/60 (07/31/20 0730)  SpO2: 98 % (07/31/20 0828) Vital Signs (24h Range):  Temp:  [97.8 °F (36.6 °C)-102.8 °F (39.3 °C)] 97.9 °F (36.6 °C)  Pulse:  [] 47  Resp:  [5-51] 24  SpO2:  [88 %-99 %] 98 %  BP: ()/(38-76) 137/60     Weight: 76.1 kg (167 lb 12.3 oz)  Body mass index is 26.28 kg/m².    Intake/Output Summary (Last 24 hours) at 7/31/2020 1042  Last data filed at 7/31/2020 0700  Gross per 24 hour   Intake 2223.41 ml   Output 1060 ml   Net 1163.41 ml     -8.5L Since admission      Physical Exam  Vitals signs and nursing note reviewed.   Constitutional:       Appearance: Normal appearance. He is normal weight.      Interventions: Nasal cannula in place.   HENT:      Head: Normocephalic and atraumatic.      Nose: Nose normal.   Eyes:      Conjunctiva/sclera: Conjunctivae normal.      Pupils: Pupils are equal, round, and reactive to light.   Neck:      Musculoskeletal: Normal range of motion.   Cardiovascular:      Rate and Rhythm: Normal rate.   Pulmonary:      Breath sounds: Examination of the right-lower field reveals decreased breath sounds and rhonchi. Examination of the left-lower field reveals decreased breath sounds and rhonchi. Decreased breath sounds and rhonchi present. No wheezing.      Comments: Remains tachypneic.  Abdominal:      General: Bowel sounds are normal. There is no distension.      Palpations: Abdomen is soft. There is no mass.      Tenderness: There is no abdominal tenderness. There is no guarding.      Hernia: No hernia is present.   Musculoskeletal: Normal range of motion.         General: No swelling.   Skin:     General: Skin is  warm and dry.      Capillary Refill: Capillary refill takes less than 2 seconds.   Neurological:      General: No focal deficit present.      Mental Status: He is alert and oriented to person, place, and time.   Psychiatric:         Mood and Affect: Mood normal.         Speech: Speech normal.       Vents:  Oxygen Concentration (%): 80 (07/31/20 0828)    Lines/Drains/Airways     Peripherally Inserted Central Catheter Line            PICC Triple Lumen 07/30/20 2040 right brachial less than 1 day          Drain                 Urethral Catheter 07/28/20 1800 Latex 16 Fr. 2 days          Peripheral Intravenous Line                 Peripheral IV - Single Lumen 07/24/20 1032 20 G Right Forearm 7 days         Peripheral IV - Single Lumen 07/29/20 0425 20 G Left Antecubital 2 days         Midline Catheter Insertion/Assessment  - Single Lumen 07/29/20 1158 Left brachial vein 18g x 10cm 1 day                Significant Labs:    CBC/Anemia Profile:  Recent Labs   Lab 07/30/20  0342  07/30/20  1615 07/30/20  2215 07/31/20  0430   WBC 25.72*  --  20.85*  --  22.15*   HGB 9.3*   < > 8.5* 9.4* 9.2*  9.2*   HCT 27.6*   < > 25.6* 28.0* 27.3*  27.3*     --  137*  --  150   MCV 91  --  93  --  92   RDW 13.7  --  13.9  --  14.0    < > = values in this interval not displayed.        Chemistries:  Recent Labs   Lab 07/30/20  0342 07/31/20  0430    132*   K 4.0 3.5    102   CO2 28 26   BUN 24* 21   CREATININE 0.8 0.7   CALCIUM 7.6* 7.5*   MG 2.5 2.1   PHOS 3.0 2.4*       All pertinent labs within the past 24 hours have been reviewed.    Significant Imaging:  I have reviewed all pertinent imaging results/findings within the past 24 hours.     CXR- Right-sided PICC line is visualized with distal tip over the SVC.  Multifocal consolidative changes are again seen within the right mid to lower lung zone and to lesser degree within the left lung base.  Similar findings are seen on previous radiograph.  No pneumothorax or  large pleural effusion seen.    ECHO-   · Normal left ventricular systolic function. The estimated ejection fraction is 65%.  · Mild left atrial enlargement.  · Normal LV diastolic function.  · Normal right ventricular systolic function.  · Mild aortic regurgitation.  · Mild tricuspid regurgitation.  · Mild pulmonary hypertension present.  · Normal central venous pressure (3 mmHg).  · The estimated PA systolic pressure is 31 mmHg.      MICROBIOLOGY-         Respiratory Culture Normal respiratory marquis P    Gram Stain (Respiratory) <10 epithelial cells per low power field.    Gram Stain (Respiratory) Many WBC's    Gram Stain (Respiratory) No organisms seen      Blood Cultures- 7/28- NGTD

## 2020-07-31 NOTE — PROGRESS NOTES
[facility]  ICU Shift Summary  Date: 7/31/2020      COVID Test: (+)  Isolation: Airborne and Contact and Droplet     Prehospitalization: Home  Admit Date / LOS : 7/18/2020/ 13 days    Diagnosis: Acute hypoxemic respiratory failure    Consults:        Active: Pulm CC       Needed: N/A     Code Status: DNR   Advanced Directive: <no information>    LDA: BIPAP, Pollack, Midline and PICC       Central Lines/Site/Justification:Pressors, Multiple GTTS and Unable to Obtain/Maintain PIV       Urinary Cath/Order/Justification:Critically Ill in the ICU and requiring intensive monitoring    Vasopressors/Infusions:    dexmedetomidine (PRECEDEX) infusion 0.2 mcg/kg/hr (07/31/20 1626)    norepinephrine bitartrate-D5W Stopped (07/31/20 0946)    pantoprazole 40 mg in dextrose 5 % 100 mL infusion (ready to mix system) 8 mg/hr (07/31/20 1627)          GOALS: Volume/ Hemodynamic: MAP >65                     RASS: 0  alert and calm    Pain Management: IV       Pain Controlled: yes     Rhythm: NSR and SB    Respiratory Device: Bipap    Oxygen Concentration (%):  [] 100             Most Recent SBT/ SAT: N/A       MOVE Screen: FAIL    VTE Prophylaxis: Mechanical  Mobility: Bedrest  Stress Ulcer Prophylaxis: Yes    Dietary: PO  Tolerance: no  /  Advancement: no    I & O (24h):    Intake/Output Summary (Last 24 hours) at 7/31/2020 1829  Last data filed at 7/31/2020 1400  Gross per 24 hour   Intake 1976.05 ml   Output 875 ml   Net 1101.05 ml        Restraints: No    Significant Dates:  Post Op Date: N/A  Rescue Date: N/A  Imaging/ Diagnostics: N/A    Noteworthy Labs:  none    CBC/Anemia Labs: Coags:    Recent Labs   Lab 07/26/20  1755  07/30/20  1615  07/31/20  0430 07/31/20  1122   WBC  --    < > 20.85*  --  22.15*  --    HGB  --    < > 8.5*   < > 9.2*  9.2* 10.6*   HCT  --    < > 25.6*   < > 27.3*  27.3* 30.6*   PLT  --    < > 137*  --  150  --    MCV  --    < > 93  --  92  --    RDW  --    < > 13.9  --  14.0  --     OCCULTBLOOD Positive*  --   --   --   --   --     < > = values in this interval not displayed.    Recent Labs   Lab 07/26/20  1838 07/27/20  1313   INR 1.1 1.1   APTT 26.5  --         Chemistries:   Recent Labs   Lab 07/26/20  0337  07/30/20  0342 07/31/20  0430      < > 136 132*   K 4.1   < > 4.0 3.5      < > 105 102   CO2 25   < > 28 26   BUN 42*   < > 24* 21   CREATININE 0.8   < > 0.8 0.7   CALCIUM 8.4*   < > 7.6* 7.5*   PROT 5.6*  --   --   --    BILITOT 0.5  --   --   --    ALKPHOS 150*  --   --   --    ALT 46*  --   --   --    AST 29  --   --   --    MG  --    < > 2.5 2.1   PHOS  --    < > 3.0 2.4*    < > = values in this interval not displayed.        Cardiac Enzymes: Ejection Fractions:    No results for input(s): CPK, CPKMB, MB, TROPONINI in the last 72 hours. Nuc Rest EF   Date Value Ref Range Status   06/11/2020 77  Final        POCT Glucose: HbA1c:    Recent Labs   Lab 07/30/20  1619 07/30/20  2133 07/31/20  0514   POCTGLUCOSE 292* 217* 332*    No results found for: HGBA1C        ICU LOS 12d 22h  Level of Care: Critical Care    Shift Summary/Plan for the shift: Levophed gtt weaned off, precedex gtt restarted this evening due to tachypnea/discomfort. PRN morphine ordered and given. Temp 101, tylenol po and iv, temp improved. HR, BP, and RR elevated this shift, improved. Nitro paste to left great toe. Levemir discontinued due to decreased blood sugar at noon. Pt unable to tolerate meals, desats quickly.

## 2020-07-31 NOTE — ASSESSMENT & PLAN NOTE
Likely new PNA +/- sedation. Minimal vasopressor support O/N. Source likely new PNA as above     -- BS Abx. Follow cultures   -- Levophed targeting MAP >65

## 2020-07-31 NOTE — PROGRESS NOTES
1545 pt's temp 101.0 Axillary. Tachycardic, tachypnic, morphine prn given, precedex restarted. New orders received.   1700 Pt's respirations and heart rate improved with interventions. Temp now 99.8 axillary. Reports comfortable and resting intermittently.

## 2020-07-31 NOTE — PLAN OF CARE
Received on bipap asleep difficult to arouse and when aroused returns back to sleep. Using accessory muscle to breath 02 at 80% sats 92-94%, HOB up 45 degrees. DNR/DNI in place and being followed. Awaiting PICC line nurse for line placement. Cardiac shows SB rate 40's casiano output adequate, denies any discomforts only C/O dry mouth.no injuries noted. Informed pt mask could not be removed all the time as this messes with his oxygenation, verbalized understanding.

## 2020-07-31 NOTE — PROGRESS NOTES
[facility]  Pulmonology  Progress Note    Patient Name: Joo York  MRN: [id number]  Admission Date: 7/18/2020  Hospital Length of Stay: 13 days  Code Status: DNR  Attending Provider: Adriana Zamora MD  Primary Care Provider: Keegan Watson MD   Principal Problem: Acute hypoxemic respiratory failure    Subjective:     Interval History: Hb holding. No additional blood products in the last 24 hours. Maintained on NIV O/N.. 80%. NE initiated for hemodynamic support. Precedex for agitation/WOB.   Febrile yesterday     Objective:     Vital Signs (Most Recent):  Temp: 97.9 °F (36.6 °C) (07/31/20 0400)  Pulse: (!) 47 (07/31/20 0730)  Resp: (!) 24 (07/31/20 0730)  BP: 137/60 (07/31/20 0730)  SpO2: 98 % (07/31/20 0828) Vital Signs (24h Range):  Temp:  [97.8 °F (36.6 °C)-102.8 °F (39.3 °C)] 97.9 °F (36.6 °C)  Pulse:  [] 47  Resp:  [5-51] 24  SpO2:  [88 %-99 %] 98 %  BP: ()/(38-76) 137/60     Weight: 76.1 kg (167 lb 12.3 oz)  Body mass index is 26.28 kg/m².    Intake/Output Summary (Last 24 hours) at 7/31/2020 1042  Last data filed at 7/31/2020 0700  Gross per 24 hour   Intake 2223.41 ml   Output 1060 ml   Net 1163.41 ml     -8.5L Since admission      Physical Exam  Vitals signs and nursing note reviewed.   Constitutional:       Appearance: Normal appearance. He is normal weight.      Interventions: Nasal cannula in place.   HENT:      Head: Normocephalic and atraumatic.      Nose: Nose normal.   Eyes:      Conjunctiva/sclera: Conjunctivae normal.      Pupils: Pupils are equal, round, and reactive to light.   Neck:      Musculoskeletal: Normal range of motion.   Cardiovascular:      Rate and Rhythm: Normal rate.   Pulmonary:      Breath sounds: Examination of the right-lower field reveals decreased breath sounds and rhonchi. Examination of the left-lower field reveals decreased breath sounds and rhonchi. Decreased breath sounds and rhonchi present. No wheezing.      Comments: Remains  tachypneic.  Abdominal:      General: Bowel sounds are normal. There is no distension.      Palpations: Abdomen is soft. There is no mass.      Tenderness: There is no abdominal tenderness. There is no guarding.      Hernia: No hernia is present.   Musculoskeletal: Normal range of motion.         General: No swelling.   Skin:     General: Skin is warm and dry.      Capillary Refill: Capillary refill takes less than 2 seconds.   Neurological:      General: No focal deficit present.      Mental Status: He is alert and oriented to person, place, and time.   Psychiatric:         Mood and Affect: Mood normal.         Speech: Speech normal.       Vents:  Oxygen Concentration (%): 80 (07/31/20 0828)    Lines/Drains/Airways     Peripherally Inserted Central Catheter Line            PICC Triple Lumen 07/30/20 2040 right brachial less than 1 day          Drain                 Urethral Catheter 07/28/20 1800 Latex 16 Fr. 2 days          Peripheral Intravenous Line                 Peripheral IV - Single Lumen 07/24/20 1032 20 G Right Forearm 7 days         Peripheral IV - Single Lumen 07/29/20 0425 20 G Left Antecubital 2 days         Midline Catheter Insertion/Assessment  - Single Lumen 07/29/20 1158 Left brachial vein 18g x 10cm 1 day                Significant Labs:    CBC/Anemia Profile:  Recent Labs   Lab 07/30/20  0342  07/30/20  1615 07/30/20  2215 07/31/20  0430   WBC 25.72*  --  20.85*  --  22.15*   HGB 9.3*   < > 8.5* 9.4* 9.2*  9.2*   HCT 27.6*   < > 25.6* 28.0* 27.3*  27.3*     --  137*  --  150   MCV 91  --  93  --  92   RDW 13.7  --  13.9  --  14.0    < > = values in this interval not displayed.        Chemistries:  Recent Labs   Lab 07/30/20  0342 07/31/20  0430    132*   K 4.0 3.5    102   CO2 28 26   BUN 24* 21   CREATININE 0.8 0.7   CALCIUM 7.6* 7.5*   MG 2.5 2.1   PHOS 3.0 2.4*       All pertinent labs within the past 24 hours have been reviewed.    Significant Imaging:  I have reviewed  all pertinent imaging results/findings within the past 24 hours.     CXR- Right-sided PICC line is visualized with distal tip over the SVC.  Multifocal consolidative changes are again seen within the right mid to lower lung zone and to lesser degree within the left lung base.  Similar findings are seen on previous radiograph.  No pneumothorax or large pleural effusion seen.    ECHO-   · Normal left ventricular systolic function. The estimated ejection fraction is 65%.  · Mild left atrial enlargement.  · Normal LV diastolic function.  · Normal right ventricular systolic function.  · Mild aortic regurgitation.  · Mild tricuspid regurgitation.  · Mild pulmonary hypertension present.  · Normal central venous pressure (3 mmHg).  · The estimated PA systolic pressure is 31 mmHg.      MICROBIOLOGY-         Respiratory Culture Normal respiratory marquis P    Gram Stain (Respiratory) <10 epithelial cells per low power field.    Gram Stain (Respiratory) Many WBC's    Gram Stain (Respiratory) No organisms seen      Blood Cultures- 7/28- NGTD        Assessment/Plan:     * Acute hypoxemic respiratory failure  2/2 COVID-19. Now with new infiltrates RML/RLL, new fevers concerning for superimposed aspiration/bacterial PNA.     - s/p 5 days of remdesivir +  dexamethasone  - Wean O2 as tolerated. Alternate vapotherm + NIV   - Lasix spot dose as needed (last dose 07/29)- Maintains negative fluid balance. Holding today in setting of new vasopressor needs.   - holding full dose Lovenox for elevated d-dimer in setting of suspected lower GI bleed  -- BS abx. Follow cultures.   -- Precedex.. wean as tolerated.     Renal function remains stable    Shock  Likely new PNA +/- sedation. Minimal vasopressor support O/N. Source likely new PNA as above     -- BS Abx. Follow cultures   -- Levophed targeting MAP >65     Anemia due to acute blood loss  See GIB            GIB (gastrointestinal bleeding)  Melena decreasing. On BID PPI. Unable to tolerate  EGD in setting of significant respiratory compromise. Hb holding and no additional blood products in last 24 hours.     -- PPI   -- Transfusion threshold < 7 unless hemorrhage/instability.     COVID-19  --see acute hypoxemic resp fx    Multifocal pneumonia  See acute hypoxemic resp failure      DVT PPX- SCDs. Pharmacologic held in setting of UGIB..   GI PPX- On PPI   Nutrition- NPO     Complicated course, frail.. Hopefully no more setbacks, but support/debility remain high..Family updated by primary.      Critical Care Time: 50 minutes  Critical care was time spent personally by me on the following activities: evaluating this patient's organ dysfunction, development of treatment plan, discussing treatment plan with patient or surrogate and bedside caregivers, discussions with consultants, evaluation of patient's response to treatment, examination of patient, ordering and performing treatments and interventions, ordering and review of laboratory studies, ordering and review of radiographic studies, re-evaluation of patient's condition. This critical care time did not overlap with that of any other provider or involve time for any procedures.      Isidro Christy MD  Pulmonology/Critical Care Medicine   [facility] [place]

## 2020-07-31 NOTE — PLAN OF CARE
Plan of care reviewed. Pt updated.Levophed gtt weaned off, precedex gtt restarted this evening due to tachypnea/discomfort. PRN morphine ordered and given. Temp 101, tylenol po and iv, temp improved. HR, BP, and RR elevated this shift, improved. Nitro paste to left great toe. Levemir discontinued due to decreased blood sugar at noon. Pt unable to tolerate meals, desats quickly.

## 2020-07-31 NOTE — ASSESSMENT & PLAN NOTE
2/2 COVID-19. Now with new infiltrates RML/RLL, new fevers concerning for superimposed aspiration/bacterial PNA.     - s/p 5 days of remdesivir +  dexamethasone  - Wean O2 as tolerated. Alternate vapotherm + NIV   - Lasix spot dose as needed (last dose 07/29)- Maintains negative fluid balance. Holding today in setting of new vasopressor needs.   - holding full dose Lovenox for elevated d-dimer in setting of suspected lower GI bleed  -- BS abx. Follow cultures.   -- Precedex.. wean as tolerated.     Renal function remains stable

## 2020-07-31 NOTE — NURSING
[facility]  ICU Shift Summary  Date: 7/31/2020      COVID Test: (+)  Isolation: Airborne and Contact and Droplet     Prehospitalization: Home  Admit Date / LOS : 7/18/2020/ 13 days    Diagnosis: Acute hypoxemic respiratory failure    Consults:        Active: GI, Palliative, Pulm CC and SW       Needed: N/A     Code Status: DNR   Advanced Directive: <no information>    LDA: BIPAP, Pollack, PICC and PIV       Central Lines/Site/Justification:Patient Does Not Have Central Line       Urinary Cath/Order/Justification:Critically Ill in the ICU and requiring intensive monitoring    Vasopressors/Infusions:    dexmedetomidine (PRECEDEX) infusion 0.3 mcg/kg/hr (07/31/20 0500)    norepinephrine bitartrate-D5W 0.04 mcg/kg/min (07/31/20 0500)    pantoprazole 40 mg in dextrose 5 % 100 mL infusion (ready to mix system) 8 mg/hr (07/31/20 0559)          GOALS: Volume/ Hemodynamic: HR <                     RASS: -2  light sedation, briefly awakes to voice (eye opening)    Pain Management: none       Pain Controlled: yes     Rhythm: SB    Respiratory Device: Bipap    Oxygen Concentration (%):  [60-80] 80             Most Recent SBT/ SAT: N/A         VTE Prophylaxis: Pharm and Mechanical  Mobility: Bedrest  Stress Ulcer Prophylaxis: Yes    Dietary: PO  Tolerance: no  /  Advancement: no    I & O (24h):    Intake/Output Summary (Last 24 hours) at 7/31/2020 0607  Last data filed at 7/31/2020 0500  Gross per 24 hour   Intake 2183.41 ml   Output 1537 ml   Net 646.41 ml        Restraints: No    Significant Dates:  Post Op Date: N/A  Rescue Date: N/A  Imaging/ Diagnostics: N/A    Noteworthy Labs: WBC,K+ 3.5     CBC/Anemia Labs: Coags:    Recent Labs   Lab 07/26/20  1755  07/30/20  1615 07/30/20  2215 07/31/20  0430   WBC  --    < > 20.85*  --  22.15*   HGB  --    < > 8.5* 9.4* 9.2*  9.2*   HCT  --    < > 25.6* 28.0* 27.3*  27.3*   PLT  --    < > 137*  --  150   MCV  --    < > 93  --  92   RDW  --    < > 13.9  --  14.0    OCCULTBLOOD Positive*  --   --   --   --     < > = values in this interval not displayed.    Recent Labs   Lab 07/26/20  1838 07/27/20  1313   INR 1.1 1.1   APTT 26.5  --         Chemistries:   Recent Labs   Lab 07/26/20  0337  07/30/20  0342 07/31/20  0430      < > 136 132*   K 4.1   < > 4.0 3.5      < > 105 102   CO2 25   < > 28 26   BUN 42*   < > 24* 21   CREATININE 0.8   < > 0.8 0.7   CALCIUM 8.4*   < > 7.6* 7.5*   PROT 5.6*  --   --   --    BILITOT 0.5  --   --   --    ALKPHOS 150*  --   --   --    ALT 46*  --   --   --    AST 29  --   --   --    MG  --    < > 2.5 2.1   PHOS  --    < > 3.0 2.4*    < > = values in this interval not displayed.        Cardiac Enzymes: Ejection Fractions:    No results for input(s): CPK, CPKMB, MB, TROPONINI in the last 72 hours. Nuc Rest EF   Date Value Ref Range Status   06/11/2020 77  Final        POCT Glucose: HbA1c:    Recent Labs   Lab 07/30/20  1619 07/30/20  2133 07/31/20  0514   POCTGLUCOSE 292* 217* 332*    No results found for: HGBA1C        ICU LOS 12d 9h  Level of Care: Critical Care    Shift Summary/Plan for the shift:  Pt remains on high levels of O2 when removed on any given time numbers fall in the low 70's SOB with sats falling in the 70's.

## 2020-08-01 NOTE — SUBJECTIVE & OBJECTIVE
Interval History:  Patient has remained on BiPAP overnight.  Reports being tired but still tries to talk. Desats with any attempt to come off BIPAP.    Review of Systems   Constitutional: Positive for appetite change.   Respiratory: Positive for shortness of breath.    Gastrointestinal: Negative for abdominal distention, abdominal pain, nausea and vomiting.     Objective:     Vital Signs (Most Recent):  Temp: 99.5 °F (37.5 °C) (08/01/20 0715)  Pulse: 71 (08/01/20 1500)  Resp: (!) 28 (08/01/20 1500)  BP: 136/65 (08/01/20 1500)  SpO2: 100 % (08/01/20 1500) Vital Signs (24h Range):  Temp:  [97.4 °F (36.3 °C)-101 °F (38.3 °C)] 99.5 °F (37.5 °C)  Pulse:  [] 71  Resp:  [] 28  SpO2:  [79 %-100 %] 100 %  BP: ()/(38-76) 136/65     Weight: 76.1 kg (167 lb 12.3 oz)  Body mass index is 26.28 kg/m².    Intake/Output Summary (Last 24 hours) at 8/1/2020 1531  Last data filed at 8/1/2020 0610  Gross per 24 hour   Intake 1191.55 ml   Output 470 ml   Net 721.55 ml      Physical Exam  Vitals signs and nursing note reviewed.   Constitutional:       General: He is not in acute distress.     Appearance: He is not toxic-appearing.      Comments: Appears tired   Cardiovascular:      Rate and Rhythm: Normal rate and regular rhythm.   Pulmonary:      Effort: Respiratory distress present.      Breath sounds: Rhonchi present.      Comments: Increased work of breathing with tachypnea, on BiPAP  Abdominal:      General: Abdomen is flat. Bowel sounds are normal. There is no distension.      Palpations: Abdomen is soft. There is no mass.      Tenderness: There is no abdominal tenderness. There is no guarding.   Skin:     Capillary Refill: Capillary refill takes less than 2 seconds.      Coloration: Skin is not pale.   Neurological:      Mental Status: He is alert.      Comments: Weaker and intermittent confusion that is redirectable   Psychiatric:         Mood and Affect: Mood normal.         Behavior: Behavior normal.          Thought Content: Thought content normal.         Judgment: Judgment normal.         Significant Labs: All pertinent labs within the past 24 hours have been reviewed.    Significant Imaging: I have reviewed all pertinent imaging results/findings within the past 24 hours.  I have reviewed and interpreted all pertinent imaging results/findings within the past 24 hours.

## 2020-08-01 NOTE — PROGRESS NOTES
[facility]  Pulmonology  Progress Note    Patient Name: Joo York  MRN: [id number]  Admission Date: 7/18/2020  Hospital Length of Stay: 14 days  Code Status: DNR  Attending Provider: Adriana Zamora MD  Primary Care Provider: Keegan Watson MD   Principal Problem: Acute hypoxemic respiratory failure    Subjective:     Interval History: Hb holding. No additional blood products in the last 24 hours. Maintained on NIV O/N.. 100%. Off vasopressors. Still requiring low dose precedex for agitation/WOB.     Objective:     Vital Signs (Most Recent):  Temp: 99.5 °F (37.5 °C) (08/01/20 0715)  Pulse: 71 (08/01/20 1500)  Resp: (!) 28 (08/01/20 1500)  BP: 136/65 (08/01/20 1500)  SpO2: 100 % (08/01/20 1500) Vital Signs (24h Range):  Temp:  [97.4 °F (36.3 °C)-101 °F (38.3 °C)] 99.5 °F (37.5 °C)  Pulse:  [] 71  Resp:  [] 28  SpO2:  [79 %-100 %] 100 %  BP: ()/(38-76) 136/65     Weight: 76.1 kg (167 lb 12.3 oz)  Body mass index is 26.28 kg/m².    Intake/Output Summary (Last 24 hours) at 8/1/2020 1519  Last data filed at 8/1/2020 0610  Gross per 24 hour   Intake 1191.55 ml   Output 470 ml   Net 721.55 ml     -7L Since admission      Physical Exam  Vitals signs and nursing note reviewed.   Constitutional:       Appearance: Normal appearance. He is normal weight.      Interventions: Nasal cannula in place.   HENT:      Head: Normocephalic and atraumatic.      Nose: Nose normal.   Eyes:      Conjunctiva/sclera: Conjunctivae normal.      Pupils: Pupils are equal, round, and reactive to light.   Neck:      Musculoskeletal: Normal range of motion.   Cardiovascular:      Rate and Rhythm: Normal rate.   Pulmonary:      Breath sounds: Examination of the right-lower field reveals decreased breath sounds and rhonchi. Examination of the left-lower field reveals decreased breath sounds and rhonchi. Decreased breath sounds and rhonchi present. No wheezing.      Comments: Remains tachypneic.  Abdominal:       General: Bowel sounds are normal. There is no distension.      Palpations: Abdomen is soft. There is no mass.      Tenderness: There is no abdominal tenderness. There is no guarding.      Hernia: No hernia is present.   Musculoskeletal: Normal range of motion.         General: No swelling.   Skin:     General: Skin is warm and dry.      Capillary Refill: Capillary refill takes less than 2 seconds.   Neurological:      General: No focal deficit present.      Mental Status: He is alert and oriented to person, place, and time.   Psychiatric:         Mood and Affect: Mood normal.         Speech: Speech normal.       Vents:  Oxygen Concentration (%): 100 (08/01/20 1500)    Lines/Drains/Airways     Peripherally Inserted Central Catheter Line            PICC Triple Lumen 07/30/20 2040 right brachial 1 day          Drain                 Urethral Catheter 07/28/20 1800 Latex 16 Fr. 3 days          Peripheral Intravenous Line                 Midline Catheter Insertion/Assessment  - Single Lumen 07/29/20 1158 Left brachial vein 18g x 10cm 3 days                Significant Labs:    CBC/Anemia Profile:  Recent Labs   Lab 07/31/20  0430  07/31/20  1800 08/01/20  0002 08/01/20  0255 08/01/20  1124   WBC 22.15*  --  24.48*  --  21.86*  --    HGB 9.2*  9.2*   < > 8.7* 8.4* 9.0*  9.0* 10.1*   HCT 27.3*  27.3*   < > 26.1* 25.4* 27.2*  27.2* 29.8*     --  115*  --  132*  --    MCV 92  --  93  --  94  --    RDW 14.0  --  14.1  --  14.4  --     < > = values in this interval not displayed.        Chemistries:  Recent Labs   Lab 07/31/20  0430 08/01/20  0255   * 133*   K 3.5 3.9    102   CO2 26 28   BUN 21 20   CREATININE 0.7 0.8   CALCIUM 7.5* 7.8*   MG 2.1 2.3   PHOS 2.4* 2.6*       All pertinent labs within the past 24 hours have been reviewed.    Significant Imaging:  I have reviewed all pertinent imaging results/findings within the past 24 hours.     CXR- Right-sided PICC line is visualized with distal tip over  the SVC.  Multifocal consolidative changes are again seen within the right mid to lower lung zone and to lesser degree within the left lung base.  Similar findings are seen on previous radiograph.  No pneumothorax or large pleural effusion seen.    ECHO-   · Normal left ventricular systolic function. The estimated ejection fraction is 65%.  · Mild left atrial enlargement.  · Normal LV diastolic function.  · Normal right ventricular systolic function.  · Mild aortic regurgitation.  · Mild tricuspid regurgitation.  · Mild pulmonary hypertension present.  · Normal central venous pressure (3 mmHg).  · The estimated PA systolic pressure is 31 mmHg.      MICROBIOLOGY-         Respiratory Culture Normal respiratory marquis P    Gram Stain (Respiratory) <10 epithelial cells per low power field.    Gram Stain (Respiratory) Many WBC's    Gram Stain (Respiratory) No organisms seen      Blood Cultures- 7/28- NGTD        Assessment/Plan:     * Acute hypoxemic respiratory failure  2/2 COVID-19. Now with new infiltrates RML/RLL, new fevers concerning for superimposed aspiration/bacterial PNA. Blood/sputum cultures unrevealing. Still intermittently febrile.     - s/p 5 days of remdesivir +  dexamethasone  - Wean O2 as tolerated. Alternate vapotherm + NIV   - Lasix spot dose as needed- Maintains negative fluid balance.   - holding full dose Lovenox for elevated d-dimer in setting of suspected lower GI bleed  -- Stop linezolid and continue zosyn.. Duration 7 days for presumed aspiration PNA.   -- Precedex.. wean as tolerated.     Renal function remains stable. Not thriving.. Worried about long term and if he will survive this hospitalization.. Last few days have been challenging for him..     Shock  Resolved. Off vasopressors     Anemia due to acute blood loss  See GIB            GIB (gastrointestinal bleeding)  Melena decreasing. On BID PPI. Unable to tolerate EGD in setting of significant respiratory compromise. Hb holding and no  additional blood products in last 24 hours.     -- PPI   -- Transfusion threshold < 7 unless hemorrhage/instability.     COVID-19  --see acute hypoxemic resp fx    Multifocal pneumonia  See acute hypoxemic resp failure        Critical Care Time: 50 minutes  Critical care was time spent personally by me on the following activities: evaluating this patient's organ dysfunction, development of treatment plan, discussing treatment plan with patient or surrogate and bedside caregivers, discussions with consultants, evaluation of patient's response to treatment, examination of patient, ordering and performing treatments and interventions, ordering and review of laboratory studies, ordering and review of radiographic studies, re-evaluation of patient's condition. This critical care time did not overlap with that of any other provider or involve time for any procedures.      Isidro Christy MD  Pulmonology/Critical Care Medicine   [facility] [place]

## 2020-08-01 NOTE — ASSESSMENT & PLAN NOTE
Secondary to COVID-19 infection  Completed course of empiric antibiotics for elevated procalcitonin  High D-dimer improving. Treated with full dose lovenox  CRP a bit better. Completed dexamethasone and Remdesivir course  Continue scheduled olanzapine nightly for related anxiety.   Tolerating vapotherm and PO intake as tolerated.   Pulmonary on board for comanagement.  Precautions in place  Had abrupt desats noted that was treated BiPAP   Repeat cultures obtained - no growth to date  Increasing WBC noted, started empiric Zosyn, Flagyl and Vancomycin today, 7/28  Code status: DNR  Developed high fever on 7/30, vancomycin level were subtherapeutic - change to Zyvox  Intermittent fever but all cultures are NGTD and patient holding but not improving  Will stop Zyvox today  Current critical status discussed with wife via phone and she expressed understanding. I expressed concern that he is not improving and may continue to not do well.

## 2020-08-01 NOTE — PROGRESS NOTES
[facility] Hospital Medicine  Progress Note    Patient Name: Joo York  MRN: [id number]  Patient Class: IP- Inpatient   Admission Date: 7/18/2020  Length of Stay: 14 days  Attending Physician: Adriana Zamora MD  Primary Care Provider: Keegan Watson MD        Subjective:     Principal Problem:Acute hypoxemic respiratory failure        HPI:  Joo York is a 80 y.o. male that (in part)  has a past medical history of Claudication, Essential hypertension, Essential hypertension, Hematuria, Hernia of abdominal cavity, and Multifocal pneumonia.  has a past surgical history that includes Vasectomy; Tonsillectomy; office cysto 2018; Cystoscopy with biopsy of bladder (N/A, 9/5/2018); and Atherectomy (1/23/2020). Presents to [facility] Emergency Department with report of shortness of breath, fever, chills last 3 days.  He was tested for COVID-19 2 days ago and positive.  He said dyspnea with exertion and a cough that started today.  Denies hemoptysis, stridor, or night sweats.  No nausea, vomiting diarrhea reported.  Patient is a former smoker.  Denies home oxygen use.  Code status discussed in the most remain a DNR DNI.  The family is aware of these wishes.  History is limited to respiratory distress as patient is speaking in short sentences.    In the emergency department routine laboratory studies and chest x-ray obtained.  Evidence of bilateral pneumonia.  Was supplemental oxygen and dexamethasone.  O2 sat 67% air prior to oxygen supplementation.  Okay to escalate to CPAP/BiPAP as.  Does not want to be intubated.    Hospital medicine has been asked to admit to inpatient in the ICU for further evaluation and treatment.     Overview/Hospital Course:  Mr. York presented with shortness of breath and fever.  He was admitted for acute respiratory failure secondary to COVID-19. Placed on BiPAP. Code status DNR. Completed course of empiric antibiotics for elevated procalcitonin  level, course of dexamethasone 6 mg daily and Remdesivir. Also on full dose lovenox for hypercoagulable state. Diuresed intermittently. He slowly improved and de-escalated to vapotherm NC. On 7/26, patient had melena and acute drop in Hgb from 16 to 12 in 24 hours. BP stable and respirations were not affected. Placed on pantoprazole IV 40 mg BID and held ASA/plavix which he uses for PVD. Repeat Hgb levels remained stable and amount of melena decreased. Given such high risk for thromboembolism, he continued on enoxaparin. Noted increased melena and lovenox stopped, and changed IV PPI to Q12 and increased H/H surveillance and GI consulted.  Transfuse 2 units of packed red blood cells and PPI changed to infusion.  Empiric Zosyn and vancomycin initiated for continued worsening leukocytosis.  Blood cultures no growth to date.  Sputum culture pending. Intermittent high fever.    Interval History:  Patient has remained on BiPAP overnight.  Reports being tired but still tries to talk. Desats with any attempt to come off BIPAP.    Review of Systems   Constitutional: Positive for appetite change.   Respiratory: Positive for shortness of breath.    Gastrointestinal: Negative for abdominal distention, abdominal pain, nausea and vomiting.     Objective:     Vital Signs (Most Recent):  Temp: 99.5 °F (37.5 °C) (08/01/20 0715)  Pulse: 71 (08/01/20 1500)  Resp: (!) 28 (08/01/20 1500)  BP: 136/65 (08/01/20 1500)  SpO2: 100 % (08/01/20 1500) Vital Signs (24h Range):  Temp:  [97.4 °F (36.3 °C)-101 °F (38.3 °C)] 99.5 °F (37.5 °C)  Pulse:  [] 71  Resp:  [] 28  SpO2:  [79 %-100 %] 100 %  BP: ()/(38-76) 136/65     Weight: 76.1 kg (167 lb 12.3 oz)  Body mass index is 26.28 kg/m².    Intake/Output Summary (Last 24 hours) at 8/1/2020 1531  Last data filed at 8/1/2020 0610  Gross per 24 hour   Intake 1191.55 ml   Output 470 ml   Net 721.55 ml      Physical Exam  Vitals signs and nursing note reviewed.   Constitutional:        General: He is not in acute distress.     Appearance: He is not toxic-appearing.      Comments: Appears tired   Cardiovascular:      Rate and Rhythm: Normal rate and regular rhythm.   Pulmonary:      Effort: Respiratory distress present.      Breath sounds: Rhonchi present.      Comments: Increased work of breathing with tachypnea, on BiPAP  Abdominal:      General: Abdomen is flat. Bowel sounds are normal. There is no distension.      Palpations: Abdomen is soft. There is no mass.      Tenderness: There is no abdominal tenderness. There is no guarding.   Skin:     Capillary Refill: Capillary refill takes less than 2 seconds.      Coloration: Skin is not pale.   Neurological:      Mental Status: He is alert.      Comments: Weaker and intermittent confusion that is redirectable   Psychiatric:         Mood and Affect: Mood normal.         Behavior: Behavior normal.         Thought Content: Thought content normal.         Judgment: Judgment normal.         Significant Labs: All pertinent labs within the past 24 hours have been reviewed.    Significant Imaging: I have reviewed all pertinent imaging results/findings within the past 24 hours.  I have reviewed and interpreted all pertinent imaging results/findings within the past 24 hours.      Assessment/Plan:      * Acute hypoxemic respiratory failure  Secondary to COVID-19 infection  Completed course of empiric antibiotics for elevated procalcitonin  High D-dimer improving. Treated with full dose lovenox  CRP a bit better. Completed dexamethasone and Remdesivir course  Continue scheduled olanzapine nightly for related anxiety.   Tolerating vapotherm and PO intake as tolerated.   Pulmonary on board for comanagement.  Precautions in place  Had abrupt desats noted that was treated BiPAP   Repeat cultures obtained - no growth to date  Increasing WBC noted, started empiric Zosyn, Flagyl and Vancomycin today, 7/28  Code status: DNR  Developed high fever on 7/30, vancomycin  level were subtherapeutic - change to Zyvox  Intermittent fever but all cultures are NGTD and patient holding but not improving  Will stop Zyvox today  Current critical status discussed with wife via phone and she expressed understanding. I expressed concern that he is not improving and may continue to not do well.    GIB (gastrointestinal bleeding)  Occult blood positive. Hgb downtrended   Holding aspirin and clopidogrel.   Is on enoxaparin for hypercoagulable state with COVID-19  Has been on IV PPI Q12  H/H with drop noted and noted large melanotic stool  Monitoring with serial H/H and transfuse if needed  Consultws GI, however hypoxemia will make patient high risk for endoscopy  Status post transfusion 2 U PRBC on 7/29  Case discussed with GI, Pulm and patient - plan for conservative treatment and avoid procedure unless it becomes emergent  No further bleeding, and H&H has been stable  Will starting Lovenox, prophylactic dose and monitor closely    Shock  Weaned off pressor as of 7/31      Anemia due to acute blood loss  As discussed above  Will continue monitor    COVID-19  As above    Elevated troponin I level  Trended. No reported chest pain.      Lower respiratory tract infection due to COVID-19 virus  As discussed above    Multifocal pneumonia  As discussed above    Essential hypertension  Blood pressure medication will be on home given hypotension     PAD (peripheral artery disease)  Holding ASA and plavix    Dyslipidemia  Currently not on medications.    Will hold off on statin as his liver enzymes are still elevated although better      VTE Risk Mitigation (From admission, onward)         Ordered     IP VTE HIGH RISK PATIENT  Once      07/18/20 2106     Place sequential compression device  Until discontinued      07/18/20 2106                Critical care time spent on the evaluation and treatment of severe organ dysfunction, review of pertinent labs and imaging studies, discussions with consulting  providers and discussions with patient/family: 60 minutes.      Adriana Zamora MD  Department of Hospital Medicine   [facility]

## 2020-08-01 NOTE — SUBJECTIVE & OBJECTIVE
Interval History:  Patient has remained on BiPAP overnight. Fever noted today treated with tylenol.    Review of Systems   Constitutional: Positive for appetite change.   Respiratory: Positive for shortness of breath.    Gastrointestinal: Negative for abdominal distention, abdominal pain, nausea and vomiting.     Objective:     Vital Signs (Most Recent):  Temp: 97.7 °F (36.5 °C) (07/31/20 2301)  Pulse: (!) 50 (07/31/20 2301)  Resp: (!) 101 (07/31/20 2301)  BP: (!) 140/63 (07/31/20 2301)  SpO2: 100 % (07/31/20 2301) Vital Signs (24h Range):  Temp:  [97.6 °F (36.4 °C)-101 °F (38.3 °C)] 97.7 °F (36.5 °C)  Pulse:  [] 50  Resp:  [] 101  SpO2:  [92 %-100 %] 100 %  BP: ()/() 140/63     Weight: 76.1 kg (167 lb 12.3 oz)  Body mass index is 26.28 kg/m².    Intake/Output Summary (Last 24 hours) at 8/1/2020 0030  Last data filed at 7/31/2020 1900  Gross per 24 hour   Intake 2166 ml   Output 1000 ml   Net 1166 ml      Physical Exam  Vitals signs and nursing note reviewed.   Constitutional:       General: He is not in acute distress.     Appearance: He is not toxic-appearing.      Comments: Appears tired   Cardiovascular:      Rate and Rhythm: Normal rate and regular rhythm.   Pulmonary:      Effort: Respiratory distress present.      Breath sounds: Rhonchi present.      Comments: Increased work of breathing with tachypnea, on BiPAP  Abdominal:      General: Abdomen is flat. Bowel sounds are normal. There is no distension.      Palpations: Abdomen is soft. There is no mass.      Tenderness: There is no abdominal tenderness. There is no guarding.   Skin:     Capillary Refill: Capillary refill takes less than 2 seconds.      Coloration: Skin is not pale.   Neurological:      Mental Status: He is alert.      Comments: Weaker and intermittent confusion that is redirectable   Psychiatric:         Mood and Affect: Mood normal.         Behavior: Behavior normal.         Thought Content: Thought content normal.          Judgment: Judgment normal.         Significant Labs: All pertinent labs within the past 24 hours have been reviewed.    Significant Imaging: I have reviewed all pertinent imaging results/findings within the past 24 hours.  I have reviewed and interpreted all pertinent imaging results/findings within the past 24 hours.

## 2020-08-01 NOTE — SUBJECTIVE & OBJECTIVE
Interval History: Hb holding. No additional blood products in the last 24 hours. Maintained on NIV O/N.. 100%. Off vasopressors. Still requiring low dose precedex for agitation/WOB.     Objective:     Vital Signs (Most Recent):  Temp: 99.5 °F (37.5 °C) (08/01/20 0715)  Pulse: 71 (08/01/20 1500)  Resp: (!) 28 (08/01/20 1500)  BP: 136/65 (08/01/20 1500)  SpO2: 100 % (08/01/20 1500) Vital Signs (24h Range):  Temp:  [97.4 °F (36.3 °C)-101 °F (38.3 °C)] 99.5 °F (37.5 °C)  Pulse:  [] 71  Resp:  [] 28  SpO2:  [79 %-100 %] 100 %  BP: ()/(38-76) 136/65     Weight: 76.1 kg (167 lb 12.3 oz)  Body mass index is 26.28 kg/m².    Intake/Output Summary (Last 24 hours) at 8/1/2020 1519  Last data filed at 8/1/2020 0610  Gross per 24 hour   Intake 1191.55 ml   Output 470 ml   Net 721.55 ml     -7L Since admission      Physical Exam  Vitals signs and nursing note reviewed.   Constitutional:       Appearance: Normal appearance. He is normal weight.      Interventions: Nasal cannula in place.   HENT:      Head: Normocephalic and atraumatic.      Nose: Nose normal.   Eyes:      Conjunctiva/sclera: Conjunctivae normal.      Pupils: Pupils are equal, round, and reactive to light.   Neck:      Musculoskeletal: Normal range of motion.   Cardiovascular:      Rate and Rhythm: Normal rate.   Pulmonary:      Breath sounds: Examination of the right-lower field reveals decreased breath sounds and rhonchi. Examination of the left-lower field reveals decreased breath sounds and rhonchi. Decreased breath sounds and rhonchi present. No wheezing.      Comments: Remains tachypneic.  Abdominal:      General: Bowel sounds are normal. There is no distension.      Palpations: Abdomen is soft. There is no mass.      Tenderness: There is no abdominal tenderness. There is no guarding.      Hernia: No hernia is present.   Musculoskeletal: Normal range of motion.         General: No swelling.   Skin:     General: Skin is warm and dry.       Capillary Refill: Capillary refill takes less than 2 seconds.   Neurological:      General: No focal deficit present.      Mental Status: He is alert and oriented to person, place, and time.   Psychiatric:         Mood and Affect: Mood normal.         Speech: Speech normal.       Vents:  Oxygen Concentration (%): 100 (08/01/20 1500)    Lines/Drains/Airways     Peripherally Inserted Central Catheter Line            PICC Triple Lumen 07/30/20 2040 right brachial 1 day          Drain                 Urethral Catheter 07/28/20 1800 Latex 16 Fr. 3 days          Peripheral Intravenous Line                 Midline Catheter Insertion/Assessment  - Single Lumen 07/29/20 1158 Left brachial vein 18g x 10cm 3 days                Significant Labs:    CBC/Anemia Profile:  Recent Labs   Lab 07/31/20  0430  07/31/20  1800 08/01/20  0002 08/01/20  0255 08/01/20  1124   WBC 22.15*  --  24.48*  --  21.86*  --    HGB 9.2*  9.2*   < > 8.7* 8.4* 9.0*  9.0* 10.1*   HCT 27.3*  27.3*   < > 26.1* 25.4* 27.2*  27.2* 29.8*     --  115*  --  132*  --    MCV 92  --  93  --  94  --    RDW 14.0  --  14.1  --  14.4  --     < > = values in this interval not displayed.        Chemistries:  Recent Labs   Lab 07/31/20  0430 08/01/20  0255   * 133*   K 3.5 3.9    102   CO2 26 28   BUN 21 20   CREATININE 0.7 0.8   CALCIUM 7.5* 7.8*   MG 2.1 2.3   PHOS 2.4* 2.6*       All pertinent labs within the past 24 hours have been reviewed.    Significant Imaging:  I have reviewed all pertinent imaging results/findings within the past 24 hours.     CXR- Right-sided PICC line is visualized with distal tip over the SVC.  Multifocal consolidative changes are again seen within the right mid to lower lung zone and to lesser degree within the left lung base.  Similar findings are seen on previous radiograph.  No pneumothorax or large pleural effusion seen.    ECHO-   · Normal left ventricular systolic function. The estimated ejection fraction is  65%.  · Mild left atrial enlargement.  · Normal LV diastolic function.  · Normal right ventricular systolic function.  · Mild aortic regurgitation.  · Mild tricuspid regurgitation.  · Mild pulmonary hypertension present.  · Normal central venous pressure (3 mmHg).  · The estimated PA systolic pressure is 31 mmHg.      MICROBIOLOGY-         Respiratory Culture Normal respiratory marquis P    Gram Stain (Respiratory) <10 epithelial cells per low power field.    Gram Stain (Respiratory) Many WBC's    Gram Stain (Respiratory) No organisms seen      Blood Cultures- 7/28- NGTD

## 2020-08-01 NOTE — ASSESSMENT & PLAN NOTE
Occult blood positive. Hgb downtrended   Holding aspirin and clopidogrel.   Is on enoxaparin for hypercoagulable state with COVID-19  Has been on IV PPI Q12  H/H with drop noted and noted large melanotic stool  Monitoring with serial H/H and transfuse if needed  Consultws GI, however hypoxemia will make patient high risk for endoscopy  Status post transfusion 2 U PRBC on 7/29  Case discussed with GI, Pulm and patient - plan for conservative treatment and avoid procedure unless it becomes emergent  No further bleeding, and H&H has been stable  Will starting Lovenox, prophylactic dose and monitor closely

## 2020-08-01 NOTE — CARE UPDATE
[facility]  ICU Shift Summary  Date: 8/1/2020      COVID Test: (+)  Isolation: Airborne and Contact and Droplet     Prehospitalization: Home  Admit Date / LOS : 7/18/2020/ 14 days    Diagnosis: Acute hypoxemic respiratory failure    Consults:        Active: GI and Pulm CC       Needed: N/A     Code Status: DNR   Advanced Directive: <no information>    LDA: BIPAP, Pollack, Midline and PICC       Central Lines/Site/Justification:Multiple GTTS and Unable to Obtain/Maintain PIV       Urinary Cath/Order/Justification:Critically Ill in the ICU and requiring intensive monitoring    Vasopressors/Infusions:    dexmedetomidine (PRECEDEX) infusion 0.2 mcg/kg/hr (08/01/20 1456)    norepinephrine bitartrate-D5W Stopped (07/31/20 0946)    pantoprazole 40 mg in dextrose 5 % 100 mL infusion (ready to mix system) 8 mg/hr (08/01/20 1455)          GOALS: Volume/ Hemodynamic: N/A                     RASS: 0  alert and calm    Pain Management: IV       Pain Controlled: yes     Rhythm: SB    Respiratory Device: Bipap    Oxygen Concentration (%):  [] 100             Most Recent SBT/ SAT: Fail       MOVE Screen: FAIL    VTE Prophylaxis: Mechanical  Mobility: Bedrest  Stress Ulcer Prophylaxis: Yes    Dietary: PO  Tolerance: no  /  Advancement: no    I & O (24h):    Intake/Output Summary (Last 24 hours) at 8/1/2020 1842  Last data filed at 8/1/2020 0610  Gross per 24 hour   Intake 1025.6 ml   Output 375 ml   Net 650.6 ml        Restraints: No    Significant Dates:  Post Op Date: N/A  Rescue Date: N/A  Imaging/ Diagnostics: N/A    Noteworthy Labs:  none    CBC/Anemia Labs: Coags:    Recent Labs   Lab 07/26/20  1755  07/31/20  1800  08/01/20  0255 08/01/20  1124   WBC  --    < > 24.48*  --  21.86*  --    HGB  --    < > 8.7*   < > 9.0*  9.0* 10.1*   HCT  --    < > 26.1*   < > 27.2*  27.2* 29.8*   PLT  --    < > 115*  --  132*  --    MCV  --    < > 93  --  94  --    RDW  --    < > 14.1  --  14.4  --    OCCULTBLOOD  Positive*  --   --   --   --   --     < > = values in this interval not displayed.    Recent Labs   Lab 07/26/20  1838 07/27/20  1313   INR 1.1 1.1   APTT 26.5  --         Chemistries:   Recent Labs   Lab 07/26/20  0337  07/31/20  0430 08/01/20  0255      < > 132* 133*   K 4.1   < > 3.5 3.9      < > 102 102   CO2 25   < > 26 28   BUN 42*   < > 21 20   CREATININE 0.8   < > 0.7 0.8   CALCIUM 8.4*   < > 7.5* 7.8*   PROT 5.6*  --   --   --    BILITOT 0.5  --   --   --    ALKPHOS 150*  --   --   --    ALT 46*  --   --   --    AST 29  --   --   --    MG  --    < > 2.1 2.3   PHOS  --    < > 2.4* 2.6*    < > = values in this interval not displayed.        Cardiac Enzymes: Ejection Fractions:    No results for input(s): CPK, CPKMB, MB, TROPONINI in the last 72 hours. Nuc Rest EF   Date Value Ref Range Status   06/11/2020 77  Final        POCT Glucose: HbA1c:    Recent Labs   Lab 07/30/20  1619 07/30/20  2133 07/31/20  0514   POCTGLUCOSE 292* 217* 332*    No results found for: HGBA1C        ICU LOS 13d 22h  Level of Care: Critical Care    Shift Summary/Plan for the shift: Patient remains in ICU on BiPAP 100% FiO2, on Precedex at 0.2mcg/kg. and Morphine 1mg IV given,  and Pantoprazole gtt infusing, VSS, and afebrile. minimal urine out put per casiano cath, dark color BM X1,  Pt remains free of injury throughout shift, safety maintained, pt status updated to wife via phone, no acute distress noted at present time, will continue to monitor.

## 2020-08-01 NOTE — ASSESSMENT & PLAN NOTE
2/2 COVID-19. Now with new infiltrates RML/RLL, new fevers concerning for superimposed aspiration/bacterial PNA. Blood/sputum cultures unrevealing. Still intermittently febrile.     - s/p 5 days of remdesivir +  dexamethasone  - Wean O2 as tolerated. Alternate vapotherm + NIV   - Lasix spot dose as needed- Maintains negative fluid balance.   - holding full dose Lovenox for elevated d-dimer in setting of suspected lower GI bleed  -- Stop linezolid and continue zosyn.. Duration 7 days for presumed aspiration PNA.   -- Precedex.. wean as tolerated.     Renal function remains stable. Not thriving.. Worried about long term and if he will survive this hospitalization.. Last few days have been challenging for him..

## 2020-08-01 NOTE — ASSESSMENT & PLAN NOTE
Secondary to COVID-19 infection  Completed course of empiric antibiotics for elevated procalcitonin  High D-dimer improving. Treated with full dose lovenox  CRP a bit better. Completed dexamethasone and Remdesivir course  Continue scheduled olanzapine nightly for related anxiety.   Tolerating vapotherm and PO intake as tolerated.   Pulmonary on board for comanagement.  Precautions in place  Had abrupt desats noted that was treated BiPAP   Repeat cultures obtained - no growth to date  Increasing WBC noted, started empiric Zosyn, Flagyl and Vancomycin today, 7/28  Code status: DNR  Developed high fever on 7/30, vancomycin level were subtherapeutic - change to Zyvox  Current critical status and change reported/discussed with wife via phone

## 2020-08-01 NOTE — PROGRESS NOTES
[facility] Hospital Medicine  Progress Note    Patient Name: Joo York  MRN: [id number]  Patient Class: IP- Inpatient   Admission Date: 7/18/2020  Length of Stay: 14 days  Attending Physician: Adriana Zamora MD  Primary Care Provider: Keegan Watson MD        Subjective:     Principal Problem:Acute hypoxemic respiratory failure        HPI:  Joo York is a 80 y.o. male that (in part)  has a past medical history of Claudication, Essential hypertension, Essential hypertension, Hematuria, Hernia of abdominal cavity, and Multifocal pneumonia.  has a past surgical history that includes Vasectomy; Tonsillectomy; office cysto 2018; Cystoscopy with biopsy of bladder (N/A, 9/5/2018); and Atherectomy (1/23/2020). Presents to [facility] Emergency Department with report of shortness of breath, fever, chills last 3 days.  He was tested for COVID-19 2 days ago and positive.  He said dyspnea with exertion and a cough that started today.  Denies hemoptysis, stridor, or night sweats.  No nausea, vomiting diarrhea reported.  Patient is a former smoker.  Denies home oxygen use.  Code status discussed in the most remain a DNR DNI.  The family is aware of these wishes.  History is limited to respiratory distress as patient is speaking in short sentences.    In the emergency department routine laboratory studies and chest x-ray obtained.  Evidence of bilateral pneumonia.  Was supplemental oxygen and dexamethasone.  O2 sat 67% air prior to oxygen supplementation.  Okay to escalate to CPAP/BiPAP as.  Does not want to be intubated.    Hospital medicine has been asked to admit to inpatient in the ICU for further evaluation and treatment.     Overview/Hospital Course:  Mr. York presented with shortness of breath and fever.  He was admitted for acute respiratory failure secondary to COVID-19. Placed on BiPAP. Code status DNR. Completed course of empiric antibiotics for elevated procalcitonin  level, course of dexamethasone 6 mg daily and Remdesivir. Also on full dose lovenox for hypercoagulable state. Diuresed intermittently. He slowly improved and de-escalated to vapotherm NC. On 7/26, patient had melena and acute drop in Hgb from 16 to 12 in 24 hours. BP stable and respirations were not affected. Placed on pantoprazole IV 40 mg BID and held ASA/plavix which he uses for PVD. Repeat Hgb levels remained stable and amount of melena decreased. Given such high risk for thromboembolism, he continued on enoxaparin. Noted increased melena and lovenox stopped, and changed IV PPI to Q12 and increased H/H surveillance and GI consulted.  Transfuse 2 units of packed red blood cells and PPI changed to infusion.  Empiric Zosyn and vancomycin initiated for continued worsening leukocytosis.  Blood cultures no growth to date.  Sputum culture pending. Intermittent high fever.    Interval History:  Patient has remained on BiPAP overnight. Fever noted today treated with tylenol.    Review of Systems   Constitutional: Positive for appetite change.   Respiratory: Positive for shortness of breath.    Gastrointestinal: Negative for abdominal distention, abdominal pain, nausea and vomiting.     Objective:     Vital Signs (Most Recent):  Temp: 97.7 °F (36.5 °C) (07/31/20 2301)  Pulse: (!) 50 (07/31/20 2301)  Resp: (!) 101 (07/31/20 2301)  BP: (!) 140/63 (07/31/20 2301)  SpO2: 100 % (07/31/20 2301) Vital Signs (24h Range):  Temp:  [97.6 °F (36.4 °C)-101 °F (38.3 °C)] 97.7 °F (36.5 °C)  Pulse:  [] 50  Resp:  [] 101  SpO2:  [92 %-100 %] 100 %  BP: ()/() 140/63     Weight: 76.1 kg (167 lb 12.3 oz)  Body mass index is 26.28 kg/m².    Intake/Output Summary (Last 24 hours) at 8/1/2020 0030  Last data filed at 7/31/2020 1900  Gross per 24 hour   Intake 2166 ml   Output 1000 ml   Net 1166 ml      Physical Exam  Vitals signs and nursing note reviewed.   Constitutional:       General: He is not in acute distress.      Appearance: He is not toxic-appearing.      Comments: Appears tired   Cardiovascular:      Rate and Rhythm: Normal rate and regular rhythm.   Pulmonary:      Effort: Respiratory distress present.      Breath sounds: Rhonchi present.      Comments: Increased work of breathing with tachypnea, on BiPAP  Abdominal:      General: Abdomen is flat. Bowel sounds are normal. There is no distension.      Palpations: Abdomen is soft. There is no mass.      Tenderness: There is no abdominal tenderness. There is no guarding.   Skin:     Capillary Refill: Capillary refill takes less than 2 seconds.      Coloration: Skin is not pale.   Neurological:      Mental Status: He is alert.      Comments: Weaker and intermittent confusion that is redirectable   Psychiatric:         Mood and Affect: Mood normal.         Behavior: Behavior normal.         Thought Content: Thought content normal.         Judgment: Judgment normal.         Significant Labs: All pertinent labs within the past 24 hours have been reviewed.    Significant Imaging: I have reviewed all pertinent imaging results/findings within the past 24 hours.  I have reviewed and interpreted all pertinent imaging results/findings within the past 24 hours.      Assessment/Plan:      * Acute hypoxemic respiratory failure  Secondary to COVID-19 infection  Completed course of empiric antibiotics for elevated procalcitonin  High D-dimer improving. Treated with full dose lovenox  CRP a bit better. Completed dexamethasone and Remdesivir course  Continue scheduled olanzapine nightly for related anxiety.   Tolerating vapotherm and PO intake as tolerated.   Pulmonary on board for comanagement.  Precautions in place  Had abrupt desats noted that was treated BiPAP   Repeat cultures obtained - no growth to date  Increasing WBC noted, started empiric Zosyn, Flagyl and Vancomycin today, 7/28  Code status: DNR  Developed high fever on 7/30, vancomycin level were subtherapeutic - change to  Zyvox  Current critical status and change reported/discussed with wife via phone    GIB (gastrointestinal bleeding)  Occult blood positive. Hgb downtrended   Holding aspirin and clopidogrel.   Is on enoxaparin for hypercoagulable state with COVID-19  Has been on IV PPI Q12  H/H with drop noted and noted large melanotic stool  Monitoring with serial H/H and transfuse if needed  Consultws GI, however hypoxemia will make patient high risk for endoscopy  Status post transfusion 2 U PRBC on 7/29  Case discussed with GI, Pulm and patient - plan for conservative treatment and avoid procedure unless it becomes emergent  No further bleeding, and H&H has been stable  Will consider restarting prophylactic Lovenox tomorrow if there is no bleeding    Shock  Weaned off pressor as of today      Anemia due to acute blood loss  As discussed above  Will continue monitor    COVID-19  As above    Elevated troponin I level  Trended. No reported chest pain.      Lower respiratory tract infection due to COVID-19 virus  As discussed above    Multifocal pneumonia  As discussed above    Essential hypertension  Blood pressure medication will be on home given hypotension     PAD (peripheral artery disease)  Holding ASA and plavix    Dyslipidemia  Currently not on medications.    Will hold off on statin as his liver enzymes are still elevated although better      VTE Risk Mitigation (From admission, onward)         Ordered     IP VTE HIGH RISK PATIENT  Once      07/18/20 2106     Place sequential compression device  Until discontinued      07/18/20 2106                Critical care time spent on the evaluation and treatment of severe organ dysfunction, review of pertinent labs and imaging studies, discussions with consulting providers and discussions with patient/family: 60 minutes.      Adriana Zamora MD  Department of Hospital Medicine   [facility]

## 2020-08-02 NOTE — PROGRESS NOTES
[facility]  Pulmonology  Progress Note    Patient Name: Joo York  MRN: [id number]  Admission Date: 7/18/2020  Hospital Length of Stay: 15 days  Code Status: DNR  Attending Provider: Adriana Zamora MD  Primary Care Provider: Keegan Watson MD   Principal Problem: Acute hypoxemic respiratory failure    Subjective:     Interval History: Hb holding. No additional blood products in the last 24 hours. Maintained on NIV O/N.. 100%. Off vasopressors.  More awake/alert this AM    Objective:     Vital Signs (Most Recent):  Temp: 98.5 °F (36.9 °C) (08/02/20 0715)  Pulse: 106 (08/02/20 1000)  Resp: (!) 40 (08/02/20 1150)  BP: (!) 158/76 (08/02/20 0900)  SpO2: (!) 90 % (08/02/20 1000) Vital Signs (24h Range):  Temp:  [97.9 °F (36.6 °C)-98.8 °F (37.1 °C)] 98.5 °F (36.9 °C)  Pulse:  [] 106  Resp:  [] 40  SpO2:  [88 %-100 %] 90 %  BP: (114-179)/(57-93) 158/76     Weight: 76.1 kg (167 lb 12.3 oz)  Body mass index is 26.28 kg/m².    Intake/Output Summary (Last 24 hours) at 8/2/2020 1238  Last data filed at 8/2/2020 0601  Gross per 24 hour   Intake 1545.59 ml   Output 775 ml   Net 770.59 ml     -6L Since admission      Physical Exam  Vitals signs and nursing note reviewed.   Constitutional:       Appearance: Normal appearance. He is normal weight.      Interventions: Nasal cannula in place.   HENT:      Head: Normocephalic and atraumatic.      Nose: Nose normal.   Eyes:      Conjunctiva/sclera: Conjunctivae normal.      Pupils: Pupils are equal, round, and reactive to light.   Neck:      Musculoskeletal: Normal range of motion.   Cardiovascular:      Rate and Rhythm: Normal rate.   Pulmonary:      Breath sounds: Examination of the right-lower field reveals decreased breath sounds and rhonchi. Examination of the left-lower field reveals decreased breath sounds and rhonchi. Decreased breath sounds and rhonchi present. No wheezing.      Comments: Remains tachypneic.  Abdominal:      General: Bowel sounds  are normal. There is no distension.      Palpations: Abdomen is soft. There is no mass.      Tenderness: There is no abdominal tenderness. There is no guarding.      Hernia: No hernia is present.   Musculoskeletal: Normal range of motion.         General: No swelling.   Skin:     General: Skin is warm and dry.      Capillary Refill: Capillary refill takes less than 2 seconds.   Neurological:      General: No focal deficit present.      Mental Status: He is alert and oriented to person, place, and time.   Psychiatric:         Mood and Affect: Mood normal.         Speech: Speech normal.       Vents:  Oxygen Concentration (%): (S) 100 (08/02/20 0917)    Lines/Drains/Airways     Peripherally Inserted Central Catheter Line            PICC Triple Lumen 07/30/20 2040 right brachial 2 days          Drain                 Urethral Catheter 07/28/20 1800 Latex 16 Fr. 4 days          Peripheral Intravenous Line                 Midline Catheter Insertion/Assessment  - Single Lumen 07/29/20 1158 Left brachial vein 18g x 10cm 4 days                Significant Labs:    CBC/Anemia Profile:  Recent Labs   Lab 07/31/20  1800  08/01/20  0255 08/01/20  1124 08/02/20  0330   WBC 24.48*  --  21.86*  --  24.84*   HGB 8.7*   < > 9.0*  9.0* 10.1* 8.5*   HCT 26.1*   < > 27.2*  27.2* 29.8* 25.9*   *  --  132*  --  144*   MCV 93  --  94  --  96   RDW 14.1  --  14.4  --  14.6*    < > = values in this interval not displayed.        Chemistries:  Recent Labs   Lab 08/01/20  0255 08/02/20  0330   * 135*   K 3.9 3.7    102   CO2 28 28   BUN 20 16   CREATININE 0.8 0.8   CALCIUM 7.8* 7.9*   MG 2.3 2.3   PHOS 2.6* 1.6*       All pertinent labs within the past 24 hours have been reviewed.    Significant Imaging:  I have reviewed all pertinent imaging results/findings within the past 24 hours.     CXR- Right-sided PICC line is visualized with distal tip over the SVC.  Multifocal consolidative changes are again seen within the right  mid to lower lung zone and to lesser degree within the left lung base.  Similar findings are seen on previous radiograph.  No pneumothorax or large pleural effusion seen.    ECHO-   · Normal left ventricular systolic function. The estimated ejection fraction is 65%.  · Mild left atrial enlargement.  · Normal LV diastolic function.  · Normal right ventricular systolic function.  · Mild aortic regurgitation.  · Mild tricuspid regurgitation.  · Mild pulmonary hypertension present.  · Normal central venous pressure (3 mmHg).  · The estimated PA systolic pressure is 31 mmHg.      MICROBIOLOGY-     Respiratory Culture LEIF ALBICANSAbnormal     Gram Stain (Respiratory) <10 epithelial cells per low power field.    Gram Stain (Respiratory) Many WBC's    Gram Stain (Respiratory) No organisms seen        Blood Cultures- 7/28- NGTD        Assessment/Plan:     * Acute hypoxemic respiratory failure  2/2 COVID-19. Now with new infiltrates RML/RLL, new fevers concerning for superimposed aspiration/bacterial PNA. Blood/sputum cultures unrevealing. Still intermittently febrile.     - s/p 5 days of remdesivir +  dexamethasone  - Wean O2 as tolerated. Alternate vapotherm + NIV   - Lasix spot dose as needed- Maintains negative fluid balance.   - holding full dose Lovenox for elevated d-dimer in setting of suspected lower GI bleed  -- continue zosyn.. Duration 7 days for presumed aspiration PNA.     Renal function remains stable. Not thriving.. Worried about long term and if he will survive this hospitalization.. Last few days have been challenging for him, but clinically better this AM>     Shock  Resolved. Off vasopressors     Anemia due to acute blood loss  See GIB            GIB (gastrointestinal bleeding)  Melena decreasing. On BID PPI. Unable to tolerate EGD in setting of significant respiratory compromise. Hb holding and no additional blood products in last 24 hours.     -- PPI   -- Transfusion threshold < 7 unless  hemorrhage/instability.     Multifocal pneumonia  See acute hypoxemic resp failure      Critical Care Time: 45 minutes  Critical care was time spent personally by me on the following activities: evaluating this patient's organ dysfunction, development of treatment plan, discussing treatment plan with patient or surrogate and bedside caregivers, discussions with consultants, evaluation of patient's response to treatment, examination of patient, ordering and performing treatments and interventions, ordering and review of laboratory studies, ordering and review of radiographic studies, re-evaluation of patient's condition. This critical care time did not overlap with that of any other provider or involve time for any procedures.       Isidro Christy MD  Pulmonology/Critical Care Medicine   [facility]

## 2020-08-02 NOTE — PROGRESS NOTES
[facility] Hospital Medicine  Progress Note    Patient Name: Joo York  MRN: [id number]  Patient Class: IP- Inpatient   Admission Date: 7/18/2020  Length of Stay: 15 days  Attending Physician: Adriana Zamora MD  Primary Care Provider: Keegan Watson MD        Subjective:     Principal Problem:Acute hypoxemic respiratory failure        HPI:  Joo York is a 80 y.o. male that (in part)  has a past medical history of Claudication, Essential hypertension, Essential hypertension, Hematuria, Hernia of abdominal cavity, and Multifocal pneumonia.  has a past surgical history that includes Vasectomy; Tonsillectomy; office cysto 2018; Cystoscopy with biopsy of bladder (N/A, 9/5/2018); and Atherectomy (1/23/2020). Presents to [facility] Emergency Department with report of shortness of breath, fever, chills last 3 days.  He was tested for COVID-19 2 days ago and positive.  He said dyspnea with exertion and a cough that started today.  Denies hemoptysis, stridor, or night sweats.  No nausea, vomiting diarrhea reported.  Patient is a former smoker.  Denies home oxygen use.  Code status discussed in the most remain a DNR DNI.  The family is aware of these wishes.  History is limited to respiratory distress as patient is speaking in short sentences.    In the emergency department routine laboratory studies and chest x-ray obtained.  Evidence of bilateral pneumonia.  Was supplemental oxygen and dexamethasone.  O2 sat 67% air prior to oxygen supplementation.  Okay to escalate to CPAP/BiPAP as.  Does not want to be intubated.    Hospital medicine has been asked to admit to inpatient in the ICU for further evaluation and treatment.     Overview/Hospital Course:  Mr. York presented with shortness of breath and fever.  He was admitted for acute respiratory failure secondary to COVID-19. Placed on BiPAP. Code status DNR. Completed course of empiric antibiotics for elevated procalcitonin  level, course of dexamethasone 6 mg daily and Remdesivir. Also on full dose lovenox for hypercoagulable state. Diuresed intermittently. He slowly improved and de-escalated to vapotherm NC. On 7/26, patient had melena and acute drop in Hgb from 16 to 12 in 24 hours. BP stable and respirations were not affected. Placed on pantoprazole IV 40 mg BID and held ASA/plavix which he uses for PVD. Repeat Hgb levels remained stable and amount of melena decreased. Given such high risk for thromboembolism, he continued on enoxaparin. Noted increased melena and lovenox stopped, and changed IV PPI to Q12 and increased H/H surveillance and GI consulted.  Transfuse 2 units of packed red blood cells and PPI changed to infusion.  Empiric Zosyn and vancomycin initiated for continued worsening leukocytosis.  Blood cultures no growth to date.  Sputum culture NGTD. Intermittent high fever. Zyvox stopped on 8/1.    Interval History:  Patient was able to have BIPAP off for short bouts last night and drank some Boost x 2. Weaned off BIPAP this morning to Vapotherm and sats maintaining in the low 90s. Reports feeling little better today. No reported bleeding by staff.    Review of Systems   Constitutional: Positive for appetite change.   Respiratory: Positive for shortness of breath.    Gastrointestinal: Negative for abdominal distention, abdominal pain, nausea and vomiting.     Objective:     Vital Signs (Most Recent):  Temp: 98.5 °F (36.9 °C) (08/02/20 0715)  Pulse: 106 (08/02/20 1000)  Resp: (!) 40 (08/02/20 1150)  BP: (!) 158/76 (08/02/20 0900)  SpO2: (!) 90 % (08/02/20 1000) Vital Signs (24h Range):  Temp:  [97.9 °F (36.6 °C)-98.8 °F (37.1 °C)] 98.5 °F (36.9 °C)  Pulse:  [] 106  Resp:  [] 40  SpO2:  [88 %-100 %] 90 %  BP: (114-179)/(57-93) 158/76     Weight: 76.1 kg (167 lb 12.3 oz)  Body mass index is 26.28 kg/m².    Intake/Output Summary (Last 24 hours) at 8/2/2020 1219  Last data filed at 8/2/2020 0601  Gross per 24 hour    Intake 1545.59 ml   Output 775 ml   Net 770.59 ml      Physical Exam  Vitals signs and nursing note reviewed.   Constitutional:       General: He is not in acute distress.     Appearance: He is not toxic-appearing.      Comments: Appears more energetic than yesterday   Cardiovascular:      Rate and Rhythm: Normal rate and regular rhythm.   Pulmonary:      Breath sounds: Rhonchi present.      Comments: Increased work of breathing with tachypnea, now on Vapotherm but improved from yesterday  Abdominal:      General: Abdomen is flat. Bowel sounds are normal. There is no distension.      Palpations: Abdomen is soft. There is no mass.      Tenderness: There is no abdominal tenderness. There is no guarding.   Skin:     Capillary Refill: Capillary refill takes less than 2 seconds.      Coloration: Skin is not pale.   Neurological:      Mental Status: He is alert.      Comments: Stronger today and appropriate. Giving the thumbs up sign   Psychiatric:         Mood and Affect: Mood normal.         Behavior: Behavior normal.         Thought Content: Thought content normal.         Judgment: Judgment normal.         Significant Labs: All pertinent labs within the past 24 hours have been reviewed.    Significant Imaging: I have reviewed all pertinent imaging results/findings within the past 24 hours.  I have reviewed and interpreted all pertinent imaging results/findings within the past 24 hours.      Assessment/Plan:      * Acute hypoxemic respiratory failure  Secondary to COVID-19 infection  Completed course of empiric antibiotics for elevated procalcitonin  High D-dimer improving. Treated with full dose lovenox  CRP a bit better. Completed dexamethasone and Remdesivir course  Continue scheduled olanzapine nightly for related anxiety.   Tolerating vapotherm and PO intake as tolerated.   Pulmonary on board for comanagement.  Precautions in place  Had abrupt desats noted that was treated BiPAP   Repeat cultures obtained - no  growth to date  Increasing WBC noted, started empiric Zosyn, Flagyl and Vancomycin today, 7/28  Code status: DNR  Developed high fever on 7/30, vancomycin level were subtherapeutic - change to Zyvox  Intermittent fever but all cultures are NGTD and patient holding but not improving  Stopped Zyvox on 8/1 since all cultures remained negative  Slightly improved from yesterday    GIB (gastrointestinal bleeding)  Occult blood positive. Hgb downtrended   Holding aspirin and clopidogrel.   Is on enoxaparin for hypercoagulable state with COVID-19  Has been on IV PPI Q12  H/H with drop noted and noted large melanotic stool  Monitoring with serial H/H and transfuse if needed  Consultws GI, however hypoxemia will make patient high risk for endoscopy  Status post transfusion 2 U PRBC on 7/29  Case discussed with GI, Pulm and patient - plan for conservative treatment and avoid procedure unless it becomes emergent  No further bleeding, and H&H has been stable  Will start Lovenox, prophylactic dose and monitor closely    Shock  Weaned off pressor as of 7/31      Anemia due to acute blood loss  As discussed above  Will continue monitor    COVID-19  As above    Elevated troponin I level  Trended. No reported chest pain.      Lower respiratory tract infection due to COVID-19 virus  As discussed above    Multifocal pneumonia  As discussed above    Essential hypertension  Blood pressure medication will be on home given hypotension     PAD (peripheral artery disease)  Holding ASA and plavix    Dyslipidemia  Currently not on medications.    Will hold off on statin as his liver enzymes are still elevated although better      VTE Risk Mitigation (From admission, onward)         Ordered     IP VTE HIGH RISK PATIENT  Once      07/18/20 2106     Place sequential compression device  Until discontinued      07/18/20 2106                Critical care time spent on the evaluation and treatment of severe organ dysfunction, review of pertinent labs  and imaging studies, discussions with consulting providers and discussions with patient/family: 45 minutes.      Adriana Zamora MD  Department of Hospital Medicine   [facility]

## 2020-08-02 NOTE — ASSESSMENT & PLAN NOTE
Secondary to COVID-19 infection  Completed course of empiric antibiotics for elevated procalcitonin  High D-dimer improving. Treated with full dose lovenox  CRP a bit better. Completed dexamethasone and Remdesivir course  Continue scheduled olanzapine nightly for related anxiety.   Tolerating vapotherm and PO intake as tolerated.   Pulmonary on board for comanagement.  Precautions in place  Had abrupt desats noted that was treated BiPAP   Repeat cultures obtained - no growth to date  Increasing WBC noted, started empiric Zosyn, Flagyl and Vancomycin today, 7/28  Code status: DNR  Developed high fever on 7/30, vancomycin level were subtherapeutic - change to Zyvox  Intermittent fever but all cultures are NGTD and patient holding but not improving  Stopped Zyvox on 8/1 since all cultures remained negative  Slightly improved from yesterday

## 2020-08-02 NOTE — ASSESSMENT & PLAN NOTE
Occult blood positive. Hgb downtrended   Holding aspirin and clopidogrel.   Is on enoxaparin for hypercoagulable state with COVID-19  Has been on IV PPI Q12  H/H with drop noted and noted large melanotic stool  Monitoring with serial H/H and transfuse if needed  Consultws GI, however hypoxemia will make patient high risk for endoscopy  Status post transfusion 2 U PRBC on 7/29  Case discussed with GI, Pulm and patient - plan for conservative treatment and avoid procedure unless it becomes emergent  No further bleeding, and H&H has been stable  Will start Lovenox, prophylactic dose and monitor closely

## 2020-08-02 NOTE — SUBJECTIVE & OBJECTIVE
Interval History:  Patient was able to have BIPAP off for short bouts last night and drank some Boost x 2. Weaned off BIPAP this morning to Vapotherm and sats maintaining in the low 90s. Reports feeling little better today. No reported bleeding by staff.    Review of Systems   Constitutional: Positive for appetite change.   Respiratory: Positive for shortness of breath.    Gastrointestinal: Negative for abdominal distention, abdominal pain, nausea and vomiting.     Objective:     Vital Signs (Most Recent):  Temp: 98.5 °F (36.9 °C) (08/02/20 0715)  Pulse: 106 (08/02/20 1000)  Resp: (!) 40 (08/02/20 1150)  BP: (!) 158/76 (08/02/20 0900)  SpO2: (!) 90 % (08/02/20 1000) Vital Signs (24h Range):  Temp:  [97.9 °F (36.6 °C)-98.8 °F (37.1 °C)] 98.5 °F (36.9 °C)  Pulse:  [] 106  Resp:  [] 40  SpO2:  [88 %-100 %] 90 %  BP: (114-179)/(57-93) 158/76     Weight: 76.1 kg (167 lb 12.3 oz)  Body mass index is 26.28 kg/m².    Intake/Output Summary (Last 24 hours) at 8/2/2020 1219  Last data filed at 8/2/2020 0601  Gross per 24 hour   Intake 1545.59 ml   Output 775 ml   Net 770.59 ml      Physical Exam  Vitals signs and nursing note reviewed.   Constitutional:       General: He is not in acute distress.     Appearance: He is not toxic-appearing.      Comments: Appears more energetic than yesterday   Cardiovascular:      Rate and Rhythm: Normal rate and regular rhythm.   Pulmonary:      Breath sounds: Rhonchi present.      Comments: Increased work of breathing with tachypnea, now on Vapotherm but improved from yesterday  Abdominal:      General: Abdomen is flat. Bowel sounds are normal. There is no distension.      Palpations: Abdomen is soft. There is no mass.      Tenderness: There is no abdominal tenderness. There is no guarding.   Skin:     Capillary Refill: Capillary refill takes less than 2 seconds.      Coloration: Skin is not pale.   Neurological:      Mental Status: He is alert.      Comments: Stronger today and  appropriate. Giving the thumbs up sign   Psychiatric:         Mood and Affect: Mood normal.         Behavior: Behavior normal.         Thought Content: Thought content normal.         Judgment: Judgment normal.         Significant Labs: All pertinent labs within the past 24 hours have been reviewed.    Significant Imaging: I have reviewed all pertinent imaging results/findings within the past 24 hours.  I have reviewed and interpreted all pertinent imaging results/findings within the past 24 hours.

## 2020-08-02 NOTE — CARE UPDATE
[facility]  ICU Shift Summary  Date: 8/2/2020      COVID Test: (+)  Isolation: Airborne and Contact and Droplet     Prehospitalization: Home  Admit Date / LOS : 7/18/2020/ 15 days    Diagnosis: Acute hypoxemic respiratory failure    Consults:        Active: Pulm CC       Needed: N/A     Code Status: DNR   Advanced Directive: <no information>    LDA: Pollack and PICC       Central Lines/Site/Justification:Patient Does Not Have Central Line       Urinary Cath/Order/Justification:Critically Ill in the ICU and requiring intensive monitoring    Vasopressors/Infusions:    dexmedetomidine (PRECEDEX) infusion 0.2 mcg/kg/hr (08/02/20 1600)    norepinephrine bitartrate-D5W Stopped (07/31/20 0946)    pantoprazole 40 mg in dextrose 5 % 100 mL infusion (ready to mix system) 8 mg/hr (08/02/20 1600)          GOALS: Volume/ Hemodynamic: N/A                     RASS: 0  alert and calm    Pain Management: IV       Pain Controlled: yes     Rhythm: NSR and ST    Respiratory Device: Venti Mask and Bipap    Oxygen Concentration (%):  [100] 100             Most Recent SBT/ SAT: N/A       MOVE Screen: FAIL    VTE Prophylaxis: Mechanical  Mobility: Bedrest  Stress Ulcer Prophylaxis: No    Dietary: PO  Tolerance: yes  /  Advancement: no    I & O (24h):    Intake/Output Summary (Last 24 hours) at 8/2/2020 1833  Last data filed at 8/2/2020 1800  Gross per 24 hour   Intake 1479.89 ml   Output 775 ml   Net 704.89 ml        Restraints: No    Significant Dates:  Post Op Date: N/A  Rescue Date: N/A  Imaging/ Diagnostics: N/A    Noteworthy Labs:  none    CBC/Anemia Labs: Coags:    Recent Labs   Lab 08/01/20  0255 08/01/20  1124 08/02/20  0330   WBC 21.86*  --  24.84*   HGB 9.0*  9.0* 10.1* 8.5*   HCT 27.2*  27.2* 29.8* 25.9*   *  --  144*   MCV 94  --  96   RDW 14.4  --  14.6*    Recent Labs   Lab 07/26/20  1838 07/27/20  1313   INR 1.1 1.1   APTT 26.5  --         Chemistries:   Recent Labs   Lab 08/01/20  0255  08/02/20  0330   * 135*   K 3.9 3.7    102   CO2 28 28   BUN 20 16   CREATININE 0.8 0.8   CALCIUM 7.8* 7.9*   MG 2.3 2.3   PHOS 2.6* 1.6*        Cardiac Enzymes: Ejection Fractions:    No results for input(s): CPK, CPKMB, MB, TROPONINI in the last 72 hours. Nuc Rest EF   Date Value Ref Range Status   06/11/2020 77  Final        POCT Glucose: HbA1c:    Recent Labs   Lab 07/31/20  0514 08/02/20  1115 08/02/20  1755   POCTGLUCOSE 332* 281* 177*    No results found for: HGBA1C        ICU LOS 14d 22h  Level of Care: Critical Care    Shift Summary/Plan for the shift: Patient remains in ICU alternating BiPAP and   Vapotherm, on  precedex gtt titrated as needed, VSS and remains afebrile,minimum UO,  Boost and Pudding given while he is awake, dark BM X1,patient remains free of injury throughout shift, safety maintained, no acute distress noted at present time, will continue to monitor.

## 2020-08-02 NOTE — SUBJECTIVE & OBJECTIVE
Interval History: Hb holding. No additional blood products in the last 24 hours. Maintained on NIV O/N.. 100%. Off vasopressors.  More awake/alert this AM    Objective:     Vital Signs (Most Recent):  Temp: 98.5 °F (36.9 °C) (08/02/20 0715)  Pulse: 106 (08/02/20 1000)  Resp: (!) 40 (08/02/20 1150)  BP: (!) 158/76 (08/02/20 0900)  SpO2: (!) 90 % (08/02/20 1000) Vital Signs (24h Range):  Temp:  [97.9 °F (36.6 °C)-98.8 °F (37.1 °C)] 98.5 °F (36.9 °C)  Pulse:  [] 106  Resp:  [] 40  SpO2:  [88 %-100 %] 90 %  BP: (114-179)/(57-93) 158/76     Weight: 76.1 kg (167 lb 12.3 oz)  Body mass index is 26.28 kg/m².    Intake/Output Summary (Last 24 hours) at 8/2/2020 1238  Last data filed at 8/2/2020 0601  Gross per 24 hour   Intake 1545.59 ml   Output 775 ml   Net 770.59 ml     -6L Since admission      Physical Exam  Vitals signs and nursing note reviewed.   Constitutional:       Appearance: Normal appearance. He is normal weight.      Interventions: Nasal cannula in place.   HENT:      Head: Normocephalic and atraumatic.      Nose: Nose normal.   Eyes:      Conjunctiva/sclera: Conjunctivae normal.      Pupils: Pupils are equal, round, and reactive to light.   Neck:      Musculoskeletal: Normal range of motion.   Cardiovascular:      Rate and Rhythm: Normal rate.   Pulmonary:      Breath sounds: Examination of the right-lower field reveals decreased breath sounds and rhonchi. Examination of the left-lower field reveals decreased breath sounds and rhonchi. Decreased breath sounds and rhonchi present. No wheezing.      Comments: Remains tachypneic.  Abdominal:      General: Bowel sounds are normal. There is no distension.      Palpations: Abdomen is soft. There is no mass.      Tenderness: There is no abdominal tenderness. There is no guarding.      Hernia: No hernia is present.   Musculoskeletal: Normal range of motion.         General: No swelling.   Skin:     General: Skin is warm and dry.      Capillary Refill:  Capillary refill takes less than 2 seconds.   Neurological:      General: No focal deficit present.      Mental Status: He is alert and oriented to person, place, and time.   Psychiatric:         Mood and Affect: Mood normal.         Speech: Speech normal.       Vents:  Oxygen Concentration (%): (S) 100 (08/02/20 0917)    Lines/Drains/Airways     Peripherally Inserted Central Catheter Line            PICC Triple Lumen 07/30/20 2040 right brachial 2 days          Drain                 Urethral Catheter 07/28/20 1800 Latex 16 Fr. 4 days          Peripheral Intravenous Line                 Midline Catheter Insertion/Assessment  - Single Lumen 07/29/20 1158 Left brachial vein 18g x 10cm 4 days                Significant Labs:    CBC/Anemia Profile:  Recent Labs   Lab 07/31/20  1800  08/01/20  0255 08/01/20  1124 08/02/20  0330   WBC 24.48*  --  21.86*  --  24.84*   HGB 8.7*   < > 9.0*  9.0* 10.1* 8.5*   HCT 26.1*   < > 27.2*  27.2* 29.8* 25.9*   *  --  132*  --  144*   MCV 93  --  94  --  96   RDW 14.1  --  14.4  --  14.6*    < > = values in this interval not displayed.        Chemistries:  Recent Labs   Lab 08/01/20 0255 08/02/20  0330   * 135*   K 3.9 3.7    102   CO2 28 28   BUN 20 16   CREATININE 0.8 0.8   CALCIUM 7.8* 7.9*   MG 2.3 2.3   PHOS 2.6* 1.6*       All pertinent labs within the past 24 hours have been reviewed.    Significant Imaging:  I have reviewed all pertinent imaging results/findings within the past 24 hours.     CXR- Right-sided PICC line is visualized with distal tip over the SVC.  Multifocal consolidative changes are again seen within the right mid to lower lung zone and to lesser degree within the left lung base.  Similar findings are seen on previous radiograph.  No pneumothorax or large pleural effusion seen.    ECHO-   · Normal left ventricular systolic function. The estimated ejection fraction is 65%.  · Mild left atrial enlargement.  · Normal LV diastolic  function.  · Normal right ventricular systolic function.  · Mild aortic regurgitation.  · Mild tricuspid regurgitation.  · Mild pulmonary hypertension present.  · Normal central venous pressure (3 mmHg).  · The estimated PA systolic pressure is 31 mmHg.      MICROBIOLOGY-     Respiratory Culture LEIF ALBICANSAbnormal     Gram Stain (Respiratory) <10 epithelial cells per low power field.    Gram Stain (Respiratory) Many WBC's    Gram Stain (Respiratory) No organisms seen        Blood Cultures- 7/28- NGTD

## 2020-08-03 NOTE — ASSESSMENT & PLAN NOTE
Melena decreasing. On BID PPI. Unable to tolerate EGD in setting of significant respiratory compromise. Hb holding and no additional blood products in last 24 hours.     -- PPI   -- Transfusion threshold < 7 unless hemorrhage/instability.  Last transfusion was 7/29

## 2020-08-03 NOTE — SUBJECTIVE & OBJECTIVE
Interval History: Hb holding. No additional blood products in the last 48 hours. Still on 100% Fio2 BPAP.  Minimally responsive to tactile stimulation.    Objective:     Vital Signs (Most Recent):  Temp: 99.1 °F (37.3 °C) (08/03/20 0715)  Pulse: 84 (08/03/20 0930)  Resp: (!) 22 (08/03/20 1253)  BP: (!) 112/55 (08/03/20 0930)  SpO2: (!) 93 % (08/03/20 0930) Vital Signs (24h Range):  Temp:  [97.4 °F (36.3 °C)-99.1 °F (37.3 °C)] 99.1 °F (37.3 °C)  Pulse:  [] 84  Resp:  [] 22  SpO2:  [74 %-100 %] 93 %  BP: ()/(46-74) 112/55     Weight: 76.1 kg (167 lb 12.3 oz)  Body mass index is 26.28 kg/m².    Intake/Output Summary (Last 24 hours) at 8/3/2020 1434  Last data filed at 8/3/2020 0900  Gross per 24 hour   Intake 803.45 ml   Output 675 ml   Net 128.45 ml     -6L Since admission      Physical Exam  Vitals signs and nursing note reviewed.   Constitutional:       Appearance: Normal appearance. He is normal weight.      Interventions: Nasal cannula in place.   HENT:      Head: Normocephalic and atraumatic.      Nose: Nose normal.   Eyes:      Conjunctiva/sclera: Conjunctivae normal.      Pupils: Pupils are equal, round, and reactive to light.   Neck:      Musculoskeletal: Normal range of motion.   Cardiovascular:      Rate and Rhythm: Normal rate.   Pulmonary:      Breath sounds: Examination of the right-lower field reveals decreased breath sounds and rhonchi. Examination of the left-lower field reveals decreased breath sounds and rhonchi. Decreased breath sounds and rhonchi present. No wheezing.      Comments: Remains tachypneic.  Abdominal:      General: Bowel sounds are normal. There is no distension.      Palpations: Abdomen is soft. There is no mass.      Tenderness: There is no abdominal tenderness. There is no guarding.      Hernia: No hernia is present.   Musculoskeletal: Normal range of motion.         General: No swelling.   Skin:     General: Skin is warm and dry.      Capillary Refill: Capillary  refill takes less than 2 seconds.   Neurological:      General: No focal deficit present.      Mental Status: He is alert and oriented to person, place, and time.   Psychiatric:         Mood and Affect: Mood normal.         Speech: Speech normal.       Vents:  Oxygen Concentration (%): 100 (08/03/20 0700)    Lines/Drains/Airways     Peripherally Inserted Central Catheter Line            PICC Triple Lumen 07/30/20 2040 right brachial 3 days          Drain                 Urethral Catheter 07/28/20 1800 Latex 16 Fr. 5 days                Significant Labs:    CBC/Anemia Profile:  Recent Labs   Lab 08/02/20  0330 08/03/20  0330   WBC 24.84* 19.57*   HGB 8.5* 8.0*   HCT 25.9* 24.8*   * 125*   MCV 96 98   RDW 14.6* 14.9*        Chemistries:  Recent Labs   Lab 08/02/20  0330 08/03/20  0330   * 136   K 3.7 4.1    103   CO2 28 25   BUN 16 21   CREATININE 0.8 0.8   CALCIUM 7.9* 8.2*   MG 2.3 2.5   PHOS 1.6* 3.3       All pertinent labs within the past 24 hours have been reviewed.    Significant Imaging:  I have reviewed all pertinent imaging results/findings within the past 24 hours.     CXR- Right-sided PICC line is visualized with distal tip over the SVC.  Multifocal consolidative changes are again seen within the right mid to lower lung zone and to lesser degree within the left lung base.  Similar findings are seen on previous radiograph.  No pneumothorax or large pleural effusion seen.    ECHO-   · Normal left ventricular systolic function. The estimated ejection fraction is 65%.  · Mild left atrial enlargement.  · Normal LV diastolic function.  · Normal right ventricular systolic function.  · Mild aortic regurgitation.  · Mild tricuspid regurgitation.  · Mild pulmonary hypertension present.  · Normal central venous pressure (3 mmHg).  · The estimated PA systolic pressure is 31 mmHg.      MICROBIOLOGY-     Respiratory Culture LEIF ALBICANSAbnormal     Gram Stain (Respiratory) <10 epithelial cells per  low power field.    Gram Stain (Respiratory) Many WBC's    Gram Stain (Respiratory) No organisms seen        Blood Cultures- 7/28- NGTD

## 2020-08-03 NOTE — NURSING
[facility]  ICU Shift Summary  Date: 8/3/2020      COVID Test: (+)  Isolation: Airborne and Contact and Droplet     Prehospitalization: Home  Admit Date / LOS : 7/18/2020/ 16 days    Diagnosis: Acute hypoxemic respiratory failure    Consults:        Active: SW and Spiritual Care       Needed: Palliative     Code Status: DNR   Advanced Directive: <no information>    LDA: BIPAP and PIV       Central Lines/Site/Justification:Multiple GTTS       Urinary Cath/Order/Justification:Hospice/Palliative Care/Comfort Care    Vasopressors/Infusions:    dexmedetomidine (PRECEDEX) infusion 0.399 mcg/kg/hr (08/03/20 0900)    norepinephrine bitartrate-D5W Stopped (07/31/20 0946)    pantoprazole 40 mg in dextrose 5 % 100 mL infusion (ready to mix system) 8 mg/hr (08/03/20 1654)          GOALS: Volume/ Hemodynamic: N/A                     RASS: -5 unarousable, no response to voice or physical stimulation    Pain Management: IV       Pain Controlled: yes     Rhythm: NSR    Respiratory Device: Bipap    Oxygen Concentration (%):  [100] 100             Most Recent SBT/ SAT: Does not meet criteria       MOVE Screen: FAIL    VTE Prophylaxis: Mechanical  Mobility: Bedrest  Stress Ulcer Prophylaxis: Yes    Dietary: NPO  Tolerance: not applicable  /  Advancement: no    I & O (24h):    Intake/Output Summary (Last 24 hours) at 8/3/2020 1829  Last data filed at 8/3/2020 1800  Gross per 24 hour   Intake 747.3 ml   Output 1475 ml   Net -727.7 ml        Restraints: No    Significant Dates:  Post Op Date:   Rescue Date:   Imaging/ Diagnostics: N/A    Noteworthy Labs:  none    CBC/Anemia Labs: Coags:    Recent Labs   Lab 08/02/20 0330 08/03/20  0330   WBC 24.84* 19.57*   HGB 8.5* 8.0*   HCT 25.9* 24.8*   * 125*   MCV 96 98   RDW 14.6* 14.9*    No results for input(s): PT, INR, APTT in the last 168 hours.     Chemistries:   Recent Labs   Lab 08/02/20  0330 08/03/20  0330   * 136   K 3.7 4.1    103   CO2 28 25    BUN 16 21   CREATININE 0.8 0.8   CALCIUM 7.9* 8.2*   MG 2.3 2.5   PHOS 1.6* 3.3        Cardiac Enzymes: Ejection Fractions:    No results for input(s): CPK, CPKMB, MB, TROPONINI in the last 72 hours. Nuc Rest EF   Date Value Ref Range Status   06/11/2020 77  Final        POCT Glucose: HbA1c:    Recent Labs   Lab 08/03/20  0800 08/03/20  1107 08/03/20  1659   POCTGLUCOSE 218* 196* 223*    No results found for: HGBA1C        ICU LOS 15d 22h  Level of Care: Critical Care    Shift Summary/Plan for the shift: Patient no longer responds to pain stimulation and is agonal breathing. Moderate relief of symptoms obtained with morphine and ativan. VSS this shift, POC continues to move towards comfort. No new injury or falls, family updated, will continue to monitor.

## 2020-08-03 NOTE — ASSESSMENT & PLAN NOTE
2/2 COVID-19. Now with new infiltrates RML/RLL, new fevers concerning for superimposed aspiration/bacterial PNA. Blood/sputum cultures unrevealing. Still intermittently febrile.     - s/p 5 days of remdesivir +  dexamethasone  - Wean O2 as tolerated. Alternate vapotherm + NIV   - Lasix spot dose as needed- Maintains negative fluid balance.  Lasix 40 mg given 8/3/20  - holding full dose Lovenox for elevated d-dimer in setting of suspected lower GI bleed  -- continue zosyn.. Duration 7 days for presumed aspiration PNA.     Renal function remains stable. Not thriving.  Most concerning is apparent decline in mental status.  Per Dr. Zamora, patient and family did not wish for aggressive intervention such as CPR or intubation should patient's condition deteriorate.  Continue with current management.  However, improvement has been marginal since admission.  Would agree with palliative care consultation to help with decision about appropriateness of transitioning to comfort care.

## 2020-08-03 NOTE — PLAN OF CARE
Problem: Adult Inpatient Plan of Care  Goal: Plan of Care Review  Outcome: Ongoing, Progressing   Patient remains in ICU alternating BiPAP and   Vapotherm, on  precedex gtt titrated as needed, VSS and remains afebrile,minimum UO,  Boost and Pudding given while he is awake, dark BM X1,patient remains free of injury throughout shift, safety maintained, no acute distress noted at present time, will continue to monitor.

## 2020-08-03 NOTE — PHYSICIAN QUERY
PT Name: Joo York  MR #: [id number]     Perfusion Diagnosis Clarification     CDS: Jolene Mccartney RN     Contact information:[email or web address] or (cell) [phone]    This form is a permanent document in the medical record.     Query Date: August 3, 2020    By submitting this query, we are merely seeking further clarification of documentation.  Please utilize your independent clinical judgment when addressing the question(s) below.  The Medical Record contains the following:  Indicators Supporting Clinical Findings Location in Medical Record   x Acute Illness (e.g. AMI, Sepsis, etc.) Shock  Likely new PNA +/- sedation. Minimal vasopressor support O/N. Source likely new PNA as above      -- BS Abx. Follow cultures   -- Levophed targeting MAP >65    Anemia due to acute blood loss  See GIB       GIB (gastrointestinal bleeding)  Melena decreasing. On BID PPI. Unable to tolerate EGD in setting of significant respiratory compromise. Hb holding and no additional blood products in last 24 hours.      -- PPI   -- Transfusion threshold < 7 unless hemorrhage/instability.    Acute hypoxemic respiratory failure  2/2 COVID-19. Now with new infiltrates RML/RLL, new fevers concerning for superimposed aspiration/bacterial PNA.    COVID-19  --see acute hypoxemic resp fx     Multifocal pneumonia  See acute hypoxemic resp failure   Pulm PN 7/31   x Vital Signs  7/30 @ 1656: HR 56, RR 28, BP 84/46  7/31 @ 2301: HR 50, /63  8/1 @ 0715: HR 71, RR 28, /65 Vital Signs Flowsheet    Acidosis documented     x ABGs/Labs  7/21  09:45 7/22  11:10   PH 7.448 7.421   PCO2 26.7  32.2    PO2 52  60    BE -4 -3   HCO3 18.5  20.9    SATURATED O2 89  91    TCO2 19  22    FiO2 100 60   Flow 40       7/30  16:15 7/31  04:30 7/31  18:00 8/1  02:55 8/2  03:30 8/3  03:30   WBC 20.85 22.15  24.48  21.86  24.84  19.57       POC Labs                              Lab Results    x Hypotension or Low Blood Pressure documented Pt hypotensive,  b/p 77/45 precidex decreased, MD notified. Will begin levophed. ICU RN Note 7/30 @ 1643    Altered Mental Status or Confusion      Diaphoresis, Cold Extremities or Cyanosis      Oliguria     x Medication/Treatment:  -Vasopressors  -Inotropic Drugs  -IV Fluids   -IV Antibiotics  -Cardiac Assist Devices  -Hemodynamic Monitoring  -Blood/Blood Products NORepinephrine bitartrate 8 mg in dextrose 5% 250 mL infusion  Ordered Dose: 0.02 mcg/kg/min × 76.1 kg   Route: Intravenous   Frequency: Continuous @ 2.9 mL/hr MAR   x Other Pt in bed drowsy, arouses to voice, was placed on vapotherm this am, pt begin to desat and is now on BiPAP settings 18/8 @ 80%, SpO2 >95%, tachypenic with the use of abd muscles, started on precedex, became hypotensive, precedex decreased to 0.3 mcg/kg/h, currently on 0.04 mcg/kg/min levophed to maintain a map >65, Tmax @ 102.8 treated with tylenol IV,  casiano cath in place, uop adequate, no Bm's this shift, bs present, on Protonix gtt @20ml/hr, h&h q6 hours due again 2200    Shock  Weaned off pressor as of today    100%. Off vasopressors ICU RN POC 7/30 @ 1733            HM PN 8/1      Pulm PN 8/1     Provider, please specify diagnosis or diagnoses associated with above clinical findings.    [ x   ] Septic Shock due to (specify source) ___COVID19__________________     [    ] Hypovolemic Shock     [    ] Other Shock (please specify): __________     [    ] Shock, Unspecified     [    ] Shock Ruled Out     [    ] Other Condition (please specify): _________     [  ] Clinically Undetermined     Please document in your progress notes daily for the duration of treatment until resolved and include in your discharge summary.

## 2020-08-03 NOTE — PROGRESS NOTES
[facility]  Pulmonology  Progress Note    Patient Name: Joo York  MRN: [id number]  Admission Date: 7/18/2020  Hospital Length of Stay: 16 days  Code Status: DNR  Attending Provider: Adriana Zamora MD  Primary Care Provider: Keegan Watson MD   Principal Problem: Acute hypoxemic respiratory failure    Subjective:     Interval History: Hb holding. No additional blood products in the last 48 hours. Still on 100% Fio2 BPAP.  Minimally responsive to tactile stimulation.    Objective:     Vital Signs (Most Recent):  Temp: 99.1 °F (37.3 °C) (08/03/20 0715)  Pulse: 84 (08/03/20 0930)  Resp: (!) 22 (08/03/20 1253)  BP: (!) 112/55 (08/03/20 0930)  SpO2: (!) 93 % (08/03/20 0930) Vital Signs (24h Range):  Temp:  [97.4 °F (36.3 °C)-99.1 °F (37.3 °C)] 99.1 °F (37.3 °C)  Pulse:  [] 84  Resp:  [] 22  SpO2:  [74 %-100 %] 93 %  BP: ()/(46-74) 112/55     Weight: 76.1 kg (167 lb 12.3 oz)  Body mass index is 26.28 kg/m².    Intake/Output Summary (Last 24 hours) at 8/3/2020 1434  Last data filed at 8/3/2020 0900  Gross per 24 hour   Intake 803.45 ml   Output 675 ml   Net 128.45 ml     -6L Since admission      Physical Exam  Vitals signs and nursing note reviewed.   Constitutional:       Appearance: Normal appearance. He is normal weight.      Interventions: Nasal cannula in place.   HENT:      Head: Normocephalic and atraumatic.      Nose: Nose normal.   Eyes:      Conjunctiva/sclera: Conjunctivae normal.      Pupils: Pupils are equal, round, and reactive to light.   Neck:      Musculoskeletal: Normal range of motion.   Cardiovascular:      Rate and Rhythm: Normal rate.   Pulmonary:      Breath sounds: Examination of the right-lower field reveals decreased breath sounds and rhonchi. Examination of the left-lower field reveals decreased breath sounds and rhonchi. Decreased breath sounds and rhonchi present. No wheezing.      Comments: Remains tachypneic.  Abdominal:      General: Bowel sounds are  normal. There is no distension.      Palpations: Abdomen is soft. There is no mass.      Tenderness: There is no abdominal tenderness. There is no guarding.      Hernia: No hernia is present.   Musculoskeletal: Normal range of motion.         General: No swelling.   Skin:     General: Skin is warm and dry.      Capillary Refill: Capillary refill takes less than 2 seconds.   Neurological:      General: No focal deficit present.      Mental Status: He is alert and oriented to person, place, and time.   Psychiatric:         Mood and Affect: Mood normal.         Speech: Speech normal.       Vents:  Oxygen Concentration (%): 100 (08/03/20 0700)    Lines/Drains/Airways     Peripherally Inserted Central Catheter Line            PICC Triple Lumen 07/30/20 2040 right brachial 3 days          Drain                 Urethral Catheter 07/28/20 1800 Latex 16 Fr. 5 days                Significant Labs:    CBC/Anemia Profile:  Recent Labs   Lab 08/02/20  0330 08/03/20  0330   WBC 24.84* 19.57*   HGB 8.5* 8.0*   HCT 25.9* 24.8*   * 125*   MCV 96 98   RDW 14.6* 14.9*        Chemistries:  Recent Labs   Lab 08/02/20  0330 08/03/20  0330   * 136   K 3.7 4.1    103   CO2 28 25   BUN 16 21   CREATININE 0.8 0.8   CALCIUM 7.9* 8.2*   MG 2.3 2.5   PHOS 1.6* 3.3       All pertinent labs within the past 24 hours have been reviewed.    Significant Imaging:  I have reviewed all pertinent imaging results/findings within the past 24 hours.     CXR- Right-sided PICC line is visualized with distal tip over the SVC.  Multifocal consolidative changes are again seen within the right mid to lower lung zone and to lesser degree within the left lung base.  Similar findings are seen on previous radiograph.  No pneumothorax or large pleural effusion seen.    ECHO-   · Normal left ventricular systolic function. The estimated ejection fraction is 65%.  · Mild left atrial enlargement.  · Normal LV diastolic function.  · Normal right  ventricular systolic function.  · Mild aortic regurgitation.  · Mild tricuspid regurgitation.  · Mild pulmonary hypertension present.  · Normal central venous pressure (3 mmHg).  · The estimated PA systolic pressure is 31 mmHg.      MICROBIOLOGY-     Respiratory Culture LEIF ALBICANSAbnormal     Gram Stain (Respiratory) <10 epithelial cells per low power field.    Gram Stain (Respiratory) Many WBC's    Gram Stain (Respiratory) No organisms seen        Blood Cultures- 7/28- NGTD        Assessment/Plan:     * Acute hypoxemic respiratory failure  2/2 COVID-19. Now with new infiltrates RML/RLL, new fevers concerning for superimposed aspiration/bacterial PNA. Blood/sputum cultures unrevealing. Still intermittently febrile.     - s/p 5 days of remdesivir +  dexamethasone  - Wean O2 as tolerated. Alternate vapotherm + NIV   - Lasix spot dose as needed- Maintains negative fluid balance.  Lasix 40 mg given 8/3/20  - holding full dose Lovenox for elevated d-dimer in setting of suspected lower GI bleed  -- continue zosyn.. Duration 7 days for presumed aspiration PNA.     Renal function remains stable. Not thriving.  Most concerning is apparent decline in mental status.  Per Dr. Zamora, patient and family did not wish for aggressive intervention such as CPR or intubation should patient's condition deteriorate.  Continue with current management.  However, improvement has been marginal since admission.  Would agree with palliative care consultation to help with decision about appropriateness of transitioning to comfort care.      Shock  Resolved. Off vasopressors     GIB (gastrointestinal bleeding)  Melena decreasing. On BID PPI. Unable to tolerate EGD in setting of significant respiratory compromise. Hb holding and no additional blood products in last 24 hours.     -- PPI   -- Transfusion threshold < 7 unless hemorrhage/instability.  Last transfusion was 7/29    COVID-19  --see acute hypoxemic resp fx           Loza NABEEL Izquierdo,  MD  Pulmonology  [facility]    Critical Care Time: 35  minutes  Critical secondary to respiratory failure     Critical care was time spent personally by me on the following activities: development of treatment plan with patient or surrogate and bedside caregivers, discussions with consultants, evaluation of patient's response to treatment, examination of patient, ordering and performing treatments and interventions, ordering and review of laboratory studies, ordering and review of radiographic studies, pulse oximetry, re-evaluation of patient's condition.  This critical care time did not overlap with that of any other provider or involve time for any procedures.

## 2020-08-04 NOTE — PROGRESS NOTES
[facility] Hospital Medicine  Progress Note    Patient Name: Joo York  MRN: [id number]  Patient Class: IP- Inpatient   Admission Date: 7/18/2020  Length of Stay: 16 days  Attending Physician: Adriana Zamora MD  Primary Care Provider: Keegan Watson MD        Subjective:     Principal Problem:Acute hypoxemic respiratory failure        HPI:  Joo York is a 80 y.o. male that (in part)  has a past medical history of Claudication, Essential hypertension, Essential hypertension, Hematuria, Hernia of abdominal cavity, and Multifocal pneumonia.  has a past surgical history that includes Vasectomy; Tonsillectomy; office cysto 2018; Cystoscopy with biopsy of bladder (N/A, 9/5/2018); and Atherectomy (1/23/2020). Presents to [facility] Emergency Department with report of shortness of breath, fever, chills last 3 days.  He was tested for COVID-19 2 days ago and positive.  He said dyspnea with exertion and a cough that started today.  Denies hemoptysis, stridor, or night sweats.  No nausea, vomiting diarrhea reported.  Patient is a former smoker.  Denies home oxygen use.  Code status discussed in the most remain a DNR DNI.  The family is aware of these wishes.  History is limited to respiratory distress as patient is speaking in short sentences.    In the emergency department routine laboratory studies and chest x-ray obtained.  Evidence of bilateral pneumonia.  Was supplemental oxygen and dexamethasone.  O2 sat 67% air prior to oxygen supplementation.  Okay to escalate to CPAP/BiPAP as.  Does not want to be intubated.    Hospital medicine has been asked to admit to inpatient in the ICU for further evaluation and treatment.     Overview/Hospital Course:  Mr. York presented with shortness of breath and fever.  He was admitted for acute respiratory failure secondary to COVID-19. Placed on BiPAP. Code status DNR. Completed course of empiric antibiotics for elevated procalcitonin  level, course of dexamethasone 6 mg daily and Remdesivir. Also on full dose lovenox for hypercoagulable state. Diuresed intermittently. He slowly improved and de-escalated to vapotherm NC. On 7/26, patient had melena and acute drop in Hgb from 16 to 12 in 24 hours. BP stable and respirations were not affected. Placed on pantoprazole IV 40 mg BID and held ASA/plavix which he uses for PVD. Repeat Hgb levels remained stable and amount of melena decreased. Given such high risk for thromboembolism, he continued on enoxaparin. Noted increased melena and lovenox stopped, and changed IV PPI to Q12 and increased H/H surveillance and GI consulted.  Transfused 2 units of packed red blood cells and PPI changed to infusion.  No further bleeding and H/H overall stable. Empiric Zosyn and vancomycin initiated for continued worsening leukocytosis.  Blood cultures no growth to date.  Sputum culture NGTD. Intermittent high fever and Vancomycin levels were subtherapeutic - Zyvox added and Vancomycin stopped.. Zyvox stopped on 8/1.    Interval History:  Desats noted overnight and he had remained on BIPAP. No reported bleeding by staff. Worsening mechanics of respiration and no longer interactive today, only withdraws to pain.    Review of Systems   Unable to perform ROS: Mental status change     Objective:     Vital Signs (Most Recent):  Temp: 97.5 °F (36.4 °C) (08/03/20 2000)  Pulse: 81 (08/03/20 2230)  Resp: (!) 115 (08/03/20 2230)  BP: (!) 143/65 (08/03/20 2230)  SpO2: 100 % (08/03/20 2230) Vital Signs (24h Range):  Temp:  [97.4 °F (36.3 °C)-99.1 °F (37.3 °C)] 97.5 °F (36.4 °C)  Pulse:  [] 81  Resp:  [] 115  SpO2:  [74 %-100 %] 100 %  BP: ()/(46-74) 143/65     Weight: 76.1 kg (167 lb 12.3 oz)  Body mass index is 26.28 kg/m².    Intake/Output Summary (Last 24 hours) at 8/3/2020 2239  Last data filed at 8/3/2020 2200  Gross per 24 hour   Intake 647.6 ml   Output 1475 ml   Net -827.4 ml      Physical Exam  Vitals signs  and nursing note reviewed.   Constitutional:       General: He is in acute distress.      Appearance: He is ill-appearing and toxic-appearing.   HENT:      Head: Normocephalic and atraumatic.      Comments: BIPAP mask in place  Eyes:      Conjunctiva/sclera: Conjunctivae normal.   Cardiovascular:      Rate and Rhythm: Normal rate and regular rhythm.   Pulmonary:      Breath sounds: Rhonchi present.      Comments: Increased work of breathing with tachypnea, air hunger with gasping with BIPAP mask in place  Abdominal:      General: Abdomen is flat. Bowel sounds are normal. There is no distension.      Palpations: Abdomen is soft. There is no mass.      Tenderness: There is no abdominal tenderness. There is no guarding.   Genitourinary:     Comments: Pollack in place  Skin:     General: Skin is warm.      Capillary Refill: Capillary refill takes less than 2 seconds.      Coloration: Skin is not pale.   Neurological:      Comments: Unresponsive to verbal stimulus, withdraws to pain only         Significant Labs: All pertinent labs within the past 24 hours have been reviewed.    Significant Imaging: I have reviewed all pertinent imaging results/findings within the past 24 hours.  I have reviewed and interpreted all pertinent imaging results/findings within the past 24 hours.      Assessment/Plan:      * Acute hypoxemic respiratory failure  Secondary to COVID-19 infection  Completed course of empiric antibiotics for elevated procalcitonin  High D-dimer was improving. Treated with full dose lovenox  CRP a bit better. Completed dexamethasone and Remdesivir course  Scheduled olanzapine nightly for related anxiety.   Toleratedvapotherm and PO intake as tolerated.   Pulmonary on board for comanagement.  Precautions in place  Had abrupt desats noted that was treated BiPAP   Repeat cultures obtained - no growth to date  Increasing WBC noted, started empiric Zosyn, Flagyl and Vancomycin today, 7/28  Code status: DNR  Developed high  fever on 7/30, vancomycin level were subtherapeutic - changed to Zyvox  Intermittent fever but all cultures are NGTD and patient holding but not improving  Stopped Zyvox on 8/1 since all cultures remained negative  Slightly improved from yesterday and was able to be placed on Vapotherm and eat  Overnight and into this morning, he had desats on BIPAP and now he is only responsive to pain which is an abrupt change  I assessed him twice today with no improvement and I communicated with his wife via phone the changes that have transpired - and my concern that he will not survive this hospitalization and I offered her to visit (unfortunately she is COVID + and communicated to me that they have been talking all this time and she is aware of his status and wishes)  Inflammatory markers increasing and D-dimer high, restarted dexamethasone, lasix x 1 and prophylactic lovenox given   If patient does not improve, should transition to comfort care tomorrow and consult palliative care for family support  Wife does not want her  to suffer, already with morphine and ativan PRN for comfort in place    GIB (gastrointestinal bleeding)  Occult blood positive. Hgb downtrended   Holding aspirin and clopidogrel.   Is on enoxaparin for hypercoagulable state with COVID-19  Has been on IV PPI Q12  H/H with drop noted and noted large melanotic stool  Monitoring with serial H/H and transfuse if needed  Consultws GI, however hypoxemia will make patient high risk for endoscopy  Status post transfusion 2 U PRBC on 7/29  Case discussed with GI, Pulm and patient - plan for conservative treatment and avoid procedure unless it becomes emergent  No further bleeding, and H&H has been stable  Will start Lovenox, prophylactic dose and monitor closely    Shock  Weaned off pressor as of 7/31      Anemia due to acute blood loss  As discussed above  Will continue monitor    COVID-19  As above    Elevated troponin I level  Trended. No reported chest  pain.      Lower respiratory tract infection due to COVID-19 virus  As discussed above    Multifocal pneumonia  As discussed above    Essential hypertension  Blood pressure medication will be on home given hypotension     PAD (peripheral artery disease)  Holding ASA and plavix    Dyslipidemia  Currently not on medications.    Will hold off on statin as his liver enzymes are still elevated although better      VTE Risk Mitigation (From admission, onward)         Ordered     IP VTE HIGH RISK PATIENT  Once      07/18/20 2106     Place sequential compression device  Until discontinued      07/18/20 2106                Critical care time spent on the evaluation and treatment of severe organ dysfunction, review of pertinent labs and imaging studies, discussions with consulting providers and discussions with patient/family: 60 minutes.      Adriana Zamora MD  Department of Hospital Medicine   [facility]

## 2020-08-04 NOTE — PROGRESS NOTES
"[facility]  Adult Nutrition  Progress Note    SUMMARY       Recommendations  1. If TF res desired: Isosource 1.5 at 10mL/hr and advance as tolerated to a goal rate of 45 mL/hr.   -This will provide 1620 calories, 73 grams of protein, and 825 mL of free water.   2. If free water flush desired recommend 200 mL QID daily.   -Hold TF for n/v/abd discomfort, residuals >500 ml.   3. RD to monitor progress.    Goals: Honor pt preference-comfort care.  Nutrition Goal Status: new  Communication of RD Recs: (plan of care)    Reason for Assessment  Reason For Assessment: RD follow-up  Diagnosis: pulmonary disease(COVID, hypoxia)  Relevant Medical History: HTN, hematuria, hernia, pneumonia  General Information Comments: Pt currently on Bipap. Family does not wish for serious intervention such as intubatation. Pt transferring to comfort care. Decline in mental status. Pt minimally responsive to stimulation.NFPE unable to be completed due to COVID-19 precautions. BMI 26.28. Chart shows 4% wt loss of 7 lb in past 7 months.  Nutrition Discharge Planning: Adequate PO intake via Cardiac diet.    Nutrition Risk Screen  Nutrition Risk Screen: no indicators present    Nutrition/Diet History  Factors Affecting Nutritional Intake: impaired cognitive status/motor control    Anthropometrics  Temp: 97.7 °F (36.5 °C)  Height Method: Stated  Height: 5' 7" (170.2 cm)  Height (inches): 67 in  Weight Method: Bed Scale  Weight: 76.1 kg (167 lb 12.3 oz)  Weight (lb): 167.77 lb  Ideal Body Weight (IBW), Male: 148 lb  % Ideal Body Weight, Male (lb): 113.36 %  BMI (Calculated): 26.3  BMI Grade: 25 - 29.9 - overweight     Lab/Procedures/Meds  Pertinent Labs Reviewed: reviewed  Pertinent Labs Comments: BUN 28, Glu 291, Phos 2.3, Mg 2.7, Trig 170  Pertinent Medications Reviewed: reviewed  Pertinent Medications Comments: Vit C, furosemide, MVI, precedex, norepinephrine    Physical Findings/Assessment  Jurgen score 9 "     Estimated/Assessed Needs  Weight Used For Calorie Calculations: 76.1 kg (167 lb 12.3 oz)  Energy Calorie Requirements (kcal): 8913-5118 kcal  Energy Need Method: Kcal/kg(20-25)  Protein Requirements: 61-76 gm daily  Weight Used For Protein Calculations: 76.1 kg (167 lb 12.3 oz)(0.8-1.0 gm/kg)  Fluid Requirements (mL): 1 mL/kcal or per MD  RDA Method (mL): 1522  CHO Requirement: 190 gm daily    Nutrition Prescription Ordered  Current Diet Order: Cardiac    Evaluation of Received Nutrient/Fluid Intake  Comments: LBM 8/1  % Intake of Estimated Energy Needs: 0 - 25 %  % Meal Intake: 0 - 25 %    Nutrition Risk  Level of Risk/Frequency of Follow-up: low - moderate(f/u 1xfweekly)     Assessment and Plan    Nutrition Problem  Inadequate oral intake      Related to (etiology):   Low intake     Signs and Symptoms (as evidenced by):   Decreased appetite 2/2 decreased mental status     Interventions (treatment strategy):  Collaboration with other providers     Nutrition Diagnosis Status:   Continues    Monitor and Evaluation  Food and Nutrient Intake: energy intake  Food and Nutrient Adminstration: diet order  Knowledge/Beliefs/Attitudes: food and nutrition knowledge/skill  Physical Activity and Function: nutrition-related ADLs and IADLs  Anthropometric Measurements: weight change, weight  Biochemical Data, Medical Tests and Procedures: electrolyte and renal panel, gastrointestinal profile, glucose/endocrine profile, inflammatory profile, lipid profile  Nutrition-Focused Physical Findings: overall appearance     Nutrition Follow-Up  RD Follow-up?: Yes

## 2020-08-04 NOTE — PROGRESS NOTES
[facility]  Pulmonology  Progress Note    Patient Name: Joo York  MRN: [id number]  Admission Date: 7/18/2020  Hospital Length of Stay: 17 days  Code Status: DNR  Attending Provider: Melissa Lyons MD  Primary Care Provider: Keegan Watson MD   Principal Problem: Acute hypoxemic respiratory failure    Subjective:     Interval History: Hb holding. No additional blood products in the last 48 hours. Still on 100% Fio2 BPAP.  Sating 100%.  Per nursing, patient struggle with dyspnea when arouse.  Currently with prn morphine and ativan.  Tube feed held due to high residual.      Objective:     Vital Signs (Most Recent):  Temp: 97.7 °F (36.5 °C) (08/04/20 1115)  Pulse: 85 (08/04/20 1430)  Resp: 20 (08/04/20 1430)  BP: (!) 167/74 (08/04/20 1430)  SpO2: 96 % (08/04/20 1430) Vital Signs (24h Range):  Temp:  [97.5 °F (36.4 °C)-98.1 °F (36.7 °C)] 97.7 °F (36.5 °C)  Pulse:  [73-99] 85  Resp:  [] 20  SpO2:  [96 %-100 %] 96 %  BP: (120-174)/(56-79) 167/74     Weight: 76.1 kg (167 lb 12.3 oz)  Body mass index is 26.28 kg/m².    Intake/Output Summary (Last 24 hours) at 8/4/2020 1603  Last data filed at 8/4/2020 1400  Gross per 24 hour   Intake 440 ml   Output 900 ml   Net -460 ml     -6L Since admission      Physical Exam  Vitals signs and nursing note reviewed.   Constitutional:       Appearance: Normal appearance. He is normal weight.      Interventions: Nasal cannula in place.   HENT:      Head: Normocephalic and atraumatic.      Nose: Nose normal.   Eyes:      Conjunctiva/sclera: Conjunctivae normal.      Pupils: Pupils are equal, round, and reactive to light.   Neck:      Musculoskeletal: Normal range of motion.   Cardiovascular:      Rate and Rhythm: Normal rate.   Pulmonary:      Breath sounds: Examination of the right-lower field reveals decreased breath sounds and rhonchi. Examination of the left-lower field reveals decreased breath sounds and rhonchi. Decreased breath sounds and rhonchi  present. No wheezing.      Comments: Remains tachypneic.  Abdominal:      General: Bowel sounds are normal. There is no distension.      Palpations: Abdomen is soft. There is no mass.      Tenderness: There is no abdominal tenderness. There is no guarding.      Hernia: No hernia is present.   Musculoskeletal: Normal range of motion.         General: No swelling.   Skin:     General: Skin is warm and dry.      Capillary Refill: Capillary refill takes less than 2 seconds.   Neurological:      General: No focal deficit present.      Mental Status: He is alert and oriented to person, place, and time.   Psychiatric:         Mood and Affect: Mood normal.         Speech: Speech normal.       Vents:  Oxygen Concentration (%): 100 (08/04/20 1146)    Lines/Drains/Airways     Peripherally Inserted Central Catheter Line            PICC Triple Lumen 07/30/20 2040 right brachial 4 days          Drain                 Urethral Catheter 07/28/20 1800 Latex 16 Fr. 6 days                Significant Labs:    CBC/Anemia Profile:  Recent Labs   Lab 08/03/20  0330 08/04/20  0440   WBC 19.57* 20.76*   HGB 8.0* 8.3*   HCT 24.8* 26.1*   * 154   MCV 98 99*   RDW 14.9* 15.0*        Chemistries:  Recent Labs   Lab 08/03/20  0330 08/04/20  0440    142   K 4.1 4.2    103   CO2 25 34*   BUN 21 28*   CREATININE 0.8 0.8   CALCIUM 8.2* 8.4*   MG 2.5 2.7*   PHOS 3.3 2.3*       All pertinent labs within the past 24 hours have been reviewed.    Significant Imaging:  I have reviewed all pertinent imaging results/findings within the past 24 hours.     CXR- Right-sided PICC line is visualized with distal tip over the SVC.  Multifocal consolidative changes are again seen within the right mid to lower lung zone and to lesser degree within the left lung base.  Similar findings are seen on previous radiograph.  No pneumothorax or large pleural effusion seen.    ECHO-   · Normal left ventricular systolic function. The estimated ejection  fraction is 65%.  · Mild left atrial enlargement.  · Normal LV diastolic function.  · Normal right ventricular systolic function.  · Mild aortic regurgitation.  · Mild tricuspid regurgitation.  · Mild pulmonary hypertension present.  · Normal central venous pressure (3 mmHg).  · The estimated PA systolic pressure is 31 mmHg.      MICROBIOLOGY-     Respiratory Culture ELIF ALBICANSAbnormal     Gram Stain (Respiratory) <10 epithelial cells per low power field.    Gram Stain (Respiratory) Many WBC's    Gram Stain (Respiratory) No organisms seen        Blood Cultures- 7/28- NGTD        Assessment/Plan:     * Acute hypoxemic respiratory failure  2/2 COVID-19. Now with new infiltrates RML/RLL, new fevers concerning for superimposed aspiration/bacterial PNA. Blood/sputum cultures unrevealing. Still intermittently febrile.     - s/p 5 days of remdesivir +  dexamethasone  - Wean O2 as tolerated. Alternate vapotherm + NIV   - Lasix spot dose as needed- Maintains negative fluid balance.  Lasix 40 mg given 8/3/20  - holding full dose Lovenox for elevated d-dimer in setting of suspected lower GI bleed  -- continue zosyn.. Duration 7 days for presumed aspiration PNA.     Renal function remains stable. Not thriving.  Most concerning is apparent decline in mental status.  Per Dr. Zamora, patient and family did not wish for aggressive intervention such as CPR or intubation should patient's condition deteriorate.  DNR reconfirmed by palliative care.  Continue with current management.  However, improvement has been marginal since admission.  Palliative care is having on going discussion in regard to appropriateness of transitioning to comfort care.      GIB (gastrointestinal bleeding)  Melena decreasing. On BID PPI. Unable to tolerate EGD in setting of significant respiratory compromise. Hb holding and no additional blood products in last 24 hours.     -- PPI   -- Transfusion threshold < 7 unless hemorrhage/instability.  Last  transfusion was 7/29           Dago Izquierdo MD  Pulmonology  [facility]    Critical Care Time: 35  minutes  Critical secondary to respiratory failure     Critical care was time spent personally by me on the following activities: development of treatment plan with patient or surrogate and bedside caregivers, discussions with consultants, evaluation of patient's response to treatment, examination of patient, ordering and performing treatments and interventions, ordering and review of laboratory studies, ordering and review of radiographic studies, pulse oximetry, re-evaluation of patient's condition.  This critical care time did not overlap with that of any other provider or involve time for any procedures.

## 2020-08-04 NOTE — SUBJECTIVE & OBJECTIVE
Interval History: Hb holding. No additional blood products in the last 48 hours. Still on 100% Fio2 BPAP.  Sating 100%.  Per nursing, patient struggle with dyspnea when arouse.  Currently with prn morphine and ativan.  Tube feed held due to high residual.      Objective:     Vital Signs (Most Recent):  Temp: 97.7 °F (36.5 °C) (08/04/20 1115)  Pulse: 85 (08/04/20 1430)  Resp: 20 (08/04/20 1430)  BP: (!) 167/74 (08/04/20 1430)  SpO2: 96 % (08/04/20 1430) Vital Signs (24h Range):  Temp:  [97.5 °F (36.4 °C)-98.1 °F (36.7 °C)] 97.7 °F (36.5 °C)  Pulse:  [73-99] 85  Resp:  [] 20  SpO2:  [96 %-100 %] 96 %  BP: (120-174)/(56-79) 167/74     Weight: 76.1 kg (167 lb 12.3 oz)  Body mass index is 26.28 kg/m².    Intake/Output Summary (Last 24 hours) at 8/4/2020 1603  Last data filed at 8/4/2020 1400  Gross per 24 hour   Intake 440 ml   Output 900 ml   Net -460 ml     -6L Since admission      Physical Exam  Vitals signs and nursing note reviewed.   Constitutional:       Appearance: Normal appearance. He is normal weight.      Interventions: Nasal cannula in place.   HENT:      Head: Normocephalic and atraumatic.      Nose: Nose normal.   Eyes:      Conjunctiva/sclera: Conjunctivae normal.      Pupils: Pupils are equal, round, and reactive to light.   Neck:      Musculoskeletal: Normal range of motion.   Cardiovascular:      Rate and Rhythm: Normal rate.   Pulmonary:      Breath sounds: Examination of the right-lower field reveals decreased breath sounds and rhonchi. Examination of the left-lower field reveals decreased breath sounds and rhonchi. Decreased breath sounds and rhonchi present. No wheezing.      Comments: Remains tachypneic.  Abdominal:      General: Bowel sounds are normal. There is no distension.      Palpations: Abdomen is soft. There is no mass.      Tenderness: There is no abdominal tenderness. There is no guarding.      Hernia: No hernia is present.   Musculoskeletal: Normal range of motion.          General: No swelling.   Skin:     General: Skin is warm and dry.      Capillary Refill: Capillary refill takes less than 2 seconds.   Neurological:      General: No focal deficit present.      Mental Status: He is alert and oriented to person, place, and time.   Psychiatric:         Mood and Affect: Mood normal.         Speech: Speech normal.       Vents:  Oxygen Concentration (%): 100 (08/04/20 1146)    Lines/Drains/Airways     Peripherally Inserted Central Catheter Line            PICC Triple Lumen 07/30/20 2040 right brachial 4 days          Drain                 Urethral Catheter 07/28/20 1800 Latex 16 Fr. 6 days                Significant Labs:    CBC/Anemia Profile:  Recent Labs   Lab 08/03/20  0330 08/04/20  0440   WBC 19.57* 20.76*   HGB 8.0* 8.3*   HCT 24.8* 26.1*   * 154   MCV 98 99*   RDW 14.9* 15.0*        Chemistries:  Recent Labs   Lab 08/03/20  0330 08/04/20  0440    142   K 4.1 4.2    103   CO2 25 34*   BUN 21 28*   CREATININE 0.8 0.8   CALCIUM 8.2* 8.4*   MG 2.5 2.7*   PHOS 3.3 2.3*       All pertinent labs within the past 24 hours have been reviewed.    Significant Imaging:  I have reviewed all pertinent imaging results/findings within the past 24 hours.     CXR- Right-sided PICC line is visualized with distal tip over the SVC.  Multifocal consolidative changes are again seen within the right mid to lower lung zone and to lesser degree within the left lung base.  Similar findings are seen on previous radiograph.  No pneumothorax or large pleural effusion seen.    ECHO-   · Normal left ventricular systolic function. The estimated ejection fraction is 65%.  · Mild left atrial enlargement.  · Normal LV diastolic function.  · Normal right ventricular systolic function.  · Mild aortic regurgitation.  · Mild tricuspid regurgitation.  · Mild pulmonary hypertension present.  · Normal central venous pressure (3 mmHg).  · The estimated PA systolic pressure is 31 mmHg.      MICROBIOLOGY-      Respiratory Culture LEIF ALBICANSAbnormal     Gram Stain (Respiratory) <10 epithelial cells per low power field.    Gram Stain (Respiratory) Many WBC's    Gram Stain (Respiratory) No organisms seen        Blood Cultures- 7/28- NGTD

## 2020-08-04 NOTE — ASSESSMENT & PLAN NOTE
Secondary to COVID-19 infection  Completed course of empiric antibiotics for elevated procalcitonin  High D-dimer was improving. Treated with full dose lovenox  CRP a bit better. Completed dexamethasone and Remdesivir course  Tolerated vapotherm and PO intake as tolerated.   Pulmonary on board for comanagement.  Had abrupt desats noted that was treated BiPAP   Repeat cultures obtained - no growth to date. Increasing WBC noted, started empiric Zosyn, Flagyl and Vancomycin 7/28  Developed high fever on 7/30, vancomycin level were subtherapeutic - changed to Zyvox  Intermittent fever but all cultures are NGTD and patient holding but not improving  Stopped Zyvox on 8/1 since all cultures remained negative  Worsening mental status. Remains on continuous BiPAP but not interactive  Inflammatory markers increasing and D-dimer high, restarted dexamethasone, lasix x 1 and prophylactic lovenox given   Palliative care consulted  Comfort meds available    She can stop the Macrobid as there is no urinary tract infection.  I would recommend a consultation with gynecology for her thin yellow vaginal discharge and intermittent pelvic pain.  Electronically signed by: Paco Jeffrey DO 7/26/2019

## 2020-08-04 NOTE — SUBJECTIVE & OBJECTIVE
Interval History:  Desats noted overnight and he had remained on BIPAP. No reported bleeding by staff. Worsening mechanics of respiration and no longer interactive today, only withdraws to pain.    Review of Systems   Unable to perform ROS: Mental status change     Objective:     Vital Signs (Most Recent):  Temp: 97.5 °F (36.4 °C) (08/03/20 2000)  Pulse: 81 (08/03/20 2230)  Resp: (!) 115 (08/03/20 2230)  BP: (!) 143/65 (08/03/20 2230)  SpO2: 100 % (08/03/20 2230) Vital Signs (24h Range):  Temp:  [97.4 °F (36.3 °C)-99.1 °F (37.3 °C)] 97.5 °F (36.4 °C)  Pulse:  [] 81  Resp:  [] 115  SpO2:  [74 %-100 %] 100 %  BP: ()/(46-74) 143/65     Weight: 76.1 kg (167 lb 12.3 oz)  Body mass index is 26.28 kg/m².    Intake/Output Summary (Last 24 hours) at 8/3/2020 2239  Last data filed at 8/3/2020 2200  Gross per 24 hour   Intake 647.6 ml   Output 1475 ml   Net -827.4 ml      Physical Exam  Vitals signs and nursing note reviewed.   Constitutional:       General: He is in acute distress.      Appearance: He is ill-appearing and toxic-appearing.   HENT:      Head: Normocephalic and atraumatic.      Comments: BIPAP mask in place  Eyes:      Conjunctiva/sclera: Conjunctivae normal.   Cardiovascular:      Rate and Rhythm: Normal rate and regular rhythm.   Pulmonary:      Breath sounds: Rhonchi present.      Comments: Increased work of breathing with tachypnea, air hunger with gasping with BIPAP mask in place  Abdominal:      General: Abdomen is flat. Bowel sounds are normal. There is no distension.      Palpations: Abdomen is soft. There is no mass.      Tenderness: There is no abdominal tenderness. There is no guarding.   Genitourinary:     Comments: Pollack in place  Skin:     General: Skin is warm.      Capillary Refill: Capillary refill takes less than 2 seconds.      Coloration: Skin is not pale.   Neurological:      Comments: Unresponsive to verbal stimulus, withdraws to pain only         Significant Labs: All  pertinent labs within the past 24 hours have been reviewed.    Significant Imaging: I have reviewed all pertinent imaging results/findings within the past 24 hours.  I have reviewed and interpreted all pertinent imaging results/findings within the past 24 hours.

## 2020-08-04 NOTE — NURSING
[facility]  ICU Shift Summary  Date: 8/4/2020      COVID Test: (+)  Isolation: Airborne and Contact and Droplet     Prehospitalization: Home  Admit Date / LOS : 7/18/2020/ 17 days    Diagnosis: Acute hypoxemic respiratory failure    Consults:        Active: Palliative, Pulm CC, SW and Spiritual Care       Needed: N/A     Code Status: DNR   Advanced Directive: <no information>    LDA: BIPAP, Pollack and PICC       Central Lines/Site/Justification:Multiple GTTS       Urinary Cath/Order/Justification:Critically Ill in the ICU and requiring intensive monitoring    Vasopressors/Infusions:    dexmedetomidine (PRECEDEX) infusion 0.399 mcg/kg/hr (08/03/20 0900)    morphine 2 mg/hr (08/04/20 1238)    norepinephrine bitartrate-D5W Stopped (07/31/20 0946)    pantoprazole 40 mg in dextrose 5 % 100 mL infusion (ready to mix system) 8 mg/hr (08/04/20 1212)          GOALS: Volume/ Hemodynamic: N/A                     RASS: N/A    Pain Management: IV       Pain Controlled: yes     Rhythm: NSR    Respiratory Device: Bipap    Oxygen Concentration (%):  [] 60             Most Recent SBT/ SAT: Fail       MOVE Screen: FAIL    VTE Prophylaxis: Mechanical  Mobility: Bedrest  Stress Ulcer Prophylaxis: Yes    Dietary: NPO  Tolerance: no  /  Advancement: no    I & O (24h):    Intake/Output Summary (Last 24 hours) at 8/4/2020 1852  Last data filed at 8/4/2020 1800  Gross per 24 hour   Intake 440 ml   Output 1250 ml   Net -810 ml        Restraints: No    Significant Dates:  Post Op Date:   Rescue Date:   Imaging/ Diagnostics:     Noteworthy Labs:  none    CBC/Anemia Labs: Coags:    Recent Labs   Lab 08/03/20  0330 08/04/20  0440   WBC 19.57* 20.76*   HGB 8.0* 8.3*   HCT 24.8* 26.1*   * 154   MCV 98 99*   RDW 14.9* 15.0*    No results for input(s): PT, INR, APTT in the last 168 hours.     Chemistries:   Recent Labs   Lab 08/03/20  0330 08/04/20  0440    142   K 4.1 4.2    103   CO2 25 34*   BUN 21  28*   CREATININE 0.8 0.8   CALCIUM 8.2* 8.4*   MG 2.5 2.7*   PHOS 3.3 2.3*        Cardiac Enzymes: Ejection Fractions:    No results for input(s): CPK, CPKMB, MB, TROPONINI in the last 72 hours. Nuc Rest EF   Date Value Ref Range Status   06/11/2020 77  Final        POCT Glucose: HbA1c:    Recent Labs   Lab 08/04/20  0828 08/04/20  1126 08/04/20  1838   POCTGLUCOSE 247* 251* 277*    No results found for: HGBA1C        ICU LOS 16d 22h  Level of Care: Critical Care    Shift Summary/Plan for the shift: Palliative and spiritual care consults added, morphine gtt started. Patient is intermittently opening his eyes, moaning and moving right arm purposefully. He squeezes hands and wiggles toes on command. Weaned to 60% on bipap, will transition to vapotherm when sats are stable above 92%. No new injury or fall. Will continue to monitor.

## 2020-08-04 NOTE — ASSESSMENT & PLAN NOTE
Occult blood positive. Hgb downtrended. Holding aspirin and clopidogrel.   Was on enoxaparin for hypercoagulable state with COVID-19 and IV PPI Q12  H/H with drop noted and noted large melanotic stool  Consulted GI, however hypoxemia will make patient high risk for endoscopy  Status post transfusion 2 U PRBC on 7/29  Case discussed with GI, Pulm and patient - plan for conservative treatment and avoid procedure unless it becomes emergent  No further bleeding, and H&H has been stable  Will start Lovenox, prophylactic dose and monitor closely

## 2020-08-04 NOTE — SUBJECTIVE & OBJECTIVE
Interval History: Not interactive or responsive. On continuous BiPAP.     Review of Systems   Unable to perform ROS: Mental status change     Objective:     Vital Signs (Most Recent):  Temp: 97.7 °F (36.5 °C) (08/04/20 1115)  Pulse: 83 (08/04/20 1604)  Resp: (!) 81 (08/04/20 1604)  BP: (!) 168/76 (08/04/20 1604)  SpO2: (!) 90 % (08/04/20 1604) Vital Signs (24h Range):  Temp:  [97.5 °F (36.4 °C)-98.1 °F (36.7 °C)] 97.7 °F (36.5 °C)  Pulse:  [73-99] 83  Resp:  [] 81  SpO2:  [90 %-100 %] 90 %  BP: (122-174)/(56-79) 168/76     Weight: 76.1 kg (167 lb 12.3 oz)  Body mass index is 26.28 kg/m².    Intake/Output Summary (Last 24 hours) at 8/4/2020 1744  Last data filed at 8/4/2020 1400  Gross per 24 hour   Intake 440 ml   Output 900 ml   Net -460 ml      Physical Exam  Constitutional:       Comments: Not alert   HENT:      Head: Normocephalic and atraumatic.      Comments: BiPAP on  Cardiovascular:      Rate and Rhythm: Normal rate and regular rhythm.      Pulses: Normal pulses.      Heart sounds: Normal heart sounds.   Pulmonary:      Comments: Decreased breath sounds bilaterally  Abdominal:      General: Abdomen is flat. Bowel sounds are normal. There is no distension.      Tenderness: There is no abdominal tenderness. There is no guarding.   Genitourinary:     Comments: Pollack in place  Skin:     Comments: R arm PICC. Purple discoloration to L great toe   Neurological:      Comments: Not interactive. Does not follow commands or open eyes         Significant Labs: All pertinent labs within the past 24 hours have been reviewed.    Significant Imaging: I have reviewed and interpreted all pertinent imaging results/findings within the past 24 hours.

## 2020-08-04 NOTE — PROGRESS NOTES
[facility] Hospital Medicine  Progress Note    Patient Name: Joo York  MRN: [id number]  Patient Class: IP- Inpatient   Admission Date: 7/18/2020  Length of Stay: 17 days  Attending Physician: Melissa Lyons MD  Primary Care Provider: Keegan Watson MD        Subjective:     Principal Problem:Acute hypoxemic respiratory failure        HPI:  Joo York is a 80 y.o. male with HTN who presented with with report of shortness of breath, fever, chills last 3 days.  He was tested for COVID-19 2 days ago and positive.  He said dyspnea with exertion and a cough that started today.  Denies hemoptysis, stridor, or night sweats.  No nausea, vomiting diarrhea reported.  Patient is a former smoker.  Denies home oxygen use.  Code status discussed in the most remain a DNR DNI.  The family is aware of these wishes.  History is limited to respiratory distress as patient is speaking in short sentences.    In the emergency department routine laboratory studies and chest x-ray obtained.  Evidence of bilateral pneumonia.  Was supplemental oxygen and dexamethasone.  O2 sat 67% air prior to oxygen supplementation.  Okay to escalate to CPAP/BiPAP as.  Does not want to be intubated.    Hospital medicine has been asked to admit to inpatient in the ICU for further evaluation and treatment.     Overview/Hospital Course:  Mr. York presented with shortness of breath and fever.  He was admitted for acute respiratory failure secondary to COVID-19. Placed on BiPAP. Code status DNR. Completed course of empiric antibiotics for elevated procalcitonin level, course of dexamethasone 6 mg daily and Remdesivir. Also on full dose lovenox for hypercoagulable state. Diuresed intermittently. He slowly improved and de-escalated to vapotherm NC. On 7/26, patient had melena and acute drop in Hgb from 16 to 12 in 24 hours. BP stable and respirations were not affected. Placed on pantoprazole IV 40 mg BID and held ASA/plavix which he uses  for PVD. Repeat Hgb levels remained stable and amount of melena decreased. Given such high risk for thromboembolism, he continued on enoxaparin. Noted increased melena and lovenox stopped, and changed IV PPI to Q12 and increased H/H surveillance and GI consulted.  Transfused 2 units of packed red blood cells and PPI changed to infusion.  No further bleeding and H/H overall stable. Empiric Zosyn and vancomycin initiated for continued worsening leukocytosis.  Blood cultures no growth to date.  Sputum culture NGTD. Intermittent high fever and Vancomycin levels were subtherapeutic - Zyvox added and Vancomycin stopped. Zyvox stopped on 8/1. Mental status worsening. On continuous BiPAP and not responsive. Palliative care following.     Interval History: Not interactive or responsive. On continuous BiPAP.     Review of Systems   Unable to perform ROS: Mental status change     Objective:     Vital Signs (Most Recent):  Temp: 97.7 °F (36.5 °C) (08/04/20 1115)  Pulse: 83 (08/04/20 1604)  Resp: (!) 81 (08/04/20 1604)  BP: (!) 168/76 (08/04/20 1604)  SpO2: (!) 90 % (08/04/20 1604) Vital Signs (24h Range):  Temp:  [97.5 °F (36.4 °C)-98.1 °F (36.7 °C)] 97.7 °F (36.5 °C)  Pulse:  [73-99] 83  Resp:  [] 81  SpO2:  [90 %-100 %] 90 %  BP: (122-174)/(56-79) 168/76     Weight: 76.1 kg (167 lb 12.3 oz)  Body mass index is 26.28 kg/m².    Intake/Output Summary (Last 24 hours) at 8/4/2020 1744  Last data filed at 8/4/2020 1400  Gross per 24 hour   Intake 440 ml   Output 900 ml   Net -460 ml      Physical Exam  Constitutional:       Comments: Not alert   HENT:      Head: Normocephalic and atraumatic.      Comments: BiPAP on  Cardiovascular:      Rate and Rhythm: Normal rate and regular rhythm.      Pulses: Normal pulses.      Heart sounds: Normal heart sounds.   Pulmonary:      Comments: Decreased breath sounds bilaterally  Abdominal:      General: Abdomen is flat. Bowel sounds are normal. There is no distension.      Tenderness:  There is no abdominal tenderness. There is no guarding.   Genitourinary:     Comments: Pollack in place  Skin:     Comments: R arm PICC. Purple discoloration to L great toe   Neurological:      Comments: Not interactive. Does not follow commands or open eyes         Significant Labs: All pertinent labs within the past 24 hours have been reviewed.    Significant Imaging: I have reviewed and interpreted all pertinent imaging results/findings within the past 24 hours.      Assessment/Plan:      * Acute hypoxemic respiratory failure  Secondary to COVID-19 infection  Completed course of empiric antibiotics for elevated procalcitonin  High D-dimer was improving. Treated with full dose lovenox  CRP a bit better. Completed dexamethasone and Remdesivir course  Tolerated vapotherm and PO intake as tolerated.   Pulmonary on board for comanagement.  Had abrupt desats noted that was treated BiPAP   Repeat cultures obtained - no growth to date. Increasing WBC noted, started empiric Zosyn, Flagyl and Vancomycin 7/28  Developed high fever on 7/30, vancomycin level were subtherapeutic - changed to Zyvox  Intermittent fever but all cultures are NGTD and patient holding but not improving  Stopped Zyvox on 8/1 since all cultures remained negative  Worsening mental status. Remains on continuous BiPAP but not interactive  Inflammatory markers increasing and D-dimer high, restarted dexamethasone, lasix x 1 and prophylactic lovenox given   Palliative care consulted  Comfort meds available     Palliative care encounter  Code status DNR  Discussing hospice      Shock  Weaned off pressor as of 7/31  Holding BP meds      Anemia due to acute blood loss  As discussed above  Will continue monitor    GIB (gastrointestinal bleeding)  Occult blood positive. Hgb downtrended. Holding aspirin and clopidogrel.   Was on enoxaparin for hypercoagulable state with COVID-19 and IV PPI Q12  H/H with drop noted and noted large melanotic stool  Consulted GI,  however hypoxemia will make patient high risk for endoscopy  Status post transfusion 2 U PRBC on 7/29  Case discussed with GI, Pulm and patient - plan for conservative treatment and avoid procedure unless it becomes emergent  No further bleeding, and H&H has been stable  Will start Lovenox, prophylactic dose and monitor closely    COVID-19  As above    Elevated troponin I level  Trended. No reported chest pain.      Lower respiratory tract infection due to COVID-19 virus  As discussed above    Multifocal pneumonia  As discussed above    Essential hypertension  Blood pressure medication will be on hold given hypotension and inability to swallow PO    PAD (peripheral artery disease)  Holding ASA and plavix due to prior bleeding    Dyslipidemia  Currently not on medications.    Will hold off on statin as his liver enzymes are still elevated although better. Also not able to take PO currently      VTE Risk Mitigation (From admission, onward)         Ordered     enoxaparin injection 40 mg  Every 24 hours      08/03/20 2317     IP VTE HIGH RISK PATIENT  Once      07/18/20 2106     Place sequential compression device  Until discontinued      07/18/20 2106                Critical care time spent on the evaluation and treatment of severe organ dysfunction, review of pertinent labs and imaging studies, discussions with consulting providers and discussions with patient/family: 30 minutes.    6:11 PM  Called wife Maria Luz. Updated her. She does have some questions about hospice. All other questions answered.       Melissa Lyons MD  Department of Hospital Medicine   [facility]

## 2020-08-04 NOTE — HPI
Joo York is a 80 y.o. male that (in part)  has a past medical history of Claudication, Essential hypertension, Essential hypertension, Hematuria, Hernia of abdominal cavity, and Multifocal pneumonia.  has a past surgical history that includes Vasectomy; Tonsillectomy; office cysto 2018; Cystoscopy with biopsy of bladder (N/A, 9/5/2018); and Atherectomy (1/23/2020). Presents to [facility] Emergency Department with report of shortness of breath, fever, chills last 3 days.  He was tested for COVID-19 2 days ago and positive.  He said dyspnea with exertion and a cough that started today.  Denies hemoptysis, stridor, or night sweats.  No nausea, vomiting diarrhea reported.  Patient is a former smoker.  Denies home oxygen use.  Code status discussed in the most remain a DNR DNI.  The family is aware of these wishes.  History is limited to respiratory distress as patient is speaking in short sentences.     In the emergency department routine laboratory studies and chest x-ray obtained.  Evidence of bilateral pneumonia.  Was supplemental oxygen and dexamethasone.  O2 sat 67% air prior to oxygen supplementation.  Okay to escalate to CPAP/BiPAP as.  Does not want to be intubated.     Hospital medicine has been asked to admit to inpatient in the ICU for further evaluation and treatment.      Overview/Hospital Course:  Mr. York presented with shortness of breath and fever.  He was admitted for acute respiratory failure secondary to COVID-19. Placed on BiPAP. Code status DNR. Completed course of empiric antibiotics for elevated procalcitonin level, course of dexamethasone 6 mg daily and Remdesivir. Also on full dose lovenox for hypercoagulable state. Diuresed intermittently. He slowly improved and de-escalated to vapotherm NC. On 7/26, patient had melena and acute drop in Hgb from 16 to 12 in 24 hours. BP stable and respirations were not affected. Placed on pantoprazole IV 40 mg BID and held  ASA/plavix which he uses for PVD. Repeat Hgb levels remained stable and amount of melena decreased. Given such high risk for thromboembolism, he continued on enoxaparin. Noted increased melena and lovenox stopped, and changed IV PPI to Q12 and increased H/H surveillance and GI consulted.  Transfused 2 units of packed red blood cells and PPI changed to infusion.  No further bleeding and H/H overall stable. Empiric Zosyn and vancomycin initiated for continued worsening leukocytosis.  Blood cultures no growth to date.  Sputum culture NGTD. Intermittent high fever and Vancomycin levels were subtherapeutic - Zyvox added and Vancomycin stopped.. Zyvox stopped on 8/1.

## 2020-08-04 NOTE — ASSESSMENT & PLAN NOTE
2/2 COVID-19. Now with new infiltrates RML/RLL, new fevers concerning for superimposed aspiration/bacterial PNA. Blood/sputum cultures unrevealing. Still intermittently febrile.     - s/p 5 days of remdesivir +  dexamethasone  - Wean O2 as tolerated. Alternate vapotherm + NIV   - Lasix spot dose as needed- Maintains negative fluid balance.  Lasix 40 mg given 8/3/20  - holding full dose Lovenox for elevated d-dimer in setting of suspected lower GI bleed  -- continue zosyn.. Duration 7 days for presumed aspiration PNA.     Renal function remains stable. Not thriving.  Most concerning is apparent decline in mental status.  Per Dr. Zamora, patient and family did not wish for aggressive intervention such as CPR or intubation should patient's condition deteriorate.  DNR reconfirmed by palliative care.  Continue with current management.  However, improvement has been marginal since admission.  Palliative care is having on going discussion in regard to appropriateness of transitioning to comfort care.

## 2020-08-04 NOTE — PLAN OF CARE
Recommendations  1. If TF res desired: Isosource 1.5 at 10mL/hr and advance as tolerated to a goal rate of 45 mL/hr.   -This will provide 1620 calories, 73 grams of protein, and 825 mL of free water.   2. If free water flush desired recommend 200 mL QID daily.   -Hold TF for n/v/abd discomfort, residuals >500 ml.   3. RD to monitor progress.    Goals: Honor pt preference-comfort care.  Nutrition Goal Status: new

## 2020-08-04 NOTE — ASSESSMENT & PLAN NOTE
Currently not on medications.    Will hold off on statin as his liver enzymes are still elevated although better. Also not able to take PO currently

## 2020-08-04 NOTE — CONSULTS
[facility]  Palliative Medicine  Consult Note    Patient Name: Joo York  MRN: [id number]  Admission Date: 7/18/2020  Hospital Length of Stay: 17 days  Code Status: DNR   Attending Provider: Melissa Lyons MD  Consulting Provider: Mary Beth Sanchez NP  Primary Care Physician: Keegan Watson MD  Principal Problem:Acute hypoxemic respiratory failure    Patient information was obtained from spouse/SO and ER records.      Inpatient consult to Palliative Care  Consult performed by: Mary Beth Sanchez NP  Consult ordered by: Melissa Lyons MD  Reason for consult: end of life/hospice        Assessment/Plan:   Plan:   -Continue current treatment  -Pt's wife confirms DNR/DNI status  -Discussed recommendation for transition to comfort care with patient's wife who is considering.   -Morphine 2mg/hr for dyspnea  -Palliative will continue to follow for ongoing GOC discussions and family support.       Thank you for your consult. I will follow-up with patient. Please contact us if you have any additional questions.    Subjective:     HPI:   Joo York is a 80 y.o. male that (in part)  has a past medical history of Claudication, Essential hypertension, Essential hypertension, Hematuria, Hernia of abdominal cavity, and Multifocal pneumonia.  has a past surgical history that includes Vasectomy; Tonsillectomy; office cysto 2018; Cystoscopy with biopsy of bladder (N/A, 9/5/2018); and Atherectomy (1/23/2020). Presents to [facility] Emergency Department with report of shortness of breath, fever, chills last 3 days.  He was tested for COVID-19 2 days ago and positive.  He said dyspnea with exertion and a cough that started today.  Denies hemoptysis, stridor, or night sweats.  No nausea, vomiting diarrhea reported.  Patient is a former smoker.  Denies home oxygen use.  Code status discussed in the most remain a DNR DNI.  The family is aware of these wishes.  History is limited to  respiratory distress as patient is speaking in short sentences.     In the emergency department routine laboratory studies and chest x-ray obtained.  Evidence of bilateral pneumonia.  Was supplemental oxygen and dexamethasone.  O2 sat 67% air prior to oxygen supplementation.  Okay to escalate to CPAP/BiPAP as.  Does not want to be intubated.     Hospital medicine has been asked to admit to inpatient in the ICU for further evaluation and treatment.      Overview/Hospital Course:  Mr. York presented with shortness of breath and fever.  He was admitted for acute respiratory failure secondary to COVID-19. Placed on BiPAP. Code status DNR. Completed course of empiric antibiotics for elevated procalcitonin level, course of dexamethasone 6 mg daily and Remdesivir. Also on full dose lovenox for hypercoagulable state. Diuresed intermittently. He slowly improved and de-escalated to vapotherm NC. On 7/26, patient had melena and acute drop in Hgb from 16 to 12 in 24 hours. BP stable and respirations were not affected. Placed on pantoprazole IV 40 mg BID and held ASA/plavix which he uses for PVD. Repeat Hgb levels remained stable and amount of melena decreased. Given such high risk for thromboembolism, he continued on enoxaparin. Noted increased melena and lovenox stopped, and changed IV PPI to Q12 and increased H/H surveillance and GI consulted.  Transfused 2 units of packed red blood cells and PPI changed to infusion.  No further bleeding and H/H overall stable. Empiric Zosyn and vancomycin initiated for continued worsening leukocytosis.  Blood cultures no growth to date.  Sputum culture NGTD. Intermittent high fever and Vancomycin levels were subtherapeutic - Zyvox added and Vancomycin stopped.. Zyvox stopped on 8/1.    Hospital Course:  No notes on file    Palliative medicine consult received for end of life/hospice care, goals of care, and advance care planning. Discussed with Dr. Lyons and CHRISTOPHER Yeung.  Patient has  been unresponsive to verbal stimuli and although with some improvement today, unable to participate fully in complex conversation at this time.  Call placed to patient's wife, Maria Luz ([phone]) to discuss goals of care.     Palliative care services introduced and Maria Luz was receptive to call.  She expresses gratitude for care provided to patient.  She confirms that patient would not want to be intubated under any circumstance and feels that he would want to focus on comfort if prolonging care would affect quality of life.  Discussed options to transition focus to comfort care, including comfort care while in the hospital vs inpatient hospice.  She understands that it is unlikely that patient can recover and his prognosis is poor.  She became overwhelmed when discussing this and time was allowed for her to express feelings and emotions.  Emotional support provided.  Patient and Maria Luz have been  for 57 years and have two children.      Maria Luz and patient complete living will and HPOA documentation in 2019.  Maria Luz states that the purpose of this was to take away end-of-life decision burden from their children.  She reinforces that if her  could fully participate in conversation, he would choose comfort above any other option.  She would like to consider inpatient comfort care and inpatient hospice and asks to defer decision until tomorrow after full consideration.  In the meantime, she is ok with increasing comfort medications, such as morphine and ativan, but does not wish to discontinue any treatment at this time.     Advance Care Planning     [facility]  I engaged the family and healthcare power of   (patient's wife, Maria Luz) in a conversation about advance care planning and we specifically addressed what the goals of care would be moving forward, in light of the patient's change in clinical status, specifically acute hypoxemic respiratory failure, gastrointestinal bleeding, shock, anemia,  Covid-19 infection.  We did specifically address the patient's likely prognosis, which is poor. Patient remains on bipap today and appears to have increased work of breathing.  Discussed with patient's wife, who again confirms patient would not wish to be intubated and would want to have symptoms managed with comfort medications.     We explored the patient's values and preferences for future care.  The family and healthcare power of   endorses that what is most important right now is to focus on symptom/pain control and quality of life, even if it means sacrificing a little time.    Accordingly, we have decided that the best plan to meet the patient's goals includes continuing with treatment for now as transition to comfort care is considered by family.      I did explain the role for hospice care at this stage of the patient's illness, including its ability to help the patient live with the best quality of life possible.  Maria Luz became overwhelmed and appropriately emotional  during this discussion and asked to consider and give decision tomorrow. She asked questions, such as visitation polcies for her children at inpatient hospice facility and how long patient would be able to stay in the hospital.   All questions answered. Discussed interventions that could be provided inpatient in ICU vs inpatient hospice and focus of aggressive treatment vs comfort care.  She understands that the recommendation is for comfort care and will provide decision tomorrow.     Discussed with Dr. Lyons. Morphine continuous ordered for dyspnea.  Palliative will continue to follow for ongoing goals of care discussion and family support.           Past Medical History:   Diagnosis Date    Acute hypoxemic respiratory failure 7/19/2020    Anemia 7/28/2020    Claudication 1/23/2020    Essential hypertension 1/24/2020    Essential hypertension 1/24/2020    Hematuria     Hernia of abdominal cavity     Multifocal pneumonia  2020       Past Surgical History:   Procedure Laterality Date    ATHERECTOMY  2020    Procedure: Atherectomy;  Surgeon: Triston Hightower MD;  Location: [facility] CATH LAB;  Service: Cardiology;;  SFA mid distal    CYSTOSCOPY WITH BIOPSY OF BLADDER N/A 2018    Procedure: CYSTOSCOPY, WITH BLADDER BIOPSY;  Surgeon: KADEI Johnson MD;  Location: [facility] OR;  Service: Urology;  Laterality: N/A;  RN PRE OP 18------NEED CONSENT    office cysto 2018      TONSILLECTOMY      VASECTOMY         Review of patient's allergies indicates:  No Known Allergies    Medications:  Continuous Infusions:   dexmedetomidine (PRECEDEX) infusion 0.399 mcg/kg/hr (20 0900)    morphine      norepinephrine bitartrate-D5W Stopped (20 0946)    pantoprazole 40 mg in dextrose 5 % 100 mL infusion (ready to mix system) 8 mg/hr (20 0700)     Scheduled Meds:   ascorbic acid (vitamin C)  500 mg Oral BID    dexamethasone  6 mg Intravenous Daily    enoxaparin  40 mg Subcutaneous Q24H    multivitamin  1 tablet Oral Daily    nitroGLYCERIN 2% TD oint  0.5 inch Topical (Top) Q6H    piperacillin-tazobactam (ZOSYN) IVPB  4.5 g Intravenous Q8H    sodium chloride 0.9%  10 mL Intravenous Q6H    tamsulosin  0.8 mg Oral Daily     PRN Meds:sodium chloride, acetaminophen, albuterol-ipratropium, atropine, dextrose 50%, dextrose 50%, glucagon (human recombinant), glucose, glucose, guaiFENesin, insulin aspart U-100, lorazepam, morphine, sodium chloride 0.9%, Flushing PICC Protocol **AND** sodium chloride 0.9% **AND** sodium chloride 0.9%    Family History     Problem Relation (Age of Onset)    Cancer Brother    Osteoarthritis Sister        Tobacco Use    Smoking status: Former Smoker     Quit date:      Years since quittin.6    Smokeless tobacco: Never Used   Substance and Sexual Activity    Alcohol use: Not Currently     Frequency: Never     Comment: RARELY    Drug use: No    Sexual activity: Yes     Partners:  Female       Review of Systems   Unable to perform ROS: Acuity of condition     Objective:     Vital Signs (Most Recent):  Temp: 98.1 °F (36.7 °C) (08/04/20 0715)  Pulse: 99 (08/04/20 1030)  Resp: (!) 22 (08/04/20 1030)  BP: (!) 164/71 (08/04/20 1030)  SpO2: 100 % (08/04/20 1030) Vital Signs (24h Range):  Temp:  [97.5 °F (36.4 °C)-98.8 °F (37.1 °C)] 98.1 °F (36.7 °C)  Pulse:  [73-99] 99  Resp:  [] 22  SpO2:  [94 %-100 %] 100 %  BP: (113-174)/(51-79) 164/71     Weight: 76.1 kg (167 lb 12.3 oz)  Body mass index is 26.28 kg/m².    Physical Exam  Vitals signs and nursing note reviewed.   Constitutional:       Comments: Physical exam deferred due to risk of spread of Covid-19. Patient viewed through glass door. Bipap in place, increased work of breathing noted.          Advance Care Planning   Review of Symptoms              Advanced Directives:  Living Will:  Yes.  Copy on chart:  Yes    LaPOST: No    Do Not Resuscitate Status:  Yes    Medical Power of : Yes           Significant Labs: All pertinent labs within the past 24 hours have been reviewed.  CBC:   Recent Labs   Lab 08/04/20  0440   WBC 20.76*   HGB 8.3*   HCT 26.1*   MCV 99*        BMP:  Recent Labs   Lab 08/04/20  0440   *      K 4.2      CO2 34*   BUN 28*   CREATININE 0.8   CALCIUM 8.4*   MG 2.7*     LFT:  Lab Results   Component Value Date    AST 29 07/26/2020    ALKPHOS 150 (H) 07/26/2020    BILITOT 0.5 07/26/2020     Albumin:   Albumin   Date Value Ref Range Status   07/26/2020 2.2 (L) 3.5 - 5.2 g/dL Final     Protein:   Total Protein   Date Value Ref Range Status   07/26/2020 5.6 (L) 6.0 - 8.4 g/dL Final     Lactic acid:   Lab Results   Component Value Date    LACTATE 2.2 07/18/2020       Significant Imaging: I have reviewed all pertinent imaging results/findings within the past 24 hours.    Total visit time 90 min  > 50% of 70 min visit spent in chart review, face to face discussion of goals of care,  symptom  assessment, coordination of care and emotional support. Additional 20 minutes in advance care planning.     Mary Beth Sanchez NP  Palliative Medicine  [facility]

## 2020-08-04 NOTE — ASSESSMENT & PLAN NOTE
As discussed above  Will continue monitor   Darn it, please call pharmacy and see if you can verbally change this.

## 2020-08-04 NOTE — PROGRESS NOTES
Patient currently inpatient due to acute respiratory failure secondary to COVID-19.     Will monitor for status.

## 2020-08-04 NOTE — SUBJECTIVE & OBJECTIVE
Palliative medicine consult received for end of life/hospice care, goals of care, and advance care planning. Discussed with Dr. Lyons and Anjana, RN.  Patient has been unresponsive to verbal stimuli and although with some improvement today, unable to participate fully in complex conversation at this time.  Call placed to patient's wife, Maria Luz ([phone]) to discuss goals of care.     Palliative care services introduced and Maria Luz was receptive to call.  She expresses gratitude for care provided to patient.  She confirms that patient would not want to be intubated under any circumstance and feels that he would want to focus on comfort if prolonging care would affect quality of life.  Discussed options to transition focus to comfort care, including comfort care while in the hospital vs inpatient hospice.  She understands that it is unlikely that patient can recover and his prognosis is poor.  She became overwhelmed when discussing this and time was allowed for her to express feelings and emotions.  Emotional support provided.  Patient and Maria Luz have been  for 57 years and have two children.      Maria Luz and patient complete living will and HPOA documentation in 2019.  Maria Luz states that the purpose of this was to take away end-of-life decision burden from their children.  She reinforces that if her  could fully participate in conversation, he would choose comfort above any other option.  She would like to consider inpatient comfort care and inpatient hospice and asks to defer decision until tomorrow after full consideration.  In the meantime, she is ok with increasing comfort medications, such as morphine and ativan, but does not wish to discontinue any treatment at this time.     Advance Care Planning     [facility]  I engaged the family and healthcare power of   (patient's wife, Maria Luz) in a conversation about advance care planning and we specifically addressed what the goals of care would be  moving forward, in light of the patient's change in clinical status, specifically acute hypoxemic respiratory failure, gastrointestinal bleeding, shock, anemia, Covid-19 infection.  We did specifically address the patient's likely prognosis, which is poor. Patient remains on bipap today and appears to have increased work of breathing.  Discussed with patient's wife, who again confirms patient would not wish to be intubated and would want to have symptoms managed with comfort medications.     We explored the patient's values and preferences for future care.  The family and healthcare power of   endorses that what is most important right now is to focus on symptom/pain control and quality of life, even if it means sacrificing a little time.    Accordingly, we have decided that the best plan to meet the patient's goals includes continuing with treatment for now as transition to comfort care is considered by family.      I did explain the role for hospice care at this stage of the patient's illness, including its ability to help the patient live with the best quality of life possible.  Maria Luz became overwhelmed and appropriately emotional  during this discussion and asked to consider and give decision tomorrow. She asked questions, such as visitation polcies for her children at inpatient hospice facility and how long patient would be able to stay in the hospital.   All questions answered. Discussed interventions that could be provided inpatient in ICU vs inpatient hospice and focus of aggressive treatment vs comfort care.  She understands that the recommendation is for comfort care and will provide decision tomorrow.     Discussed with Dr. Lyons. Morphine continuous ordered for dyspnea.  Palliative will continue to follow for ongoing goals of care discussion and family support.           Past Medical History:   Diagnosis Date    Acute hypoxemic respiratory failure 7/19/2020    Anemia 7/28/2020     Claudication 2020    Essential hypertension 2020    Essential hypertension 2020    Hematuria     Hernia of abdominal cavity     Multifocal pneumonia 2020       Past Surgical History:   Procedure Laterality Date    ATHERECTOMY  2020    Procedure: Atherectomy;  Surgeon: Triston Hightower MD;  Location: [facility] CATH LAB;  Service: Cardiology;;  SFA mid distal    CYSTOSCOPY WITH BIOPSY OF BLADDER N/A 2018    Procedure: CYSTOSCOPY, WITH BLADDER BIOPSY;  Surgeon: KADIE Johnson MD;  Location: [facility] OR;  Service: Urology;  Laterality: N/A;  RN PRE OP 18------NEED CONSENT    office cysto 2018      TONSILLECTOMY      VASECTOMY         Review of patient's allergies indicates:  No Known Allergies    Medications:  Continuous Infusions:   dexmedetomidine (PRECEDEX) infusion 0.399 mcg/kg/hr (20 0900)    morphine      norepinephrine bitartrate-D5W Stopped (20 0946)    pantoprazole 40 mg in dextrose 5 % 100 mL infusion (ready to mix system) 8 mg/hr (20 0700)     Scheduled Meds:   ascorbic acid (vitamin C)  500 mg Oral BID    dexamethasone  6 mg Intravenous Daily    enoxaparin  40 mg Subcutaneous Q24H    multivitamin  1 tablet Oral Daily    nitroGLYCERIN 2% TD oint  0.5 inch Topical (Top) Q6H    piperacillin-tazobactam (ZOSYN) IVPB  4.5 g Intravenous Q8H    sodium chloride 0.9%  10 mL Intravenous Q6H    tamsulosin  0.8 mg Oral Daily     PRN Meds:sodium chloride, acetaminophen, albuterol-ipratropium, atropine, dextrose 50%, dextrose 50%, glucagon (human recombinant), glucose, glucose, guaiFENesin, insulin aspart U-100, lorazepam, morphine, sodium chloride 0.9%, Flushing PICC Protocol **AND** sodium chloride 0.9% **AND** sodium chloride 0.9%    Family History     Problem Relation (Age of Onset)    Cancer Brother    Osteoarthritis Sister        Tobacco Use    Smoking status: Former Smoker     Quit date:      Years since quittin.6    Smokeless tobacco:  Never Used   Substance and Sexual Activity    Alcohol use: Not Currently     Frequency: Never     Comment: RARELY    Drug use: No    Sexual activity: Yes     Partners: Female       Review of Systems   Unable to perform ROS: Acuity of condition     Objective:     Vital Signs (Most Recent):  Temp: 98.1 °F (36.7 °C) (08/04/20 0715)  Pulse: 99 (08/04/20 1030)  Resp: (!) 22 (08/04/20 1030)  BP: (!) 164/71 (08/04/20 1030)  SpO2: 100 % (08/04/20 1030) Vital Signs (24h Range):  Temp:  [97.5 °F (36.4 °C)-98.8 °F (37.1 °C)] 98.1 °F (36.7 °C)  Pulse:  [73-99] 99  Resp:  [] 22  SpO2:  [94 %-100 %] 100 %  BP: (113-174)/(51-79) 164/71     Weight: 76.1 kg (167 lb 12.3 oz)  Body mass index is 26.28 kg/m².    Physical Exam  Vitals signs and nursing note reviewed.   Constitutional:       Comments: Physical exam deferred due to risk of spread of Covid-19. Patient viewed through glass door. Bipap in place, increased work of breathing noted.          Advance Care Planning   Review of Symptoms              Advanced Directives:  Living Will:  Yes.  Copy on chart:  Yes    LaPOST: No    Do Not Resuscitate Status:  Yes    Medical Power of : Yes           Significant Labs: All pertinent labs within the past 24 hours have been reviewed.  CBC:   Recent Labs   Lab 08/04/20  0440   WBC 20.76*   HGB 8.3*   HCT 26.1*   MCV 99*        BMP:  Recent Labs   Lab 08/04/20  0440   *      K 4.2      CO2 34*   BUN 28*   CREATININE 0.8   CALCIUM 8.4*   MG 2.7*     LFT:  Lab Results   Component Value Date    AST 29 07/26/2020    ALKPHOS 150 (H) 07/26/2020    BILITOT 0.5 07/26/2020     Albumin:   Albumin   Date Value Ref Range Status   07/26/2020 2.2 (L) 3.5 - 5.2 g/dL Final     Protein:   Total Protein   Date Value Ref Range Status   07/26/2020 5.6 (L) 6.0 - 8.4 g/dL Final     Lactic acid:   Lab Results   Component Value Date    LACTATE 2.2 07/18/2020       Significant Imaging: I have reviewed all pertinent imaging  results/findings within the past 24 hours.

## 2020-08-04 NOTE — ASSESSMENT & PLAN NOTE
Secondary to COVID-19 infection  Completed course of empiric antibiotics for elevated procalcitonin  High D-dimer was improving. Treated with full dose lovenox  CRP a bit better. Completed dexamethasone and Remdesivir course  Scheduled olanzapine nightly for related anxiety.   Toleratedvapotherm and PO intake as tolerated.   Pulmonary on board for comanagement.  Precautions in place  Had abrupt desats noted that was treated BiPAP   Repeat cultures obtained - no growth to date  Increasing WBC noted, started empiric Zosyn, Flagyl and Vancomycin today, 7/28  Code status: DNR  Developed high fever on 7/30, vancomycin level were subtherapeutic - changed to Zyvox  Intermittent fever but all cultures are NGTD and patient holding but not improving  Stopped Zyvox on 8/1 since all cultures remained negative  Slightly improved from yesterday and was able to be placed on Vapotherm and eat  Overnight and into this morning, he had desats on BIPAP and now he is only responsive to pain which is an abrupt change  I assessed him twice today with no improvement and I communicated with his wife via phone the changes that have transpired - and my concern that he will not survive this hospitalization and I offered her to visit (unfortunately she is COVID + and communicated to me that they have been talking all this time and she is aware of his status and wishes)  Inflammatory markers increasing and D-dimer high, restarted dexamethasone, lasix x 1 and prophylactic lovenox given   If patient does not improve, should transition to comfort care tomorrow and consult palliative care for family support  Wife does not want her  to suffer, already with morphine and ativan PRN for comfort in place

## 2020-08-05 NOTE — PLAN OF CARE
08/05/20 1507   Discharge Reassessment   Assessment Type Discharge Planning Reassessment   Provided patient/caregiver education on the expected discharge date and the discharge plan No   Do you have any problems affording any of your prescribed medications? TBD   Discharge Plan A Inpatient Hospice   Discharge Plan B Hospice/home   DME Needed Upon Discharge  none   Patient choice form signed by patient/caregiver N/A   Anticipated Discharge Disposition [facility]ospiceMedic   Can the patient/caregiver answer the patient profile reliably? No, cognitively impaired   How does the patient rate their overall health at the present time? Poor   Describe the patient's ability to walk at the present time. Does not walk or unable to take any steps at all   How often would a person be available to care for the patient? Whenever needed   Number of comorbid conditions (as recorded on the chart) Three   Post-Acute Status   Post-Acute Authorization Hospice   Hospice Status Family Barriers

## 2020-08-05 NOTE — ASSESSMENT & PLAN NOTE
2/2 COVID-19. Now with new infiltrates RML/RLL, new fevers concerning for superimposed aspiration/bacterial PNA. Blood/sputum cultures unrevealing.     - s/p 5 days of remdesivir +  dexamethasone  - Wean O2 as tolerated. Continuous NIPPV   - Discontinue zosyn as patient completed full 7 day course    Apparent decline in mental status.  Per Dr. Zamora, patient and family did not wish for aggressive intervention such as CPR or intubation should patient's condition deteriorate.  DNR reconfirmed by palliative care.  Continue with current management.  However, improvement has been marginal since admission.  Palliative care is having on going discussion in regard to appropriateness of transitioning to comfort care.

## 2020-08-05 NOTE — NURSING
[facility]  ICU Shift Summary  Date: 8/5/2020      COVID Test: (+)  Isolation: Airborne and Contact and Droplet     Prehospitalization: Home  Admit Date / LOS : 7/18/2020/ 18 days    Diagnosis: Acute hypoxemic respiratory failure    Consults:        Active: Palliative       Needed: N/A     Code Status: DNR   Advanced Directive: <no information>    LDA: BIPAP, Pollack, PICC and PIV       Central Lines/Site/Justification:Multiple GTTS       Urinary Cath/Order/Justification:Hospice/Palliative Care/Comfort Care and Critically Ill in the ICU and requiring intensive monitoring    Vasopressors/Infusions:    dexmedetomidine (PRECEDEX) infusion 0.399 mcg/kg/hr (08/03/20 0900)    morphine 2 mg/hr (08/05/20 0500)    norepinephrine bitartrate-D5W Stopped (07/31/20 0946)    pantoprazole 40 mg in dextrose 5 % 100 mL infusion (ready to mix system) 8 mg/hr (08/05/20 0500)          GOALS: Volume/ Hemodynamic: N/A                     RASS: 0  alert and calm    Pain Management: IV       Pain Controlled: yes     Rhythm: NSR    Respiratory Device: Vapotherm and Bipap    Oxygen Concentration (%):  [] 60             Most Recent SBT/ SAT: N/A       MOVE Screen: FAIL    VTE Prophylaxis: Pharm and Mechanical  Mobility: Bedrest  Stress Ulcer Prophylaxis: Yes    Dietary: NPO  Tolerance: no  /  Advancement: no    I & O (24h):    Intake/Output Summary (Last 24 hours) at 8/5/2020 0542  Last data filed at 8/5/2020 0515  Gross per 24 hour   Intake 562 ml   Output 1300 ml   Net -738 ml        Restraints: No    Significant Dates:  Post Op Date: N/A  Rescue Date: N/A  Imaging/ Diagnostics: N/A    Noteworthy Labs:  none    CBC/Anemia Labs: Coags:    Recent Labs   Lab 08/04/20 0440 08/05/20 0335   WBC 20.76* 23.63*   HGB 8.3* 8.0*   HCT 26.1* 26.1*    144*   MCV 99* 100*   RDW 15.0* 15.1*    No results for input(s): PT, INR, APTT in the last 168 hours.     Chemistries:   Recent Labs   Lab 08/04/20 0440 08/05/20 0335     146*   K 4.2 4.3    103   CO2 34* 38*   BUN 28* 30*   CREATININE 0.8 0.8   CALCIUM 8.4* 8.3*   MG 2.7* 2.8*   PHOS 2.3* 1.5*        Cardiac Enzymes: Ejection Fractions:    No results for input(s): CPK, CPKMB, MB, TROPONINI in the last 72 hours. Nuc Rest EF   Date Value Ref Range Status   06/11/2020 77  Final        POCT Glucose: HbA1c:    Recent Labs   Lab 08/04/20  0828 08/04/20  1126 08/04/20  1838   POCTGLUCOSE 247* 251* 277*    No results found for: HGBA1C        ICU LOS 17d 9h  Level of Care: OK to Transfer    Shift Summary/Plan for the shift: Wife will make decision to have pt transition to comfort care or transfer to inpatient hospice. Palliative Care is following. Pt is a DNR/DNI.  Pt remains on continuous IV Protonix and IV morphine with PRN IVP morphine for air hunger. Unable to transition pt from Bipap to Vapotherm due to pt not maintaining SaO2 > 92% (FiO2 at 60% while on Bipap). Pt is not responsive or following commands.

## 2020-08-05 NOTE — SUBJECTIVE & OBJECTIVE
Interval History: Agonal breathing on bipap 15/5 50% with oxygen saturations >95%. On morphine infusion for symptom management. Ongoing GOC with palliative and family.     Objective:     Vital Signs (Most Recent):  Temp: 97.2 °F (36.2 °C) (08/05/20 1600)  Pulse: 98 (08/05/20 1600)  Resp: 15 (08/05/20 1600)  BP: 131/60 (08/05/20 1600)  SpO2: 100 % (08/05/20 1600) Vital Signs (24h Range):  Temp:  [97.2 °F (36.2 °C)-98.3 °F (36.8 °C)] 97.2 °F (36.2 °C)  Pulse:  [] 98  Resp:  [] 15  SpO2:  [87 %-100 %] 100 %  BP: (129-192)/(58-80) 131/60     Weight: 76.1 kg (167 lb 12.3 oz)  Body mass index is 26.28 kg/m².      Intake/Output Summary (Last 24 hours) at 8/5/2020 1741  Last data filed at 8/5/2020 1600  Gross per 24 hour   Intake 629 ml   Output 1325 ml   Net -696 ml       Physical Exam  Vitals signs reviewed.   Constitutional:       Appearance: He is well-developed. He is ill-appearing.      Interventions: Face mask in place.   HENT:      Head: Normocephalic and atraumatic.   Neck:      Thyroid: No thyromegaly.      Trachea: No tracheal deviation.   Cardiovascular:      Rate and Rhythm: Normal rate and regular rhythm.      Heart sounds: Normal heart sounds. No murmur. No friction rub. No gallop.    Pulmonary:      Effort: Bradypnea, accessory muscle usage and respiratory distress present.      Breath sounds: Decreased air movement present. Decreased breath sounds present.      Comments: Agonal breathing  Abdominal:      General: Bowel sounds are decreased.      Palpations: Abdomen is soft.   Genitourinary:     Comments: Pollack in place.   Musculoskeletal: Normal range of motion.   Skin:     General: Skin is warm and dry.   Neurological:      Mental Status: He is unresponsive.      Sensory: No sensory deficit.      Comments: No withdrawal to pain in all extremities.         Vents:  Oxygen Concentration (%): 60 (08/05/20 0700)    Lines/Drains/Airways     Peripherally Inserted Central Catheter Line            PICC  Triple Lumen 07/30/20 2040 right brachial 5 days          Drain                 Urethral Catheter 07/28/20 1800 Latex 16 Fr. 7 days                Significant Labs:    CBC/Anemia Profile:  Recent Labs   Lab 08/04/20 0440 08/05/20  0335   WBC 20.76* 23.63*   HGB 8.3* 8.0*   HCT 26.1* 26.1*    144*   MCV 99* 100*   RDW 15.0* 15.1*        Chemistries:  Recent Labs   Lab 08/04/20 0440 08/05/20  0335    146*   K 4.2 4.3    103   CO2 34* 38*   BUN 28* 30*   CREATININE 0.8 0.8   CALCIUM 8.4* 8.3*   MG 2.7* 2.8*   PHOS 2.3* 1.5*       All pertinent labs within the past 24 hours have been reviewed.    Significant Imaging:  I have reviewed and interpreted all pertinent imaging results/findings within the past 24 hours.

## 2020-08-05 NOTE — UM SECONDARY REVIEW
[facility]    IP Extended Stay > 10  Hospital day # 18    Mr. York presented with shortness of breath and fever.  He was admitted for acute respiratory failure secondary to COVID-19. Placed on BiPAP. Code status DNR. Completed course of empiric antibiotics for elevated procalcitonin level, course of dexamethasone 6 mg daily and Remdesivir. Also on full dose lovenox for hypercoagulable state. Diuresed intermittently. He slowly improved and de-escalated to vapotherm NC. On 7/26, patient had melena and acute drop in Hgb from 16 to 12 in 24 hours. BP stable and respirations were not affected. Placed on pantoprazole IV 40 mg BID and held ASA/plavix which he uses for PVD. Repeat Hgb levels remained stable and amount of melena decreased. Given such high risk for thromboembolism, he continued on enoxaparin. Noted increased melena and lovenox stopped, and changed IV PPI to Q12 and increased H/H surveillance and GI consulted.  Transfused 2 units of packed red blood cells and PPI changed to infusion.  No further bleeding and H/H overall stable. Empiric Zosyn and vancomycin initiated for continued worsening leukocytosis.  Blood cultures no growth to date.  Sputum culture NGTD. Intermittent high fever and Vancomycin levels were subtherapeutic - Zyvox added and Vancomycin stopped. Zyvox stopped on 8/1. Mental status worsening. On continuous BiPAP and not responsive. Palliative care following.      Interval History: Not interactive or responsive. On continuous BiPAP.   O2 sats  88-98% O2 conc. Weaned from 100-60%  {[facility]  Review Outcome:[id number]}

## 2020-08-05 NOTE — ASSESSMENT & PLAN NOTE
Secondary to COVID-19 infection  Completed course of empiric antibiotics for elevated procalcitonin  High D-dimer was improving. Treated with full dose lovenox  CRP a bit better. Completed dexamethasone and Remdesivir course  Tolerated vapotherm and PO intake as tolerated.   Pulmonary on board for comanagement.  Had abrupt desats noted that was treated BiPAP   Repeat cultures obtained - no growth to date. Increasing WBC noted, started empiric Zosyn, Flagyl and Vancomycin 7/28  Developed high fever on 7/30, vancomycin level were subtherapeutic - changed to Zyvox  Intermittent fever but all cultures are NGTD and patient holding but not improving  Stopped Zyvox on 8/1 since all cultures remained negative  Worsening mental status. Remains on continuous BiPAP but not interactive  Inflammatory markers increasing and D-dimer high, restarted dexamethasone, lasix x 1 and prophylactic lovenox given   Palliative care consulted. Considering hospice options  Comfort meds available - on morphine gtt

## 2020-08-05 NOTE — PLAN OF CARE
08/05/20 1451   Medicare Message   Important Message from Medicare regarding Discharge Appeal Rights Explained to patient/caregiver;Given to patient/caregiver   Date IMM was signed 08/05/20   Time IMM was signed 1453       KAR spoke with pt wife Maria Lzu to explain the IMM. Pt wife wants a copy emailed to her at [email or web address]

## 2020-08-05 NOTE — SUBJECTIVE & OBJECTIVE
Interval History: Not interactive or responsive. On continuous BiPAP.     Review of Systems   Unable to perform ROS: Mental status change     Objective:     Vital Signs (Most Recent):  Temp: 98.3 °F (36.8 °C) (08/05/20 1100)  Pulse: 99 (08/05/20 1430)  Resp: 15 (08/05/20 1430)  BP: (!) 130/58 (08/05/20 1430)  SpO2: 99 % (08/05/20 1430) Vital Signs (24h Range):  Temp:  [97.8 °F (36.6 °C)-98.9 °F (37.2 °C)] 98.3 °F (36.8 °C)  Pulse:  [] 99  Resp:  [] 15  SpO2:  [87 %-99 %] 99 %  BP: (130-192)/(58-80) 130/58     Weight: 76.1 kg (167 lb 12.3 oz)  Body mass index is 26.28 kg/m².    Intake/Output Summary (Last 24 hours) at 8/5/2020 1507  Last data filed at 8/5/2020 1100  Gross per 24 hour   Intake 519 ml   Output 1250 ml   Net -731 ml      Physical Exam  Constitutional:       Comments: Not alert   HENT:      Head: Normocephalic and atraumatic.      Comments: BiPAP on  Cardiovascular:      Rate and Rhythm: Normal rate and regular rhythm.      Pulses: Normal pulses.      Heart sounds: Normal heart sounds.   Pulmonary:      Comments: Decreased breath sounds bilaterally  Abdominal:      General: Abdomen is flat. Bowel sounds are normal. There is no distension.      Tenderness: There is no abdominal tenderness. There is no guarding.   Genitourinary:     Comments: Pollack in place  Skin:     Comments: R arm PICC. Purple discoloration to L great toe   Neurological:      Comments: Not interactive. Does not follow commands or open eyes         Significant Labs: All pertinent labs within the past 24 hours have been reviewed.    Significant Imaging: I have reviewed and interpreted all pertinent imaging results/findings within the past 24 hours.

## 2020-08-05 NOTE — PROGRESS NOTES
[facility]  Pulmonology  Progress Note    Patient Name: Joo York  MRN: [id number]  Admission Date: 7/18/2020  Hospital Length of Stay: 18 days  Code Status: DNR  Attending Provider: Melissa Lyons MD  Primary Care Provider: Keegan Watson MD   Principal Problem: Acute hypoxemic respiratory failure    Subjective:     Interval History: Agonal breathing on bipap 15/5 50% with oxygen saturations >95%. On morphine infusion for symptom management. Ongoing GOC with palliative and family.     Objective:     Vital Signs (Most Recent):  Temp: 97.2 °F (36.2 °C) (08/05/20 1600)  Pulse: 98 (08/05/20 1600)  Resp: 15 (08/05/20 1600)  BP: 131/60 (08/05/20 1600)  SpO2: 100 % (08/05/20 1600) Vital Signs (24h Range):  Temp:  [97.2 °F (36.2 °C)-98.3 °F (36.8 °C)] 97.2 °F (36.2 °C)  Pulse:  [] 98  Resp:  [] 15  SpO2:  [87 %-100 %] 100 %  BP: (129-192)/(58-80) 131/60     Weight: 76.1 kg (167 lb 12.3 oz)  Body mass index is 26.28 kg/m².      Intake/Output Summary (Last 24 hours) at 8/5/2020 1741  Last data filed at 8/5/2020 1600  Gross per 24 hour   Intake 629 ml   Output 1325 ml   Net -696 ml       Physical Exam  Vitals signs reviewed.   Constitutional:       Appearance: He is well-developed. He is ill-appearing.      Interventions: Face mask in place.   HENT:      Head: Normocephalic and atraumatic.   Neck:      Thyroid: No thyromegaly.      Trachea: No tracheal deviation.   Cardiovascular:      Rate and Rhythm: Normal rate and regular rhythm.      Heart sounds: Normal heart sounds. No murmur. No friction rub. No gallop.    Pulmonary:      Effort: Bradypnea, accessory muscle usage and respiratory distress present.      Breath sounds: Decreased air movement present. Decreased breath sounds present.      Comments: Agonal breathing  Abdominal:      General: Bowel sounds are decreased.      Palpations: Abdomen is soft.   Genitourinary:     Comments: Pollack in place.   Musculoskeletal: Normal range of  motion.   Skin:     General: Skin is warm and dry.   Neurological:      Mental Status: He is unresponsive.      Sensory: No sensory deficit.      Comments: No withdrawal to pain in all extremities.         Vents:  Oxygen Concentration (%): 60 (08/05/20 0700)    Lines/Drains/Airways     Peripherally Inserted Central Catheter Line            PICC Triple Lumen 07/30/20 2040 right brachial 5 days          Drain                 Urethral Catheter 07/28/20 1800 Latex 16 Fr. 7 days                Significant Labs:    CBC/Anemia Profile:  Recent Labs   Lab 08/04/20 0440 08/05/20  0335   WBC 20.76* 23.63*   HGB 8.3* 8.0*   HCT 26.1* 26.1*    144*   MCV 99* 100*   RDW 15.0* 15.1*        Chemistries:  Recent Labs   Lab 08/04/20 0440 08/05/20  0335    146*   K 4.2 4.3    103   CO2 34* 38*   BUN 28* 30*   CREATININE 0.8 0.8   CALCIUM 8.4* 8.3*   MG 2.7* 2.8*   PHOS 2.3* 1.5*       All pertinent labs within the past 24 hours have been reviewed.    Significant Imaging:  I have reviewed and interpreted all pertinent imaging results/findings within the past 24 hours.    Assessment/Plan:     * Acute hypoxemic respiratory failure  2/2 COVID-19. Now with new infiltrates RML/RLL, new fevers concerning for superimposed aspiration/bacterial PNA. Blood/sputum cultures unrevealing.     - s/p 5 days of remdesivir +  dexamethasone  - Wean O2 as tolerated. Continuous NIPPV   - Discontinue zosyn as patient completed full 7 day course    Apparent decline in mental status.  Per Dr. Zamora, patient and family did not wish for aggressive intervention such as CPR or intubation should patient's condition deteriorate.  DNR reconfirmed by palliative care.  Continue with current management.  However, improvement has been marginal since admission.  Palliative care is having on going discussion in regard to appropriateness of transitioning to comfort care.      Shock  Resolved. Off vasopressors     GIB (gastrointestinal bleeding)  Melena  decreasing. On BID PPI. Unable to tolerate EGD in setting of significant respiratory compromise. Hb holding and no additional blood products in last 24 hours.     -- PPI   -- Transfusion threshold < 7 unless hemorrhage/instability.  Last transfusion was 7/29    COVID-19  --see acute hypoxemic resp fx      PPI ppx: PPI infusion  VTE ppx: enoxaparin  Lines: PICC, casiano  Diet: NPO  PT/OT/SLP: N/A    Above plan discussed with Dr. Izquierdo.     I have spent 35 min with this patient, with over 50% of this time spent coordinating care and speaking with the family       Roz Burkett PA-C  Pulmonology  [facility]

## 2020-08-05 NOTE — PLAN OF CARE
POC reviewed with patient at bedside, family over the phone. POC remains for decision on home vs inpatient hospice per wife. Decision to be made tonight after conversation with family. Bipap continued this shift with agonal bretahing noted, sats, HR, other VS per flowsheets. Morphine gtt continued this shift with ease of breathing noted. All questions/concerns addressed, WCTM.

## 2020-08-05 NOTE — PROGRESS NOTES
[facility] Hospital Medicine  Progress Note    Patient Name: Joo York  MRN: [id number]  Patient Class: IP- Inpatient   Admission Date: 7/18/2020  Length of Stay: 18 days  Attending Physician: Melissa Lyons MD  Primary Care Provider: Keegan Watson MD        Subjective:     Principal Problem:Acute hypoxemic respiratory failure        HPI:  Joo York is a 80 y.o. male with HTN who presented with with report of shortness of breath, fever, chills last 3 days.  He was tested for COVID-19 2 days ago and positive.  He said dyspnea with exertion and a cough that started today.  Denies hemoptysis, stridor, or night sweats.  No nausea, vomiting diarrhea reported.  Patient is a former smoker.  Denies home oxygen use.  Code status discussed in the most remain a DNR DNI.  The family is aware of these wishes.  History is limited to respiratory distress as patient is speaking in short sentences.    In the emergency department routine laboratory studies and chest x-ray obtained.  Evidence of bilateral pneumonia.  Was supplemental oxygen and dexamethasone.  O2 sat 67% air prior to oxygen supplementation.  Okay to escalate to CPAP/BiPAP as.  Does not want to be intubated.    Hospital medicine has been asked to admit to inpatient in the ICU for further evaluation and treatment.     Overview/Hospital Course:  Mr. York presented with shortness of breath and fever.  He was admitted for acute respiratory failure secondary to COVID-19. Placed on BiPAP. Code status DNR. Completed course of empiric antibiotics for elevated procalcitonin level, course of dexamethasone 6 mg daily and Remdesivir. Also on full dose lovenox for hypercoagulable state. Diuresed intermittently. He slowly improved and de-escalated to vapotherm NC. On 7/26, patient had melena and acute drop in Hgb from 16 to 12 in 24 hours. BP stable and respirations were not affected. Placed on pantoprazole IV 40 mg BID and held ASA/plavix which he uses  for PVD. Repeat Hgb levels remained stable and amount of melena decreased. Given such high risk for thromboembolism, he continued on enoxaparin. Noted increased melena and lovenox stopped, and changed IV PPI to Q12 and increased H/H surveillance and GI consulted.  Transfused 2 units of packed red blood cells and PPI changed to infusion.  No further bleeding and H/H overall stable. Empiric Zosyn and vancomycin initiated for continued worsening leukocytosis.  Blood cultures no growth to date.  Sputum culture NGTD. Intermittent high fever and Vancomycin levels were subtherapeutic - Zyvox added and Vancomycin stopped. Zyvox stopped on 8/1. Mental status worsening. On continuous BiPAP and not responsive. Palliative care following; considering hospice.     Interval History: Not interactive or responsive. On continuous BiPAP.     Review of Systems   Unable to perform ROS: Mental status change     Objective:     Vital Signs (Most Recent):  Temp: 98.3 °F (36.8 °C) (08/05/20 1100)  Pulse: 99 (08/05/20 1430)  Resp: 15 (08/05/20 1430)  BP: (!) 130/58 (08/05/20 1430)  SpO2: 99 % (08/05/20 1430) Vital Signs (24h Range):  Temp:  [97.8 °F (36.6 °C)-98.9 °F (37.2 °C)] 98.3 °F (36.8 °C)  Pulse:  [] 99  Resp:  [] 15  SpO2:  [87 %-99 %] 99 %  BP: (130-192)/(58-80) 130/58     Weight: 76.1 kg (167 lb 12.3 oz)  Body mass index is 26.28 kg/m².    Intake/Output Summary (Last 24 hours) at 8/5/2020 1507  Last data filed at 8/5/2020 1100  Gross per 24 hour   Intake 519 ml   Output 1250 ml   Net -731 ml      Physical Exam  Constitutional:       Comments: Not alert   HENT:      Head: Normocephalic and atraumatic.      Comments: BiPAP on  Cardiovascular:      Rate and Rhythm: Normal rate and regular rhythm.      Pulses: Normal pulses.      Heart sounds: Normal heart sounds.   Pulmonary:      Comments: Decreased breath sounds bilaterally  Abdominal:      General: Abdomen is flat. Bowel sounds are normal. There is no distension.       Tenderness: There is no abdominal tenderness. There is no guarding.   Genitourinary:     Comments: Pollack in place  Skin:     Comments: R arm PICC. Purple discoloration to L great toe   Neurological:      Comments: Not interactive. Does not follow commands or open eyes         Significant Labs: All pertinent labs within the past 24 hours have been reviewed.    Significant Imaging: I have reviewed and interpreted all pertinent imaging results/findings within the past 24 hours.      Assessment/Plan:      * Acute hypoxemic respiratory failure  Secondary to COVID-19 infection  Completed course of empiric antibiotics for elevated procalcitonin  High D-dimer was improving. Treated with full dose lovenox  CRP a bit better. Completed dexamethasone and Remdesivir course  Tolerated vapotherm and PO intake as tolerated.   Pulmonary on board for comanagement.  Had abrupt desats noted that was treated BiPAP   Repeat cultures obtained - no growth to date. Increasing WBC noted, started empiric Zosyn, Flagyl and Vancomycin 7/28  Developed high fever on 7/30, vancomycin level were subtherapeutic - changed to Zyvox  Intermittent fever but all cultures are NGTD and patient holding but not improving  Stopped Zyvox on 8/1 since all cultures remained negative  Worsening mental status. Remains on continuous BiPAP but not interactive  Inflammatory markers increasing and D-dimer high, restarted dexamethasone, lasix x 1 and prophylactic lovenox given   Palliative care consulted. Considering hospice options  Comfort meds available - on morphine gtt    Palliative care encounter  Code status DNR  Discussing hospice      Shock  Weaned off pressor as of 7/31  Holding BP meds      Anemia due to acute blood loss  As discussed above  Will continue monitor    GIB (gastrointestinal bleeding)  Occult blood positive. Hgb downtrended. Holding aspirin and clopidogrel.   Was on enoxaparin for hypercoagulable state with COVID-19 and IV PPI Q12  H/H with drop  noted and noted large melanotic stool  Consulted GI, however hypoxemia will make patient high risk for endoscopy  Status post transfusion 2 U PRBC on 7/29  Case discussed with GI, Pulm and patient - plan for conservative treatment and avoid procedure unless it becomes emergent. Continues PPI gtt  No further bleeding, and H&H has been stable  Will start Lovenox, prophylactic dose and monitor closely    COVID-19  As above    Elevated troponin I level  Trended. No reported chest pain.      Lower respiratory tract infection due to COVID-19 virus  As discussed above    Multifocal pneumonia  As discussed above    Essential hypertension  Blood pressure medication will be on hold given hypotension and inability to swallow PO    PAD (peripheral artery disease)  Holding ASA and plavix due to prior bleeding    Dyslipidemia  Currently not on medications.    Will hold off on statin as his liver enzymes are still elevated although better. Also not able to take PO currently      VTE Risk Mitigation (From admission, onward)         Ordered     enoxaparin injection 40 mg  Every 24 hours      08/03/20 2317     IP VTE HIGH RISK PATIENT  Once      07/18/20 2106     Place sequential compression device  Until discontinued      07/18/20 2106                Critical care time spent on the evaluation and treatment of severe organ dysfunction, review of pertinent labs and imaging studies, discussions with consulting providers and discussions with patient/family: 40 minutes.      Melissa Lyons MD  Department of Hospital Medicine   [facility]

## 2020-08-05 NOTE — ASSESSMENT & PLAN NOTE
Occult blood positive. Hgb downtrended. Holding aspirin and clopidogrel.   Was on enoxaparin for hypercoagulable state with COVID-19 and IV PPI Q12  H/H with drop noted and noted large melanotic stool  Consulted GI, however hypoxemia will make patient high risk for endoscopy  Status post transfusion 2 U PRBC on 7/29  Case discussed with GI, Pulm and patient - plan for conservative treatment and avoid procedure unless it becomes emergent. Continues PPI gtt  No further bleeding, and H&H has been stable  Will start Lovenox, prophylactic dose and monitor closely

## 2020-08-05 NOTE — PROGRESS NOTES
Follow up phone call to patient's wife, Maria Luz, to discuss goals of care.  Maria Luz is positive for covid and this significantly limits her ability to visit patient, although it is extremely important to her to see him one more time before the end of his life.  She was hesitant to contact a family member who is a hospice nurse but the eventually spoke and she agrees with inpatient hospice option.  Maria Luz and her family will be allowed to visit with appropriate PPE and that is what is most important to her.  She continues to consider inpatient hospice and states that she will finalize her decision after speaking to her children tonight.

## 2020-08-06 PROBLEM — Z51.5 COMFORT MEASURES ONLY STATUS: Status: ACTIVE | Noted: 2020-01-01

## 2020-08-06 NOTE — ASSESSMENT & PLAN NOTE
Code status DNR  Comfort care only  Family coming tomorrow. Will remove BiPAP.   Continue morphine gtt

## 2020-08-06 NOTE — PLAN OF CARE
08/06/20 1205   Post-Acute Status   Post-Acute Authorization Placement;Hospice   Post-Acute Placement Status Referrals Sent   Hospice Status Pending Medical Review   Patient choice form signed by patient/caregiver   (Palliative Care Nurse spoke with family regarding provider available to accept COVID+ IP)   Discharge Delays None known at this time   Susy Rose LMSW, CCM  8/6/20

## 2020-08-06 NOTE — PLAN OF CARE
[facility]    HOSPICE  ORDERS    08/06/2020    Admit to Hospice:  Inpatient Service     Diagnoses:   Active Hospital Problems    Diagnosis  POA    *Acute hypoxemic respiratory failure [J96.01]  Yes    Palliative care encounter [Z51.5]  Not Applicable    Shock [R57.9]  No    Anemia due to acute blood loss [D62]  No    GIB (gastrointestinal bleeding) [K92.2]  No    COVID-19 [U07.1]  Yes    Multifocal pneumonia [J18.9]  Yes    Lower respiratory tract infection due to COVID-19 virus [U07.1, J22]  Yes    Essential hypertension [I10]  Yes    Elevated troponin I level [R79.89]  Yes    PAD (peripheral artery disease) [I73.9]  Yes    Dyslipidemia [E78.5]  Yes      Resolved Hospital Problems   No resolved problems to display.       Hospice Qualifying Diagnoses:        Patient has a life expectancy < 6 months due to:  1) Primary Hospice Diagnosis: respiratory failure due to COVID19 pneumonia  2) Comorbid Conditions Contributing to Decline: HTN    Vital Signs: Routine per Hospice Protocol.    Code Status: DNR     Allergies: Review of patient's allergies indicates:  No Known Allergies    Diet: NPO    Activities: As tolerated    Nursing: Per Hospice Routine    Pollack Care: Empty Pollack bag Q shift and PRN.  Change Pollack every month.    Routine Skin for Bedridden Patients: Apply moisture barrier cream to all skin folds and   wet areas in perineal area daily and after baths and all bowel movements.    PICC Care:   Scrub the Hub: Prior to accessing the line, always perform a 30 second alcohol scrub  Each lumen of the central line is to be flushed at least daily with 10 mL Normal Saline and 3 mL Heparin flush (100 units/mL)  Skilled Nurse (SN) may draw blood from IV access    Oxygen: BiPAP 18/8 50%      Medications: comfort care medications as determined by hospice MD.     There are no discharge medications for this patient.         Future Orders:  Hospice Medical Director may dictate new orders for comfortable  care measures & sign death certificate.      _________________________________  Melissa Lyons MD  08/06/2020

## 2020-08-06 NOTE — PROGRESS NOTES
[facility] Hospital Medicine  Progress Note    Patient Name: Joo York  MRN: [id number]  Patient Class: IP- Inpatient   Admission Date: 7/18/2020  Length of Stay: 19 days  Attending Physician: Melissa Lyons MD  Primary Care Provider: Keegan Watson MD        Subjective:     Principal Problem:Acute hypoxemic respiratory failure        HPI:  Joo York is a 80 y.o. male with HTN who presented with with report of shortness of breath, fever, chills last 3 days.  He was tested for COVID-19 2 days ago and positive.  He said dyspnea with exertion and a cough that started today.  Denies hemoptysis, stridor, or night sweats.  No nausea, vomiting diarrhea reported.  Patient is a former smoker.  Denies home oxygen use.  Code status discussed in the most remain a DNR DNI.  The family is aware of these wishes.  History is limited to respiratory distress as patient is speaking in short sentences.    In the emergency department routine laboratory studies and chest x-ray obtained.  Evidence of bilateral pneumonia.  Was supplemental oxygen and dexamethasone.  O2 sat 67% air prior to oxygen supplementation.  Okay to escalate to CPAP/BiPAP as.  Does not want to be intubated.    Hospital medicine has been asked to admit to inpatient in the ICU for further evaluation and treatment.     Overview/Hospital Course:  Mr. York presented with shortness of breath and fever.  He was admitted for acute respiratory failure secondary to COVID-19. Placed on BiPAP. Code status DNR. Completed course of empiric antibiotics for elevated procalcitonin level, course of dexamethasone 6 mg daily and Remdesivir. Also on full dose lovenox for hypercoagulable state. Diuresed intermittently. He slowly improved and de-escalated to vapotherm NC. On 7/26, patient had melena and acute drop in Hgb from 16 to 12 in 24 hours. BP stable and respirations were not affected. Placed on pantoprazole IV 40 mg BID and held ASA/plavix which he uses  for PVD. Repeat Hgb levels remained stable and amount of melena decreased. Given such high risk for thromboembolism, he continued on enoxaparin. Noted increased melena and lovenox stopped, and changed IV PPI to Q12 and increased H/H surveillance and GI consulted.  Transfused 2 units of packed red blood cells and PPI changed to infusion.  No further bleeding and H/H overall stable. Empiric Zosyn and vancomycin initiated for continued worsening leukocytosis.  Blood cultures no growth to date.  Sputum culture NGTD. Intermittent high fever and Vancomycin levels were subtherapeutic - Zyvox added and Vancomycin stopped. Zyvox stopped on 8/1. Mental status worsening. On continuous BiPAP and not responsive. Morphine gtt started for agonal breathing. Palliative care following. Inpatient hospice is not able to take him on BiPAP. Plan for daughter and wife to come to hospital tomorrow and transition to full comfort measures then.     No new subjective & objective note has been filed under this hospital service since the last note was generated.      Assessment/Plan:      * Acute hypoxemic respiratory failure  Secondary to COVID-19 infection  Completed course of empiric antibiotics for elevated procalcitonin  High D-dimer was improving. Treated with full dose lovenox  CRP a bit better. Completed dexamethasone and Remdesivir course  Tolerated vapotherm and PO intake as tolerated.   Pulmonary on board for comanagement.  Had abrupt desats noted that was treated BiPAP   Repeat cultures obtained - no growth to date. Increasing WBC noted, started empiric Zosyn, Flagyl and Vancomycin 7/28  Developed high fever on 7/30, vancomycin level were subtherapeutic - changed to Zyvox  Intermittent fever but all cultures are NGTD and patient holding but not improving  Stopped Zyvox on 8/1 since all cultures remained negative  Worsening mental status. Remains on continuous BiPAP but not interactive  Inflammatory markers increasing and D-dimer  high, restarted dexamethasone, lasix x 1 and prophylactic lovenox given   Palliative care consulted. Planning for family to come tomorrow and withdraw BiPAP at that time.   Comfort meds available - on morphine gtt    Comfort measures only status  Morphine gtt  Continue BiPAP until family arrives tomorrow      Palliative care encounter  Code status DNR  Comfort care only  Family coming tomorrow. Will remove BiPAP.   Continue morphine gtt      Shock  Weaned off pressor as of 7/31  Holding BP meds      Anemia due to acute blood loss  As discussed above      GIB (gastrointestinal bleeding)  Occult blood positive. Hgb downtrended. Holding aspirin and clopidogrel.   Was on enoxaparin for hypercoagulable state with COVID-19 and IV PPI Q12  H/H with drop noted and noted large melanotic stool  Consulted GI, however hypoxemia will make patient high risk for endoscopy  Status post transfusion 2 U PRBC on 7/29  Case discussed with GI, Pulm and patient - plan for conservative treatment and avoid procedure unless it becomes emergent. Continues PPI gtt  No further bleeding, and H&H has been stable      COVID-19  As above    Elevated troponin I level  Trended. No reported chest pain.      Lower respiratory tract infection due to COVID-19 virus  As discussed above    Multifocal pneumonia  As discussed above    Essential hypertension  Blood pressure medication will be on hold given hypotension and inability to swallow PO    PAD (peripheral artery disease)  Holding ASA and plavix due to prior bleeding    Dyslipidemia  Currently not on medications.    Will hold off on statin as his liver enzymes are still elevated although better. Also not able to take PO currently      VTE Risk Mitigation (From admission, onward)         Ordered     IP VTE HIGH RISK PATIENT  Once      07/18/20 2106     Place sequential compression device  Until discontinued      07/18/20 2106                Critical care time spent on the evaluation and treatment of  severe organ dysfunction, review of pertinent labs and imaging studies, discussions with consulting providers and discussions with patient/family: 40 minutes.      Melissa Lyons MD  Department of Hospital Medicine   [facility]

## 2020-08-06 NOTE — PROGRESS NOTES
Plan:   -Transfer to inpatient hospice ([facility])  -Shaina completed    Follow up palliative phone call to patient's wife, Maria Luz.  She was tearful and discussed the conversation she had with her children last night.  They have decided that they would like for patient to be transferred to inpatient hospice and request [facility]. Now that she has made the decision and come to terms that patient is at the end of his life, she would like for transfer to happen as soon as possible as the family would like to see him comfortable there. Reviewed Shaina with Maria Luz and she confirmed DNR/comfort care. Shaina completed and on file. Time was allowed for her to express emotions and emotional support was provided. Discussed with KAR Jain and Dr. Lyons.      Total time: 55 minutes  >50% of 35 min visit spent in chart review, face to face/telephone discussion of goals of care,  symptom assessment, coordination of care and emotional support. Additional 20 minutes in advance care planning.       Addendum: 1200:  Received call from Angelina from [facility] who states that they will be unable to accept patient with Bipap.  She was not able to provide further information.  Discussed with patient's wife, Maria Luz, who is aware that we have been unable to successfully wean bipap so far. She would like to begin the transition to full comfort care tomorrow (her children can be present, her daughter driving in from [place]) but is ok with discontinuing other treatments unless comfort focused.   She asks that bipap stays in place for now and palliative will call her tomorrow at 9am to determine time of visit tomorrow. Time allowed for her to express feelings and concerns and emotional support provided.

## 2020-08-06 NOTE — PLAN OF CARE
Patient is on bipap. Currently infusing with morphine. Patient has been made Comfort Care.   Problem: Fall Injury Risk  Goal: Absence of Fall and Fall-Related Injury  Outcome: Ongoing, Progressing     Problem: Adult Inpatient Plan of Care  Goal: Plan of Care Review  Outcome: Ongoing, Progressing  Goal: Patient-Specific Goal (Individualization)  Outcome: Ongoing, Progressing  Goal: Absence of Hospital-Acquired Illness or Injury  Outcome: Ongoing, Progressing  Goal: Optimal Comfort and Wellbeing  Outcome: Ongoing, Progressing  Goal: Readiness for Transition of Care  Outcome: Ongoing, Progressing  Goal: Rounds/Family Conference  Outcome: Ongoing, Progressing     Problem: Fluid Imbalance (Pneumonia)  Goal: Fluid Balance  Outcome: Ongoing, Progressing     Problem: Infection (Pneumonia)  Goal: Resolution of Infection Signs/Symptoms  Outcome: Ongoing, Progressing     Problem: Respiratory Compromise (Pneumonia)  Goal: Effective Oxygenation and Ventilation  Outcome: Ongoing, Progressing     Problem: Skin Injury Risk Increased  Goal: Skin Health and Integrity  Outcome: Ongoing, Progressing     Problem: Coping Ineffective  Goal: Effective Coping  Outcome: Ongoing, Progressing     Problem: Infection  Goal: Infection Symptom Resolution  Outcome: Ongoing, Progressing     Problem: Palliative Care  Goal: Enhanced Quality of Life  Outcome: Ongoing, Progressing

## 2020-08-06 NOTE — DISCHARGE SUMMARY
[facility] Hospital Medicine  Discharge Summary      Patient Name: Joo York  MRN: [id number]  Admission Date: 7/18/2020  Hospital Length of Stay: 19 days  Discharge Date and Time:  08/06/2020 9:36 AM  Attending Physician: Melissa Lyons MD   Discharging Provider: Melissa Lyons MD  Primary Care Provider: Keegan Watson MD      HPI:   Joo York is a 80 y.o. male with HTN who presented with with report of shortness of breath, fever, chills last 3 days.  He was tested for COVID-19 2 days ago and positive.  He said dyspnea with exertion and a cough that started today.  Denies hemoptysis, stridor, or night sweats.  No nausea, vomiting diarrhea reported.  Patient is a former smoker.  Denies home oxygen use.  Code status discussed in the most remain a DNR DNI.  The family is aware of these wishes.  History is limited to respiratory distress as patient is speaking in short sentences.    In the emergency department routine laboratory studies and chest x-ray obtained.  Evidence of bilateral pneumonia.  Was supplemental oxygen and dexamethasone.  O2 sat 67% air prior to oxygen supplementation.  Okay to escalate to CPAP/BiPAP as.  Does not want to be intubated.    Hospital medicine has been asked to admit to inpatient in the ICU for further evaluation and treatment.     * No surgery found *      Hospital Course:   Mr. York presented with shortness of breath and fever.  He was admitted for acute respiratory failure secondary to COVID-19. Placed on BiPAP. Code status DNR. Completed course of empiric antibiotics for elevated procalcitonin level, course of dexamethasone 6 mg daily and Remdesivir. Also on full dose lovenox for hypercoagulable state. Diuresed intermittently. He slowly improved and de-escalated to vapotherm NC. On 7/26, patient had melena and acute drop in Hgb from 16 to 12 in 24 hours. BP stable and respirations were not affected. Placed on pantoprazole IV 40 mg BID and held  ASA/plavix which he uses for PVD. Repeat Hgb levels remained stable and amount of melena decreased. Given such high risk for thromboembolism, he continued on enoxaparin. Noted increased melena and lovenox stopped, and changed IV PPI to Q12 and increased H/H surveillance and GI consulted.  Transfused 2 units of packed red blood cells and PPI changed to infusion.  No further bleeding and H/H overall stable. Empiric Zosyn and vancomycin initiated for continued worsening leukocytosis.  Blood cultures no growth to date.  Sputum culture NGTD. Intermittent high fever and Vancomycin levels were subtherapeutic - Zyvox added and Vancomycin stopped. Zyvox stopped on 8/1. Mental status worsening. On continuous BiPAP and not responsive. Morphine gtt started for agonal breathing. Palliative care following. Family has decided on inpatient hospice. Discharged to inpatient hospice.      Consults:   Consults (From admission, onward)        Status Ordering Provider     Inpatient consult to Gastroenterology  Once     Provider:  Louis Ceballos MD    Completed ALEJANDRA RAJAN     Inpatient consult to Palliative Care  Once     Provider:  Carie Macias NP    Completed STACEY SEO     Inpatient [facility] team ([facility])  Once     Provider:  (Not yet assigned)    Completed ALEJANDRA RAJAN     Inpatient consult to PICC team ([facility])  Once     Provider:  (Not yet assigned)    Completed AJ MENSAH     Inpatient consult to Pulmonology  Once     Provider:  Bradley Law MD    Completed WILL EDWARDS     Inpatient consult to Social Work  Once     Provider:  (Not yet assigned)    Ordered YASMINE CUTLER     Inpatient consult to Spiritual Care  Once     Provider:  (Not yet assigned)    Completed STACEY SEO          No new Assessment & Plan notes have been filed under this hospital service since the last note was generated.  Service: Hospital Medicine    Final Active Diagnoses:    Diagnosis Date Noted POA    PRINCIPAL  PROBLEM:  Acute hypoxemic respiratory failure [J96.01] 07/19/2020 Yes    Palliative care encounter [Z51.5]  Not Applicable    Shock [R57.9] 07/31/2020 No    Anemia due to acute blood loss [D62] 07/28/2020 No    GIB (gastrointestinal bleeding) [K92.2] 07/26/2020 No    COVID-19 [U07.1] 07/21/2020 Yes    Multifocal pneumonia [J18.9] 07/18/2020 Yes    Lower respiratory tract infection due to COVID-19 virus [U07.1, J22] 07/18/2020 Yes    Essential hypertension [I10] 01/24/2020 Yes    Elevated troponin I level [R79.89] 01/24/2020 Yes    PAD (peripheral artery disease) [I73.9] 01/23/2020 Yes    Dyslipidemia [E78.5] 09/11/2014 Yes      Problems Resolved During this Admission:       Discharged Condition: poor    Disposition: Hospice/Medical Facility    Follow Up: with inpatient hospice    Patient Instructions:      Notify your health care provider if you experience any of the following:   Order Comments: Notify hospice of any discomfort     Activity as tolerated       Significant Diagnostic Studies: Labs: All labs within the past 24 hours have been reviewed    Pending Diagnostic Studies:     None         Medications:  Reconciled Home Medications:      Medication List      STOP taking these medications    amLODIPine 5 MG tablet  Commonly known as: NORVASC     aspirin 81 MG Chew     clopidogreL 75 mg tablet  Commonly known as: PLAVIX     famotidine 20 MG tablet  Commonly known as: PEPCID     ONE-A-DAY MEN'S 50 PLUS ORAL     PROSTATE HEALTH FORMULA ORAL     saw palmetto 500 MG capsule     tamsulosin 0.4 mg Cap  Commonly known as: FLOMAX            Indwelling Lines/Drains at time of discharge:   Lines/Drains/Airways     Peripherally Inserted Central Catheter Line            PICC Triple Lumen 07/30/20 2040 right brachial 6 days          Drain                 Urethral Catheter 07/28/20 1800 Latex 16 Fr. 8 days                Time spent on the discharge of patient: 40 minutes  Patient was seen and examined on the date of  discharge and determined to be suitable for discharge.      Melissa Lyons MD  Department of Hospital Medicine  [facility]

## 2020-08-06 NOTE — PLAN OF CARE
Patient remains in the ICU on BiPaP 50% O2. O2 sat 94-97%. NSR. Morphine gtt. Patient unresponsive to voice, will arouse to painful stimuli. FC remains in the place with good UO. Accu checks q 6 hours. Wife will make decision in AM regarding hospice. Plan of care reviewed with patient. No skin tears, falls or injuries during shift. Precautions met for all.

## 2020-08-06 NOTE — ASSESSMENT & PLAN NOTE
Secondary to COVID-19 infection  Completed course of empiric antibiotics for elevated procalcitonin  High D-dimer was improving. Treated with full dose lovenox  CRP a bit better. Completed dexamethasone and Remdesivir course  Tolerated vapotherm and PO intake as tolerated.   Pulmonary on board for comanagement.  Had abrupt desats noted that was treated BiPAP   Repeat cultures obtained - no growth to date. Increasing WBC noted, started empiric Zosyn, Flagyl and Vancomycin 7/28  Developed high fever on 7/30, vancomycin level were subtherapeutic - changed to Zyvox  Intermittent fever but all cultures are NGTD and patient holding but not improving  Stopped Zyvox on 8/1 since all cultures remained negative  Worsening mental status. Remains on continuous BiPAP but not interactive  Inflammatory markers increasing and D-dimer high, restarted dexamethasone, lasix x 1 and prophylactic lovenox given   Palliative care consulted. Planning for family to come tomorrow and withdraw BiPAP at that time.   Comfort meds available - on morphine gtt

## 2020-08-06 NOTE — PLAN OF CARE
08/06/20 1311   Post-Acute Status   Post-Acute Placement Status Awaiting Internal Medical Clearance   Hospice Status Awaiting Clarification of Orders  (Facility unable to accept patient on BiPAP.)   Discharge Delays (!) Other   Discharge will be delayed until tomorrow as provider unable to accept patient on BiPAP. NP has asked family to visit patient tomorrow after which time they will decide if patient will discharge to hospice.  SW will continue to followup .    SW updated [facility] representative, Angelina.    Susy Rose LMSW,[facility]  8/6/20

## 2020-08-06 NOTE — ASSESSMENT & PLAN NOTE
Occult blood positive. Hgb downtrended. Holding aspirin and clopidogrel.   Was on enoxaparin for hypercoagulable state with COVID-19 and IV PPI Q12  H/H with drop noted and noted large melanotic stool  Consulted GI, however hypoxemia will make patient high risk for endoscopy  Status post transfusion 2 U PRBC on 7/29  Case discussed with GI, Pulm and patient - plan for conservative treatment and avoid procedure unless it becomes emergent. Continues PPI gtt  No further bleeding, and H&H has been stable

## 2020-08-06 NOTE — NURSING
[facility]  ICU Shift Summary  Date: 8/6/2020      COVID Test: (+)  Isolation: Airborne and Contact and Droplet     Prehospitalization: Home  Admit Date / LOS : 7/18/2020/ 19 days    Diagnosis: Acute hypoxemic respiratory failure    Consults:        Active: Palliative and Pulm CC       Needed: N/A     Code Status: DNR   Advanced Directive: <no information>    LDA: BIPAP, Pollack and PICC       Central Lines/Site/Justification:Multiple GTTS       Urinary Cath/Order/Justification:Critically Ill in the ICU and requiring intensive monitoring    Vasopressors/Infusions:    morphine 2 mg/hr (08/06/20 0400)          GOALS: Volume/ Hemodynamic: N/A                     RASS: -4 deep sedation; no response to physical stimulation    Pain Management: IV       Pain Controlled: yes     Rhythm: NSR    Respiratory Device: Bipap    Oxygen Concentration (%):  [50-60] 50             Most Recent SBT/ SAT: Fail       MOVE Screen: FAIL    VTE Prophylaxis: Pharm  Mobility: Bedrest  Stress Ulcer Prophylaxis: Yes    Dietary: NPO  Tolerance: no  /  Advancement: no    I & O (24h):    Intake/Output Summary (Last 24 hours) at 8/6/2020 0452  Last data filed at 8/6/2020 0400  Gross per 24 hour   Intake 326.67 ml   Output 650 ml   Net -323.33 ml        Restraints: No    Significant Dates:  Post Op Date: N/A  Rescue Date: N/A  Imaging/ Diagnostics: N/A    Noteworthy Labs:  Na+ 149    CBC/Anemia Labs: Coags:    Recent Labs   Lab 08/05/20  0335 08/06/20  0255   WBC 23.63* 25.89*   HGB 8.0* 8.2*   HCT 26.1* 28.5*   * 133*   * 105*   RDW 15.1* 15.0*    No results for input(s): PT, INR, APTT in the last 168 hours.     Chemistries:   Recent Labs   Lab 08/05/20  0335 08/06/20  0255   * 149*   K 4.3 4.7    104   CO2 38* 39*   BUN 30* 30*   CREATININE 0.8 0.7   CALCIUM 8.3* 8.7   MG 2.8* 3.1*   PHOS 1.5* 2.0*        Cardiac Enzymes: Ejection Fractions:    No results for input(s): CPK, CPKMB, MB, TROPONINI in the  last 72 hours. Nuc Rest EF   Date Value Ref Range Status   06/11/2020 77  Final        POCT Glucose: HbA1c:    Recent Labs   Lab 08/04/20  0828 08/04/20  1126 08/04/20  1838   POCTGLUCOSE 247* 251* 277*    No results found for: HGBA1C        ICU LOS 18d 8h  Level of Care: Discharge to Hospice    Shift Summary/Plan for the shift: see plan of care note.

## 2020-08-06 NOTE — CONSULTS
Consult received for IP Hospice with [facility].  SW spoke with patient's spouse, Maria Luz Del Valle via telephone to discuss discharge plan for patient.  Patient's wife is in agreement with plan for patient to discharge to [facility]. Per wife, she has spoken with her family and will move forward with IP hospice since she was advised that nothing more could be done for patient.   Patient's wife stated that she believed that she had been contacted by the provider ([facility]).      SW established contact with [facility] representative, Angelina Vidal, to advise of referral.  Representative checked on bed availability and reported that there was one bed available.  Representative will followup with patient's spouse.      Clinicals and Orders sent to [facility] via [facility].  SW awaiting followup from [facility].    Patient's choice form filed in patient's chart.    Susy Rose LMSW, [facility]  8/6/20

## 2020-08-06 NOTE — NURSING
0430: Notified MD Zendejas that patient Na+ trending upwards. 146 last night to 149 tonight. D5 500cc bolus ordered. Will continue to monitor.

## 2020-08-06 NOTE — SUBJECTIVE & OBJECTIVE
Interval History: Not interactive or responsive. On continuous BiPAP. Agonal breathing.     Review of Systems   Unable to perform ROS: Mental status change     Objective:     Vital Signs (Most Recent):  Temp: 98.1 °F (36.7 °C) (08/06/20 0715)  Pulse: 91 (08/06/20 0930)  Resp: (!) 24 (08/06/20 0915)  BP: (!) 171/70 (08/06/20 0900)  SpO2: (!) 94 % (08/06/20 0930) Vital Signs (24h Range):  Temp:  [97.2 °F (36.2 °C)-98.4 °F (36.9 °C)] 98.1 °F (36.7 °C)  Pulse:  [81-99] 91  Resp:  [] 24  SpO2:  [22 %-100 %] 94 %  BP: (129-189)/(58-79) 171/70     Weight: 76.1 kg (167 lb 12.3 oz)  Body mass index is 26.28 kg/m².    Intake/Output Summary (Last 24 hours) at 8/6/2020 1306  Last data filed at 8/6/2020 1000  Gross per 24 hour   Intake 149.67 ml   Output 1300 ml   Net -1150.33 ml      Physical Exam  Constitutional:       Comments: Not alert   HENT:      Head: Normocephalic and atraumatic.      Comments: BiPAP on  Cardiovascular:      Rate and Rhythm: Normal rate and regular rhythm.      Pulses: Normal pulses.      Heart sounds: Normal heart sounds.   Pulmonary:      Comments: Decreased breath sounds bilaterally  Abdominal:      General: Abdomen is flat. Bowel sounds are normal. There is no distension.      Tenderness: There is no abdominal tenderness. There is no guarding.   Genitourinary:     Comments: Pollack in place  Skin:     Comments: R arm PICC. Purple discoloration to L great toe   Neurological:      Comments: Not interactive. Does not follow commands or open eyes         Significant Labs: All pertinent labs within the past 24 hours have been reviewed.    Significant Imaging: I have reviewed and interpreted all pertinent imaging results/findings within the past 24 hours.

## 2020-08-06 NOTE — PROGRESS NOTES
F/u call received from Angelina with [facility] inquiring if patient will need a BIPAP at discharge.  Per Angelina, if patient needs BIPAP, he cannot be accepted for IP services.  KAR sent a secure message to AIDA Clinton.  Awaiting response.  Susy Rose LMSW, [facility]  8/6/20

## 2020-08-07 NOTE — NURSING
[facility]  ICU Multidisciplinary Bedside Rounds   SUMMARY     Date: 8/7/2020    Prehospitalization: Home  Admit Date / LOS : 7/18/2020/ 20 days    Diagnosis: Acute hypoxemic respiratory failure    Consults:        Active: Palliative and Pulm CC        Needed: N/A     Code Status: DNR   Advanced Directive: <no information>    LDA: Pollack and PICC       Central Lines/Site/Justification:Multiple GTTS       Urinary Cath/Order/Justification:Hospice/Palliative Care/Comfort Care    Vasopressors/Infusions:    morphine 2 mg/hr (08/07/20 0500)          GOALS: Volume/ Hemodynamic: N/A                     RASS: -4 deep sedation; no response to physical stimulation    CAM ICU: N/A  Pain Management: IV       Pain Controlled: yes     Rhythm: NSR    Respiratory Device: Bipap    Oxygen Concentration (%):  [50] 50             Most Recent SBT/ SAT: N/A       MOVE Screen: FAIL    VTE Prophylaxis: Mechanical  Mobility: Bedrest  Stress Ulcer Prophylaxis: Yes    Dietary: NPO  Tolerance: not applicable  /  Advancement: N/A    Isolation: Airborne and Contact and Droplet    Restraints: No    Significant Dates:  Post Op Date: N/A  Rescue Date: N/A  Imaging/ Diagnostics: N/A    Noteworthy Labs:  none    CBC/Anemia Labs: Coags:    Recent Labs   Lab 08/05/20  0335 08/06/20  0255   WBC 23.63* 25.89*   HGB 8.0* 8.2*   HCT 26.1* 28.5*   * 133*   * 105*   RDW 15.1* 15.0*    No results for input(s): PT, INR, APTT in the last 168 hours.     Chemistries:   Recent Labs   Lab 08/05/20  0335 08/06/20  0255   * 149*   K 4.3 4.7    104   CO2 38* 39*   BUN 30* 30*   CREATININE 0.8 0.7   CALCIUM 8.3* 8.7   MG 2.8* 3.1*   PHOS 1.5* 2.0*        Cardiac Enzymes: Ejection Fractions:    No results for input(s): CPK, CPKMB, MB, TROPONINI in the last 72 hours. Nuc Rest EF   Date Value Ref Range Status   06/11/2020 77  Final        POCT Glucose: HbA1c:    Recent Labs   Lab 08/06/20  0033 08/06/20  0518 08/06/20  1204    POCTGLUCOSE 266* 284* 294*    No results found for: HGBA1C     Needs from Care Team: see plan of care note.      ICU LOS 19d 8h  Level of Care: OK to Transfer Going to Hospice

## 2020-08-07 NOTE — NURSING
Family arrived to patient bedside for withdraw at 11:15. Patient pre-medicated prior to removal of bi-pap. Once bi-pap mask was removed, patient rapidly de-sated and became bradycardic, asystole was observed approx 10 minutes after mask removal. Dr. Fowler came to the bedside to pronounce, [facility] at bedside to  family. Ring removed from patient left ring finger and given to wife. PORTILLO ruled out all donations for this patient. The  has been contacted.

## 2020-08-07 NOTE — PLAN OF CARE
Patient remains in the ICU on BiPaP 50% O2. O2 sat 92%. NSR. Morphine gtt. Patient unresponsive to voice or painful stimuli. FC remains in the place with 725 cc UO. Comfort care measures in place. Family will come see patient in AM and plans to remove BiPaP AM. Plan of care reviewed with patient. No skin tears, falls or injuries during shift. Precautions met for all.

## 2020-08-07 NOTE — SUBJECTIVE & OBJECTIVE
1100: Palliative medicine follow up visit.  Patient remains on bipap after several unsuccessful attempts at weaning bipap. Patient's family would like to transition to comfort care/hospice.  Discussed inpatient hospice but [facility] unable to accept on Bipap. Patient's family requested to visit prior to transition to full comfort care and this was approved by charge RN and allowed per current visiting policy.  Family arrived at 1100 and were allowed to visit.  They requested that they have a few minutes to say goodbye prior to removing bipap.  Discussed with Dr. Magallanes and CHRISTOPHER Yeung that plan is to allow visitation, administer prn dosages of ativan and morphine approx 30 minutes prior to removal of Bipap.  It is anticipated that patient will not survive long without bipap.      1200:  Notified by Dr. Lyons that patient passed away.  Sent notification to Chaplain Donald and requested  visitation for family, at bedside.          Medications:  Continuous Infusions:   morphine 2 mg/hr (08/07/20 1100)     Scheduled Meds:   sodium chloride 0.9%  10 mL Intravenous Q6H     PRN Meds:lorazepam, morphine, sodium chloride 0.9%, Flushing PICC Protocol **AND** sodium chloride 0.9% **AND** sodium chloride 0.9%    Objective:     Vital Signs (Most Recent):  Temp: 98.5 °F (36.9 °C) (08/07/20 0400)  Pulse: 94 (08/07/20 1119)  Resp: (!) 24 (08/07/20 1119)  BP: (!) 121/59 (08/07/20 1100)  SpO2: (!) 91 % (08/07/20 1119) Vital Signs (24h Range):  Temp:  [98.4 °F (36.9 °C)-98.5 °F (36.9 °C)] 98.5 °F (36.9 °C)  Pulse:  [] 94  Resp:  [7-40] 24  SpO2:  [89 %-95 %] 91 %  BP: (116-136)/(55-65) 121/59     Weight: 76.1 kg (167 lb 12.3 oz)  Body mass index is 26.28 kg/m².    Physical Exam  Vitals signs and nursing note reviewed.   Constitutional:       Comments: Physical exam deferred due to risk of spread of covid-19.  Patient visualized through glass door. Bipap remains in place.          Advance Care Planning    Review of Symptoms              Advanced Directives:  Living Will:  Yes.  Copy on chart:  Yes    [place]:  Yes    Do Not Resuscitate Status:  Yes    Medical Power of : Yes           Significant Labs: All pertinent labs within the past 24 hours have been reviewed.  CBC:   Recent Labs   Lab 08/06/20  0255   WBC 25.89*   HGB 8.2*   HCT 28.5*   *   *     BMP:  No results for input(s): GLU, NA, K, CL, CO2, BUN, CREATININE, CALCIUM, MG in the last 24 hours.  LFT:  Lab Results   Component Value Date    AST 29 07/26/2020    ALKPHOS 150 (H) 07/26/2020    BILITOT 0.5 07/26/2020     Albumin:   Albumin   Date Value Ref Range Status   07/26/2020 2.2 (L) 3.5 - 5.2 g/dL Final     Protein:   Total Protein   Date Value Ref Range Status   07/26/2020 5.6 (L) 6.0 - 8.4 g/dL Final     Lactic acid:   Lab Results   Component Value Date    LACTATE 2.2 07/18/2020       Significant Imaging: I have reviewed all pertinent imaging results/findings within the past 24 hours.

## 2020-08-07 NOTE — PROGRESS NOTES
[id number]:  Phone call to patient's family,  to continue C discussion.  Patient's wife, Maria Luz,  confirms that she would like for patient to transition to comfort care today after family visits.  Discussed how emotionally difficult this has been for her and her family.  Time was allowed for her to express her emotions and emotional support provided.  Discussed transition to comfort care in detail, including pre-medications for bipap removal, bipap removal, possible outcomes including death.  It is expected that patient will not live long after bipap removal but hospice transition can be discussed if he is able to be weaned from bipap.  Discussed current visiting policies and that patient's children will be allowed to visit but it places them at risk as patient is covid positive. Reviewed PPE precautions and all are in agreement to continue with visitation. All questions answered.            25 minutes spent in advance care planning. > 50% of 20 min visit spent in  emotional support.

## 2020-08-07 NOTE — PROGRESS NOTES
[facility]  Palliative Medicine  Progress Note    Patient Name: Joo York  MRN: [id number]  Admission Date: 7/18/2020  Hospital Length of Stay: 20 days  Code Status: DNR   Attending Provider: Melissa Lyons MD  Consulting Provider: Mary Beth Sanchez NP  Primary Care Physician: Keegan Watson MD  Principal Problem:Acute hypoxemic respiratory failure    Patient information was obtained from spouse/SO, relative(s) and ER records.      Assessment/Plan:     Plan:  -Patient's family to visit prior to transition to comfort care.     I will follow-up with patient. Please contact us if you have any additional questions.    Subjective:     Chief Complaint:   Chief Complaint   Patient presents with    COVID-19 Concerns     Pt reports he tested positive for covid-19 2 days ago. Pt c/o SOB, chills x3 days, cough that started today. Denies sore throat, abd pain, N/V/D. Denies pain at this time       HPI:   Joo York is a 80 y.o. male that (in part)  has a past medical history of Claudication, Essential hypertension, Essential hypertension, Hematuria, Hernia of abdominal cavity, and Multifocal pneumonia.  has a past surgical history that includes Vasectomy; Tonsillectomy; office cysto 2018; Cystoscopy with biopsy of bladder (N/A, 9/5/2018); and Atherectomy (1/23/2020). Presents to [facility] Emergency Department with report of shortness of breath, fever, chills last 3 days.  He was tested for COVID-19 2 days ago and positive.  He said dyspnea with exertion and a cough that started today.  Denies hemoptysis, stridor, or night sweats.  No nausea, vomiting diarrhea reported.  Patient is a former smoker.  Denies home oxygen use.  Code status discussed in the most remain a DNR DNI.  The family is aware of these wishes.  History is limited to respiratory distress as patient is speaking in short sentences.     In the emergency department routine laboratory studies and chest x-ray obtained.   Evidence of bilateral pneumonia.  Was supplemental oxygen and dexamethasone.  O2 sat 67% air prior to oxygen supplementation.  Okay to escalate to CPAP/BiPAP as.  Does not want to be intubated.     Hospital medicine has been asked to admit to inpatient in the ICU for further evaluation and treatment.      Overview/Hospital Course:  Mr. York presented with shortness of breath and fever.  He was admitted for acute respiratory failure secondary to COVID-19. Placed on BiPAP. Code status DNR. Completed course of empiric antibiotics for elevated procalcitonin level, course of dexamethasone 6 mg daily and Remdesivir. Also on full dose lovenox for hypercoagulable state. Diuresed intermittently. He slowly improved and de-escalated to vapotherm NC. On 7/26, patient had melena and acute drop in Hgb from 16 to 12 in 24 hours. BP stable and respirations were not affected. Placed on pantoprazole IV 40 mg BID and held ASA/plavix which he uses for PVD. Repeat Hgb levels remained stable and amount of melena decreased. Given such high risk for thromboembolism, he continued on enoxaparin. Noted increased melena and lovenox stopped, and changed IV PPI to Q12 and increased H/H surveillance and GI consulted.  Transfused 2 units of packed red blood cells and PPI changed to infusion.  No further bleeding and H/H overall stable. Empiric Zosyn and vancomycin initiated for continued worsening leukocytosis.  Blood cultures no growth to date.  Sputum culture NGTD. Intermittent high fever and Vancomycin levels were subtherapeutic - Zyvox added and Vancomycin stopped.. Zyvox stopped on 8/1.    Hospital Course:  No notes on file    1100: Palliative medicine follow up visit.  Patient remains on bipap after several unsuccessful attempts at weaning bipap. Patient's family would like to transition to comfort care/hospice.  Discussed inpatient hospice but [facility] unable to accept on Bipap. Patient's family requested to visit prior to  transition to full comfort care and this was approved by charge RN and allowed per current visiting policy.  Family arrived at 1100 and were allowed to visit.  They requested that they have a few minutes to say goodbye prior to removing bipap.  Discussed with Dr. Magallanes and CHRISTOPHER Yeung that plan is to allow visitation, administer prn dosages of ativan and morphine approx 30 minutes prior to removal of Bipap.  It is anticipated that patient will not survive long without bipap.      1200:  Notified by Dr. Lyons that patient passed away.  Sent notification to Chaplain Donald and requested  visitation for family, at bedside.          Medications:  Continuous Infusions:   morphine 2 mg/hr (08/07/20 1100)     Scheduled Meds:   sodium chloride 0.9%  10 mL Intravenous Q6H     PRN Meds:lorazepam, morphine, sodium chloride 0.9%, Flushing PICC Protocol **AND** sodium chloride 0.9% **AND** sodium chloride 0.9%    Objective:     Vital Signs (Most Recent):  Temp: 98.5 °F (36.9 °C) (08/07/20 0400)  Pulse: 94 (08/07/20 1119)  Resp: (!) 24 (08/07/20 1119)  BP: (!) 121/59 (08/07/20 1100)  SpO2: (!) 91 % (08/07/20 1119) Vital Signs (24h Range):  Temp:  [98.4 °F (36.9 °C)-98.5 °F (36.9 °C)] 98.5 °F (36.9 °C)  Pulse:  [] 94  Resp:  [7-40] 24  SpO2:  [89 %-95 %] 91 %  BP: (116-136)/(55-65) 121/59     Weight: 76.1 kg (167 lb 12.3 oz)  Body mass index is 26.28 kg/m².    Physical Exam  Vitals signs and nursing note reviewed.   Constitutional:       Comments: Physical exam deferred due to risk of spread of covid-19.  Patient visualized through glass door. Bipap remains in place.          Advance Care Planning   Review of Symptoms              Advanced Directives:  Living Will:  Yes.  Copy on chart:  Yes    LaPOST:  Yes    Do Not Resuscitate Status:  Yes    Medical Power of : Yes           Significant Labs: All pertinent labs within the past 24 hours have been reviewed.  CBC:   Recent Labs   Lab 08/06/20  5419    WBC 25.89*   HGB 8.2*   HCT 28.5*   *   *     BMP:  No results for input(s): GLU, NA, K, CL, CO2, BUN, CREATININE, CALCIUM, MG in the last 24 hours.  LFT:  Lab Results   Component Value Date    AST 29 07/26/2020    ALKPHOS 150 (H) 07/26/2020    BILITOT 0.5 07/26/2020     Albumin:   Albumin   Date Value Ref Range Status   07/26/2020 2.2 (L) 3.5 - 5.2 g/dL Final     Protein:   Total Protein   Date Value Ref Range Status   07/26/2020 5.6 (L) 6.0 - 8.4 g/dL Final     Lactic acid:   Lab Results   Component Value Date    LACTATE 2.2 07/18/2020       Significant Imaging: I have reviewed all pertinent imaging results/findings within the past 24 hours.      > 50% of 60 min visit spent in chart review, face to face discussion of goals of care,  symptom assessment, coordination of care and emotional support.    Mary Beth Sanchez NP  Palliative Medicine  [facility]

## 2020-08-07 NOTE — SIGNIFICANT EVENT
Patient asystole. Called to bedside. Family present. No heart or lung sounds. Pronounced  2020 at 11:50AM. Cause of death: acute hypoxic respiratory failure due to COVID19.     Melissa Lyons MD  2020 12:09 PM

## 2020-08-10 NOTE — PHYSICIAN QUERY
PT Name: Joo York  MR #: [id number]     PRESENT ON ADMISSION (POA) DIAGNOSIS CLARIFICATION     CDS: Jolene Mccartney RN        Contact information:[email or web address] or (cell) [phone]    This form is a permanent document in the medical record.     Query Date: August 10, 2020    By submitting this query, we are merely seeking further clarification of documentation.  Please utilize your independent clinical judgment when addressing the question(s) below.     The Medical Record contains following diagnoses documented:    Clinical Information Location in Medical Record   [ x   ] Septic Shock due to (specify source) ___COVID19_____    80 y.o. male with a PMHx of hypertension who presents to the Emergency Department with a cc of worsening shortness of breath for two days. Patient states he tested positive for COVID-19 two days ago. He reports a cough for several weeks as well as fever, chills, myalgias, and fatigue    Acute Hypoxemia secondary to SARS-CoV-2 (COVID-19) with evidence of bilateral pneumonia  As evidenced by history, physical exam, xray imaging, and laboratory studies  COVID-19 rapid testing positive  CRP, LDH, and ferritin markedly elevated  Maintain airborne isolation / droplet / contact precautions  Supplemental oxygen to maintain SpO2 >92%  Supportive care  Limit all Visitors  Avoid  nebulizer treatments to minimize aerosolization     7/18  14:05 7/19 04:21 7/21  03:44 7/22  02:55 7/24  04:31 7/28  04:03 7/30  16:15 8/2  03:30 8/3  03:30 8/4  04:40 8/5  03:35 8/6  02:55   WBC 9.82 10.01 17.44  14.25  12.87  32.59  20.85  24.84  19.57  20.76  23.63  25.89       7/18  14:05   Lactate 2.2      7/18  14:05   Procal 10.00      7/16  12:00   SARS-CoV2 (COVID-19) Qualitative PCR Detected        Initial Vital Signs 7/18 @ 1314: HR 81 RR  26 Temp 99.5 SpO2 77%  7/19 @ 0730: HR 55 RR 45 Temp 97.7 SpO2 91%  7/23 @ 0811: HR 49 RR 32 Temp 99.9 SpO2 91%  7/29 @ 2301: HR 77 RR 35 Temp 100.1 SpO2  100%                Query Answer 8/3    ED Note 7/18        H&P 7/18                    Lab Results                                             Vital Signs       Present on admission (POA) is defined as present at the time inpatient admission occurs. Conditions that develop during an outpatient encounter, including emergency department, observation or outpatient surgery, are considered as present on admission. [facility] 4th Quarter 2008      Please clarify the Present on Admission (POA) status of the diagnosis: Sepsis    Present on admission (POA) status:   [   ] Yes (Y)             Clinically Undetermined (W)        [x   ] No (N)                 [   ] Documentation insufficient to determine if condition is POA (U)

## 2020-08-18 ENCOUNTER — POST MORTEM DOCUMENTATION (OUTPATIENT)
Dept: OTHER | Facility: OTHER | Age: 80
End: 2020-08-18

## 2020-08-18 NOTE — PROGRESS NOTES
Patient enrolled in [facility]. Received notice of his passing. Will dis-enroll.

## 2020-11-24 NOTE — Clinical Note
Text sent to Dr. Hightower. Hemigard Intro: Due to skin fragility and wound tension, it was decided to use HEMIGARD adhesive retention suture devices to permit a linear closure. The skin was cleaned and dried for a 6cm distance away from the wound. Excessive hair, if present, was removed to allow for adhesion.

## 2021-04-10 NOTE — PROGRESS NOTES
"Subjective:       Patient ID: Joo York is a 79 y.o. male.    Chief Complaint: Foot Pain    Bilateral foot/calf pain x two months    HPI: 80 y/o presents to discuss bilateral calf pain. Gets "cramping" pain from plantar surface of foot to posterior calf when trying to walk on treadmill for any time > 10 minutes. Pain relief with stopping no rest pain no swelling/edema. No skin ulcers or breakdown. Denies chest pain or shortness of breath. No history of CAD /CVA    Review of Systems   Constitutional: Negative for activity change, appetite change, fatigue, fever and unexpected weight change.   HENT: Negative for ear pain, rhinorrhea and sore throat.    Eyes: Negative for discharge and visual disturbance.   Respiratory: Negative for chest tightness, shortness of breath and wheezing.    Cardiovascular: Negative for chest pain, palpitations and leg swelling.   Gastrointestinal: Negative for abdominal pain, constipation and diarrhea.   Endocrine: Negative for cold intolerance and heat intolerance.   Genitourinary: Negative for dysuria and hematuria.   Musculoskeletal: Negative for joint swelling and neck stiffness.   Skin: Negative for rash.   Neurological: Negative for dizziness, syncope, weakness and headaches.   Psychiatric/Behavioral: Negative for suicidal ideas.       Objective:     Vitals:    10/29/19 1514   BP: 130/72   Pulse: (!) 58   Temp: 98.8 °F (37.1 °C)   TempSrc: Oral   SpO2: 96%   Weight: 78.6 kg (173 lb 4.5 oz)   Height: 5' 7" (1.702 m)          Physical Exam   Constitutional: He is oriented to person, place, and time. He appears well-developed and well-nourished.   HENT:   Head: Normocephalic and atraumatic.   Eyes: Pupils are equal, round, and reactive to light. Conjunctivae are normal.   Neck: Normal range of motion.   Cardiovascular: Normal rate and regular rhythm. Exam reveals no gallop and no friction rub.   No murmur heard.  Loss of hair growth from mid tibia distally to foot, skin is dry of " bilateral feet but intact, DP non palapble AT on right 1+ non palp(able on left   Pulmonary/Chest: Effort normal and breath sounds normal. He has no wheezes. He has no rales.   Abdominal: Soft. Bowel sounds are normal. There is no tenderness. There is no rebound and no guarding.   Musculoskeletal: Normal range of motion. He exhibits no edema or tenderness.   Neurological: He is alert and oriented to person, place, and time. No cranial nerve deficit.   Skin: Skin is warm and dry.   Psychiatric: He has a normal mood and affect.       Assessment and Plan   1. Claudication  screenig with [place] if no evidence of significant stenosis consider achilles tendon patholgoy  - CV Ankle Brachial Indices WO Stress W Toe Tracings; Future    2. Need for pneumococcal vaccine  Prescription sent to his pharmacy for PCV13 #2  - Pneumococcal Conjugate Vaccine (13 Valent) (IM)   Statement Selected

## 2022-05-26 NOTE — Clinical Note
The sheath is sutured in place.  Detail Level: Detailed Curette Text (Optional): After the contents were expressed a curette was used to partially remove the cyst wall. Suture Text: The incision was partially closed with Post-Care Instructions: I reviewed with the patient in detail post-care instructions. Patient should keep wound covered and call the office should any redness, pain, swelling or worsening occur. Render Postcare In Note?: No Size Of Lesion In Cm (Optional But May Be Required For Some Insurances): 0 Epidermal Sutures: 4-0 Ethilon Epidermal Closure: simple interrupted Preparation Text: The area was prepped in the usual clean fashion. Method: 11 blade Dressing: dry sterile dressing Lesion Type: Cyst Consent was obtained and risks were reviewed including but not limited to delayed wound healing, infection, need for multiple I and D's, and pain.

## 2023-01-05 NOTE — TELEPHONE ENCOUNTER
Spoke to pt he just wanted to clarify should he stop his multivitamin I did advise him yes he should.-jannette   Thalidomide Counseling: I discussed with the patient the risks of thalidomide including but not limited to birth defects, anxiety, weakness, chest pain, dizziness, cough and severe allergy.

## 2023-02-01 NOTE — TELEPHONE ENCOUNTER
Reason for Disposition   MODERATE difficulty breathing (e.g., speaks in phrases, SOB even at rest, pulse 100-120)    Additional Information   Negative: SEVERE difficulty breathing (e.g., struggling for each breath, speaks in single words)   Negative: Difficult to awaken or acting confused (e.g., disoriented, slurred speech)   Negative: Bluish (or gray) lips or face now   Negative: Shock suspected (e.g., cold/pale/clammy skin, too weak to stand, low BP, rapid pulse)   Negative: Sounds like a life-threatening emergency to the triager   Negative: SEVERE or constant chest pain or pressure (Exception: mild central chest pain, present only when coughing)    Protocols used: CORONAVIRUS (COVID-19) DIAGNOSED OR [id number]    Pt's daughter in law Sommer and Pt's son called to report the Pt was diagnosed with COVID on Thursday. Stated last night the Pt struggled to breath for about 4 hours. Stated Pt is refusing to go to the hospital. Pt is very sob now even when resting. Spoke with Pt and explained delay in care would cause more harm. Pt agreed to go to ED. Per triage protocol, advised Son to take to ED for evaluation. Advised to let triage Nurse at ED know Pt is COVID positive before bringing Pt inside. Son and daughter-in-law verbalized understanding.   [Negative] : Genitourinary

## (undated) DEVICE — KIT HAND CONTROL HIGH PRESSUR

## (undated) DEVICE — GLOVE SURG BIOGEL LATEX SZ 7.5

## (undated) DEVICE — INFLATOR ADVANTAGE ENCORE 26

## (undated) DEVICE — KIT INTRODUCER MICROPUNCTR 4F

## (undated) DEVICE — LUBRICANT VIPERSLIDE 100ML BAG

## (undated) DEVICE — CATH LUTONIX DCB 035X135X6X40

## (undated) DEVICE — KIT SYR REUSABLE

## (undated) DEVICE — SUPPORT ULNA NERVE PROTECTOR

## (undated) DEVICE — GOWN B1 X-LG X-LONG

## (undated) DEVICE — GUIDEWIRE VIPER FIRM .014

## (undated) DEVICE — KIT MANIFOLD LOW PRESS TUBING

## (undated) DEVICE — CONTRAST VISIPAQUE 150ML

## (undated) DEVICE — GUIDEWIRE STIFF ANG .035 X 260

## (undated) DEVICE — CATH SEEKER .035IN 150CM

## (undated) DEVICE — GLOVE BIOGEL SKINSENSE PI 7.0

## (undated) DEVICE — GUIDEWIRE SAFE T J TIP 145X2.5

## (undated) DEVICE — SHEATH INTRO PINNACLE 7F 10CM

## (undated) DEVICE — PACK CATH LAB

## (undated) DEVICE — CATH DIAMONDBACK 1.5MM 145CM

## (undated) DEVICE — CANISTER SUCTION 2 LTR

## (undated) DEVICE — ELECTRODE REM PLYHSV RETURN 9

## (undated) DEVICE — SHEATH DESTINATION 6F X 45CM

## (undated) DEVICE — OMNIPAQUE 350MG 150ML VIAL

## (undated) DEVICE — Device

## (undated) DEVICE — CATH 5FR OMNIFLUSH 65CM .038

## (undated) DEVICE — MAT QUICK 40X30 FLOOR FLUID LF

## (undated) DEVICE — INTRODUCER CATH 6F 11CM

## (undated) DEVICE — COVER SNAP KAP 26IN

## (undated) DEVICE — SOL IRR WATER STRL 3000 ML